# Patient Record
Sex: MALE | Race: WHITE | NOT HISPANIC OR LATINO | Employment: OTHER | URBAN - METROPOLITAN AREA
[De-identification: names, ages, dates, MRNs, and addresses within clinical notes are randomized per-mention and may not be internally consistent; named-entity substitution may affect disease eponyms.]

---

## 2021-03-20 ENCOUNTER — HOSPITAL ENCOUNTER (INPATIENT)
Facility: HOSPITAL | Age: 41
LOS: 20 days | Discharge: NURSING, REHAB OR LONG-TERM CARE | DRG: 065 | End: 2021-04-09
Attending: EMERGENCY MEDICINE | Admitting: HOSPITALIST
Payer: MEDICAID

## 2021-03-20 DIAGNOSIS — I63.9 STROKE: ICD-10-CM

## 2021-03-20 DIAGNOSIS — G93.40 ACUTE ENCEPHALOPATHY: ICD-10-CM

## 2021-03-20 DIAGNOSIS — R41.82 ALTERED MENTAL STATUS, UNSPECIFIED ALTERED MENTAL STATUS TYPE: ICD-10-CM

## 2021-03-20 DIAGNOSIS — L02.423: ICD-10-CM

## 2021-03-20 DIAGNOSIS — R41.82 AMS (ALTERED MENTAL STATUS): ICD-10-CM

## 2021-03-20 DIAGNOSIS — I16.1 HYPERTENSIVE EMERGENCY: ICD-10-CM

## 2021-03-20 DIAGNOSIS — L03.114 CELLULITIS OF LEFT FOREARM: ICD-10-CM

## 2021-03-20 DIAGNOSIS — I61.0 BASAL GANGLIA HEMORRHAGE: Primary | ICD-10-CM

## 2021-03-20 LAB
ABO + RH BLD: NORMAL
ALBUMIN SERPL BCP-MCNC: 4.2 G/DL (ref 3.5–5.2)
ALP SERPL-CCNC: 76 U/L (ref 55–135)
ALT SERPL W/O P-5'-P-CCNC: 9 U/L (ref 10–44)
AMMONIA PLAS-SCNC: 20 UMOL/L (ref 10–50)
AMPHET+METHAMPHET UR QL: NEGATIVE
ANION GAP SERPL CALC-SCNC: 8 MMOL/L (ref 8–16)
APTT PPP: 30.1 SEC (ref 23.6–33.3)
APTT PPP: 39.5 SEC (ref 23.6–33.3)
AST SERPL-CCNC: 20 U/L (ref 10–40)
BACTERIA #/AREA URNS HPF: ABNORMAL /HPF
BACTERIA #/AREA URNS HPF: NEGATIVE /HPF
BARBITURATES UR QL SCN>200 NG/ML: NEGATIVE
BASOPHILS # BLD AUTO: 0.05 K/UL (ref 0–0.2)
BASOPHILS NFR BLD: 0.3 % (ref 0–1.9)
BENZODIAZ UR QL SCN>200 NG/ML: NEGATIVE
BILIRUB SERPL-MCNC: 1.7 MG/DL (ref 0.1–1)
BILIRUB UR QL STRIP: NEGATIVE
BILIRUB UR QL STRIP: NEGATIVE
BLD GP AB SCN CELLS X3 SERPL QL: NORMAL
BNP SERPL-MCNC: 113 PG/ML (ref 0–99)
BUN SERPL-MCNC: 22 MG/DL (ref 6–20)
BZE UR QL SCN: NEGATIVE
CALCIUM SERPL-MCNC: 9.4 MG/DL (ref 8.7–10.5)
CANNABINOIDS UR QL SCN: NEGATIVE
CHLORIDE SERPL-SCNC: 105 MMOL/L (ref 95–110)
CHOLEST SERPL-MCNC: 210 MG/DL (ref 120–199)
CHOLEST/HDLC SERPL: 5.1 {RATIO} (ref 2–5)
CLARITY UR: CLEAR
CLARITY UR: CLEAR
CO2 SERPL-SCNC: 24 MMOL/L (ref 23–29)
COLOR UR: YELLOW
COLOR UR: YELLOW
CREAT SERPL-MCNC: 2.3 MG/DL (ref 0.5–1.4)
CREAT SERPL-MCNC: 2.5 MG/DL (ref 0.5–1.4)
CREAT UR-MCNC: 229 MG/DL (ref 23–375)
DIFFERENTIAL METHOD: ABNORMAL
EOSINOPHIL # BLD AUTO: 0.3 K/UL (ref 0–0.5)
EOSINOPHIL NFR BLD: 2.3 % (ref 0–8)
ERYTHROCYTE [DISTWIDTH] IN BLOOD BY AUTOMATED COUNT: 13.9 % (ref 11.5–14.5)
EST. GFR  (AFRICAN AMERICAN): 39.9 ML/MIN/1.73 M^2
EST. GFR  (NON AFRICAN AMERICAN): 34.5 ML/MIN/1.73 M^2
ESTIMATED AVG GLUCOSE: 117 MG/DL (ref 68–131)
FOLATE SERPL-MCNC: 6.3 NG/ML (ref 4–24)
GLUCOSE SERPL-MCNC: 111 MG/DL (ref 70–110)
GLUCOSE SERPL-MCNC: 93 MG/DL (ref 70–110)
GLUCOSE SERPL-MCNC: 94 MG/DL (ref 70–110)
GLUCOSE UR QL STRIP: NEGATIVE
GLUCOSE UR QL STRIP: NEGATIVE
HBA1C MFR BLD: 5.7 % (ref 4.5–6.2)
HCT VFR BLD AUTO: 41.1 % (ref 40–54)
HDLC SERPL-MCNC: 41 MG/DL (ref 40–75)
HDLC SERPL: 19.5 % (ref 20–50)
HGB BLD-MCNC: 13.2 G/DL (ref 14–18)
HGB UR QL STRIP: ABNORMAL
HGB UR QL STRIP: ABNORMAL
HYALINE CASTS #/AREA URNS LPF: 15 /LPF
HYALINE CASTS #/AREA URNS LPF: 27 /LPF
IMM GRANULOCYTES # BLD AUTO: 0.05 K/UL (ref 0–0.04)
IMM GRANULOCYTES NFR BLD AUTO: 0.3 % (ref 0–0.5)
INR PPP: 1.1
INR PPP: 1.1
KETONES UR QL STRIP: ABNORMAL
KETONES UR QL STRIP: NEGATIVE
LDLC SERPL CALC-MCNC: 146 MG/DL (ref 63–159)
LEUKOCYTE ESTERASE UR QL STRIP: NEGATIVE
LEUKOCYTE ESTERASE UR QL STRIP: NEGATIVE
LYMPHOCYTES # BLD AUTO: 2.1 K/UL (ref 1–4.8)
LYMPHOCYTES NFR BLD: 14.1 % (ref 18–48)
MAGNESIUM SERPL-MCNC: 1.8 MG/DL (ref 1.6–2.6)
MCH RBC QN AUTO: 27.6 PG (ref 27–31)
MCHC RBC AUTO-ENTMCNC: 32.1 G/DL (ref 32–36)
MCV RBC AUTO: 86 FL (ref 82–98)
MICROSCOPIC COMMENT: ABNORMAL
MICROSCOPIC COMMENT: ABNORMAL
MONOCYTES # BLD AUTO: 0.9 K/UL (ref 0.3–1)
MONOCYTES NFR BLD: 6.5 % (ref 4–15)
NEUTROPHILS # BLD AUTO: 11.1 K/UL (ref 1.8–7.7)
NEUTROPHILS NFR BLD: 76.5 % (ref 38–73)
NITRITE UR QL STRIP: NEGATIVE
NITRITE UR QL STRIP: NEGATIVE
NONHDLC SERPL-MCNC: 169 MG/DL
NRBC BLD-RTO: 0 /100 WBC
OPIATES UR QL SCN: NEGATIVE
PCP UR QL SCN>25 NG/ML: NEGATIVE
PH UR STRIP: 6 [PH] (ref 5–8)
PH UR STRIP: 6 [PH] (ref 5–8)
PLATELET # BLD AUTO: 199 K/UL (ref 150–350)
PMV BLD AUTO: 11.2 FL (ref 9.2–12.9)
POTASSIUM SERPL-SCNC: 3.7 MMOL/L (ref 3.5–5.1)
PROT SERPL-MCNC: 7.5 G/DL (ref 6–8.4)
PROT UR QL STRIP: ABNORMAL
PROT UR QL STRIP: ABNORMAL
PROTHROMBIN TIME: 13.3 SEC (ref 10.6–14.8)
PROTHROMBIN TIME: 13.8 SEC (ref 10.6–14.8)
RBC # BLD AUTO: 4.79 M/UL (ref 4.6–6.2)
RBC #/AREA URNS HPF: 1 /HPF (ref 0–4)
RBC #/AREA URNS HPF: 20 /HPF (ref 0–4)
SAMPLE: ABNORMAL
SARS-COV-2 RDRP RESP QL NAA+PROBE: NEGATIVE
SODIUM SERPL-SCNC: 137 MMOL/L (ref 136–145)
SP GR UR STRIP: 1.01 (ref 1–1.03)
SP GR UR STRIP: >=1.03 (ref 1–1.03)
SQUAMOUS #/AREA URNS HPF: 3 /HPF
SQUAMOUS #/AREA URNS HPF: 3 /HPF
TOXICOLOGY INFORMATION: NORMAL
TRIGL SERPL-MCNC: 115 MG/DL (ref 30–150)
TROPONIN I SERPL DL<=0.01 NG/ML-MCNC: <0.03 NG/ML
TSH SERPL DL<=0.005 MIU/L-ACNC: 1.82 UIU/ML (ref 0.34–5.6)
URN SPEC COLLECT METH UR: ABNORMAL
URN SPEC COLLECT METH UR: ABNORMAL
UROBILINOGEN UR STRIP-ACNC: NEGATIVE EU/DL
UROBILINOGEN UR STRIP-ACNC: NEGATIVE EU/DL
VIT B12 SERPL-MCNC: 212 PG/ML (ref 210–950)
WBC # BLD AUTO: 14.52 K/UL (ref 3.9–12.7)
WBC #/AREA URNS HPF: 2 /HPF (ref 0–5)
WBC #/AREA URNS HPF: 5 /HPF (ref 0–5)

## 2021-03-20 PROCEDURE — 63600175 PHARM REV CODE 636 W HCPCS: Performed by: INTERNAL MEDICINE

## 2021-03-20 PROCEDURE — 63600175 PHARM REV CODE 636 W HCPCS: Performed by: HOSPITALIST

## 2021-03-20 PROCEDURE — 82140 ASSAY OF AMMONIA: CPT | Performed by: HOSPITALIST

## 2021-03-20 PROCEDURE — 80061 LIPID PANEL: CPT | Performed by: HOSPITALIST

## 2021-03-20 PROCEDURE — 85610 PROTHROMBIN TIME: CPT | Mod: 91 | Performed by: EMERGENCY MEDICINE

## 2021-03-20 PROCEDURE — 86900 BLOOD TYPING SEROLOGIC ABO: CPT | Performed by: HOSPITALIST

## 2021-03-20 PROCEDURE — 85610 PROTHROMBIN TIME: CPT | Performed by: HOSPITALIST

## 2021-03-20 PROCEDURE — 86592 SYPHILIS TEST NON-TREP QUAL: CPT | Performed by: HOSPITALIST

## 2021-03-20 PROCEDURE — 82962 GLUCOSE BLOOD TEST: CPT

## 2021-03-20 PROCEDURE — C9113 INJ PANTOPRAZOLE SODIUM, VIA: HCPCS | Performed by: HOSPITALIST

## 2021-03-20 PROCEDURE — 83880 ASSAY OF NATRIURETIC PEPTIDE: CPT | Performed by: EMERGENCY MEDICINE

## 2021-03-20 PROCEDURE — 80307 DRUG TEST PRSMV CHEM ANLYZR: CPT | Performed by: EMERGENCY MEDICINE

## 2021-03-20 PROCEDURE — 99900035 HC TECH TIME PER 15 MIN (STAT)

## 2021-03-20 PROCEDURE — 85025 COMPLETE CBC W/AUTO DIFF WBC: CPT | Performed by: EMERGENCY MEDICINE

## 2021-03-20 PROCEDURE — 80053 COMPREHEN METABOLIC PANEL: CPT | Performed by: EMERGENCY MEDICINE

## 2021-03-20 PROCEDURE — 93010 EKG 12-LEAD: ICD-10-PCS | Mod: ,,, | Performed by: SPECIALIST

## 2021-03-20 PROCEDURE — 99291 CRITICAL CARE FIRST HOUR: CPT

## 2021-03-20 PROCEDURE — 84484 ASSAY OF TROPONIN QUANT: CPT | Performed by: EMERGENCY MEDICINE

## 2021-03-20 PROCEDURE — 25000003 PHARM REV CODE 250: Performed by: HOSPITALIST

## 2021-03-20 PROCEDURE — 20000000 HC ICU ROOM

## 2021-03-20 PROCEDURE — 83735 ASSAY OF MAGNESIUM: CPT | Performed by: EMERGENCY MEDICINE

## 2021-03-20 PROCEDURE — 84443 ASSAY THYROID STIM HORMONE: CPT | Performed by: HOSPITALIST

## 2021-03-20 PROCEDURE — 81001 URINALYSIS AUTO W/SCOPE: CPT | Performed by: EMERGENCY MEDICINE

## 2021-03-20 PROCEDURE — 94761 N-INVAS EAR/PLS OXIMETRY MLT: CPT

## 2021-03-20 PROCEDURE — 25000003 PHARM REV CODE 250: Performed by: EMERGENCY MEDICINE

## 2021-03-20 PROCEDURE — 82607 VITAMIN B-12: CPT | Performed by: HOSPITALIST

## 2021-03-20 PROCEDURE — 99232 SBSQ HOSP IP/OBS MODERATE 35: CPT | Mod: ,,, | Performed by: NEUROLOGICAL SURGERY

## 2021-03-20 PROCEDURE — U0002 COVID-19 LAB TEST NON-CDC: HCPCS | Performed by: EMERGENCY MEDICINE

## 2021-03-20 PROCEDURE — 93010 ELECTROCARDIOGRAM REPORT: CPT | Mod: ,,, | Performed by: SPECIALIST

## 2021-03-20 PROCEDURE — 99232 PR SUBSEQUENT HOSPITAL CARE,LEVL II: ICD-10-PCS | Mod: ,,, | Performed by: NEUROLOGICAL SURGERY

## 2021-03-20 PROCEDURE — 82746 ASSAY OF FOLIC ACID SERUM: CPT | Performed by: HOSPITALIST

## 2021-03-20 PROCEDURE — 83036 HEMOGLOBIN GLYCOSYLATED A1C: CPT | Performed by: HOSPITALIST

## 2021-03-20 PROCEDURE — 85730 THROMBOPLASTIN TIME PARTIAL: CPT | Performed by: HOSPITALIST

## 2021-03-20 PROCEDURE — 85730 THROMBOPLASTIN TIME PARTIAL: CPT | Mod: 91 | Performed by: EMERGENCY MEDICINE

## 2021-03-20 PROCEDURE — 93005 ELECTROCARDIOGRAM TRACING: CPT | Performed by: SPECIALIST

## 2021-03-20 PROCEDURE — 36415 COLL VENOUS BLD VENIPUNCTURE: CPT | Performed by: HOSPITALIST

## 2021-03-20 PROCEDURE — 81001 URINALYSIS AUTO W/SCOPE: CPT | Mod: 91 | Performed by: HOSPITALIST

## 2021-03-20 PROCEDURE — 96365 THER/PROPH/DIAG IV INF INIT: CPT

## 2021-03-20 RX ORDER — DIPHENHYDRAMINE HYDROCHLORIDE 50 MG/ML
50 INJECTION INTRAMUSCULAR; INTRAVENOUS EVERY 4 HOURS PRN
Status: DISCONTINUED | OUTPATIENT
Start: 2021-03-21 | End: 2021-03-25

## 2021-03-20 RX ORDER — ONDANSETRON 2 MG/ML
4 INJECTION INTRAMUSCULAR; INTRAVENOUS EVERY 6 HOURS PRN
Status: DISCONTINUED | OUTPATIENT
Start: 2021-03-20 | End: 2021-04-09 | Stop reason: HOSPADM

## 2021-03-20 RX ORDER — SODIUM CHLORIDE 0.9 % (FLUSH) 0.9 %
10 SYRINGE (ML) INJECTION
Status: DISCONTINUED | OUTPATIENT
Start: 2021-03-20 | End: 2021-04-09 | Stop reason: HOSPADM

## 2021-03-20 RX ORDER — NICARDIPINE HYDROCHLORIDE 0.2 MG/ML
1 INJECTION INTRAVENOUS CONTINUOUS
Status: DISCONTINUED | OUTPATIENT
Start: 2021-03-20 | End: 2021-03-24

## 2021-03-20 RX ORDER — DIPHENHYDRAMINE HYDROCHLORIDE 50 MG/ML
50 INJECTION INTRAMUSCULAR; INTRAVENOUS EVERY 6 HOURS PRN
Status: DISCONTINUED | OUTPATIENT
Start: 2021-03-20 | End: 2021-03-20

## 2021-03-20 RX ORDER — ACETAMINOPHEN 325 MG/1
650 TABLET ORAL EVERY 6 HOURS PRN
Status: DISCONTINUED | OUTPATIENT
Start: 2021-03-20 | End: 2021-04-09 | Stop reason: HOSPADM

## 2021-03-20 RX ORDER — DIPHENHYDRAMINE HYDROCHLORIDE 50 MG/ML
50 INJECTION INTRAMUSCULAR; INTRAVENOUS EVERY 4 HOURS PRN
Status: DISCONTINUED | OUTPATIENT
Start: 2021-03-21 | End: 2021-03-20

## 2021-03-20 RX ORDER — PANTOPRAZOLE SODIUM 40 MG/10ML
40 INJECTION, POWDER, LYOPHILIZED, FOR SOLUTION INTRAVENOUS DAILY
Status: DISCONTINUED | OUTPATIENT
Start: 2021-03-20 | End: 2021-03-23

## 2021-03-20 RX ADMIN — PANTOPRAZOLE SODIUM 40 MG: 40 INJECTION, POWDER, FOR SOLUTION INTRAVENOUS at 04:03

## 2021-03-20 RX ADMIN — NICARDIPINE HYDROCHLORIDE 7 MG/HR: 0.2 INJECTION INTRAVENOUS at 06:03

## 2021-03-20 RX ADMIN — NICARDIPINE HYDROCHLORIDE 1 MG/HR: 0.2 INJECTION INTRAVENOUS at 01:03

## 2021-03-20 RX ADMIN — NICARDIPINE HYDROCHLORIDE 15 MG/HR: 0.2 INJECTION INTRAVENOUS at 10:03

## 2021-03-20 RX ADMIN — DIPHENHYDRAMINE HYDROCHLORIDE 50 MG: 50 INJECTION INTRAMUSCULAR; INTRAVENOUS at 07:03

## 2021-03-21 ENCOUNTER — CLINICAL SUPPORT (OUTPATIENT)
Dept: CARDIOLOGY | Facility: HOSPITAL | Age: 41
DRG: 065 | End: 2021-03-21
Attending: HOSPITALIST
Payer: MEDICAID

## 2021-03-21 VITALS — WEIGHT: 217.13 LBS | HEIGHT: 68 IN | BODY MASS INDEX: 32.91 KG/M2

## 2021-03-21 LAB
ANION GAP SERPL CALC-SCNC: 12 MMOL/L (ref 8–16)
AORTIC ROOT ANNULUS: 3.66 CM
AORTIC VALVE CUSP SEPERATION: 2.42 CM
AV INDEX (PROSTH): 1
AV MEAN GRADIENT: 5 MMHG
AV PEAK GRADIENT: 8 MMHG
AV VALVE AREA: 4.56 CM2
AV VELOCITY RATIO: 86.5
BASOPHILS # BLD AUTO: 0.05 K/UL (ref 0–0.2)
BASOPHILS NFR BLD: 0.4 % (ref 0–1.9)
BSA FOR ECHO PROCEDURE: 2.17 M2
BUN SERPL-MCNC: 20 MG/DL (ref 6–20)
CALCIUM SERPL-MCNC: 8.9 MG/DL (ref 8.7–10.5)
CHLORIDE SERPL-SCNC: 107 MMOL/L (ref 95–110)
CO2 SERPL-SCNC: 22 MMOL/L (ref 23–29)
CREAT SERPL-MCNC: 2.4 MG/DL (ref 0.5–1.4)
CV ECHO LV RWT: 0.67 CM
DIFFERENTIAL METHOD: ABNORMAL
DOP CALC AO PEAK VEL: 1.43 M/S
DOP CALC AO VTI: 22.06 CM
DOP CALC LVOT AREA: 4.6 CM2
DOP CALC LVOT DIAMETER: 2.41 CM
DOP CALC LVOT PEAK VEL: 123.69 M/S
DOP CALC LVOT STROKE VOLUME: 100.67 CM3
DOP CALCLVOT PEAK VEL VTI: 22.08 CM
E WAVE DECELERATION TIME: 190.62 MSEC
E/A RATIO: 0.96
E/E' RATIO: 11.08 M/S
ECHO LV POSTERIOR WALL: 1.56 CM (ref 0.6–1.1)
EOSINOPHIL # BLD AUTO: 0.1 K/UL (ref 0–0.5)
EOSINOPHIL NFR BLD: 0.5 % (ref 0–8)
ERYTHROCYTE [DISTWIDTH] IN BLOOD BY AUTOMATED COUNT: 13.6 % (ref 11.5–14.5)
ERYTHROCYTE [DISTWIDTH] IN BLOOD BY AUTOMATED COUNT: 13.6 % (ref 11.5–14.5)
EST. GFR  (AFRICAN AMERICAN): 37.9 ML/MIN/1.73 M^2
EST. GFR  (NON AFRICAN AMERICAN): 32.8 ML/MIN/1.73 M^2
FRACTIONAL SHORTENING: 31 % (ref 28–44)
GLUCOSE SERPL-MCNC: 116 MG/DL (ref 70–110)
HCT VFR BLD AUTO: 38.4 % (ref 40–54)
HCT VFR BLD AUTO: 38.4 % (ref 40–54)
HGB BLD-MCNC: 12.5 G/DL (ref 14–18)
HGB BLD-MCNC: 12.5 G/DL (ref 14–18)
IMM GRANULOCYTES # BLD AUTO: 0.05 K/UL (ref 0–0.04)
IMM GRANULOCYTES NFR BLD AUTO: 0.4 % (ref 0–0.5)
INTERVENTRICULAR SEPTUM: 1.57 CM (ref 0.6–1.1)
IVRT: 102.22 MSEC
LEFT ATRIUM SIZE: 3.63 CM
LEFT INTERNAL DIMENSION IN SYSTOLE: 3.22 CM (ref 2.1–4)
LEFT VENTRICLE MASS INDEX: 145 G/M2
LEFT VENTRICULAR INTERNAL DIMENSION IN DIASTOLE: 4.64 CM (ref 3.5–6)
LEFT VENTRICULAR MASS: 307.77 G
LV LATERAL E/E' RATIO: 9 M/S
LV SEPTAL E/E' RATIO: 14.4 M/S
LYMPHOCYTES # BLD AUTO: 1.4 K/UL (ref 1–4.8)
LYMPHOCYTES NFR BLD: 10.6 % (ref 18–48)
MAGNESIUM SERPL-MCNC: 1.8 MG/DL (ref 1.6–2.6)
MCH RBC QN AUTO: 27.9 PG (ref 27–31)
MCH RBC QN AUTO: 27.9 PG (ref 27–31)
MCHC RBC AUTO-ENTMCNC: 32.6 G/DL (ref 32–36)
MCHC RBC AUTO-ENTMCNC: 32.6 G/DL (ref 32–36)
MCV RBC AUTO: 86 FL (ref 82–98)
MCV RBC AUTO: 86 FL (ref 82–98)
MONOCYTES # BLD AUTO: 0.9 K/UL (ref 0.3–1)
MONOCYTES NFR BLD: 6.3 % (ref 4–15)
MV PEAK A VEL: 0.75 M/S
MV PEAK E VEL: 0.72 M/S
NEUTROPHILS # BLD AUTO: 11 K/UL (ref 1.8–7.7)
NEUTROPHILS NFR BLD: 81.8 % (ref 38–73)
NRBC BLD-RTO: 0 /100 WBC
PISA TR MAX VEL: 2.47 M/S
PLATELET # BLD AUTO: 215 K/UL (ref 150–350)
PLATELET # BLD AUTO: 215 K/UL (ref 150–350)
PMV BLD AUTO: 11 FL (ref 9.2–12.9)
PMV BLD AUTO: 11 FL (ref 9.2–12.9)
POTASSIUM SERPL-SCNC: 3.8 MMOL/L (ref 3.5–5.1)
PV PEAK VELOCITY: 113.42 CM/S
RA PRESSURE: 3 MMHG
RBC # BLD AUTO: 4.48 M/UL (ref 4.6–6.2)
RBC # BLD AUTO: 4.48 M/UL (ref 4.6–6.2)
RIGHT VENTRICULAR END-DIASTOLIC DIMENSION: 256 CM
SODIUM SERPL-SCNC: 141 MMOL/L (ref 136–145)
TDI LATERAL: 0.08 M/S
TDI SEPTAL: 0.05 M/S
TDI: 0.07 M/S
TR MAX PG: 24 MMHG
TV REST PULMONARY ARTERY PRESSURE: 27 MMHG
WBC # BLD AUTO: 13.42 K/UL (ref 3.9–12.7)
WBC # BLD AUTO: 13.42 K/UL (ref 3.9–12.7)

## 2021-03-21 PROCEDURE — C9113 INJ PANTOPRAZOLE SODIUM, VIA: HCPCS | Performed by: HOSPITALIST

## 2021-03-21 PROCEDURE — 25000003 PHARM REV CODE 250: Performed by: HOSPITALIST

## 2021-03-21 PROCEDURE — 99900035 HC TECH TIME PER 15 MIN (STAT)

## 2021-03-21 PROCEDURE — 36415 COLL VENOUS BLD VENIPUNCTURE: CPT | Performed by: INTERNAL MEDICINE

## 2021-03-21 PROCEDURE — 87040 BLOOD CULTURE FOR BACTERIA: CPT | Performed by: INTERNAL MEDICINE

## 2021-03-21 PROCEDURE — 93306 ECHO (CUPID ONLY): ICD-10-PCS | Mod: 26,,, | Performed by: INTERNAL MEDICINE

## 2021-03-21 PROCEDURE — 99233 PR SUBSEQUENT HOSPITAL CARE,LEVL III: ICD-10-PCS | Mod: ,,, | Performed by: NEUROLOGICAL SURGERY

## 2021-03-21 PROCEDURE — 20000000 HC ICU ROOM

## 2021-03-21 PROCEDURE — 94761 N-INVAS EAR/PLS OXIMETRY MLT: CPT

## 2021-03-21 PROCEDURE — 93306 TTE W/DOPPLER COMPLETE: CPT

## 2021-03-21 PROCEDURE — 97110 THERAPEUTIC EXERCISES: CPT

## 2021-03-21 PROCEDURE — 93306 TTE W/DOPPLER COMPLETE: CPT | Mod: 26,,, | Performed by: INTERNAL MEDICINE

## 2021-03-21 PROCEDURE — 63600175 PHARM REV CODE 636 W HCPCS: Performed by: HOSPITALIST

## 2021-03-21 PROCEDURE — 63600175 PHARM REV CODE 636 W HCPCS: Performed by: INTERNAL MEDICINE

## 2021-03-21 PROCEDURE — 97535 SELF CARE MNGMENT TRAINING: CPT

## 2021-03-21 PROCEDURE — 25000003 PHARM REV CODE 250: Performed by: INTERNAL MEDICINE

## 2021-03-21 PROCEDURE — 99233 SBSQ HOSP IP/OBS HIGH 50: CPT | Mod: ,,, | Performed by: NEUROLOGICAL SURGERY

## 2021-03-21 PROCEDURE — 85025 COMPLETE CBC W/AUTO DIFF WBC: CPT | Performed by: HOSPITALIST

## 2021-03-21 PROCEDURE — 80048 BASIC METABOLIC PNL TOTAL CA: CPT | Performed by: HOSPITALIST

## 2021-03-21 PROCEDURE — 87040 BLOOD CULTURE FOR BACTERIA: CPT | Mod: 59 | Performed by: INTERNAL MEDICINE

## 2021-03-21 PROCEDURE — 97167 OT EVAL HIGH COMPLEX 60 MIN: CPT

## 2021-03-21 PROCEDURE — 83735 ASSAY OF MAGNESIUM: CPT | Performed by: HOSPITALIST

## 2021-03-21 RX ORDER — HALOPERIDOL LACTATE 5 MG/ML
5 INJECTION, SOLUTION INTRAMUSCULAR ONCE
Status: COMPLETED | OUTPATIENT
Start: 2021-03-21 | End: 2021-03-21

## 2021-03-21 RX ORDER — SODIUM CHLORIDE 9 MG/ML
INJECTION, SOLUTION INTRAVENOUS CONTINUOUS
Status: DISCONTINUED | OUTPATIENT
Start: 2021-03-21 | End: 2021-03-23

## 2021-03-21 RX ORDER — CLONAZEPAM 0.5 MG/1
0.5 TABLET ORAL 2 TIMES DAILY
Status: DISCONTINUED | OUTPATIENT
Start: 2021-03-21 | End: 2021-03-29

## 2021-03-21 RX ORDER — DEXMEDETOMIDINE HYDROCHLORIDE 4 UG/ML
0-1.4 INJECTION, SOLUTION INTRAVENOUS CONTINUOUS
Status: DISCONTINUED | OUTPATIENT
Start: 2021-03-21 | End: 2021-04-07

## 2021-03-21 RX ADMIN — HALOPERIDOL LACTATE 5 MG: 5 INJECTION, SOLUTION INTRAMUSCULAR at 08:03

## 2021-03-21 RX ADMIN — SODIUM CHLORIDE: 0.9 INJECTION, SOLUTION INTRAVENOUS at 11:03

## 2021-03-21 RX ADMIN — DIPHENHYDRAMINE HYDROCHLORIDE 50 MG: 50 INJECTION INTRAMUSCULAR; INTRAVENOUS at 04:03

## 2021-03-21 RX ADMIN — DEXMEDETOMIDINE HYDROCHLORIDE 0.2 MCG/KG/HR: 400 INJECTION, SOLUTION INTRAVENOUS at 07:03

## 2021-03-21 RX ADMIN — SODIUM CHLORIDE: 0.9 INJECTION, SOLUTION INTRAVENOUS at 06:03

## 2021-03-21 RX ADMIN — PANTOPRAZOLE SODIUM 40 MG: 40 INJECTION, POWDER, FOR SOLUTION INTRAVENOUS at 08:03

## 2021-03-21 RX ADMIN — DEXMEDETOMIDINE HYDROCHLORIDE 0.6 MCG/KG/HR: 400 INJECTION, SOLUTION INTRAVENOUS at 05:03

## 2021-03-21 RX ADMIN — DEXMEDETOMIDINE HYDROCHLORIDE 0.4 MCG/KG/HR: 400 INJECTION, SOLUTION INTRAVENOUS at 02:03

## 2021-03-21 RX ADMIN — DIPHENHYDRAMINE HYDROCHLORIDE 50 MG: 50 INJECTION INTRAMUSCULAR; INTRAVENOUS at 12:03

## 2021-03-21 RX ADMIN — NICARDIPINE HYDROCHLORIDE 13 MG/HR: 0.2 INJECTION INTRAVENOUS at 01:03

## 2021-03-22 LAB
AMMONIA PLAS-SCNC: 10 UMOL/L (ref 10–50)
ANION GAP SERPL CALC-SCNC: 9 MMOL/L (ref 8–16)
BASOPHILS # BLD AUTO: 0.06 K/UL (ref 0–0.2)
BASOPHILS NFR BLD: 0.6 % (ref 0–1.9)
BUN SERPL-MCNC: 21 MG/DL (ref 6–20)
CALCIUM SERPL-MCNC: 9 MG/DL (ref 8.7–10.5)
CHLORIDE SERPL-SCNC: 112 MMOL/L (ref 95–110)
CK SERPL-CCNC: 407 U/L (ref 20–200)
CO2 SERPL-SCNC: 21 MMOL/L (ref 23–29)
CREAT SERPL-MCNC: 2.3 MG/DL (ref 0.5–1.4)
DIFFERENTIAL METHOD: ABNORMAL
EOSINOPHIL # BLD AUTO: 0.2 K/UL (ref 0–0.5)
EOSINOPHIL NFR BLD: 1.9 % (ref 0–8)
ERYTHROCYTE [DISTWIDTH] IN BLOOD BY AUTOMATED COUNT: 14 % (ref 11.5–14.5)
EST. GFR  (AFRICAN AMERICAN): 39.9 ML/MIN/1.73 M^2
EST. GFR  (NON AFRICAN AMERICAN): 34.5 ML/MIN/1.73 M^2
GLUCOSE SERPL-MCNC: 83 MG/DL (ref 70–110)
GLUCOSE SERPL-MCNC: 91 MG/DL (ref 70–110)
GLUCOSE SERPL-MCNC: 93 MG/DL (ref 70–110)
HCT VFR BLD AUTO: 38.5 % (ref 40–54)
HGB BLD-MCNC: 12.2 G/DL (ref 14–18)
IMM GRANULOCYTES # BLD AUTO: 0.03 K/UL (ref 0–0.04)
IMM GRANULOCYTES NFR BLD AUTO: 0.3 % (ref 0–0.5)
LYMPHOCYTES # BLD AUTO: 1.6 K/UL (ref 1–4.8)
LYMPHOCYTES NFR BLD: 15.1 % (ref 18–48)
MCH RBC QN AUTO: 28 PG (ref 27–31)
MCHC RBC AUTO-ENTMCNC: 31.7 G/DL (ref 32–36)
MCV RBC AUTO: 88 FL (ref 82–98)
MONOCYTES # BLD AUTO: 0.7 K/UL (ref 0.3–1)
MONOCYTES NFR BLD: 6.2 % (ref 4–15)
NEUTROPHILS # BLD AUTO: 8.2 K/UL (ref 1.8–7.7)
NEUTROPHILS NFR BLD: 75.9 % (ref 38–73)
NRBC BLD-RTO: 0 /100 WBC
PLATELET # BLD AUTO: 213 K/UL (ref 150–350)
PMV BLD AUTO: 10.7 FL (ref 9.2–12.9)
POTASSIUM SERPL-SCNC: 4 MMOL/L (ref 3.5–5.1)
RBC # BLD AUTO: 4.36 M/UL (ref 4.6–6.2)
RPR SER QL: NORMAL
SODIUM SERPL-SCNC: 142 MMOL/L (ref 136–145)
WBC # BLD AUTO: 10.75 K/UL (ref 3.9–12.7)

## 2021-03-22 PROCEDURE — 80048 BASIC METABOLIC PNL TOTAL CA: CPT | Performed by: HOSPITALIST

## 2021-03-22 PROCEDURE — 84207 ASSAY OF VITAMIN B-6: CPT | Performed by: STUDENT IN AN ORGANIZED HEALTH CARE EDUCATION/TRAINING PROGRAM

## 2021-03-22 PROCEDURE — 85025 COMPLETE CBC W/AUTO DIFF WBC: CPT | Performed by: HOSPITALIST

## 2021-03-22 PROCEDURE — 92610 EVALUATE SWALLOWING FUNCTION: CPT

## 2021-03-22 PROCEDURE — 63600175 PHARM REV CODE 636 W HCPCS: Performed by: HOSPITALIST

## 2021-03-22 PROCEDURE — 99900035 HC TECH TIME PER 15 MIN (STAT)

## 2021-03-22 PROCEDURE — 82550 ASSAY OF CK (CPK): CPT | Performed by: STUDENT IN AN ORGANIZED HEALTH CARE EDUCATION/TRAINING PROGRAM

## 2021-03-22 PROCEDURE — C9113 INJ PANTOPRAZOLE SODIUM, VIA: HCPCS | Performed by: HOSPITALIST

## 2021-03-22 PROCEDURE — 82962 GLUCOSE BLOOD TEST: CPT

## 2021-03-22 PROCEDURE — 82140 ASSAY OF AMMONIA: CPT | Performed by: STUDENT IN AN ORGANIZED HEALTH CARE EDUCATION/TRAINING PROGRAM

## 2021-03-22 PROCEDURE — 97535 SELF CARE MNGMENT TRAINING: CPT

## 2021-03-22 PROCEDURE — 63600175 PHARM REV CODE 636 W HCPCS: Performed by: INTERNAL MEDICINE

## 2021-03-22 PROCEDURE — 20000000 HC ICU ROOM

## 2021-03-22 PROCEDURE — 25000003 PHARM REV CODE 250: Performed by: INTERNAL MEDICINE

## 2021-03-22 PROCEDURE — 94761 N-INVAS EAR/PLS OXIMETRY MLT: CPT

## 2021-03-22 PROCEDURE — 97162 PT EVAL MOD COMPLEX 30 MIN: CPT

## 2021-03-22 PROCEDURE — 36415 COLL VENOUS BLD VENIPUNCTURE: CPT | Performed by: STUDENT IN AN ORGANIZED HEALTH CARE EDUCATION/TRAINING PROGRAM

## 2021-03-22 PROCEDURE — 97530 THERAPEUTIC ACTIVITIES: CPT

## 2021-03-22 PROCEDURE — 25000003 PHARM REV CODE 250: Performed by: STUDENT IN AN ORGANIZED HEALTH CARE EDUCATION/TRAINING PROGRAM

## 2021-03-22 PROCEDURE — 84425 ASSAY OF VITAMIN B-1: CPT | Performed by: STUDENT IN AN ORGANIZED HEALTH CARE EDUCATION/TRAINING PROGRAM

## 2021-03-22 PROCEDURE — 36415 COLL VENOUS BLD VENIPUNCTURE: CPT | Performed by: HOSPITALIST

## 2021-03-22 PROCEDURE — 25000003 PHARM REV CODE 250: Performed by: HOSPITALIST

## 2021-03-22 PROCEDURE — 86341 ISLET CELL ANTIBODY: CPT | Performed by: STUDENT IN AN ORGANIZED HEALTH CARE EDUCATION/TRAINING PROGRAM

## 2021-03-22 PROCEDURE — 97112 NEUROMUSCULAR REEDUCATION: CPT

## 2021-03-22 PROCEDURE — 30000890 LABCORP MISCELLANEOUS TEST: Performed by: STUDENT IN AN ORGANIZED HEALTH CARE EDUCATION/TRAINING PROGRAM

## 2021-03-22 PROCEDURE — 25000003 PHARM REV CODE 250

## 2021-03-22 RX ORDER — MUPIROCIN 20 MG/G
OINTMENT TOPICAL 2 TIMES DAILY
Status: COMPLETED | OUTPATIENT
Start: 2021-03-22 | End: 2021-03-26

## 2021-03-22 RX ORDER — CHLORHEXIDINE GLUCONATE ORAL RINSE 1.2 MG/ML
15 SOLUTION DENTAL 2 TIMES DAILY
Status: DISPENSED | OUTPATIENT
Start: 2021-03-22 | End: 2021-03-27

## 2021-03-22 RX ADMIN — NICARDIPINE HYDROCHLORIDE 12 MG/HR: 0.2 INJECTION INTRAVENOUS at 10:03

## 2021-03-22 RX ADMIN — SODIUM CHLORIDE: 0.9 INJECTION, SOLUTION INTRAVENOUS at 04:03

## 2021-03-22 RX ADMIN — CHLORHEXIDINE GLUCONATE 15 ML: 1.2 RINSE ORAL at 08:03

## 2021-03-22 RX ADMIN — MUPIROCIN: 20 OINTMENT TOPICAL at 08:03

## 2021-03-22 RX ADMIN — NICARDIPINE HYDROCHLORIDE 2 MG/HR: 0.2 INJECTION INTRAVENOUS at 05:03

## 2021-03-22 RX ADMIN — MUPIROCIN: 20 OINTMENT TOPICAL at 10:03

## 2021-03-22 RX ADMIN — CLONAZEPAM 0.5 MG: 0.5 TABLET ORAL at 08:03

## 2021-03-22 RX ADMIN — PANTOPRAZOLE SODIUM 40 MG: 40 INJECTION, POWDER, FOR SOLUTION INTRAVENOUS at 09:03

## 2021-03-22 RX ADMIN — DEXMEDETOMIDINE HYDROCHLORIDE 0.2 MCG/KG/HR: 400 INJECTION, SOLUTION INTRAVENOUS at 02:03

## 2021-03-22 RX ADMIN — SODIUM CHLORIDE: 0.9 INJECTION, SOLUTION INTRAVENOUS at 09:03

## 2021-03-22 RX ADMIN — CHLORHEXIDINE GLUCONATE 15 ML: 1.2 RINSE ORAL at 10:03

## 2021-03-22 RX ADMIN — NICARDIPINE HYDROCHLORIDE 15 MG/HR: 0.2 INJECTION INTRAVENOUS at 09:03

## 2021-03-22 RX ADMIN — DIPHENHYDRAMINE HYDROCHLORIDE 50 MG: 50 INJECTION INTRAMUSCULAR; INTRAVENOUS at 02:03

## 2021-03-22 RX ADMIN — DIPHENHYDRAMINE HYDROCHLORIDE 50 MG: 50 INJECTION INTRAMUSCULAR; INTRAVENOUS at 04:03

## 2021-03-22 RX ADMIN — NICARDIPINE HYDROCHLORIDE 10 MG/HR: 0.2 INJECTION INTRAVENOUS at 06:03

## 2021-03-22 RX ADMIN — CLONAZEPAM 0.5 MG: 0.5 TABLET ORAL at 09:03

## 2021-03-22 RX ADMIN — NICARDIPINE HYDROCHLORIDE 10 MG/HR: 0.2 INJECTION INTRAVENOUS at 02:03

## 2021-03-23 ENCOUNTER — CLINICAL SUPPORT (OUTPATIENT)
Dept: CARDIOLOGY | Facility: HOSPITAL | Age: 41
DRG: 065 | End: 2021-03-23
Attending: PSYCHIATRY & NEUROLOGY
Payer: MEDICAID

## 2021-03-23 VITALS — HEIGHT: 68 IN | WEIGHT: 218.06 LBS | BODY MASS INDEX: 33.05 KG/M2

## 2021-03-23 PROBLEM — I63.9 ACUTE CVA (CEREBROVASCULAR ACCIDENT): Status: ACTIVE | Noted: 2021-03-23

## 2021-03-23 PROBLEM — R53.81 PHYSICAL DEBILITY: Status: ACTIVE | Noted: 2021-03-23

## 2021-03-23 PROBLEM — N18.9 CKD (CHRONIC KIDNEY DISEASE): Status: ACTIVE | Noted: 2021-03-23

## 2021-03-23 PROBLEM — G93.40 ACUTE ENCEPHALOPATHY: Status: ACTIVE | Noted: 2021-03-23

## 2021-03-23 PROBLEM — R62.50 DEVELOPMENT DELAY: Status: ACTIVE | Noted: 2021-03-23

## 2021-03-23 PROBLEM — L98.9 SKIN LESIONS: Status: ACTIVE | Noted: 2021-03-23

## 2021-03-23 LAB
ANION GAP SERPL CALC-SCNC: 10 MMOL/L (ref 8–16)
BASOPHILS # BLD AUTO: 0.06 K/UL (ref 0–0.2)
BASOPHILS NFR BLD: 0.5 % (ref 0–1.9)
BSA FOR ECHO PROCEDURE: 2.18 M2
BUN SERPL-MCNC: 18 MG/DL (ref 6–20)
CALCIUM SERPL-MCNC: 8.8 MG/DL (ref 8.7–10.5)
CHLORIDE SERPL-SCNC: 113 MMOL/L (ref 95–110)
CK SERPL-CCNC: 229 U/L (ref 20–200)
CO2 SERPL-SCNC: 19 MMOL/L (ref 23–29)
CREAT SERPL-MCNC: 2.2 MG/DL (ref 0.5–1.4)
DIFFERENTIAL METHOD: ABNORMAL
EOSINOPHIL # BLD AUTO: 0.4 K/UL (ref 0–0.5)
EOSINOPHIL NFR BLD: 3.4 % (ref 0–8)
ERYTHROCYTE [DISTWIDTH] IN BLOOD BY AUTOMATED COUNT: 14.2 % (ref 11.5–14.5)
EST. GFR  (AFRICAN AMERICAN): 42.1 ML/MIN/1.73 M^2
EST. GFR  (NON AFRICAN AMERICAN): 36.4 ML/MIN/1.73 M^2
GLUCOSE SERPL-MCNC: 95 MG/DL (ref 70–110)
HCT VFR BLD AUTO: 38.8 % (ref 40–54)
HGB BLD-MCNC: 12.4 G/DL (ref 14–18)
IMM GRANULOCYTES # BLD AUTO: 0.04 K/UL (ref 0–0.04)
IMM GRANULOCYTES NFR BLD AUTO: 0.3 % (ref 0–0.5)
LYMPHOCYTES # BLD AUTO: 2.3 K/UL (ref 1–4.8)
LYMPHOCYTES NFR BLD: 18.4 % (ref 18–48)
MCH RBC QN AUTO: 27.9 PG (ref 27–31)
MCHC RBC AUTO-ENTMCNC: 32 G/DL (ref 32–36)
MCV RBC AUTO: 87 FL (ref 82–98)
MONOCYTES # BLD AUTO: 1 K/UL (ref 0.3–1)
MONOCYTES NFR BLD: 7.7 % (ref 4–15)
NEUTROPHILS # BLD AUTO: 8.8 K/UL (ref 1.8–7.7)
NEUTROPHILS NFR BLD: 69.7 % (ref 38–73)
NRBC BLD-RTO: 0 /100 WBC
PLATELET # BLD AUTO: 234 K/UL (ref 150–350)
PMV BLD AUTO: 10.8 FL (ref 9.2–12.9)
POTASSIUM SERPL-SCNC: 3.7 MMOL/L (ref 3.5–5.1)
RBC # BLD AUTO: 4.44 M/UL (ref 4.6–6.2)
SODIUM SERPL-SCNC: 142 MMOL/L (ref 136–145)
WBC # BLD AUTO: 12.66 K/UL (ref 3.9–12.7)

## 2021-03-23 PROCEDURE — 99900035 HC TECH TIME PER 15 MIN (STAT)

## 2021-03-23 PROCEDURE — 94761 N-INVAS EAR/PLS OXIMETRY MLT: CPT

## 2021-03-23 PROCEDURE — 80048 BASIC METABOLIC PNL TOTAL CA: CPT | Performed by: HOSPITALIST

## 2021-03-23 PROCEDURE — 25000003 PHARM REV CODE 250: Performed by: HOSPITALIST

## 2021-03-23 PROCEDURE — 36415 COLL VENOUS BLD VENIPUNCTURE: CPT | Performed by: STUDENT IN AN ORGANIZED HEALTH CARE EDUCATION/TRAINING PROGRAM

## 2021-03-23 PROCEDURE — 63600175 PHARM REV CODE 636 W HCPCS: Performed by: HOSPITALIST

## 2021-03-23 PROCEDURE — 85025 COMPLETE CBC W/AUTO DIFF WBC: CPT | Performed by: HOSPITALIST

## 2021-03-23 PROCEDURE — 93308 ECHO (CUPID ONLY): ICD-10-PCS | Mod: 26,,, | Performed by: INTERNAL MEDICINE

## 2021-03-23 PROCEDURE — 25000003 PHARM REV CODE 250: Performed by: STUDENT IN AN ORGANIZED HEALTH CARE EDUCATION/TRAINING PROGRAM

## 2021-03-23 PROCEDURE — 97112 NEUROMUSCULAR REEDUCATION: CPT

## 2021-03-23 PROCEDURE — 97535 SELF CARE MNGMENT TRAINING: CPT

## 2021-03-23 PROCEDURE — C9113 INJ PANTOPRAZOLE SODIUM, VIA: HCPCS | Performed by: HOSPITALIST

## 2021-03-23 PROCEDURE — 20000000 HC ICU ROOM

## 2021-03-23 PROCEDURE — 97530 THERAPEUTIC ACTIVITIES: CPT

## 2021-03-23 PROCEDURE — 63600175 PHARM REV CODE 636 W HCPCS: Performed by: INTERNAL MEDICINE

## 2021-03-23 PROCEDURE — 25000003 PHARM REV CODE 250

## 2021-03-23 PROCEDURE — 25000003 PHARM REV CODE 250: Performed by: INTERNAL MEDICINE

## 2021-03-23 PROCEDURE — 27000221 HC OXYGEN, UP TO 24 HOURS

## 2021-03-23 PROCEDURE — 36415 COLL VENOUS BLD VENIPUNCTURE: CPT | Performed by: HOSPITALIST

## 2021-03-23 PROCEDURE — 93308 TTE F-UP OR LMTD: CPT | Mod: 26,,, | Performed by: INTERNAL MEDICINE

## 2021-03-23 PROCEDURE — 92526 ORAL FUNCTION THERAPY: CPT

## 2021-03-23 PROCEDURE — 93308 TTE F-UP OR LMTD: CPT

## 2021-03-23 PROCEDURE — 92523 SPEECH SOUND LANG COMPREHEN: CPT

## 2021-03-23 PROCEDURE — 82550 ASSAY OF CK (CPK): CPT | Performed by: STUDENT IN AN ORGANIZED HEALTH CARE EDUCATION/TRAINING PROGRAM

## 2021-03-23 PROCEDURE — 95819 EEG AWAKE AND ASLEEP: CPT

## 2021-03-23 RX ORDER — AMLODIPINE BESYLATE 5 MG/1
10 TABLET ORAL DAILY
Status: DISCONTINUED | OUTPATIENT
Start: 2021-03-23 | End: 2021-04-02

## 2021-03-23 RX ORDER — LISINOPRIL 20 MG/1
20 TABLET ORAL DAILY
Status: DISCONTINUED | OUTPATIENT
Start: 2021-03-23 | End: 2021-03-31

## 2021-03-23 RX ORDER — FAMOTIDINE 20 MG/1
20 TABLET, FILM COATED ORAL 2 TIMES DAILY
Status: DISCONTINUED | OUTPATIENT
Start: 2021-03-23 | End: 2021-04-09 | Stop reason: HOSPADM

## 2021-03-23 RX ORDER — AMLODIPINE BESYLATE 5 MG/1
5 TABLET ORAL DAILY
Status: DISCONTINUED | OUTPATIENT
Start: 2021-03-23 | End: 2021-03-23

## 2021-03-23 RX ADMIN — CLONAZEPAM 0.5 MG: 0.5 TABLET ORAL at 09:03

## 2021-03-23 RX ADMIN — MUPIROCIN: 20 OINTMENT TOPICAL at 09:03

## 2021-03-23 RX ADMIN — FAMOTIDINE 20 MG: 20 TABLET ORAL at 09:03

## 2021-03-23 RX ADMIN — MUPIROCIN: 20 OINTMENT TOPICAL at 08:03

## 2021-03-23 RX ADMIN — DEXMEDETOMIDINE HYDROCHLORIDE 1 MCG/KG/HR: 400 INJECTION, SOLUTION INTRAVENOUS at 07:03

## 2021-03-23 RX ADMIN — NICARDIPINE HYDROCHLORIDE 13 MG/HR: 0.2 INJECTION INTRAVENOUS at 01:03

## 2021-03-23 RX ADMIN — AMLODIPINE BESYLATE 10 MG: 5 TABLET ORAL at 09:03

## 2021-03-23 RX ADMIN — NICARDIPINE HYDROCHLORIDE 10 MG/HR: 0.2 INJECTION INTRAVENOUS at 09:03

## 2021-03-23 RX ADMIN — SODIUM CHLORIDE: 0.9 INJECTION, SOLUTION INTRAVENOUS at 08:03

## 2021-03-23 RX ADMIN — CHLORHEXIDINE GLUCONATE 15 ML: 1.2 RINSE ORAL at 09:03

## 2021-03-23 RX ADMIN — LISINOPRIL 20 MG: 20 TABLET ORAL at 09:03

## 2021-03-23 RX ADMIN — NICARDIPINE HYDROCHLORIDE 5 MG/HR: 0.2 INJECTION INTRAVENOUS at 04:03

## 2021-03-23 RX ADMIN — DIPHENHYDRAMINE HYDROCHLORIDE 50 MG: 50 INJECTION INTRAMUSCULAR; INTRAVENOUS at 07:03

## 2021-03-23 RX ADMIN — SODIUM CHLORIDE: 0.9 INJECTION, SOLUTION INTRAVENOUS at 01:03

## 2021-03-23 RX ADMIN — CHLORHEXIDINE GLUCONATE 15 ML: 1.2 RINSE ORAL at 08:03

## 2021-03-23 RX ADMIN — NICARDIPINE HYDROCHLORIDE 6 MG/HR: 0.2 INJECTION INTRAVENOUS at 04:03

## 2021-03-23 RX ADMIN — PANTOPRAZOLE SODIUM 40 MG: 40 INJECTION, POWDER, FOR SOLUTION INTRAVENOUS at 08:03

## 2021-03-23 RX ADMIN — NICARDIPINE HYDROCHLORIDE 15 MG/HR: 0.2 INJECTION INTRAVENOUS at 11:03

## 2021-03-23 RX ADMIN — NICARDIPINE HYDROCHLORIDE 12 MG/HR: 0.2 INJECTION INTRAVENOUS at 08:03

## 2021-03-24 LAB
ALBUMIN SERPL BCP-MCNC: 3.2 G/DL (ref 3.5–5.2)
ALP SERPL-CCNC: 53 U/L (ref 55–135)
ALT SERPL W/O P-5'-P-CCNC: 10 U/L (ref 10–44)
ANION GAP SERPL CALC-SCNC: 9 MMOL/L (ref 8–16)
AST SERPL-CCNC: 16 U/L (ref 10–40)
BASOPHILS # BLD AUTO: 0.05 K/UL (ref 0–0.2)
BASOPHILS NFR BLD: 0.4 % (ref 0–1.9)
BILIRUB SERPL-MCNC: 1.5 MG/DL (ref 0.1–1)
BUN SERPL-MCNC: 17 MG/DL (ref 6–20)
CALCIUM SERPL-MCNC: 8.7 MG/DL (ref 8.7–10.5)
CHLORIDE SERPL-SCNC: 118 MMOL/L (ref 95–110)
CK SERPL-CCNC: 151 U/L (ref 20–200)
CO2 SERPL-SCNC: 19 MMOL/L (ref 23–29)
CREAT SERPL-MCNC: 2.1 MG/DL (ref 0.5–1.4)
DIFFERENTIAL METHOD: ABNORMAL
EOSINOPHIL # BLD AUTO: 0.4 K/UL (ref 0–0.5)
EOSINOPHIL NFR BLD: 3.9 % (ref 0–8)
ERYTHROCYTE [DISTWIDTH] IN BLOOD BY AUTOMATED COUNT: 14.6 % (ref 11.5–14.5)
EST. GFR  (AFRICAN AMERICAN): 44.5 ML/MIN/1.73 M^2
EST. GFR  (NON AFRICAN AMERICAN): 38.5 ML/MIN/1.73 M^2
GLUCOSE SERPL-MCNC: 116 MG/DL (ref 70–110)
HCT VFR BLD AUTO: 36.7 % (ref 40–54)
HGB BLD-MCNC: 11.6 G/DL (ref 14–18)
IMM GRANULOCYTES # BLD AUTO: 0.03 K/UL (ref 0–0.04)
IMM GRANULOCYTES NFR BLD AUTO: 0.3 % (ref 0–0.5)
LABCORP MISC TEST CODE: NORMAL
LABCORP MISC TEST CODE: NORMAL
LABCORP MISC TEST NAME: NORMAL
LABCORP MISC TEST NAME: NORMAL
LABCORP MISCELLANEOUS TEST: NORMAL
LABCORP MISCELLANEOUS TEST: NORMAL
LYMPHOCYTES # BLD AUTO: 1.6 K/UL (ref 1–4.8)
LYMPHOCYTES NFR BLD: 14.4 % (ref 18–48)
MCH RBC QN AUTO: 28.1 PG (ref 27–31)
MCHC RBC AUTO-ENTMCNC: 31.6 G/DL (ref 32–36)
MCV RBC AUTO: 89 FL (ref 82–98)
MONOCYTES # BLD AUTO: 0.8 K/UL (ref 0.3–1)
MONOCYTES NFR BLD: 7.3 % (ref 4–15)
NEUTROPHILS # BLD AUTO: 8.2 K/UL (ref 1.8–7.7)
NEUTROPHILS NFR BLD: 73.7 % (ref 38–73)
NRBC BLD-RTO: 0 /100 WBC
PLATELET # BLD AUTO: 238 K/UL (ref 150–350)
PMV BLD AUTO: 11 FL (ref 9.2–12.9)
POTASSIUM SERPL-SCNC: 3.7 MMOL/L (ref 3.5–5.1)
PROT SERPL-MCNC: 6.5 G/DL (ref 6–8.4)
RBC # BLD AUTO: 4.13 M/UL (ref 4.6–6.2)
SODIUM SERPL-SCNC: 146 MMOL/L (ref 136–145)
WBC # BLD AUTO: 11.18 K/UL (ref 3.9–12.7)

## 2021-03-24 PROCEDURE — 97535 SELF CARE MNGMENT TRAINING: CPT

## 2021-03-24 PROCEDURE — 82550 ASSAY OF CK (CPK): CPT | Performed by: STUDENT IN AN ORGANIZED HEALTH CARE EDUCATION/TRAINING PROGRAM

## 2021-03-24 PROCEDURE — 99900035 HC TECH TIME PER 15 MIN (STAT)

## 2021-03-24 PROCEDURE — 86255 FLUORESCENT ANTIBODY SCREEN: CPT

## 2021-03-24 PROCEDURE — 30000890 HC MISC. SEND OUT TEST

## 2021-03-24 PROCEDURE — 25000003 PHARM REV CODE 250: Performed by: STUDENT IN AN ORGANIZED HEALTH CARE EDUCATION/TRAINING PROGRAM

## 2021-03-24 PROCEDURE — 86038 ANTINUCLEAR ANTIBODIES: CPT | Performed by: INTERNAL MEDICINE

## 2021-03-24 PROCEDURE — 94761 N-INVAS EAR/PLS OXIMETRY MLT: CPT

## 2021-03-24 PROCEDURE — 85025 COMPLETE CBC W/AUTO DIFF WBC: CPT | Performed by: HOSPITALIST

## 2021-03-24 PROCEDURE — 80053 COMPREHEN METABOLIC PANEL: CPT | Performed by: INTERNAL MEDICINE

## 2021-03-24 PROCEDURE — 25000003 PHARM REV CODE 250: Performed by: NURSE PRACTITIONER

## 2021-03-24 PROCEDURE — 25000003 PHARM REV CODE 250: Performed by: HOSPITALIST

## 2021-03-24 PROCEDURE — 63600175 PHARM REV CODE 636 W HCPCS: Performed by: INTERNAL MEDICINE

## 2021-03-24 PROCEDURE — 92526 ORAL FUNCTION THERAPY: CPT

## 2021-03-24 PROCEDURE — 97530 THERAPEUTIC ACTIVITIES: CPT | Mod: CQ

## 2021-03-24 PROCEDURE — 20000000 HC ICU ROOM

## 2021-03-24 PROCEDURE — 25000003 PHARM REV CODE 250: Performed by: INTERNAL MEDICINE

## 2021-03-24 PROCEDURE — 25000003 PHARM REV CODE 250

## 2021-03-24 RX ORDER — NICARDIPINE HYDROCHLORIDE 0.2 MG/ML
1 INJECTION INTRAVENOUS CONTINUOUS
Status: DISCONTINUED | OUTPATIENT
Start: 2021-03-24 | End: 2021-03-27

## 2021-03-24 RX ORDER — HYDROCORTISONE 1 %
CREAM (GRAM) TOPICAL DAILY
Status: DISCONTINUED | OUTPATIENT
Start: 2021-03-25 | End: 2021-04-09 | Stop reason: HOSPADM

## 2021-03-24 RX ORDER — HYDRALAZINE HYDROCHLORIDE 25 MG/1
50 TABLET, FILM COATED ORAL EVERY 8 HOURS
Status: DISCONTINUED | OUTPATIENT
Start: 2021-03-24 | End: 2021-04-02

## 2021-03-24 RX ADMIN — HYDRALAZINE HYDROCHLORIDE 50 MG: 25 TABLET, FILM COATED ORAL at 02:03

## 2021-03-24 RX ADMIN — FAMOTIDINE 20 MG: 20 TABLET ORAL at 09:03

## 2021-03-24 RX ADMIN — CHLORHEXIDINE GLUCONATE 15 ML: 1.2 RINSE ORAL at 09:03

## 2021-03-24 RX ADMIN — NICARDIPINE HYDROCHLORIDE 15 MG/HR: 0.2 INJECTION INTRAVENOUS at 07:03

## 2021-03-24 RX ADMIN — AMLODIPINE BESYLATE 10 MG: 5 TABLET ORAL at 09:03

## 2021-03-24 RX ADMIN — MUPIROCIN: 20 OINTMENT TOPICAL at 09:03

## 2021-03-24 RX ADMIN — CLONAZEPAM 0.5 MG: 0.5 TABLET ORAL at 09:03

## 2021-03-24 RX ADMIN — NICARDIPINE HYDROCHLORIDE 14 MG/HR: 0.2 INJECTION INTRAVENOUS at 05:03

## 2021-03-24 RX ADMIN — HYDRALAZINE HYDROCHLORIDE 50 MG: 25 TABLET, FILM COATED ORAL at 09:03

## 2021-03-24 RX ADMIN — NICARDIPINE HYDROCHLORIDE 10 MG/HR: 0.2 INJECTION INTRAVENOUS at 11:03

## 2021-03-24 RX ADMIN — Medication: at 09:03

## 2021-03-24 RX ADMIN — NICARDIPINE HYDROCHLORIDE 14 MG/HR: 0.2 INJECTION INTRAVENOUS at 02:03

## 2021-03-24 RX ADMIN — DIPHENHYDRAMINE HYDROCHLORIDE 50 MG: 50 INJECTION INTRAMUSCULAR; INTRAVENOUS at 02:03

## 2021-03-24 RX ADMIN — LISINOPRIL 20 MG: 20 TABLET ORAL at 09:03

## 2021-03-25 PROBLEM — L02.511 ABSCESS OF RIGHT HAND: Status: ACTIVE | Noted: 2021-03-25

## 2021-03-25 LAB
ALBUMIN SERPL BCP-MCNC: 3.5 G/DL (ref 3.5–5.2)
ALP SERPL-CCNC: 56 U/L (ref 55–135)
ALT SERPL W/O P-5'-P-CCNC: 8 U/L (ref 10–44)
ANA TITR SER IF: NEGATIVE {TITER}
ANION GAP SERPL CALC-SCNC: 11 MMOL/L (ref 8–16)
AST SERPL-CCNC: 16 U/L (ref 10–40)
BASOPHILS # BLD AUTO: 0.04 K/UL (ref 0–0.2)
BASOPHILS NFR BLD: 0.3 % (ref 0–1.9)
BILIRUB SERPL-MCNC: 1.7 MG/DL (ref 0.1–1)
BUN SERPL-MCNC: 16 MG/DL (ref 6–20)
CALCIUM SERPL-MCNC: 8.9 MG/DL (ref 8.7–10.5)
CHLORIDE SERPL-SCNC: 114 MMOL/L (ref 95–110)
CK SERPL-CCNC: 162 U/L (ref 20–200)
CO2 SERPL-SCNC: 19 MMOL/L (ref 23–29)
CREAT SERPL-MCNC: 2.1 MG/DL (ref 0.5–1.4)
DIFFERENTIAL METHOD: ABNORMAL
EOSINOPHIL # BLD AUTO: 0.1 K/UL (ref 0–0.5)
EOSINOPHIL NFR BLD: 0.9 % (ref 0–8)
ERYTHROCYTE [DISTWIDTH] IN BLOOD BY AUTOMATED COUNT: 14.9 % (ref 11.5–14.5)
EST. GFR  (AFRICAN AMERICAN): 44.5 ML/MIN/1.73 M^2
EST. GFR  (NON AFRICAN AMERICAN): 38.5 ML/MIN/1.73 M^2
GLUCOSE SERPL-MCNC: 112 MG/DL (ref 70–110)
HCT VFR BLD AUTO: 39.7 % (ref 40–54)
HGB BLD-MCNC: 12.6 G/DL (ref 14–18)
IMM GRANULOCYTES # BLD AUTO: 0.05 K/UL (ref 0–0.04)
IMM GRANULOCYTES NFR BLD AUTO: 0.4 % (ref 0–0.5)
LYMPHOCYTES # BLD AUTO: 1.4 K/UL (ref 1–4.8)
LYMPHOCYTES NFR BLD: 9.9 % (ref 18–48)
MCH RBC QN AUTO: 27.3 PG (ref 27–31)
MCHC RBC AUTO-ENTMCNC: 31.7 G/DL (ref 32–36)
MCV RBC AUTO: 86 FL (ref 82–98)
MONOCYTES # BLD AUTO: 1.2 K/UL (ref 0.3–1)
MONOCYTES NFR BLD: 8.6 % (ref 4–15)
NEUTROPHILS # BLD AUTO: 11.4 K/UL (ref 1.8–7.7)
NEUTROPHILS NFR BLD: 79.9 % (ref 38–73)
NRBC BLD-RTO: 0 /100 WBC
PLATELET # BLD AUTO: 298 K/UL (ref 150–350)
PMV BLD AUTO: 11 FL (ref 9.2–12.9)
POTASSIUM SERPL-SCNC: 3.4 MMOL/L (ref 3.5–5.1)
PROT SERPL-MCNC: 7.5 G/DL (ref 6–8.4)
RBC # BLD AUTO: 4.61 M/UL (ref 4.6–6.2)
SODIUM SERPL-SCNC: 144 MMOL/L (ref 136–145)
WBC # BLD AUTO: 14.21 K/UL (ref 3.9–12.7)

## 2021-03-25 PROCEDURE — 25000003 PHARM REV CODE 250: Performed by: INTERNAL MEDICINE

## 2021-03-25 PROCEDURE — 25000003 PHARM REV CODE 250

## 2021-03-25 PROCEDURE — 36415 COLL VENOUS BLD VENIPUNCTURE: CPT | Performed by: STUDENT IN AN ORGANIZED HEALTH CARE EDUCATION/TRAINING PROGRAM

## 2021-03-25 PROCEDURE — 82550 ASSAY OF CK (CPK): CPT | Performed by: STUDENT IN AN ORGANIZED HEALTH CARE EDUCATION/TRAINING PROGRAM

## 2021-03-25 PROCEDURE — 97110 THERAPEUTIC EXERCISES: CPT | Mod: CQ

## 2021-03-25 PROCEDURE — 80053 COMPREHEN METABOLIC PANEL: CPT | Performed by: INTERNAL MEDICINE

## 2021-03-25 PROCEDURE — 20000000 HC ICU ROOM

## 2021-03-25 PROCEDURE — 25000003 PHARM REV CODE 250: Performed by: NURSE PRACTITIONER

## 2021-03-25 PROCEDURE — 94761 N-INVAS EAR/PLS OXIMETRY MLT: CPT

## 2021-03-25 PROCEDURE — 85025 COMPLETE CBC W/AUTO DIFF WBC: CPT | Performed by: HOSPITALIST

## 2021-03-25 PROCEDURE — 63600175 PHARM REV CODE 636 W HCPCS: Performed by: INTERNAL MEDICINE

## 2021-03-25 PROCEDURE — 25000003 PHARM REV CODE 250: Performed by: HOSPITALIST

## 2021-03-25 PROCEDURE — 99900035 HC TECH TIME PER 15 MIN (STAT)

## 2021-03-25 PROCEDURE — 97110 THERAPEUTIC EXERCISES: CPT

## 2021-03-25 PROCEDURE — 92526 ORAL FUNCTION THERAPY: CPT

## 2021-03-25 PROCEDURE — B4185 PARENTERAL SOL 10 GM LIPIDS: HCPCS | Performed by: INTERNAL MEDICINE

## 2021-03-25 PROCEDURE — 25000003 PHARM REV CODE 250: Performed by: STUDENT IN AN ORGANIZED HEALTH CARE EDUCATION/TRAINING PROGRAM

## 2021-03-25 RX ORDER — LABETALOL HYDROCHLORIDE 5 MG/ML
10 INJECTION, SOLUTION INTRAVENOUS
Status: DISCONTINUED | OUTPATIENT
Start: 2021-03-25 | End: 2021-04-09 | Stop reason: HOSPADM

## 2021-03-25 RX ORDER — HYDRALAZINE HYDROCHLORIDE 20 MG/ML
10 INJECTION INTRAMUSCULAR; INTRAVENOUS
Status: DISCONTINUED | OUTPATIENT
Start: 2021-03-25 | End: 2021-04-09 | Stop reason: HOSPADM

## 2021-03-25 RX ORDER — HYDROCHLOROTHIAZIDE 25 MG/1
25 TABLET ORAL DAILY
Status: DISCONTINUED | OUTPATIENT
Start: 2021-03-25 | End: 2021-04-01

## 2021-03-25 RX ORDER — LABETALOL 100 MG/1
300 TABLET, FILM COATED ORAL EVERY 12 HOURS
Status: DISCONTINUED | OUTPATIENT
Start: 2021-03-25 | End: 2021-04-09 | Stop reason: HOSPADM

## 2021-03-25 RX ADMIN — FAMOTIDINE 20 MG: 20 TABLET ORAL at 09:03

## 2021-03-25 RX ADMIN — HYDROCORTISONE: 1 CREAM TOPICAL at 09:03

## 2021-03-25 RX ADMIN — SOYBEAN OIL 250 ML: 20 INJECTION, SOLUTION INTRAVENOUS at 09:03

## 2021-03-25 RX ADMIN — ACETAMINOPHEN 650 MG: 325 TABLET, FILM COATED ORAL at 09:03

## 2021-03-25 RX ADMIN — MUPIROCIN: 20 OINTMENT TOPICAL at 09:03

## 2021-03-25 RX ADMIN — AMLODIPINE BESYLATE 10 MG: 5 TABLET ORAL at 09:03

## 2021-03-25 RX ADMIN — LABETALOL HYDROCHLORIDE 300 MG: 100 TABLET, FILM COATED ORAL at 09:03

## 2021-03-25 RX ADMIN — NICARDIPINE HYDROCHLORIDE 15 MG/HR: 0.2 INJECTION INTRAVENOUS at 09:03

## 2021-03-25 RX ADMIN — LABETALOL HYDROCHLORIDE 10 MG: 5 INJECTION, SOLUTION INTRAVENOUS at 09:03

## 2021-03-25 RX ADMIN — LABETALOL HYDROCHLORIDE 300 MG: 100 TABLET, FILM COATED ORAL at 10:03

## 2021-03-25 RX ADMIN — CLONAZEPAM 0.5 MG: 0.5 TABLET ORAL at 09:03

## 2021-03-25 RX ADMIN — HYDRALAZINE HYDROCHLORIDE 50 MG: 25 TABLET, FILM COATED ORAL at 06:03

## 2021-03-25 RX ADMIN — LEUCINE, PHENYLALANINE, LYSINE, METHIONINE, ISOLEUCINE, VALINE, HISTIDINE, THREONINE, TRYPTOPHAN, ALANINE, GLYCINE, ARGININE, PROLINE, SERINE, TYROSINE, SODIUM ACETATE, DIBASIC POTASSIUM PHOSPHATE, MAGNESIUM CHLORIDE, SODIUM CHLORIDE, CALCIUM CHLORIDE, DEXTROSE
311; 238; 247; 170; 255; 247; 204; 179; 77; 880; 438; 489; 289; 213; 17; 297; 261; 51; 77; 33; 5 INJECTION INTRAVENOUS at 04:03

## 2021-03-25 RX ADMIN — NICARDIPINE HYDROCHLORIDE 15 MG/HR: 0.2 INJECTION INTRAVENOUS at 06:03

## 2021-03-25 RX ADMIN — LISINOPRIL 20 MG: 20 TABLET ORAL at 09:03

## 2021-03-25 RX ADMIN — DIPHENHYDRAMINE HYDROCHLORIDE 50 MG: 50 INJECTION INTRAMUSCULAR; INTRAVENOUS at 12:03

## 2021-03-25 RX ADMIN — HYDRALAZINE HYDROCHLORIDE 50 MG: 25 TABLET, FILM COATED ORAL at 09:03

## 2021-03-25 RX ADMIN — CHLORHEXIDINE GLUCONATE 15 ML: 1.2 RINSE ORAL at 09:03

## 2021-03-25 RX ADMIN — NICARDIPINE HYDROCHLORIDE 5 MG/HR: 0.2 INJECTION INTRAVENOUS at 05:03

## 2021-03-25 RX ADMIN — DIPHENHYDRAMINE HYDROCHLORIDE 50 MG: 50 INJECTION INTRAMUSCULAR; INTRAVENOUS at 09:03

## 2021-03-25 RX ADMIN — NICARDIPINE HYDROCHLORIDE 15 MG/HR: 0.2 INJECTION INTRAVENOUS at 11:03

## 2021-03-25 RX ADMIN — Medication: at 09:03

## 2021-03-25 RX ADMIN — VANCOMYCIN HYDROCHLORIDE 1500 MG: 1.5 INJECTION, POWDER, LYOPHILIZED, FOR SOLUTION INTRAVENOUS at 10:03

## 2021-03-25 RX ADMIN — NICARDIPINE HYDROCHLORIDE 10 MG/HR: 0.2 INJECTION INTRAVENOUS at 12:03

## 2021-03-26 LAB
ALBUMIN SERPL BCP-MCNC: 3.2 G/DL (ref 3.5–5.2)
ALP SERPL-CCNC: 48 U/L (ref 55–135)
ALT SERPL W/O P-5'-P-CCNC: 13 U/L (ref 10–44)
ANION GAP SERPL CALC-SCNC: 12 MMOL/L (ref 8–16)
AST SERPL-CCNC: 19 U/L (ref 10–40)
BACTERIA BLD CULT: NORMAL
BACTERIA BLD CULT: NORMAL
BASOPHILS # BLD AUTO: 0.05 K/UL (ref 0–0.2)
BASOPHILS NFR BLD: 0.4 % (ref 0–1.9)
BILIRUB SERPL-MCNC: 1.9 MG/DL (ref 0.1–1)
BUN SERPL-MCNC: 19 MG/DL (ref 6–20)
CALCIUM SERPL-MCNC: 8.9 MG/DL (ref 8.7–10.5)
CHLORIDE SERPL-SCNC: 111 MMOL/L (ref 95–110)
CK SERPL-CCNC: 148 U/L (ref 20–200)
CO2 SERPL-SCNC: 19 MMOL/L (ref 23–29)
CREAT SERPL-MCNC: 2.1 MG/DL (ref 0.5–1.4)
DIFFERENTIAL METHOD: ABNORMAL
EOSINOPHIL # BLD AUTO: 0.1 K/UL (ref 0–0.5)
EOSINOPHIL NFR BLD: 0.9 % (ref 0–8)
ERYTHROCYTE [DISTWIDTH] IN BLOOD BY AUTOMATED COUNT: 15.3 % (ref 11.5–14.5)
EST. GFR  (AFRICAN AMERICAN): 44.5 ML/MIN/1.73 M^2
EST. GFR  (NON AFRICAN AMERICAN): 38.5 ML/MIN/1.73 M^2
GLUCOSE SERPL-MCNC: 126 MG/DL (ref 70–110)
HCT VFR BLD AUTO: 36.2 % (ref 40–54)
HGB BLD-MCNC: 11.7 G/DL (ref 14–18)
IMM GRANULOCYTES # BLD AUTO: 0.07 K/UL (ref 0–0.04)
IMM GRANULOCYTES NFR BLD AUTO: 0.5 % (ref 0–0.5)
LYMPHOCYTES # BLD AUTO: 1.4 K/UL (ref 1–4.8)
LYMPHOCYTES NFR BLD: 10.1 % (ref 18–48)
MCH RBC QN AUTO: 28.1 PG (ref 27–31)
MCHC RBC AUTO-ENTMCNC: 32.3 G/DL (ref 32–36)
MCV RBC AUTO: 87 FL (ref 82–98)
MONOCYTES # BLD AUTO: 1.2 K/UL (ref 0.3–1)
MONOCYTES NFR BLD: 8.5 % (ref 4–15)
NEUTROPHILS # BLD AUTO: 11.1 K/UL (ref 1.8–7.7)
NEUTROPHILS NFR BLD: 79.6 % (ref 38–73)
NRBC BLD-RTO: 0 /100 WBC
PLATELET # BLD AUTO: 255 K/UL (ref 150–350)
PMV BLD AUTO: 12.2 FL (ref 9.2–12.9)
POTASSIUM SERPL-SCNC: 4.2 MMOL/L (ref 3.5–5.1)
PROT SERPL-MCNC: 7.2 G/DL (ref 6–8.4)
RBC # BLD AUTO: 4.16 M/UL (ref 4.6–6.2)
SODIUM SERPL-SCNC: 142 MMOL/L (ref 136–145)
VANCOMYCIN TROUGH SERPL-MCNC: 11.9 UG/ML (ref 10–22)
VIT B1 BLD-SCNC: 103.3 NMOL/L (ref 66.5–200)
WBC # BLD AUTO: 13.89 K/UL (ref 3.9–12.7)

## 2021-03-26 PROCEDURE — 25000003 PHARM REV CODE 250: Performed by: INTERNAL MEDICINE

## 2021-03-26 PROCEDURE — 27000221 HC OXYGEN, UP TO 24 HOURS

## 2021-03-26 PROCEDURE — 80053 COMPREHEN METABOLIC PANEL: CPT | Performed by: INTERNAL MEDICINE

## 2021-03-26 PROCEDURE — 36415 COLL VENOUS BLD VENIPUNCTURE: CPT | Performed by: STUDENT IN AN ORGANIZED HEALTH CARE EDUCATION/TRAINING PROGRAM

## 2021-03-26 PROCEDURE — 25000003 PHARM REV CODE 250

## 2021-03-26 PROCEDURE — 97530 THERAPEUTIC ACTIVITIES: CPT

## 2021-03-26 PROCEDURE — 20000000 HC ICU ROOM

## 2021-03-26 PROCEDURE — 82550 ASSAY OF CK (CPK): CPT | Performed by: STUDENT IN AN ORGANIZED HEALTH CARE EDUCATION/TRAINING PROGRAM

## 2021-03-26 PROCEDURE — 99900035 HC TECH TIME PER 15 MIN (STAT)

## 2021-03-26 PROCEDURE — 92526 ORAL FUNCTION THERAPY: CPT

## 2021-03-26 PROCEDURE — 85025 COMPLETE CBC W/AUTO DIFF WBC: CPT | Performed by: HOSPITALIST

## 2021-03-26 PROCEDURE — 25000003 PHARM REV CODE 250: Performed by: STUDENT IN AN ORGANIZED HEALTH CARE EDUCATION/TRAINING PROGRAM

## 2021-03-26 PROCEDURE — 94761 N-INVAS EAR/PLS OXIMETRY MLT: CPT

## 2021-03-26 PROCEDURE — 36415 COLL VENOUS BLD VENIPUNCTURE: CPT | Performed by: INTERNAL MEDICINE

## 2021-03-26 PROCEDURE — 80202 ASSAY OF VANCOMYCIN: CPT | Performed by: INTERNAL MEDICINE

## 2021-03-26 RX ADMIN — MUPIROCIN: 20 OINTMENT TOPICAL at 09:03

## 2021-03-26 RX ADMIN — CLONAZEPAM 0.5 MG: 0.5 TABLET ORAL at 09:03

## 2021-03-26 RX ADMIN — DEXMEDETOMIDINE HYDROCHLORIDE 0.2 MCG/KG/HR: 400 INJECTION, SOLUTION INTRAVENOUS at 08:03

## 2021-03-26 RX ADMIN — LISINOPRIL 20 MG: 20 TABLET ORAL at 03:03

## 2021-03-26 RX ADMIN — LEUCINE, PHENYLALANINE, LYSINE, METHIONINE, ISOLEUCINE, VALINE, HISTIDINE, THREONINE, TRYPTOPHAN, ALANINE, GLYCINE, ARGININE, PROLINE, SERINE, TYROSINE, SODIUM ACETATE, DIBASIC POTASSIUM PHOSPHATE, MAGNESIUM CHLORIDE, SODIUM CHLORIDE, CALCIUM CHLORIDE, DEXTROSE
311; 238; 247; 170; 255; 247; 204; 179; 77; 880; 438; 489; 289; 213; 17; 297; 261; 51; 77; 33; 5 INJECTION INTRAVENOUS at 06:03

## 2021-03-26 RX ADMIN — CHLORHEXIDINE GLUCONATE 15 ML: 1.2 RINSE ORAL at 09:03

## 2021-03-26 RX ADMIN — FAMOTIDINE 20 MG: 20 TABLET ORAL at 09:03

## 2021-03-26 RX ADMIN — FAMOTIDINE 20 MG: 20 TABLET ORAL at 03:03

## 2021-03-26 RX ADMIN — MUPIROCIN: 20 OINTMENT TOPICAL at 10:03

## 2021-03-26 RX ADMIN — DEXMEDETOMIDINE HYDROCHLORIDE 0.2 MCG/KG/HR: 400 INJECTION, SOLUTION INTRAVENOUS at 03:03

## 2021-03-26 RX ADMIN — HYDROCHLOROTHIAZIDE 25 MG: 25 TABLET ORAL at 10:03

## 2021-03-26 RX ADMIN — HYDROCORTISONE: 1 CREAM TOPICAL at 10:03

## 2021-03-26 RX ADMIN — HYDRALAZINE HYDROCHLORIDE 50 MG: 25 TABLET, FILM COATED ORAL at 03:03

## 2021-03-26 RX ADMIN — AMLODIPINE BESYLATE 10 MG: 5 TABLET ORAL at 03:03

## 2021-03-26 RX ADMIN — LABETALOL HYDROCHLORIDE 10 MG: 5 INJECTION, SOLUTION INTRAVENOUS at 06:03

## 2021-03-26 RX ADMIN — CLONAZEPAM 0.5 MG: 0.5 TABLET ORAL at 03:03

## 2021-03-26 RX ADMIN — Medication: at 10:03

## 2021-03-27 LAB
ALBUMIN SERPL BCP-MCNC: 3.2 G/DL (ref 3.5–5.2)
ALP SERPL-CCNC: 56 U/L (ref 55–135)
ALT SERPL W/O P-5'-P-CCNC: 10 U/L (ref 10–44)
ANION GAP SERPL CALC-SCNC: 9 MMOL/L (ref 8–16)
ANION GAP SERPL CALC-SCNC: 9 MMOL/L (ref 8–16)
AST SERPL-CCNC: 15 U/L (ref 10–40)
BASOPHILS # BLD AUTO: 0.03 K/UL (ref 0–0.2)
BASOPHILS # BLD AUTO: 0.03 K/UL (ref 0–0.2)
BASOPHILS NFR BLD: 0.3 % (ref 0–1.9)
BASOPHILS NFR BLD: 0.3 % (ref 0–1.9)
BILIRUB SERPL-MCNC: 1.4 MG/DL (ref 0.1–1)
BUN SERPL-MCNC: 24 MG/DL (ref 6–20)
BUN SERPL-MCNC: 25 MG/DL (ref 6–20)
CALCIUM SERPL-MCNC: 8.8 MG/DL (ref 8.7–10.5)
CALCIUM SERPL-MCNC: 8.9 MG/DL (ref 8.7–10.5)
CHLORIDE SERPL-SCNC: 109 MMOL/L (ref 95–110)
CHLORIDE SERPL-SCNC: 110 MMOL/L (ref 95–110)
CK SERPL-CCNC: 80 U/L (ref 20–200)
CO2 SERPL-SCNC: 21 MMOL/L (ref 23–29)
CO2 SERPL-SCNC: 22 MMOL/L (ref 23–29)
CREAT SERPL-MCNC: 2.1 MG/DL (ref 0.5–1.4)
CREAT SERPL-MCNC: 2.2 MG/DL (ref 0.5–1.4)
DIFFERENTIAL METHOD: ABNORMAL
DIFFERENTIAL METHOD: ABNORMAL
EOSINOPHIL # BLD AUTO: 0.3 K/UL (ref 0–0.5)
EOSINOPHIL # BLD AUTO: 0.3 K/UL (ref 0–0.5)
EOSINOPHIL NFR BLD: 2.8 % (ref 0–8)
EOSINOPHIL NFR BLD: 2.9 % (ref 0–8)
ERYTHROCYTE [DISTWIDTH] IN BLOOD BY AUTOMATED COUNT: 14.8 % (ref 11.5–14.5)
ERYTHROCYTE [DISTWIDTH] IN BLOOD BY AUTOMATED COUNT: 15.1 % (ref 11.5–14.5)
EST. GFR  (AFRICAN AMERICAN): 42.1 ML/MIN/1.73 M^2
EST. GFR  (AFRICAN AMERICAN): 44.5 ML/MIN/1.73 M^2
EST. GFR  (NON AFRICAN AMERICAN): 36.4 ML/MIN/1.73 M^2
EST. GFR  (NON AFRICAN AMERICAN): 38.5 ML/MIN/1.73 M^2
GLUCOSE SERPL-MCNC: 109 MG/DL (ref 70–110)
GLUCOSE SERPL-MCNC: 110 MG/DL (ref 70–110)
HCT VFR BLD AUTO: 38.5 % (ref 40–54)
HCT VFR BLD AUTO: 39.6 % (ref 40–54)
HGB BLD-MCNC: 12.2 G/DL (ref 14–18)
HGB BLD-MCNC: 12.6 G/DL (ref 14–18)
IMM GRANULOCYTES # BLD AUTO: 0.05 K/UL (ref 0–0.04)
IMM GRANULOCYTES # BLD AUTO: 0.05 K/UL (ref 0–0.04)
IMM GRANULOCYTES NFR BLD AUTO: 0.4 % (ref 0–0.5)
IMM GRANULOCYTES NFR BLD AUTO: 0.5 % (ref 0–0.5)
LYMPHOCYTES # BLD AUTO: 1.4 K/UL (ref 1–4.8)
LYMPHOCYTES # BLD AUTO: 1.6 K/UL (ref 1–4.8)
LYMPHOCYTES NFR BLD: 12.8 % (ref 18–48)
LYMPHOCYTES NFR BLD: 13.9 % (ref 18–48)
MCH RBC QN AUTO: 27.4 PG (ref 27–31)
MCH RBC QN AUTO: 27.7 PG (ref 27–31)
MCHC RBC AUTO-ENTMCNC: 31.7 G/DL (ref 32–36)
MCHC RBC AUTO-ENTMCNC: 31.8 G/DL (ref 32–36)
MCV RBC AUTO: 86 FL (ref 82–98)
MCV RBC AUTO: 87 FL (ref 82–98)
MONOCYTES # BLD AUTO: 0.9 K/UL (ref 0.3–1)
MONOCYTES # BLD AUTO: 1 K/UL (ref 0.3–1)
MONOCYTES NFR BLD: 8.3 % (ref 4–15)
MONOCYTES NFR BLD: 8.7 % (ref 4–15)
NEUTROPHILS # BLD AUTO: 8.3 K/UL (ref 1.8–7.7)
NEUTROPHILS # BLD AUTO: 8.3 K/UL (ref 1.8–7.7)
NEUTROPHILS NFR BLD: 74.2 % (ref 38–73)
NEUTROPHILS NFR BLD: 74.9 % (ref 38–73)
NRBC BLD-RTO: 0 /100 WBC
NRBC BLD-RTO: 0 /100 WBC
PLATELET # BLD AUTO: 250 K/UL (ref 150–350)
PLATELET # BLD AUTO: 275 K/UL (ref 150–350)
PMV BLD AUTO: 10.7 FL (ref 9.2–12.9)
PMV BLD AUTO: 11 FL (ref 9.2–12.9)
POTASSIUM SERPL-SCNC: 3.7 MMOL/L (ref 3.5–5.1)
POTASSIUM SERPL-SCNC: 3.9 MMOL/L (ref 3.5–5.1)
PROT SERPL-MCNC: 7.2 G/DL (ref 6–8.4)
RBC # BLD AUTO: 4.46 M/UL (ref 4.6–6.2)
RBC # BLD AUTO: 4.55 M/UL (ref 4.6–6.2)
SODIUM SERPL-SCNC: 139 MMOL/L (ref 136–145)
SODIUM SERPL-SCNC: 141 MMOL/L (ref 136–145)
WBC # BLD AUTO: 11.09 K/UL (ref 3.9–12.7)
WBC # BLD AUTO: 11.12 K/UL (ref 3.9–12.7)

## 2021-03-27 PROCEDURE — 25000003 PHARM REV CODE 250: Performed by: INTERNAL MEDICINE

## 2021-03-27 PROCEDURE — 85025 COMPLETE CBC W/AUTO DIFF WBC: CPT | Performed by: HOSPITALIST

## 2021-03-27 PROCEDURE — 85025 COMPLETE CBC W/AUTO DIFF WBC: CPT | Mod: 91 | Performed by: INTERNAL MEDICINE

## 2021-03-27 PROCEDURE — 82550 ASSAY OF CK (CPK): CPT | Performed by: STUDENT IN AN ORGANIZED HEALTH CARE EDUCATION/TRAINING PROGRAM

## 2021-03-27 PROCEDURE — 87077 CULTURE AEROBIC IDENTIFY: CPT | Performed by: INTERNAL MEDICINE

## 2021-03-27 PROCEDURE — 94761 N-INVAS EAR/PLS OXIMETRY MLT: CPT

## 2021-03-27 PROCEDURE — 25000003 PHARM REV CODE 250: Performed by: STUDENT IN AN ORGANIZED HEALTH CARE EDUCATION/TRAINING PROGRAM

## 2021-03-27 PROCEDURE — 80053 COMPREHEN METABOLIC PANEL: CPT | Performed by: INTERNAL MEDICINE

## 2021-03-27 PROCEDURE — 87186 SC STD MICRODIL/AGAR DIL: CPT | Performed by: INTERNAL MEDICINE

## 2021-03-27 PROCEDURE — 87040 BLOOD CULTURE FOR BACTERIA: CPT | Performed by: INTERNAL MEDICINE

## 2021-03-27 PROCEDURE — 80048 BASIC METABOLIC PNL TOTAL CA: CPT | Performed by: INTERNAL MEDICINE

## 2021-03-27 PROCEDURE — 20000000 HC ICU ROOM

## 2021-03-27 PROCEDURE — 87070 CULTURE OTHR SPECIMN AEROBIC: CPT | Performed by: INTERNAL MEDICINE

## 2021-03-27 PROCEDURE — 36415 COLL VENOUS BLD VENIPUNCTURE: CPT | Performed by: INTERNAL MEDICINE

## 2021-03-27 PROCEDURE — 36415 COLL VENOUS BLD VENIPUNCTURE: CPT | Performed by: STUDENT IN AN ORGANIZED HEALTH CARE EDUCATION/TRAINING PROGRAM

## 2021-03-27 PROCEDURE — 99900035 HC TECH TIME PER 15 MIN (STAT)

## 2021-03-27 PROCEDURE — 63600175 PHARM REV CODE 636 W HCPCS: Performed by: INTERNAL MEDICINE

## 2021-03-27 PROCEDURE — 87147 CULTURE TYPE IMMUNOLOGIC: CPT | Mod: 59 | Performed by: INTERNAL MEDICINE

## 2021-03-27 RX ORDER — CETIRIZINE HYDROCHLORIDE 10 MG/1
10 TABLET ORAL DAILY
Status: DISCONTINUED | OUTPATIENT
Start: 2021-03-27 | End: 2021-04-09 | Stop reason: HOSPADM

## 2021-03-27 RX ORDER — PERMETHRIN 50 MG/G
CREAM TOPICAL ONCE
Status: COMPLETED | OUTPATIENT
Start: 2021-03-27 | End: 2021-03-27

## 2021-03-27 RX ORDER — DIPHENHYDRAMINE HYDROCHLORIDE 50 MG/ML
25 INJECTION INTRAMUSCULAR; INTRAVENOUS EVERY 6 HOURS PRN
Status: DISCONTINUED | OUTPATIENT
Start: 2021-03-27 | End: 2021-04-09 | Stop reason: HOSPADM

## 2021-03-27 RX ORDER — MUPIROCIN 20 MG/G
OINTMENT TOPICAL 2 TIMES DAILY
Status: DISCONTINUED | OUTPATIENT
Start: 2021-03-27 | End: 2021-04-09 | Stop reason: HOSPADM

## 2021-03-27 RX ADMIN — HYDRALAZINE HYDROCHLORIDE 50 MG: 25 TABLET, FILM COATED ORAL at 05:03

## 2021-03-27 RX ADMIN — FAMOTIDINE 20 MG: 20 TABLET ORAL at 09:03

## 2021-03-27 RX ADMIN — CLONAZEPAM 0.5 MG: 0.5 TABLET ORAL at 10:03

## 2021-03-27 RX ADMIN — DEXMEDETOMIDINE HYDROCHLORIDE 0.7 MCG/KG/HR: 400 INJECTION, SOLUTION INTRAVENOUS at 11:03

## 2021-03-27 RX ADMIN — HYDROCORTISONE: 1 CREAM TOPICAL at 10:03

## 2021-03-27 RX ADMIN — FAMOTIDINE 20 MG: 20 TABLET ORAL at 10:03

## 2021-03-27 RX ADMIN — DIPHENHYDRAMINE HYDROCHLORIDE 25 MG: 50 INJECTION INTRAMUSCULAR; INTRAVENOUS at 04:03

## 2021-03-27 RX ADMIN — DEXMEDETOMIDINE HYDROCHLORIDE 0.2 MCG/KG/HR: 400 INJECTION, SOLUTION INTRAVENOUS at 06:03

## 2021-03-27 RX ADMIN — VANCOMYCIN HYDROCHLORIDE 1500 MG: 1.5 INJECTION, POWDER, LYOPHILIZED, FOR SOLUTION INTRAVENOUS at 09:03

## 2021-03-27 RX ADMIN — DIPHENHYDRAMINE HYDROCHLORIDE 25 MG: 50 INJECTION INTRAMUSCULAR; INTRAVENOUS at 10:03

## 2021-03-27 RX ADMIN — CETIRIZINE HYDROCHLORIDE 10 MG: 10 TABLET, FILM COATED ORAL at 10:03

## 2021-03-27 RX ADMIN — CLONAZEPAM 0.5 MG: 0.5 TABLET ORAL at 09:03

## 2021-03-27 RX ADMIN — MUPIROCIN: 20 OINTMENT TOPICAL at 10:03

## 2021-03-27 RX ADMIN — Medication: at 10:03

## 2021-03-27 RX ADMIN — PERMETHRIN CREAM 5% W/W: 50 CREAM TOPICAL at 09:03

## 2021-03-27 RX ADMIN — MUPIROCIN: 20 OINTMENT TOPICAL at 09:03

## 2021-03-27 RX ADMIN — LABETALOL HYDROCHLORIDE 10 MG: 5 INJECTION, SOLUTION INTRAVENOUS at 05:03

## 2021-03-27 RX ADMIN — LISINOPRIL 20 MG: 20 TABLET ORAL at 10:03

## 2021-03-27 RX ADMIN — DEXMEDETOMIDINE HYDROCHLORIDE 0.2 MCG/KG/HR: 400 INJECTION, SOLUTION INTRAVENOUS at 12:03

## 2021-03-27 RX ADMIN — HYDROCHLOROTHIAZIDE 25 MG: 25 TABLET ORAL at 10:03

## 2021-03-27 RX ADMIN — HYDRALAZINE HYDROCHLORIDE 50 MG: 25 TABLET, FILM COATED ORAL at 09:03

## 2021-03-27 RX ADMIN — HYDRALAZINE HYDROCHLORIDE 50 MG: 25 TABLET, FILM COATED ORAL at 03:03

## 2021-03-27 RX ADMIN — AMLODIPINE BESYLATE 10 MG: 5 TABLET ORAL at 10:03

## 2021-03-28 LAB
ALBUMIN SERPL BCP-MCNC: 3.2 G/DL (ref 3.5–5.2)
ALP SERPL-CCNC: 64 U/L (ref 55–135)
ALT SERPL W/O P-5'-P-CCNC: 18 U/L (ref 10–44)
ANION GAP SERPL CALC-SCNC: 11 MMOL/L (ref 8–16)
AST SERPL-CCNC: 23 U/L (ref 10–40)
BASOPHILS # BLD AUTO: 0.03 K/UL (ref 0–0.2)
BASOPHILS NFR BLD: 0.3 % (ref 0–1.9)
BILIRUB SERPL-MCNC: 1.7 MG/DL (ref 0.1–1)
BUN SERPL-MCNC: 27 MG/DL (ref 6–20)
CALCIUM SERPL-MCNC: 8.9 MG/DL (ref 8.7–10.5)
CHLORIDE SERPL-SCNC: 107 MMOL/L (ref 95–110)
CK SERPL-CCNC: 78 U/L (ref 20–200)
CO2 SERPL-SCNC: 21 MMOL/L (ref 23–29)
CREAT SERPL-MCNC: 2.4 MG/DL (ref 0.5–1.4)
DIFFERENTIAL METHOD: ABNORMAL
EOSINOPHIL # BLD AUTO: 0.3 K/UL (ref 0–0.5)
EOSINOPHIL NFR BLD: 3.4 % (ref 0–8)
ERYTHROCYTE [DISTWIDTH] IN BLOOD BY AUTOMATED COUNT: 14.6 % (ref 11.5–14.5)
EST. GFR  (AFRICAN AMERICAN): 37.9 ML/MIN/1.73 M^2
EST. GFR  (NON AFRICAN AMERICAN): 32.8 ML/MIN/1.73 M^2
GLUCOSE SERPL-MCNC: 116 MG/DL (ref 70–110)
HCT VFR BLD AUTO: 36 % (ref 40–54)
HGB BLD-MCNC: 11.5 G/DL (ref 14–18)
IMM GRANULOCYTES # BLD AUTO: 0.04 K/UL (ref 0–0.04)
IMM GRANULOCYTES NFR BLD AUTO: 0.4 % (ref 0–0.5)
LYMPHOCYTES # BLD AUTO: 1.8 K/UL (ref 1–4.8)
LYMPHOCYTES NFR BLD: 18 % (ref 18–48)
MCH RBC QN AUTO: 27.8 PG (ref 27–31)
MCHC RBC AUTO-ENTMCNC: 31.9 G/DL (ref 32–36)
MCV RBC AUTO: 87 FL (ref 82–98)
MONOCYTES # BLD AUTO: 0.9 K/UL (ref 0.3–1)
MONOCYTES NFR BLD: 9.3 % (ref 4–15)
NEUTROPHILS # BLD AUTO: 6.7 K/UL (ref 1.8–7.7)
NEUTROPHILS NFR BLD: 68.6 % (ref 38–73)
NRBC BLD-RTO: 0 /100 WBC
PLATELET # BLD AUTO: 240 K/UL (ref 150–350)
PMV BLD AUTO: 11.1 FL (ref 9.2–12.9)
POTASSIUM SERPL-SCNC: 3.8 MMOL/L (ref 3.5–5.1)
PROT SERPL-MCNC: 7.1 G/DL (ref 6–8.4)
RBC # BLD AUTO: 4.14 M/UL (ref 4.6–6.2)
SODIUM SERPL-SCNC: 139 MMOL/L (ref 136–145)
VIT B6 SERPL-MCNC: 4.6 UG/L (ref 5.3–46.7)
WBC # BLD AUTO: 9.76 K/UL (ref 3.9–12.7)

## 2021-03-28 PROCEDURE — 25000003 PHARM REV CODE 250: Performed by: INTERNAL MEDICINE

## 2021-03-28 PROCEDURE — 99223 1ST HOSP IP/OBS HIGH 75: CPT | Mod: ,,, | Performed by: SURGERY

## 2021-03-28 PROCEDURE — 85025 COMPLETE CBC W/AUTO DIFF WBC: CPT | Performed by: HOSPITALIST

## 2021-03-28 PROCEDURE — 63600175 PHARM REV CODE 636 W HCPCS: Performed by: INTERNAL MEDICINE

## 2021-03-28 PROCEDURE — 99223 PR INITIAL HOSPITAL CARE,LEVL III: ICD-10-PCS | Mod: ,,, | Performed by: SURGERY

## 2021-03-28 PROCEDURE — 25000003 PHARM REV CODE 250: Performed by: STUDENT IN AN ORGANIZED HEALTH CARE EDUCATION/TRAINING PROGRAM

## 2021-03-28 PROCEDURE — 36415 COLL VENOUS BLD VENIPUNCTURE: CPT | Performed by: STUDENT IN AN ORGANIZED HEALTH CARE EDUCATION/TRAINING PROGRAM

## 2021-03-28 PROCEDURE — 94761 N-INVAS EAR/PLS OXIMETRY MLT: CPT

## 2021-03-28 PROCEDURE — 20000000 HC ICU ROOM

## 2021-03-28 PROCEDURE — 82550 ASSAY OF CK (CPK): CPT | Performed by: STUDENT IN AN ORGANIZED HEALTH CARE EDUCATION/TRAINING PROGRAM

## 2021-03-28 PROCEDURE — 80053 COMPREHEN METABOLIC PANEL: CPT | Performed by: INTERNAL MEDICINE

## 2021-03-28 PROCEDURE — 99900035 HC TECH TIME PER 15 MIN (STAT)

## 2021-03-28 RX ADMIN — HYDRALAZINE HYDROCHLORIDE 10 MG: 20 INJECTION, SOLUTION INTRAMUSCULAR; INTRAVENOUS at 12:03

## 2021-03-28 RX ADMIN — HYDROCHLOROTHIAZIDE 25 MG: 25 TABLET ORAL at 09:03

## 2021-03-28 RX ADMIN — MUPIROCIN: 20 OINTMENT TOPICAL at 09:03

## 2021-03-28 RX ADMIN — FAMOTIDINE 20 MG: 20 TABLET ORAL at 09:03

## 2021-03-28 RX ADMIN — HYDRALAZINE HYDROCHLORIDE 50 MG: 25 TABLET, FILM COATED ORAL at 05:03

## 2021-03-28 RX ADMIN — DEXMEDETOMIDINE HYDROCHLORIDE 0.7 MCG/KG/HR: 400 INJECTION, SOLUTION INTRAVENOUS at 10:03

## 2021-03-28 RX ADMIN — LABETALOL HYDROCHLORIDE 300 MG: 100 TABLET, FILM COATED ORAL at 09:03

## 2021-03-28 RX ADMIN — CETIRIZINE HYDROCHLORIDE 10 MG: 10 TABLET, FILM COATED ORAL at 09:03

## 2021-03-28 RX ADMIN — CLONAZEPAM 0.5 MG: 0.5 TABLET ORAL at 09:03

## 2021-03-28 RX ADMIN — LISINOPRIL 20 MG: 20 TABLET ORAL at 09:03

## 2021-03-28 RX ADMIN — DEXMEDETOMIDINE HYDROCHLORIDE 0.6 MCG/KG/HR: 400 INJECTION, SOLUTION INTRAVENOUS at 09:03

## 2021-03-28 RX ADMIN — HYDRALAZINE HYDROCHLORIDE 50 MG: 25 TABLET, FILM COATED ORAL at 09:03

## 2021-03-28 RX ADMIN — Medication: at 09:03

## 2021-03-28 RX ADMIN — DEXMEDETOMIDINE HYDROCHLORIDE 1.4 MCG/KG/HR: 400 INJECTION, SOLUTION INTRAVENOUS at 08:03

## 2021-03-28 RX ADMIN — DEXMEDETOMIDINE HYDROCHLORIDE 0.7 MCG/KG/HR: 400 INJECTION, SOLUTION INTRAVENOUS at 02:03

## 2021-03-28 RX ADMIN — HYDROCORTISONE: 1 CREAM TOPICAL at 09:03

## 2021-03-28 RX ADMIN — HYDRALAZINE HYDROCHLORIDE 50 MG: 25 TABLET, FILM COATED ORAL at 03:03

## 2021-03-28 RX ADMIN — AMLODIPINE BESYLATE 10 MG: 5 TABLET ORAL at 09:03

## 2021-03-28 RX ADMIN — DEXMEDETOMIDINE HYDROCHLORIDE 0.6 MCG/KG/HR: 400 INJECTION, SOLUTION INTRAVENOUS at 03:03

## 2021-03-28 RX ADMIN — VANCOMYCIN HYDROCHLORIDE 1500 MG: 1.5 INJECTION, POWDER, LYOPHILIZED, FOR SOLUTION INTRAVENOUS at 04:03

## 2021-03-29 LAB
ALBUMIN SERPL BCP-MCNC: 3.2 G/DL (ref 3.5–5.2)
ALP SERPL-CCNC: 71 U/L (ref 55–135)
ALT SERPL W/O P-5'-P-CCNC: 22 U/L (ref 10–44)
ANION GAP SERPL CALC-SCNC: 10 MMOL/L (ref 8–16)
AST SERPL-CCNC: 25 U/L (ref 10–40)
BACTERIA SPEC AEROBE CULT: ABNORMAL
BASOPHILS # BLD AUTO: 0.04 K/UL (ref 0–0.2)
BASOPHILS NFR BLD: 0.4 % (ref 0–1.9)
BILIRUB SERPL-MCNC: 1.7 MG/DL (ref 0.1–1)
BUN SERPL-MCNC: 32 MG/DL (ref 6–20)
CALCIUM SERPL-MCNC: 9 MG/DL (ref 8.7–10.5)
CHLORIDE SERPL-SCNC: 107 MMOL/L (ref 95–110)
CK SERPL-CCNC: 411 U/L (ref 20–200)
CO2 SERPL-SCNC: 21 MMOL/L (ref 23–29)
CREAT SERPL-MCNC: 2.5 MG/DL (ref 0.5–1.4)
DIFFERENTIAL METHOD: ABNORMAL
EOSINOPHIL # BLD AUTO: 0.3 K/UL (ref 0–0.5)
EOSINOPHIL NFR BLD: 3 % (ref 0–8)
ERYTHROCYTE [DISTWIDTH] IN BLOOD BY AUTOMATED COUNT: 14.5 % (ref 11.5–14.5)
EST. GFR  (AFRICAN AMERICAN): 36 ML/MIN/1.73 M^2
EST. GFR  (NON AFRICAN AMERICAN): 31.2 ML/MIN/1.73 M^2
GLUCOSE SERPL-MCNC: 110 MG/DL (ref 70–110)
GLUCOSE SERPL-MCNC: 130 MG/DL (ref 70–110)
HCT VFR BLD AUTO: 36.7 % (ref 40–54)
HGB BLD-MCNC: 11.8 G/DL (ref 14–18)
IMM GRANULOCYTES # BLD AUTO: 0.05 K/UL (ref 0–0.04)
IMM GRANULOCYTES NFR BLD AUTO: 0.4 % (ref 0–0.5)
LYMPHOCYTES # BLD AUTO: 1.5 K/UL (ref 1–4.8)
LYMPHOCYTES NFR BLD: 13.1 % (ref 18–48)
MCH RBC QN AUTO: 28 PG (ref 27–31)
MCHC RBC AUTO-ENTMCNC: 32.2 G/DL (ref 32–36)
MCV RBC AUTO: 87 FL (ref 82–98)
MONOCYTES # BLD AUTO: 0.9 K/UL (ref 0.3–1)
MONOCYTES NFR BLD: 8.3 % (ref 4–15)
NEUTROPHILS # BLD AUTO: 8.3 K/UL (ref 1.8–7.7)
NEUTROPHILS NFR BLD: 74.8 % (ref 38–73)
NRBC BLD-RTO: 0 /100 WBC
PLATELET # BLD AUTO: 258 K/UL (ref 150–450)
PMV BLD AUTO: 11.2 FL (ref 9.2–12.9)
POTASSIUM SERPL-SCNC: 3.8 MMOL/L (ref 3.5–5.1)
PROT SERPL-MCNC: 7.3 G/DL (ref 6–8.4)
RBC # BLD AUTO: 4.22 M/UL (ref 4.6–6.2)
SODIUM SERPL-SCNC: 138 MMOL/L (ref 136–145)
WBC # BLD AUTO: 11.16 K/UL (ref 3.9–12.7)

## 2021-03-29 PROCEDURE — 25000003 PHARM REV CODE 250: Performed by: INTERNAL MEDICINE

## 2021-03-29 PROCEDURE — 20000000 HC ICU ROOM

## 2021-03-29 PROCEDURE — 36415 COLL VENOUS BLD VENIPUNCTURE: CPT | Performed by: STUDENT IN AN ORGANIZED HEALTH CARE EDUCATION/TRAINING PROGRAM

## 2021-03-29 PROCEDURE — 97535 SELF CARE MNGMENT TRAINING: CPT

## 2021-03-29 PROCEDURE — 99900031 HC PATIENT EDUCATION (STAT)

## 2021-03-29 PROCEDURE — 82550 ASSAY OF CK (CPK): CPT | Performed by: STUDENT IN AN ORGANIZED HEALTH CARE EDUCATION/TRAINING PROGRAM

## 2021-03-29 PROCEDURE — 80053 COMPREHEN METABOLIC PANEL: CPT | Performed by: INTERNAL MEDICINE

## 2021-03-29 PROCEDURE — 85025 COMPLETE CBC W/AUTO DIFF WBC: CPT | Performed by: HOSPITALIST

## 2021-03-29 PROCEDURE — 92507 TX SP LANG VOICE COMM INDIV: CPT

## 2021-03-29 PROCEDURE — 99900035 HC TECH TIME PER 15 MIN (STAT)

## 2021-03-29 PROCEDURE — 25000003 PHARM REV CODE 250: Performed by: STUDENT IN AN ORGANIZED HEALTH CARE EDUCATION/TRAINING PROGRAM

## 2021-03-29 PROCEDURE — 63600175 PHARM REV CODE 636 W HCPCS: Performed by: INTERNAL MEDICINE

## 2021-03-29 PROCEDURE — 94761 N-INVAS EAR/PLS OXIMETRY MLT: CPT

## 2021-03-29 RX ADMIN — DEXMEDETOMIDINE HYDROCHLORIDE 0.7 MCG/KG/HR: 400 INJECTION, SOLUTION INTRAVENOUS at 02:03

## 2021-03-29 RX ADMIN — HYDRALAZINE HYDROCHLORIDE 50 MG: 25 TABLET, FILM COATED ORAL at 02:03

## 2021-03-29 RX ADMIN — FAMOTIDINE 20 MG: 20 TABLET ORAL at 08:03

## 2021-03-29 RX ADMIN — DEXMEDETOMIDINE HYDROCHLORIDE 0.3 MCG/KG/HR: 400 INJECTION, SOLUTION INTRAVENOUS at 02:03

## 2021-03-29 RX ADMIN — DEXMEDETOMIDINE HYDROCHLORIDE 0.7 MCG/KG/HR: 400 INJECTION, SOLUTION INTRAVENOUS at 08:03

## 2021-03-29 RX ADMIN — MUPIROCIN: 20 OINTMENT TOPICAL at 09:03

## 2021-03-29 RX ADMIN — AMLODIPINE BESYLATE 10 MG: 5 TABLET ORAL at 09:03

## 2021-03-29 RX ADMIN — LABETALOL HYDROCHLORIDE 300 MG: 100 TABLET, FILM COATED ORAL at 08:03

## 2021-03-29 RX ADMIN — HYDROCORTISONE: 1 CREAM TOPICAL at 09:03

## 2021-03-29 RX ADMIN — CETIRIZINE HYDROCHLORIDE 10 MG: 10 TABLET, FILM COATED ORAL at 09:03

## 2021-03-29 RX ADMIN — LABETALOL HYDROCHLORIDE 300 MG: 100 TABLET, FILM COATED ORAL at 09:03

## 2021-03-29 RX ADMIN — Medication: at 09:03

## 2021-03-29 RX ADMIN — VANCOMYCIN HYDROCHLORIDE 1500 MG: 1.5 INJECTION, POWDER, LYOPHILIZED, FOR SOLUTION INTRAVENOUS at 05:03

## 2021-03-29 RX ADMIN — LABETALOL HYDROCHLORIDE 10 MG: 5 INJECTION, SOLUTION INTRAVENOUS at 06:03

## 2021-03-29 RX ADMIN — FAMOTIDINE 20 MG: 20 TABLET ORAL at 09:03

## 2021-03-29 RX ADMIN — MUPIROCIN: 20 OINTMENT TOPICAL at 08:03

## 2021-03-29 RX ADMIN — LISINOPRIL 20 MG: 20 TABLET ORAL at 09:03

## 2021-03-29 RX ADMIN — HYDROCHLOROTHIAZIDE 25 MG: 25 TABLET ORAL at 09:03

## 2021-03-29 RX ADMIN — HYDRALAZINE HYDROCHLORIDE 50 MG: 25 TABLET, FILM COATED ORAL at 05:03

## 2021-03-29 RX ADMIN — DEXMEDETOMIDINE HYDROCHLORIDE 0.07 MCG/KG/HR: 400 INJECTION, SOLUTION INTRAVENOUS at 09:03

## 2021-03-29 RX ADMIN — CLONAZEPAM 0.5 MG: 0.5 TABLET ORAL at 09:03

## 2021-03-30 LAB
ALBUMIN SERPL BCP-MCNC: 3.2 G/DL (ref 3.5–5.2)
ALP SERPL-CCNC: 80 U/L (ref 55–135)
ALT SERPL W/O P-5'-P-CCNC: 29 U/L (ref 10–44)
ANION GAP SERPL CALC-SCNC: 12 MMOL/L (ref 8–16)
AST SERPL-CCNC: 28 U/L (ref 10–40)
BASOPHILS # BLD AUTO: 0.05 K/UL (ref 0–0.2)
BASOPHILS NFR BLD: 0.5 % (ref 0–1.9)
BILIRUB SERPL-MCNC: 1.2 MG/DL (ref 0.1–1)
BUN SERPL-MCNC: 35 MG/DL (ref 6–20)
CALCIUM SERPL-MCNC: 9.2 MG/DL (ref 8.7–10.5)
CHLORIDE SERPL-SCNC: 107 MMOL/L (ref 95–110)
CK SERPL-CCNC: 161 U/L (ref 20–200)
CK SERPL-CCNC: 161 U/L (ref 20–200)
CO2 SERPL-SCNC: 22 MMOL/L (ref 23–29)
CREAT SERPL-MCNC: 2.9 MG/DL (ref 0.5–1.4)
DIFFERENTIAL METHOD: ABNORMAL
EOSINOPHIL # BLD AUTO: 0.4 K/UL (ref 0–0.5)
EOSINOPHIL NFR BLD: 3.4 % (ref 0–8)
ERYTHROCYTE [DISTWIDTH] IN BLOOD BY AUTOMATED COUNT: 14.5 % (ref 11.5–14.5)
EST. GFR  (AFRICAN AMERICAN): 30.1 ML/MIN/1.73 M^2
EST. GFR  (NON AFRICAN AMERICAN): 26.1 ML/MIN/1.73 M^2
GLUCOSE SERPL-MCNC: 123 MG/DL (ref 70–110)
HCT VFR BLD AUTO: 36.4 % (ref 40–54)
HGB BLD-MCNC: 11.6 G/DL (ref 14–18)
IMM GRANULOCYTES # BLD AUTO: 0.05 K/UL (ref 0–0.04)
IMM GRANULOCYTES NFR BLD AUTO: 0.5 % (ref 0–0.5)
LYMPHOCYTES # BLD AUTO: 1.8 K/UL (ref 1–4.8)
LYMPHOCYTES NFR BLD: 17.1 % (ref 18–48)
MCH RBC QN AUTO: 27.4 PG (ref 27–31)
MCHC RBC AUTO-ENTMCNC: 31.9 G/DL (ref 32–36)
MCV RBC AUTO: 86 FL (ref 82–98)
MONOCYTES # BLD AUTO: 1.2 K/UL (ref 0.3–1)
MONOCYTES NFR BLD: 11.4 % (ref 4–15)
NEUTROPHILS # BLD AUTO: 6.9 K/UL (ref 1.8–7.7)
NEUTROPHILS NFR BLD: 67.1 % (ref 38–73)
NRBC BLD-RTO: 0 /100 WBC
PLATELET # BLD AUTO: 293 K/UL (ref 150–450)
PMV BLD AUTO: 11 FL (ref 9.2–12.9)
POTASSIUM SERPL-SCNC: 4 MMOL/L (ref 3.5–5.1)
PROT SERPL-MCNC: 6.9 G/DL (ref 6–8.4)
RBC # BLD AUTO: 4.23 M/UL (ref 4.6–6.2)
SODIUM SERPL-SCNC: 141 MMOL/L (ref 136–145)
VANCOMYCIN SERPL-MCNC: 45.5 UG/ML
WBC # BLD AUTO: 10.22 K/UL (ref 3.9–12.7)

## 2021-03-30 PROCEDURE — 36415 COLL VENOUS BLD VENIPUNCTURE: CPT | Performed by: STUDENT IN AN ORGANIZED HEALTH CARE EDUCATION/TRAINING PROGRAM

## 2021-03-30 PROCEDURE — 25000003 PHARM REV CODE 250: Performed by: INTERNAL MEDICINE

## 2021-03-30 PROCEDURE — 82550 ASSAY OF CK (CPK): CPT | Mod: 91 | Performed by: HOSPITALIST

## 2021-03-30 PROCEDURE — 85025 COMPLETE CBC W/AUTO DIFF WBC: CPT | Performed by: HOSPITALIST

## 2021-03-30 PROCEDURE — 80053 COMPREHEN METABOLIC PANEL: CPT | Performed by: INTERNAL MEDICINE

## 2021-03-30 PROCEDURE — 20000000 HC ICU ROOM

## 2021-03-30 PROCEDURE — 36415 COLL VENOUS BLD VENIPUNCTURE: CPT | Performed by: HOSPITALIST

## 2021-03-30 PROCEDURE — 82550 ASSAY OF CK (CPK): CPT | Performed by: STUDENT IN AN ORGANIZED HEALTH CARE EDUCATION/TRAINING PROGRAM

## 2021-03-30 PROCEDURE — 92526 ORAL FUNCTION THERAPY: CPT

## 2021-03-30 PROCEDURE — 94761 N-INVAS EAR/PLS OXIMETRY MLT: CPT

## 2021-03-30 PROCEDURE — 99900035 HC TECH TIME PER 15 MIN (STAT)

## 2021-03-30 PROCEDURE — 92507 TX SP LANG VOICE COMM INDIV: CPT

## 2021-03-30 PROCEDURE — 80202 ASSAY OF VANCOMYCIN: CPT | Performed by: HOSPITALIST

## 2021-03-30 PROCEDURE — 63600175 PHARM REV CODE 636 W HCPCS: Performed by: INTERNAL MEDICINE

## 2021-03-30 RX ORDER — SODIUM CHLORIDE 9 MG/ML
INJECTION, SOLUTION INTRAVENOUS CONTINUOUS
Status: DISCONTINUED | OUTPATIENT
Start: 2021-03-30 | End: 2021-03-30

## 2021-03-30 RX ADMIN — AMLODIPINE BESYLATE 10 MG: 5 TABLET ORAL at 09:03

## 2021-03-30 RX ADMIN — CEFTRIAXONE SODIUM 1 G: 1 INJECTION, POWDER, FOR SOLUTION INTRAMUSCULAR; INTRAVENOUS at 07:03

## 2021-03-30 RX ADMIN — DEXMEDETOMIDINE HYDROCHLORIDE 0.7 MCG/KG/HR: 400 INJECTION, SOLUTION INTRAVENOUS at 02:03

## 2021-03-30 RX ADMIN — HYDROCHLOROTHIAZIDE 25 MG: 25 TABLET ORAL at 09:03

## 2021-03-30 RX ADMIN — FAMOTIDINE 20 MG: 20 TABLET ORAL at 08:03

## 2021-03-30 RX ADMIN — DEXMEDETOMIDINE HYDROCHLORIDE 0.7 MCG/KG/HR: 400 INJECTION, SOLUTION INTRAVENOUS at 08:03

## 2021-03-30 RX ADMIN — DEXMEDETOMIDINE HYDROCHLORIDE 0.5 MCG/KG/HR: 400 INJECTION, SOLUTION INTRAVENOUS at 03:03

## 2021-03-30 RX ADMIN — HYDRALAZINE HYDROCHLORIDE 50 MG: 25 TABLET, FILM COATED ORAL at 03:03

## 2021-03-30 RX ADMIN — CETIRIZINE HYDROCHLORIDE 10 MG: 10 TABLET, FILM COATED ORAL at 09:03

## 2021-03-30 RX ADMIN — SODIUM CHLORIDE: 0.9 INJECTION, SOLUTION INTRAVENOUS at 03:03

## 2021-03-30 RX ADMIN — Medication: at 09:03

## 2021-03-30 RX ADMIN — MUPIROCIN: 20 OINTMENT TOPICAL at 08:03

## 2021-03-30 RX ADMIN — FAMOTIDINE 20 MG: 20 TABLET ORAL at 09:03

## 2021-03-30 RX ADMIN — HYDRALAZINE HYDROCHLORIDE 50 MG: 25 TABLET, FILM COATED ORAL at 08:03

## 2021-03-30 RX ADMIN — HYDROCORTISONE: 1 CREAM TOPICAL at 09:03

## 2021-03-30 RX ADMIN — HYDRALAZINE HYDROCHLORIDE 50 MG: 25 TABLET, FILM COATED ORAL at 09:03

## 2021-03-30 RX ADMIN — LISINOPRIL 20 MG: 20 TABLET ORAL at 09:03

## 2021-03-30 RX ADMIN — MUPIROCIN: 20 OINTMENT TOPICAL at 09:03

## 2021-03-30 RX ADMIN — LABETALOL HYDROCHLORIDE 300 MG: 100 TABLET, FILM COATED ORAL at 09:03

## 2021-03-30 RX ADMIN — DEXMEDETOMIDINE HYDROCHLORIDE 0.5 MCG/KG/HR: 400 INJECTION, SOLUTION INTRAVENOUS at 09:03

## 2021-03-31 LAB
ALBUMIN SERPL BCP-MCNC: 3.2 G/DL (ref 3.5–5.2)
ALP SERPL-CCNC: 84 U/L (ref 55–135)
ALT SERPL W/O P-5'-P-CCNC: 38 U/L (ref 10–44)
ANION GAP SERPL CALC-SCNC: 11 MMOL/L (ref 8–16)
AST SERPL-CCNC: 36 U/L (ref 10–40)
BASOPHILS # BLD AUTO: 0.04 K/UL (ref 0–0.2)
BASOPHILS NFR BLD: 0.3 % (ref 0–1.9)
BILIRUB SERPL-MCNC: 1.1 MG/DL (ref 0.1–1)
BILIRUB UR QL STRIP: NEGATIVE
BUN SERPL-MCNC: 41 MG/DL (ref 6–20)
CALCIUM SERPL-MCNC: 9.1 MG/DL (ref 8.7–10.5)
CHLORIDE SERPL-SCNC: 104 MMOL/L (ref 95–110)
CK SERPL-CCNC: 114 U/L (ref 20–200)
CLARITY UR: CLEAR
CO2 SERPL-SCNC: 22 MMOL/L (ref 23–29)
COLOR UR: YELLOW
CREAT SERPL-MCNC: 3 MG/DL (ref 0.5–1.4)
DIFFERENTIAL METHOD: ABNORMAL
EOSINOPHIL # BLD AUTO: 0.4 K/UL (ref 0–0.5)
EOSINOPHIL NFR BLD: 3 % (ref 0–8)
ERYTHROCYTE [DISTWIDTH] IN BLOOD BY AUTOMATED COUNT: 14.1 % (ref 11.5–14.5)
EST. GFR  (AFRICAN AMERICAN): 28.9 ML/MIN/1.73 M^2
EST. GFR  (NON AFRICAN AMERICAN): 25 ML/MIN/1.73 M^2
GLUCOSE SERPL-MCNC: 106 MG/DL (ref 70–110)
GLUCOSE SERPL-MCNC: 109 MG/DL (ref 70–110)
GLUCOSE UR QL STRIP: NEGATIVE
HCT VFR BLD AUTO: 36.5 % (ref 40–54)
HGB BLD-MCNC: 11.5 G/DL (ref 14–18)
HGB UR QL STRIP: ABNORMAL
IMM GRANULOCYTES # BLD AUTO: 0.07 K/UL (ref 0–0.04)
IMM GRANULOCYTES NFR BLD AUTO: 0.6 % (ref 0–0.5)
KETONES UR QL STRIP: NEGATIVE
LEUKOCYTE ESTERASE UR QL STRIP: NEGATIVE
LYMPHOCYTES # BLD AUTO: 1.9 K/UL (ref 1–4.8)
LYMPHOCYTES NFR BLD: 16 % (ref 18–48)
MCH RBC QN AUTO: 27.4 PG (ref 27–31)
MCHC RBC AUTO-ENTMCNC: 31.5 G/DL (ref 32–36)
MCV RBC AUTO: 87 FL (ref 82–98)
MONOCYTES # BLD AUTO: 1.2 K/UL (ref 0.3–1)
MONOCYTES NFR BLD: 10 % (ref 4–15)
NEUTROPHILS # BLD AUTO: 8.4 K/UL (ref 1.8–7.7)
NEUTROPHILS NFR BLD: 70.1 % (ref 38–73)
NITRITE UR QL STRIP: NEGATIVE
NRBC BLD-RTO: 0 /100 WBC
PH UR STRIP: 6 [PH] (ref 5–8)
PLATELET # BLD AUTO: 314 K/UL (ref 150–450)
PMV BLD AUTO: 11.6 FL (ref 9.2–12.9)
POTASSIUM SERPL-SCNC: 4.3 MMOL/L (ref 3.5–5.1)
PROT SERPL-MCNC: 7.3 G/DL (ref 6–8.4)
PROT UR QL STRIP: ABNORMAL
RBC # BLD AUTO: 4.19 M/UL (ref 4.6–6.2)
SODIUM SERPL-SCNC: 137 MMOL/L (ref 136–145)
SP GR UR STRIP: 1.01 (ref 1–1.03)
URN SPEC COLLECT METH UR: ABNORMAL
UROBILINOGEN UR STRIP-ACNC: NEGATIVE EU/DL
WBC # BLD AUTO: 12.02 K/UL (ref 3.9–12.7)

## 2021-03-31 PROCEDURE — 97110 THERAPEUTIC EXERCISES: CPT

## 2021-03-31 PROCEDURE — 99900035 HC TECH TIME PER 15 MIN (STAT)

## 2021-03-31 PROCEDURE — 92507 TX SP LANG VOICE COMM INDIV: CPT

## 2021-03-31 PROCEDURE — 80053 COMPREHEN METABOLIC PANEL: CPT | Performed by: INTERNAL MEDICINE

## 2021-03-31 PROCEDURE — 63600175 PHARM REV CODE 636 W HCPCS: Performed by: INTERNAL MEDICINE

## 2021-03-31 PROCEDURE — 20000000 HC ICU ROOM

## 2021-03-31 PROCEDURE — 82550 ASSAY OF CK (CPK): CPT | Performed by: STUDENT IN AN ORGANIZED HEALTH CARE EDUCATION/TRAINING PROGRAM

## 2021-03-31 PROCEDURE — 36415 COLL VENOUS BLD VENIPUNCTURE: CPT | Performed by: STUDENT IN AN ORGANIZED HEALTH CARE EDUCATION/TRAINING PROGRAM

## 2021-03-31 PROCEDURE — 25000003 PHARM REV CODE 250: Performed by: INTERNAL MEDICINE

## 2021-03-31 PROCEDURE — 94761 N-INVAS EAR/PLS OXIMETRY MLT: CPT

## 2021-03-31 PROCEDURE — 81003 URINALYSIS AUTO W/O SCOPE: CPT | Performed by: INTERNAL MEDICINE

## 2021-03-31 PROCEDURE — 85025 COMPLETE CBC W/AUTO DIFF WBC: CPT | Performed by: HOSPITALIST

## 2021-03-31 PROCEDURE — 82962 GLUCOSE BLOOD TEST: CPT

## 2021-03-31 RX ADMIN — HYDRALAZINE HYDROCHLORIDE 50 MG: 25 TABLET, FILM COATED ORAL at 05:03

## 2021-03-31 RX ADMIN — LABETALOL HYDROCHLORIDE 300 MG: 100 TABLET, FILM COATED ORAL at 08:03

## 2021-03-31 RX ADMIN — DEXMEDETOMIDINE HYDROCHLORIDE 0.5 MCG/KG/HR: 400 INJECTION, SOLUTION INTRAVENOUS at 07:03

## 2021-03-31 RX ADMIN — FAMOTIDINE 20 MG: 20 TABLET ORAL at 08:03

## 2021-03-31 RX ADMIN — HYDRALAZINE HYDROCHLORIDE 50 MG: 25 TABLET, FILM COATED ORAL at 03:03

## 2021-03-31 RX ADMIN — CETIRIZINE HYDROCHLORIDE 10 MG: 10 TABLET, FILM COATED ORAL at 08:03

## 2021-03-31 RX ADMIN — DEXMEDETOMIDINE HYDROCHLORIDE 0.4 MCG/KG/HR: 400 INJECTION, SOLUTION INTRAVENOUS at 10:03

## 2021-03-31 RX ADMIN — Medication: at 09:03

## 2021-03-31 RX ADMIN — HYDRALAZINE HYDROCHLORIDE 50 MG: 25 TABLET, FILM COATED ORAL at 09:03

## 2021-03-31 RX ADMIN — AMLODIPINE BESYLATE 10 MG: 5 TABLET ORAL at 08:03

## 2021-03-31 RX ADMIN — CEFTRIAXONE SODIUM 1 G: 1 INJECTION, POWDER, FOR SOLUTION INTRAMUSCULAR; INTRAVENOUS at 08:03

## 2021-03-31 RX ADMIN — MUPIROCIN: 20 OINTMENT TOPICAL at 08:03

## 2021-03-31 RX ADMIN — HYDROCORTISONE: 1 CREAM TOPICAL at 09:03

## 2021-03-31 RX ADMIN — DEXMEDETOMIDINE HYDROCHLORIDE 0.7 MCG/KG/HR: 400 INJECTION, SOLUTION INTRAVENOUS at 01:03

## 2021-03-31 RX ADMIN — DIPHENHYDRAMINE HYDROCHLORIDE 25 MG: 50 INJECTION INTRAMUSCULAR; INTRAVENOUS at 03:03

## 2021-03-31 RX ADMIN — LISINOPRIL 20 MG: 20 TABLET ORAL at 08:03

## 2021-03-31 RX ADMIN — HYDROCHLOROTHIAZIDE 25 MG: 25 TABLET ORAL at 08:03

## 2021-03-31 RX ADMIN — MUPIROCIN: 20 OINTMENT TOPICAL at 09:03

## 2021-04-01 ENCOUNTER — ANESTHESIA (OUTPATIENT)
Dept: SURGERY | Facility: HOSPITAL | Age: 41
DRG: 065 | End: 2021-04-01
Payer: MEDICAID

## 2021-04-01 ENCOUNTER — ANESTHESIA EVENT (OUTPATIENT)
Dept: SURGERY | Facility: HOSPITAL | Age: 41
DRG: 065 | End: 2021-04-01
Payer: MEDICAID

## 2021-04-01 LAB
ALBUMIN SERPL BCP-MCNC: 3.5 G/DL (ref 3.5–5.2)
ALP SERPL-CCNC: 95 U/L (ref 55–135)
ALT SERPL W/O P-5'-P-CCNC: 41 U/L (ref 10–44)
ANION GAP SERPL CALC-SCNC: 14 MMOL/L (ref 8–16)
APTT PPP: 28.8 SEC (ref 23.6–33.3)
AST SERPL-CCNC: 34 U/L (ref 10–40)
BACTERIA BLD CULT: NORMAL
BASOPHILS # BLD AUTO: 0.06 K/UL (ref 0–0.2)
BASOPHILS NFR BLD: 0.5 % (ref 0–1.9)
BILIRUB SERPL-MCNC: 1.1 MG/DL (ref 0.1–1)
BUN SERPL-MCNC: 40 MG/DL (ref 6–20)
CALCIUM SERPL-MCNC: 9.8 MG/DL (ref 8.7–10.5)
CHLORIDE SERPL-SCNC: 103 MMOL/L (ref 95–110)
CK SERPL-CCNC: 100 U/L (ref 20–200)
CO2 SERPL-SCNC: 22 MMOL/L (ref 23–29)
CREAT SERPL-MCNC: 2.9 MG/DL (ref 0.5–1.4)
DIFFERENTIAL METHOD: ABNORMAL
EOSINOPHIL # BLD AUTO: 0.5 K/UL (ref 0–0.5)
EOSINOPHIL NFR BLD: 3.9 % (ref 0–8)
ERYTHROCYTE [DISTWIDTH] IN BLOOD BY AUTOMATED COUNT: 14.1 % (ref 11.5–14.5)
EST. GFR  (AFRICAN AMERICAN): 30.1 ML/MIN/1.73 M^2
EST. GFR  (NON AFRICAN AMERICAN): 26.1 ML/MIN/1.73 M^2
GLUCOSE SERPL-MCNC: 88 MG/DL (ref 70–110)
GLUCOSE SERPL-MCNC: 91 MG/DL (ref 70–110)
GLUCOSE SERPL-MCNC: 94 MG/DL (ref 70–110)
HCT VFR BLD AUTO: 38.1 % (ref 40–54)
HGB BLD-MCNC: 12.3 G/DL (ref 14–18)
IMM GRANULOCYTES # BLD AUTO: 0.06 K/UL (ref 0–0.04)
IMM GRANULOCYTES NFR BLD AUTO: 0.5 % (ref 0–0.5)
INR PPP: 1
LYMPHOCYTES # BLD AUTO: 1.9 K/UL (ref 1–4.8)
LYMPHOCYTES NFR BLD: 15 % (ref 18–48)
MAGNESIUM SERPL-MCNC: 2.4 MG/DL (ref 1.6–2.6)
MCH RBC QN AUTO: 28.1 PG (ref 27–31)
MCHC RBC AUTO-ENTMCNC: 32.3 G/DL (ref 32–36)
MCV RBC AUTO: 87 FL (ref 82–98)
MONOCYTES # BLD AUTO: 1.3 K/UL (ref 0.3–1)
MONOCYTES NFR BLD: 10.4 % (ref 4–15)
NEUTROPHILS # BLD AUTO: 9 K/UL (ref 1.8–7.7)
NEUTROPHILS NFR BLD: 69.7 % (ref 38–73)
NRBC BLD-RTO: 0 /100 WBC
PLATELET # BLD AUTO: 337 K/UL (ref 150–450)
PMV BLD AUTO: 11.5 FL (ref 9.2–12.9)
POTASSIUM SERPL-SCNC: 4.2 MMOL/L (ref 3.5–5.1)
PROT SERPL-MCNC: 8.2 G/DL (ref 6–8.4)
PROTHROMBIN TIME: 13.1 SEC (ref 10.6–14.8)
RBC # BLD AUTO: 4.38 M/UL (ref 4.6–6.2)
SODIUM SERPL-SCNC: 139 MMOL/L (ref 136–145)
WBC # BLD AUTO: 12.92 K/UL (ref 3.9–12.7)

## 2021-04-01 PROCEDURE — 85730 THROMBOPLASTIN TIME PARTIAL: CPT | Performed by: INTERNAL MEDICINE

## 2021-04-01 PROCEDURE — 37000009 HC ANESTHESIA EA ADD 15 MINS: Performed by: INTERNAL MEDICINE

## 2021-04-01 PROCEDURE — 25000003 PHARM REV CODE 250: Performed by: INTERNAL MEDICINE

## 2021-04-01 PROCEDURE — 63600175 PHARM REV CODE 636 W HCPCS: Performed by: INTERNAL MEDICINE

## 2021-04-01 PROCEDURE — 63600175 PHARM REV CODE 636 W HCPCS: Performed by: NURSE ANESTHETIST, CERTIFIED REGISTERED

## 2021-04-01 PROCEDURE — 43246 EGD PLACE GASTROSTOMY TUBE: CPT | Performed by: INTERNAL MEDICINE

## 2021-04-01 PROCEDURE — 85025 COMPLETE CBC W/AUTO DIFF WBC: CPT | Performed by: HOSPITALIST

## 2021-04-01 PROCEDURE — 20000000 HC ICU ROOM

## 2021-04-01 PROCEDURE — 27201018 HC KIT, PEG (ANY): Performed by: INTERNAL MEDICINE

## 2021-04-01 PROCEDURE — 36415 COLL VENOUS BLD VENIPUNCTURE: CPT | Performed by: INTERNAL MEDICINE

## 2021-04-01 PROCEDURE — 92507 TX SP LANG VOICE COMM INDIV: CPT

## 2021-04-01 PROCEDURE — 99900035 HC TECH TIME PER 15 MIN (STAT)

## 2021-04-01 PROCEDURE — 94761 N-INVAS EAR/PLS OXIMETRY MLT: CPT

## 2021-04-01 PROCEDURE — 85610 PROTHROMBIN TIME: CPT | Performed by: INTERNAL MEDICINE

## 2021-04-01 PROCEDURE — 37000008 HC ANESTHESIA 1ST 15 MINUTES: Performed by: INTERNAL MEDICINE

## 2021-04-01 PROCEDURE — 80053 COMPREHEN METABOLIC PANEL: CPT | Performed by: INTERNAL MEDICINE

## 2021-04-01 PROCEDURE — 82550 ASSAY OF CK (CPK): CPT | Performed by: STUDENT IN AN ORGANIZED HEALTH CARE EDUCATION/TRAINING PROGRAM

## 2021-04-01 PROCEDURE — 83735 ASSAY OF MAGNESIUM: CPT | Performed by: INTERNAL MEDICINE

## 2021-04-01 PROCEDURE — 25000003 PHARM REV CODE 250: Performed by: NURSE ANESTHETIST, CERTIFIED REGISTERED

## 2021-04-01 RX ORDER — PROPOFOL 10 MG/ML
VIAL (ML) INTRAVENOUS
Status: DISCONTINUED | OUTPATIENT
Start: 2021-04-01 | End: 2021-04-01

## 2021-04-01 RX ORDER — LIDOCAINE HYDROCHLORIDE 20 MG/ML
INJECTION, SOLUTION EPIDURAL; INFILTRATION; INTRACAUDAL; PERINEURAL
Status: DISCONTINUED | OUTPATIENT
Start: 2021-04-01 | End: 2021-04-01

## 2021-04-01 RX ADMIN — DEXMEDETOMIDINE HYDROCHLORIDE 0.4 MCG/KG/HR: 400 INJECTION, SOLUTION INTRAVENOUS at 03:04

## 2021-04-01 RX ADMIN — AMLODIPINE BESYLATE 10 MG: 5 TABLET ORAL at 09:04

## 2021-04-01 RX ADMIN — HYDRALAZINE HYDROCHLORIDE 50 MG: 25 TABLET, FILM COATED ORAL at 09:04

## 2021-04-01 RX ADMIN — DEXMEDETOMIDINE HYDROCHLORIDE 0.4 MCG/KG/HR: 400 INJECTION, SOLUTION INTRAVENOUS at 11:04

## 2021-04-01 RX ADMIN — LABETALOL HYDROCHLORIDE 10 MG: 5 INJECTION, SOLUTION INTRAVENOUS at 05:04

## 2021-04-01 RX ADMIN — FAMOTIDINE 20 MG: 20 TABLET ORAL at 09:04

## 2021-04-01 RX ADMIN — MUPIROCIN: 20 OINTMENT TOPICAL at 09:04

## 2021-04-01 RX ADMIN — CEFTRIAXONE SODIUM 1 G: 1 INJECTION, POWDER, FOR SOLUTION INTRAMUSCULAR; INTRAVENOUS at 09:04

## 2021-04-01 RX ADMIN — Medication: at 09:04

## 2021-04-01 RX ADMIN — DIPHENHYDRAMINE HYDROCHLORIDE 25 MG: 50 INJECTION INTRAMUSCULAR; INTRAVENOUS at 06:04

## 2021-04-01 RX ADMIN — HYDRALAZINE HYDROCHLORIDE 50 MG: 25 TABLET, FILM COATED ORAL at 05:04

## 2021-04-01 RX ADMIN — PROPOFOL 50 MG: 10 INJECTION, EMULSION INTRAVENOUS at 01:04

## 2021-04-01 RX ADMIN — LIDOCAINE HYDROCHLORIDE 80 MG: 20 INJECTION, SOLUTION INTRAVENOUS at 01:04

## 2021-04-01 RX ADMIN — LABETALOL HYDROCHLORIDE 300 MG: 100 TABLET, FILM COATED ORAL at 09:04

## 2021-04-01 RX ADMIN — HYDRALAZINE HYDROCHLORIDE 10 MG: 20 INJECTION, SOLUTION INTRAMUSCULAR; INTRAVENOUS at 03:04

## 2021-04-01 RX ADMIN — HYDROCHLOROTHIAZIDE 25 MG: 25 TABLET ORAL at 09:04

## 2021-04-01 RX ADMIN — DEXMEDETOMIDINE HYDROCHLORIDE 0.4 MCG/KG/HR: 400 INJECTION, SOLUTION INTRAVENOUS at 06:04

## 2021-04-01 RX ADMIN — DIPHENHYDRAMINE HYDROCHLORIDE 25 MG: 50 INJECTION INTRAMUSCULAR; INTRAVENOUS at 01:04

## 2021-04-01 RX ADMIN — HYDROCORTISONE: 1 CREAM TOPICAL at 09:04

## 2021-04-01 RX ADMIN — LABETALOL HYDROCHLORIDE 10 MG: 5 INJECTION, SOLUTION INTRAVENOUS at 04:04

## 2021-04-01 RX ADMIN — CETIRIZINE HYDROCHLORIDE 10 MG: 10 TABLET, FILM COATED ORAL at 09:04

## 2021-04-02 LAB
ALBUMIN SERPL BCP-MCNC: 3.5 G/DL (ref 3.5–5.2)
ALP SERPL-CCNC: 97 U/L (ref 55–135)
ALT SERPL W/O P-5'-P-CCNC: 36 U/L (ref 10–44)
ANION GAP SERPL CALC-SCNC: 14 MMOL/L (ref 8–16)
AST SERPL-CCNC: 31 U/L (ref 10–40)
BASOPHILS # BLD AUTO: 0.04 K/UL (ref 0–0.2)
BASOPHILS NFR BLD: 0.3 % (ref 0–1.9)
BILIRUB SERPL-MCNC: 1.7 MG/DL (ref 0.1–1)
BUN SERPL-MCNC: 42 MG/DL (ref 6–20)
CALCIUM SERPL-MCNC: 10 MG/DL (ref 8.7–10.5)
CHLORIDE SERPL-SCNC: 106 MMOL/L (ref 95–110)
CK SERPL-CCNC: 86 U/L (ref 20–200)
CO2 SERPL-SCNC: 22 MMOL/L (ref 23–29)
CREAT SERPL-MCNC: 3.1 MG/DL (ref 0.5–1.4)
DIFFERENTIAL METHOD: ABNORMAL
EOSINOPHIL # BLD AUTO: 0.4 K/UL (ref 0–0.5)
EOSINOPHIL NFR BLD: 3.1 % (ref 0–8)
ERYTHROCYTE [DISTWIDTH] IN BLOOD BY AUTOMATED COUNT: 14.2 % (ref 11.5–14.5)
EST. GFR  (AFRICAN AMERICAN): 27.8 ML/MIN/1.73 M^2
EST. GFR  (NON AFRICAN AMERICAN): 24 ML/MIN/1.73 M^2
GLUCOSE SERPL-MCNC: 102 MG/DL (ref 70–110)
HCT VFR BLD AUTO: 38 % (ref 40–54)
HGB BLD-MCNC: 11.8 G/DL (ref 14–18)
IMM GRANULOCYTES # BLD AUTO: 0.05 K/UL (ref 0–0.04)
IMM GRANULOCYTES NFR BLD AUTO: 0.4 % (ref 0–0.5)
LYMPHOCYTES # BLD AUTO: 1.6 K/UL (ref 1–4.8)
LYMPHOCYTES NFR BLD: 13.6 % (ref 18–48)
MCH RBC QN AUTO: 26.8 PG (ref 27–31)
MCHC RBC AUTO-ENTMCNC: 31.1 G/DL (ref 32–36)
MCV RBC AUTO: 86 FL (ref 82–98)
MONOCYTES # BLD AUTO: 1.2 K/UL (ref 0.3–1)
MONOCYTES NFR BLD: 10 % (ref 4–15)
NEUTROPHILS # BLD AUTO: 8.4 K/UL (ref 1.8–7.7)
NEUTROPHILS NFR BLD: 72.6 % (ref 38–73)
NRBC BLD-RTO: 0 /100 WBC
PLATELET # BLD AUTO: 340 K/UL (ref 150–450)
PMV BLD AUTO: 11.4 FL (ref 9.2–12.9)
POTASSIUM SERPL-SCNC: 4.6 MMOL/L (ref 3.5–5.1)
PROT SERPL-MCNC: 7.9 G/DL (ref 6–8.4)
RBC # BLD AUTO: 4.41 M/UL (ref 4.6–6.2)
SODIUM SERPL-SCNC: 142 MMOL/L (ref 136–145)
WBC # BLD AUTO: 11.61 K/UL (ref 3.9–12.7)

## 2021-04-02 PROCEDURE — 85025 COMPLETE CBC W/AUTO DIFF WBC: CPT | Performed by: HOSPITALIST

## 2021-04-02 PROCEDURE — 97530 THERAPEUTIC ACTIVITIES: CPT | Mod: CQ

## 2021-04-02 PROCEDURE — 94761 N-INVAS EAR/PLS OXIMETRY MLT: CPT

## 2021-04-02 PROCEDURE — 63600175 PHARM REV CODE 636 W HCPCS: Performed by: INTERNAL MEDICINE

## 2021-04-02 PROCEDURE — 25000003 PHARM REV CODE 250: Performed by: INTERNAL MEDICINE

## 2021-04-02 PROCEDURE — 36415 COLL VENOUS BLD VENIPUNCTURE: CPT | Performed by: STUDENT IN AN ORGANIZED HEALTH CARE EDUCATION/TRAINING PROGRAM

## 2021-04-02 PROCEDURE — 20000000 HC ICU ROOM

## 2021-04-02 PROCEDURE — 92526 ORAL FUNCTION THERAPY: CPT

## 2021-04-02 PROCEDURE — 82550 ASSAY OF CK (CPK): CPT | Performed by: STUDENT IN AN ORGANIZED HEALTH CARE EDUCATION/TRAINING PROGRAM

## 2021-04-02 PROCEDURE — 99900035 HC TECH TIME PER 15 MIN (STAT)

## 2021-04-02 PROCEDURE — 80053 COMPREHEN METABOLIC PANEL: CPT | Performed by: INTERNAL MEDICINE

## 2021-04-02 RX ORDER — AMLODIPINE BESYLATE 5 MG/1
10 TABLET ORAL DAILY
Status: DISCONTINUED | OUTPATIENT
Start: 2021-04-02 | End: 2021-04-09 | Stop reason: HOSPADM

## 2021-04-02 RX ORDER — AMLODIPINE BESYLATE 5 MG/1
5 TABLET ORAL DAILY
Status: DISCONTINUED | OUTPATIENT
Start: 2021-04-02 | End: 2021-04-02

## 2021-04-02 RX ORDER — HYDRALAZINE HYDROCHLORIDE 25 MG/1
25 TABLET, FILM COATED ORAL EVERY 8 HOURS
Status: DISCONTINUED | OUTPATIENT
Start: 2021-04-02 | End: 2021-04-09

## 2021-04-02 RX ORDER — LORAZEPAM 1 MG/1
1 TABLET ORAL EVERY 6 HOURS PRN
Status: DISCONTINUED | OUTPATIENT
Start: 2021-04-02 | End: 2021-04-03

## 2021-04-02 RX ORDER — HYDRALAZINE HYDROCHLORIDE 20 MG/ML
10 INJECTION INTRAMUSCULAR; INTRAVENOUS ONCE
Status: DISCONTINUED | OUTPATIENT
Start: 2021-04-02 | End: 2021-04-06

## 2021-04-02 RX ADMIN — MUPIROCIN: 20 OINTMENT TOPICAL at 08:04

## 2021-04-02 RX ADMIN — LABETALOL HYDROCHLORIDE 300 MG: 100 TABLET, FILM COATED ORAL at 08:04

## 2021-04-02 RX ADMIN — CETIRIZINE HYDROCHLORIDE 10 MG: 10 TABLET, FILM COATED ORAL at 08:04

## 2021-04-02 RX ADMIN — DEXMEDETOMIDINE HYDROCHLORIDE 0.6 MCG/KG/HR: 400 INJECTION, SOLUTION INTRAVENOUS at 03:04

## 2021-04-02 RX ADMIN — LORAZEPAM 1 MG: 1 TABLET ORAL at 04:04

## 2021-04-02 RX ADMIN — DIPHENHYDRAMINE HYDROCHLORIDE 25 MG: 50 INJECTION INTRAMUSCULAR; INTRAVENOUS at 12:04

## 2021-04-02 RX ADMIN — HYDRALAZINE HYDROCHLORIDE 25 MG: 25 TABLET, FILM COATED ORAL at 01:04

## 2021-04-02 RX ADMIN — FAMOTIDINE 20 MG: 20 TABLET ORAL at 08:04

## 2021-04-02 RX ADMIN — HYDROCORTISONE: 1 CREAM TOPICAL at 08:04

## 2021-04-02 RX ADMIN — DEXMEDETOMIDINE HYDROCHLORIDE 1.4 MCG/KG/HR: 400 INJECTION, SOLUTION INTRAVENOUS at 09:04

## 2021-04-02 RX ADMIN — DIPHENHYDRAMINE HYDROCHLORIDE 25 MG: 50 INJECTION INTRAMUSCULAR; INTRAVENOUS at 02:04

## 2021-04-02 RX ADMIN — Medication: at 08:04

## 2021-04-02 RX ADMIN — AMLODIPINE BESYLATE 10 MG: 5 TABLET ORAL at 08:04

## 2021-04-02 RX ADMIN — DEXMEDETOMIDINE HYDROCHLORIDE 0.4 MCG/KG/HR: 400 INJECTION, SOLUTION INTRAVENOUS at 05:04

## 2021-04-02 RX ADMIN — DEXMEDETOMIDINE HYDROCHLORIDE 1.4 MCG/KG/HR: 400 INJECTION, SOLUTION INTRAVENOUS at 07:04

## 2021-04-02 RX ADMIN — CEFTRIAXONE SODIUM 1 G: 1 INJECTION, POWDER, FOR SOLUTION INTRAMUSCULAR; INTRAVENOUS at 08:04

## 2021-04-02 RX ADMIN — HYDRALAZINE HYDROCHLORIDE 25 MG: 25 TABLET, FILM COATED ORAL at 09:04

## 2021-04-02 RX ADMIN — DIPHENHYDRAMINE HYDROCHLORIDE 25 MG: 50 INJECTION INTRAMUSCULAR; INTRAVENOUS at 06:04

## 2021-04-02 RX ADMIN — HYDRALAZINE HYDROCHLORIDE 50 MG: 25 TABLET, FILM COATED ORAL at 05:04

## 2021-04-03 LAB
ALBUMIN SERPL BCP-MCNC: 3.1 G/DL (ref 3.5–5.2)
ALP SERPL-CCNC: 109 U/L (ref 55–135)
ALT SERPL W/O P-5'-P-CCNC: 32 U/L (ref 10–44)
ANION GAP SERPL CALC-SCNC: 10 MMOL/L (ref 8–16)
AST SERPL-CCNC: 36 U/L (ref 10–40)
BASOPHILS # BLD AUTO: 0.05 K/UL (ref 0–0.2)
BASOPHILS NFR BLD: 0.2 % (ref 0–1.9)
BILIRUB SERPL-MCNC: 1.7 MG/DL (ref 0.1–1)
BUN SERPL-MCNC: 42 MG/DL (ref 6–20)
CALCIUM SERPL-MCNC: 9.1 MG/DL (ref 8.7–10.5)
CHLORIDE SERPL-SCNC: 107 MMOL/L (ref 95–110)
CK SERPL-CCNC: 147 U/L (ref 20–200)
CO2 SERPL-SCNC: 21 MMOL/L (ref 23–29)
CREAT SERPL-MCNC: 3.1 MG/DL (ref 0.5–1.4)
DIFFERENTIAL METHOD: ABNORMAL
EOSINOPHIL # BLD AUTO: 0.2 K/UL (ref 0–0.5)
EOSINOPHIL NFR BLD: 0.7 % (ref 0–8)
ERYTHROCYTE [DISTWIDTH] IN BLOOD BY AUTOMATED COUNT: 14.2 % (ref 11.5–14.5)
EST. GFR  (AFRICAN AMERICAN): 27.8 ML/MIN/1.73 M^2
EST. GFR  (NON AFRICAN AMERICAN): 24 ML/MIN/1.73 M^2
GLUCOSE SERPL-MCNC: 113 MG/DL (ref 70–110)
GLUCOSE SERPL-MCNC: 138 MG/DL (ref 70–110)
HCT VFR BLD AUTO: 36.1 % (ref 40–54)
HGB BLD-MCNC: 11.4 G/DL (ref 14–18)
IMM GRANULOCYTES # BLD AUTO: 0.13 K/UL (ref 0–0.04)
IMM GRANULOCYTES NFR BLD AUTO: 0.6 % (ref 0–0.5)
LYMPHOCYTES # BLD AUTO: 1.6 K/UL (ref 1–4.8)
LYMPHOCYTES NFR BLD: 8 % (ref 18–48)
MCH RBC QN AUTO: 27.3 PG (ref 27–31)
MCHC RBC AUTO-ENTMCNC: 31.6 G/DL (ref 32–36)
MCV RBC AUTO: 86 FL (ref 82–98)
MONOCYTES # BLD AUTO: 2 K/UL (ref 0.3–1)
MONOCYTES NFR BLD: 9.9 % (ref 4–15)
NEUTROPHILS # BLD AUTO: 16.1 K/UL (ref 1.8–7.7)
NEUTROPHILS NFR BLD: 80.6 % (ref 38–73)
NRBC BLD-RTO: 0 /100 WBC
PLATELET # BLD AUTO: 306 K/UL (ref 150–450)
PMV BLD AUTO: 10.7 FL (ref 9.2–12.9)
POTASSIUM SERPL-SCNC: 5.1 MMOL/L (ref 3.5–5.1)
PROT SERPL-MCNC: 7.4 G/DL (ref 6–8.4)
RBC # BLD AUTO: 4.18 M/UL (ref 4.6–6.2)
SODIUM SERPL-SCNC: 138 MMOL/L (ref 136–145)
WBC # BLD AUTO: 20.03 K/UL (ref 3.9–12.7)

## 2021-04-03 PROCEDURE — 63600175 PHARM REV CODE 636 W HCPCS: Performed by: INTERNAL MEDICINE

## 2021-04-03 PROCEDURE — 85025 COMPLETE CBC W/AUTO DIFF WBC: CPT | Performed by: HOSPITALIST

## 2021-04-03 PROCEDURE — 82550 ASSAY OF CK (CPK): CPT | Performed by: STUDENT IN AN ORGANIZED HEALTH CARE EDUCATION/TRAINING PROGRAM

## 2021-04-03 PROCEDURE — 80053 COMPREHEN METABOLIC PANEL: CPT | Performed by: INTERNAL MEDICINE

## 2021-04-03 PROCEDURE — 99900031 HC PATIENT EDUCATION (STAT)

## 2021-04-03 PROCEDURE — 25000003 PHARM REV CODE 250: Performed by: INTERNAL MEDICINE

## 2021-04-03 PROCEDURE — 94761 N-INVAS EAR/PLS OXIMETRY MLT: CPT

## 2021-04-03 PROCEDURE — 36415 COLL VENOUS BLD VENIPUNCTURE: CPT | Performed by: STUDENT IN AN ORGANIZED HEALTH CARE EDUCATION/TRAINING PROGRAM

## 2021-04-03 PROCEDURE — 20000000 HC ICU ROOM

## 2021-04-03 PROCEDURE — 99900035 HC TECH TIME PER 15 MIN (STAT)

## 2021-04-03 RX ORDER — LORAZEPAM 1 MG/1
2 TABLET ORAL EVERY 6 HOURS PRN
Status: DISCONTINUED | OUTPATIENT
Start: 2021-04-03 | End: 2021-04-09 | Stop reason: HOSPADM

## 2021-04-03 RX ADMIN — DEXMEDETOMIDINE HYDROCHLORIDE 1.4 MCG/KG/HR: 400 INJECTION, SOLUTION INTRAVENOUS at 02:04

## 2021-04-03 RX ADMIN — DEXMEDETOMIDINE HYDROCHLORIDE 1.4 MCG/KG/HR: 400 INJECTION, SOLUTION INTRAVENOUS at 07:04

## 2021-04-03 RX ADMIN — HYDRALAZINE HYDROCHLORIDE 25 MG: 25 TABLET, FILM COATED ORAL at 09:04

## 2021-04-03 RX ADMIN — LORAZEPAM 1 MG: 1 TABLET ORAL at 05:04

## 2021-04-03 RX ADMIN — HYDROCORTISONE: 1 CREAM TOPICAL at 10:04

## 2021-04-03 RX ADMIN — DEXMEDETOMIDINE HYDROCHLORIDE 1.4 MCG/KG/HR: 400 INJECTION, SOLUTION INTRAVENOUS at 10:04

## 2021-04-03 RX ADMIN — MUPIROCIN: 20 OINTMENT TOPICAL at 08:04

## 2021-04-03 RX ADMIN — FAMOTIDINE 20 MG: 20 TABLET ORAL at 08:04

## 2021-04-03 RX ADMIN — Medication: at 10:04

## 2021-04-03 RX ADMIN — DIPHENHYDRAMINE HYDROCHLORIDE 25 MG: 50 INJECTION INTRAMUSCULAR; INTRAVENOUS at 05:04

## 2021-04-03 RX ADMIN — CEFTRIAXONE SODIUM 1 G: 1 INJECTION, POWDER, FOR SOLUTION INTRAMUSCULAR; INTRAVENOUS at 08:04

## 2021-04-03 RX ADMIN — HYDRALAZINE HYDROCHLORIDE 25 MG: 25 TABLET, FILM COATED ORAL at 02:04

## 2021-04-03 RX ADMIN — MUPIROCIN: 20 OINTMENT TOPICAL at 10:04

## 2021-04-03 RX ADMIN — LABETALOL HYDROCHLORIDE 300 MG: 100 TABLET, FILM COATED ORAL at 08:04

## 2021-04-03 RX ADMIN — DIPHENHYDRAMINE HYDROCHLORIDE 25 MG: 50 INJECTION INTRAMUSCULAR; INTRAVENOUS at 08:04

## 2021-04-03 RX ADMIN — DEXMEDETOMIDINE HYDROCHLORIDE 1.4 MCG/KG/HR: 400 INJECTION, SOLUTION INTRAVENOUS at 06:04

## 2021-04-03 RX ADMIN — CETIRIZINE HYDROCHLORIDE 10 MG: 10 TABLET, FILM COATED ORAL at 10:04

## 2021-04-03 RX ADMIN — LORAZEPAM 2 MG: 1 TABLET ORAL at 08:04

## 2021-04-03 RX ADMIN — FAMOTIDINE 20 MG: 20 TABLET ORAL at 10:04

## 2021-04-03 RX ADMIN — DEXMEDETOMIDINE HYDROCHLORIDE 1.4 MCG/KG/HR: 400 INJECTION, SOLUTION INTRAVENOUS at 12:04

## 2021-04-03 RX ADMIN — DEXMEDETOMIDINE HYDROCHLORIDE 1.4 MCG/KG/HR: 400 INJECTION, SOLUTION INTRAVENOUS at 03:04

## 2021-04-03 RX ADMIN — AMLODIPINE BESYLATE 10 MG: 5 TABLET ORAL at 10:04

## 2021-04-03 RX ADMIN — LABETALOL HYDROCHLORIDE 300 MG: 100 TABLET, FILM COATED ORAL at 10:04

## 2021-04-03 RX ADMIN — HYDRALAZINE HYDROCHLORIDE 25 MG: 25 TABLET, FILM COATED ORAL at 05:04

## 2021-04-04 LAB
ALBUMIN SERPL BCP-MCNC: 3.3 G/DL (ref 3.5–5.2)
ALP SERPL-CCNC: 111 U/L (ref 55–135)
ALT SERPL W/O P-5'-P-CCNC: 28 U/L (ref 10–44)
ANION GAP SERPL CALC-SCNC: 14 MMOL/L (ref 8–16)
AST SERPL-CCNC: 20 U/L (ref 10–40)
BASOPHILS # BLD AUTO: 0.04 K/UL (ref 0–0.2)
BASOPHILS NFR BLD: 0.3 % (ref 0–1.9)
BILIRUB SERPL-MCNC: 1 MG/DL (ref 0.1–1)
BUN SERPL-MCNC: 45 MG/DL (ref 6–20)
CALCIUM SERPL-MCNC: 9.8 MG/DL (ref 8.7–10.5)
CHLORIDE SERPL-SCNC: 108 MMOL/L (ref 95–110)
CK SERPL-CCNC: 83 U/L (ref 20–200)
CO2 SERPL-SCNC: 24 MMOL/L (ref 23–29)
CREAT SERPL-MCNC: 3 MG/DL (ref 0.5–1.4)
DIFFERENTIAL METHOD: ABNORMAL
EOSINOPHIL # BLD AUTO: 0.3 K/UL (ref 0–0.5)
EOSINOPHIL NFR BLD: 2 % (ref 0–8)
ERYTHROCYTE [DISTWIDTH] IN BLOOD BY AUTOMATED COUNT: 14.3 % (ref 11.5–14.5)
EST. GFR  (AFRICAN AMERICAN): 28.9 ML/MIN/1.73 M^2
EST. GFR  (NON AFRICAN AMERICAN): 25 ML/MIN/1.73 M^2
GLUCOSE SERPL-MCNC: 135 MG/DL (ref 70–110)
HCT VFR BLD AUTO: 37.5 % (ref 40–54)
HGB BLD-MCNC: 11.7 G/DL (ref 14–18)
IMM GRANULOCYTES # BLD AUTO: 0.07 K/UL (ref 0–0.04)
IMM GRANULOCYTES NFR BLD AUTO: 0.5 % (ref 0–0.5)
LYMPHOCYTES # BLD AUTO: 1.9 K/UL (ref 1–4.8)
LYMPHOCYTES NFR BLD: 12.9 % (ref 18–48)
MCH RBC QN AUTO: 27.5 PG (ref 27–31)
MCHC RBC AUTO-ENTMCNC: 31.2 G/DL (ref 32–36)
MCV RBC AUTO: 88 FL (ref 82–98)
MONOCYTES # BLD AUTO: 1.3 K/UL (ref 0.3–1)
MONOCYTES NFR BLD: 9.2 % (ref 4–15)
NEUTROPHILS # BLD AUTO: 10.9 K/UL (ref 1.8–7.7)
NEUTROPHILS NFR BLD: 75.1 % (ref 38–73)
NRBC BLD-RTO: 0 /100 WBC
PLATELET # BLD AUTO: 328 K/UL (ref 150–450)
PMV BLD AUTO: 11.3 FL (ref 9.2–12.9)
POTASSIUM SERPL-SCNC: 4.7 MMOL/L (ref 3.5–5.1)
PROT SERPL-MCNC: 8.1 G/DL (ref 6–8.4)
RBC # BLD AUTO: 4.26 M/UL (ref 4.6–6.2)
SODIUM SERPL-SCNC: 146 MMOL/L (ref 136–145)
WBC # BLD AUTO: 14.52 K/UL (ref 3.9–12.7)

## 2021-04-04 PROCEDURE — 25000003 PHARM REV CODE 250: Performed by: INTERNAL MEDICINE

## 2021-04-04 PROCEDURE — 63600175 PHARM REV CODE 636 W HCPCS: Performed by: INTERNAL MEDICINE

## 2021-04-04 PROCEDURE — 85025 COMPLETE CBC W/AUTO DIFF WBC: CPT | Performed by: HOSPITALIST

## 2021-04-04 PROCEDURE — 80053 COMPREHEN METABOLIC PANEL: CPT | Performed by: INTERNAL MEDICINE

## 2021-04-04 PROCEDURE — 36415 COLL VENOUS BLD VENIPUNCTURE: CPT | Performed by: STUDENT IN AN ORGANIZED HEALTH CARE EDUCATION/TRAINING PROGRAM

## 2021-04-04 PROCEDURE — 99900035 HC TECH TIME PER 15 MIN (STAT)

## 2021-04-04 PROCEDURE — 20000000 HC ICU ROOM

## 2021-04-04 PROCEDURE — 82550 ASSAY OF CK (CPK): CPT | Performed by: STUDENT IN AN ORGANIZED HEALTH CARE EDUCATION/TRAINING PROGRAM

## 2021-04-04 PROCEDURE — 94761 N-INVAS EAR/PLS OXIMETRY MLT: CPT

## 2021-04-04 RX ORDER — DIVALPROEX SODIUM 125 MG/1
500 CAPSULE, COATED PELLETS ORAL EVERY 12 HOURS
Status: DISCONTINUED | OUTPATIENT
Start: 2021-04-04 | End: 2021-04-05

## 2021-04-04 RX ORDER — DIVALPROEX SODIUM 125 MG/1
250 CAPSULE, COATED PELLETS ORAL EVERY 12 HOURS
Status: DISCONTINUED | OUTPATIENT
Start: 2021-04-04 | End: 2021-04-04

## 2021-04-04 RX ADMIN — DIVALPROEX SODIUM 250 MG: 125 CAPSULE, COATED PELLETS ORAL at 09:04

## 2021-04-04 RX ADMIN — DEXMEDETOMIDINE HYDROCHLORIDE 1.4 MCG/KG/HR: 400 INJECTION, SOLUTION INTRAVENOUS at 01:04

## 2021-04-04 RX ADMIN — CETIRIZINE HYDROCHLORIDE 10 MG: 10 TABLET, FILM COATED ORAL at 09:04

## 2021-04-04 RX ADMIN — CEFTRIAXONE SODIUM 1 G: 1 INJECTION, POWDER, FOR SOLUTION INTRAMUSCULAR; INTRAVENOUS at 09:04

## 2021-04-04 RX ADMIN — HYDRALAZINE HYDROCHLORIDE 25 MG: 25 TABLET, FILM COATED ORAL at 05:04

## 2021-04-04 RX ADMIN — Medication: at 09:04

## 2021-04-04 RX ADMIN — MUPIROCIN: 20 OINTMENT TOPICAL at 08:04

## 2021-04-04 RX ADMIN — DEXMEDETOMIDINE HYDROCHLORIDE 1.4 MCG/KG/HR: 400 INJECTION, SOLUTION INTRAVENOUS at 07:04

## 2021-04-04 RX ADMIN — DIPHENHYDRAMINE HYDROCHLORIDE 25 MG: 50 INJECTION INTRAMUSCULAR; INTRAVENOUS at 10:04

## 2021-04-04 RX ADMIN — DEXMEDETOMIDINE HYDROCHLORIDE 1.4 MCG/KG/HR: 400 INJECTION, SOLUTION INTRAVENOUS at 04:04

## 2021-04-04 RX ADMIN — DEXMEDETOMIDINE HYDROCHLORIDE 1.2 MCG/KG/HR: 400 INJECTION, SOLUTION INTRAVENOUS at 09:04

## 2021-04-04 RX ADMIN — HYDRALAZINE HYDROCHLORIDE 25 MG: 25 TABLET, FILM COATED ORAL at 09:04

## 2021-04-04 RX ADMIN — DEXMEDETOMIDINE HYDROCHLORIDE 1.4 MCG/KG/HR: 400 INJECTION, SOLUTION INTRAVENOUS at 12:04

## 2021-04-04 RX ADMIN — DEXMEDETOMIDINE HYDROCHLORIDE 1.4 MCG/KG/HR: 400 INJECTION, SOLUTION INTRAVENOUS at 09:04

## 2021-04-04 RX ADMIN — HYDROCORTISONE: 1 CREAM TOPICAL at 09:04

## 2021-04-04 RX ADMIN — HYDRALAZINE HYDROCHLORIDE 25 MG: 25 TABLET, FILM COATED ORAL at 02:04

## 2021-04-04 RX ADMIN — DIVALPROEX SODIUM 500 MG: 125 CAPSULE, COATED PELLETS ORAL at 08:04

## 2021-04-04 RX ADMIN — DEXMEDETOMIDINE HYDROCHLORIDE 1.4 MCG/KG/HR: 400 INJECTION, SOLUTION INTRAVENOUS at 03:04

## 2021-04-04 RX ADMIN — DEXMEDETOMIDINE HYDROCHLORIDE 1.4 MCG/KG/HR: 400 INJECTION, SOLUTION INTRAVENOUS at 06:04

## 2021-04-04 RX ADMIN — MUPIROCIN: 20 OINTMENT TOPICAL at 09:04

## 2021-04-04 RX ADMIN — FAMOTIDINE 20 MG: 20 TABLET ORAL at 09:04

## 2021-04-04 RX ADMIN — FAMOTIDINE 20 MG: 20 TABLET ORAL at 08:04

## 2021-04-05 LAB
ALBUMIN SERPL BCP-MCNC: 3.3 G/DL (ref 3.5–5.2)
ALP SERPL-CCNC: 110 U/L (ref 55–135)
ALT SERPL W/O P-5'-P-CCNC: 27 U/L (ref 10–44)
ANION GAP SERPL CALC-SCNC: 11 MMOL/L (ref 8–16)
AST SERPL-CCNC: 22 U/L (ref 10–40)
BASOPHILS # BLD AUTO: 0.07 K/UL (ref 0–0.2)
BASOPHILS NFR BLD: 0.5 % (ref 0–1.9)
BILIRUB SERPL-MCNC: 0.8 MG/DL (ref 0.1–1)
BUN SERPL-MCNC: 41 MG/DL (ref 6–20)
CALCIUM SERPL-MCNC: 9.6 MG/DL (ref 8.7–10.5)
CHLORIDE SERPL-SCNC: 108 MMOL/L (ref 95–110)
CK SERPL-CCNC: 183 U/L (ref 20–200)
CO2 SERPL-SCNC: 24 MMOL/L (ref 23–29)
CREAT SERPL-MCNC: 2.8 MG/DL (ref 0.5–1.4)
DIFFERENTIAL METHOD: ABNORMAL
EOSINOPHIL # BLD AUTO: 0.6 K/UL (ref 0–0.5)
EOSINOPHIL NFR BLD: 3.7 % (ref 0–8)
ERYTHROCYTE [DISTWIDTH] IN BLOOD BY AUTOMATED COUNT: 14.2 % (ref 11.5–14.5)
EST. GFR  (AFRICAN AMERICAN): 31.4 ML/MIN/1.73 M^2
EST. GFR  (NON AFRICAN AMERICAN): 27.2 ML/MIN/1.73 M^2
GLUCOSE SERPL-MCNC: 101 MG/DL (ref 70–110)
GLUCOSE SERPL-MCNC: 115 MG/DL (ref 70–110)
GLUCOSE SERPL-MCNC: 123 MG/DL (ref 70–110)
HCT VFR BLD AUTO: 37.8 % (ref 40–54)
HGB BLD-MCNC: 11.8 G/DL (ref 14–18)
IMM GRANULOCYTES # BLD AUTO: 0.08 K/UL (ref 0–0.04)
IMM GRANULOCYTES NFR BLD AUTO: 0.5 % (ref 0–0.5)
LYMPHOCYTES # BLD AUTO: 2.5 K/UL (ref 1–4.8)
LYMPHOCYTES NFR BLD: 17 % (ref 18–48)
MCH RBC QN AUTO: 27.7 PG (ref 27–31)
MCHC RBC AUTO-ENTMCNC: 31.2 G/DL (ref 32–36)
MCV RBC AUTO: 89 FL (ref 82–98)
MONOCYTES # BLD AUTO: 1.2 K/UL (ref 0.3–1)
MONOCYTES NFR BLD: 8 % (ref 4–15)
NEUTROPHILS # BLD AUTO: 10.4 K/UL (ref 1.8–7.7)
NEUTROPHILS NFR BLD: 70.3 % (ref 38–73)
NRBC BLD-RTO: 0 /100 WBC
PLATELET # BLD AUTO: 343 K/UL (ref 150–450)
PMV BLD AUTO: 11.1 FL (ref 9.2–12.9)
POTASSIUM SERPL-SCNC: 4.6 MMOL/L (ref 3.5–5.1)
PROT SERPL-MCNC: 8.1 G/DL (ref 6–8.4)
RBC # BLD AUTO: 4.26 M/UL (ref 4.6–6.2)
SODIUM SERPL-SCNC: 143 MMOL/L (ref 136–145)
VALPROATE SERPL-MCNC: 18.8 UG/ML (ref 50–100)
WBC # BLD AUTO: 14.76 K/UL (ref 3.9–12.7)

## 2021-04-05 PROCEDURE — 97530 THERAPEUTIC ACTIVITIES: CPT | Mod: CQ

## 2021-04-05 PROCEDURE — 80164 ASSAY DIPROPYLACETIC ACD TOT: CPT | Performed by: INTERNAL MEDICINE

## 2021-04-05 PROCEDURE — 20000000 HC ICU ROOM

## 2021-04-05 PROCEDURE — 25000003 PHARM REV CODE 250: Performed by: INTERNAL MEDICINE

## 2021-04-05 PROCEDURE — 97110 THERAPEUTIC EXERCISES: CPT

## 2021-04-05 PROCEDURE — 94761 N-INVAS EAR/PLS OXIMETRY MLT: CPT

## 2021-04-05 PROCEDURE — 99900035 HC TECH TIME PER 15 MIN (STAT)

## 2021-04-05 PROCEDURE — 80053 COMPREHEN METABOLIC PANEL: CPT | Performed by: INTERNAL MEDICINE

## 2021-04-05 PROCEDURE — 82962 GLUCOSE BLOOD TEST: CPT

## 2021-04-05 PROCEDURE — 63600175 PHARM REV CODE 636 W HCPCS: Performed by: INTERNAL MEDICINE

## 2021-04-05 PROCEDURE — 82550 ASSAY OF CK (CPK): CPT | Performed by: STUDENT IN AN ORGANIZED HEALTH CARE EDUCATION/TRAINING PROGRAM

## 2021-04-05 PROCEDURE — 36415 COLL VENOUS BLD VENIPUNCTURE: CPT | Performed by: INTERNAL MEDICINE

## 2021-04-05 PROCEDURE — 85025 COMPLETE CBC W/AUTO DIFF WBC: CPT | Performed by: HOSPITALIST

## 2021-04-05 RX ORDER — RISPERIDONE 1 MG/1
2 TABLET ORAL NIGHTLY
Status: DISCONTINUED | OUTPATIENT
Start: 2021-04-05 | End: 2021-04-09 | Stop reason: HOSPADM

## 2021-04-05 RX ORDER — DIVALPROEX SODIUM 125 MG/1
1000 CAPSULE, COATED PELLETS ORAL EVERY 12 HOURS
Status: DISCONTINUED | OUTPATIENT
Start: 2021-04-05 | End: 2021-04-09 | Stop reason: HOSPADM

## 2021-04-05 RX ADMIN — DIPHENHYDRAMINE HYDROCHLORIDE 25 MG: 50 INJECTION INTRAMUSCULAR; INTRAVENOUS at 04:04

## 2021-04-05 RX ADMIN — HYDRALAZINE HYDROCHLORIDE 25 MG: 25 TABLET, FILM COATED ORAL at 06:04

## 2021-04-05 RX ADMIN — HYDRALAZINE HYDROCHLORIDE 25 MG: 25 TABLET, FILM COATED ORAL at 09:04

## 2021-04-05 RX ADMIN — Medication: at 08:04

## 2021-04-05 RX ADMIN — DEXMEDETOMIDINE HYDROCHLORIDE 1 MCG/KG/HR: 400 INJECTION, SOLUTION INTRAVENOUS at 03:04

## 2021-04-05 RX ADMIN — FAMOTIDINE 20 MG: 20 TABLET ORAL at 08:04

## 2021-04-05 RX ADMIN — DEXMEDETOMIDINE HYDROCHLORIDE 1.4 MCG/KG/HR: 400 INJECTION, SOLUTION INTRAVENOUS at 06:04

## 2021-04-05 RX ADMIN — MUPIROCIN: 20 OINTMENT TOPICAL at 09:04

## 2021-04-05 RX ADMIN — DIVALPROEX SODIUM 500 MG: 125 CAPSULE, COATED PELLETS ORAL at 08:04

## 2021-04-05 RX ADMIN — HYDROCORTISONE: 1 CREAM TOPICAL at 08:04

## 2021-04-05 RX ADMIN — DEXMEDETOMIDINE HYDROCHLORIDE 1.4 MCG/KG/HR: 400 INJECTION, SOLUTION INTRAVENOUS at 02:04

## 2021-04-05 RX ADMIN — DEXMEDETOMIDINE HYDROCHLORIDE 1.4 MCG/KG/HR: 400 INJECTION, SOLUTION INTRAVENOUS at 12:04

## 2021-04-05 RX ADMIN — CEFTRIAXONE SODIUM 1 G: 1 INJECTION, POWDER, FOR SOLUTION INTRAMUSCULAR; INTRAVENOUS at 09:04

## 2021-04-05 RX ADMIN — LABETALOL HYDROCHLORIDE 300 MG: 100 TABLET, FILM COATED ORAL at 08:04

## 2021-04-05 RX ADMIN — CETIRIZINE HYDROCHLORIDE 10 MG: 10 TABLET, FILM COATED ORAL at 08:04

## 2021-04-05 RX ADMIN — DIPHENHYDRAMINE HYDROCHLORIDE 25 MG: 50 INJECTION INTRAMUSCULAR; INTRAVENOUS at 07:04

## 2021-04-05 RX ADMIN — AMLODIPINE BESYLATE 10 MG: 5 TABLET ORAL at 08:04

## 2021-04-05 RX ADMIN — DIVALPROEX SODIUM 1000 MG: 125 CAPSULE, COATED PELLETS ORAL at 09:04

## 2021-04-05 RX ADMIN — DEXMEDETOMIDINE HYDROCHLORIDE 1.4 MCG/KG/HR: 400 INJECTION, SOLUTION INTRAVENOUS at 09:04

## 2021-04-05 RX ADMIN — HYDRALAZINE HYDROCHLORIDE 25 MG: 25 TABLET, FILM COATED ORAL at 02:04

## 2021-04-05 RX ADMIN — FAMOTIDINE 20 MG: 20 TABLET ORAL at 09:04

## 2021-04-05 RX ADMIN — DEXMEDETOMIDINE HYDROCHLORIDE 1.4 MCG/KG/HR: 400 INJECTION, SOLUTION INTRAVENOUS at 11:04

## 2021-04-05 RX ADMIN — MUPIROCIN: 20 OINTMENT TOPICAL at 08:04

## 2021-04-05 RX ADMIN — HYDRALAZINE HYDROCHLORIDE 10 MG: 20 INJECTION, SOLUTION INTRAMUSCULAR; INTRAVENOUS at 04:04

## 2021-04-05 RX ADMIN — DEXMEDETOMIDINE HYDROCHLORIDE 1 MCG/KG/HR: 400 INJECTION, SOLUTION INTRAVENOUS at 08:04

## 2021-04-05 RX ADMIN — RISPERIDONE 2 MG: 1 TABLET ORAL at 09:04

## 2021-04-05 RX ADMIN — DEXMEDETOMIDINE HYDROCHLORIDE 1.4 MCG/KG/HR: 400 INJECTION, SOLUTION INTRAVENOUS at 05:04

## 2021-04-05 RX ADMIN — LORAZEPAM 2 MG: 1 TABLET ORAL at 11:04

## 2021-04-06 PROBLEM — N17.9 AKI (ACUTE KIDNEY INJURY): Status: ACTIVE | Noted: 2021-04-06

## 2021-04-06 LAB
ALBUMIN SERPL BCP-MCNC: 3.2 G/DL (ref 3.5–5.2)
ALP SERPL-CCNC: 109 U/L (ref 55–135)
ALT SERPL W/O P-5'-P-CCNC: 26 U/L (ref 10–44)
ANION GAP SERPL CALC-SCNC: 12 MMOL/L (ref 8–16)
AST SERPL-CCNC: 22 U/L (ref 10–40)
BASOPHILS # BLD AUTO: 0.06 K/UL (ref 0–0.2)
BASOPHILS NFR BLD: 0.5 % (ref 0–1.9)
BILIRUB SERPL-MCNC: 0.8 MG/DL (ref 0.1–1)
BUN SERPL-MCNC: 41 MG/DL (ref 6–20)
CALCIUM SERPL-MCNC: 9.4 MG/DL (ref 8.7–10.5)
CHLORIDE SERPL-SCNC: 106 MMOL/L (ref 95–110)
CK SERPL-CCNC: 84 U/L (ref 20–200)
CO2 SERPL-SCNC: 23 MMOL/L (ref 23–29)
CREAT SERPL-MCNC: 2.5 MG/DL (ref 0.5–1.4)
DIFFERENTIAL METHOD: ABNORMAL
EOSINOPHIL # BLD AUTO: 0.4 K/UL (ref 0–0.5)
EOSINOPHIL NFR BLD: 4 % (ref 0–8)
ERYTHROCYTE [DISTWIDTH] IN BLOOD BY AUTOMATED COUNT: 14.4 % (ref 11.5–14.5)
EST. GFR  (AFRICAN AMERICAN): 36 ML/MIN/1.73 M^2
EST. GFR  (NON AFRICAN AMERICAN): 31.2 ML/MIN/1.73 M^2
GLUCOSE SERPL-MCNC: 110 MG/DL (ref 70–110)
GLUCOSE SERPL-MCNC: 118 MG/DL (ref 70–110)
HCT VFR BLD AUTO: 41 % (ref 40–54)
HGB BLD-MCNC: 12.6 G/DL (ref 14–18)
IMM GRANULOCYTES # BLD AUTO: 0.06 K/UL (ref 0–0.04)
IMM GRANULOCYTES NFR BLD AUTO: 0.5 % (ref 0–0.5)
LYMPHOCYTES # BLD AUTO: 2 K/UL (ref 1–4.8)
LYMPHOCYTES NFR BLD: 17.9 % (ref 18–48)
MCH RBC QN AUTO: 27 PG (ref 27–31)
MCHC RBC AUTO-ENTMCNC: 30.7 G/DL (ref 32–36)
MCV RBC AUTO: 88 FL (ref 82–98)
MONOCYTES # BLD AUTO: 0.7 K/UL (ref 0.3–1)
MONOCYTES NFR BLD: 6.6 % (ref 4–15)
NEUTROPHILS # BLD AUTO: 7.8 K/UL (ref 1.8–7.7)
NEUTROPHILS NFR BLD: 70.5 % (ref 38–73)
NRBC BLD-RTO: 0 /100 WBC
PLATELET # BLD AUTO: 326 K/UL (ref 150–450)
PMV BLD AUTO: 11.3 FL (ref 9.2–12.9)
POTASSIUM SERPL-SCNC: 4.5 MMOL/L (ref 3.5–5.1)
PROT SERPL-MCNC: 7.8 G/DL (ref 6–8.4)
RBC # BLD AUTO: 4.67 M/UL (ref 4.6–6.2)
SODIUM SERPL-SCNC: 141 MMOL/L (ref 136–145)
VALPROATE SERPL-MCNC: 36.2 UG/ML (ref 50–100)
WBC # BLD AUTO: 11.06 K/UL (ref 3.9–12.7)

## 2021-04-06 PROCEDURE — 25000003 PHARM REV CODE 250: Performed by: INTERNAL MEDICINE

## 2021-04-06 PROCEDURE — 36415 COLL VENOUS BLD VENIPUNCTURE: CPT | Performed by: STUDENT IN AN ORGANIZED HEALTH CARE EDUCATION/TRAINING PROGRAM

## 2021-04-06 PROCEDURE — 80164 ASSAY DIPROPYLACETIC ACD TOT: CPT | Performed by: INTERNAL MEDICINE

## 2021-04-06 PROCEDURE — 94761 N-INVAS EAR/PLS OXIMETRY MLT: CPT

## 2021-04-06 PROCEDURE — 20000000 HC ICU ROOM

## 2021-04-06 PROCEDURE — 80053 COMPREHEN METABOLIC PANEL: CPT | Performed by: INTERNAL MEDICINE

## 2021-04-06 PROCEDURE — 82550 ASSAY OF CK (CPK): CPT | Performed by: STUDENT IN AN ORGANIZED HEALTH CARE EDUCATION/TRAINING PROGRAM

## 2021-04-06 PROCEDURE — 85025 COMPLETE CBC W/AUTO DIFF WBC: CPT | Performed by: HOSPITALIST

## 2021-04-06 PROCEDURE — 63600175 PHARM REV CODE 636 W HCPCS: Performed by: INTERNAL MEDICINE

## 2021-04-06 PROCEDURE — 82962 GLUCOSE BLOOD TEST: CPT

## 2021-04-06 PROCEDURE — 99900035 HC TECH TIME PER 15 MIN (STAT)

## 2021-04-06 RX ORDER — CEFUROXIME AXETIL 250 MG/1
250 TABLET ORAL EVERY 12 HOURS
Status: DISCONTINUED | OUTPATIENT
Start: 2021-04-06 | End: 2021-04-08

## 2021-04-06 RX ADMIN — LORAZEPAM 2 MG: 1 TABLET ORAL at 04:04

## 2021-04-06 RX ADMIN — MUPIROCIN: 20 OINTMENT TOPICAL at 08:04

## 2021-04-06 RX ADMIN — HYDRALAZINE HYDROCHLORIDE 25 MG: 25 TABLET, FILM COATED ORAL at 01:04

## 2021-04-06 RX ADMIN — FAMOTIDINE 20 MG: 20 TABLET ORAL at 08:04

## 2021-04-06 RX ADMIN — HYDROCORTISONE: 1 CREAM TOPICAL at 08:04

## 2021-04-06 RX ADMIN — LABETALOL HYDROCHLORIDE 300 MG: 100 TABLET, FILM COATED ORAL at 08:04

## 2021-04-06 RX ADMIN — DEXMEDETOMIDINE HYDROCHLORIDE 0.4 MCG/KG/HR: 400 INJECTION, SOLUTION INTRAVENOUS at 03:04

## 2021-04-06 RX ADMIN — DIVALPROEX SODIUM 1000 MG: 125 CAPSULE, COATED PELLETS ORAL at 08:04

## 2021-04-06 RX ADMIN — DEXMEDETOMIDINE HYDROCHLORIDE 1.4 MCG/KG/HR: 400 INJECTION, SOLUTION INTRAVENOUS at 12:04

## 2021-04-06 RX ADMIN — DEXMEDETOMIDINE HYDROCHLORIDE 1.4 MCG/KG/HR: 400 INJECTION, SOLUTION INTRAVENOUS at 07:04

## 2021-04-06 RX ADMIN — RISPERIDONE 2 MG: 1 TABLET ORAL at 08:04

## 2021-04-06 RX ADMIN — HYDRALAZINE HYDROCHLORIDE 25 MG: 25 TABLET, FILM COATED ORAL at 05:04

## 2021-04-06 RX ADMIN — CEFTRIAXONE SODIUM 1 G: 1 INJECTION, POWDER, FOR SOLUTION INTRAMUSCULAR; INTRAVENOUS at 09:04

## 2021-04-06 RX ADMIN — LORAZEPAM 2 MG: 1 TABLET ORAL at 05:04

## 2021-04-06 RX ADMIN — DEXMEDETOMIDINE HYDROCHLORIDE 1 MCG/KG/HR: 400 INJECTION, SOLUTION INTRAVENOUS at 10:04

## 2021-04-06 RX ADMIN — HYDRALAZINE HYDROCHLORIDE 25 MG: 25 TABLET, FILM COATED ORAL at 09:04

## 2021-04-06 RX ADMIN — Medication: at 08:04

## 2021-04-06 RX ADMIN — DEXMEDETOMIDINE HYDROCHLORIDE 1 MCG/KG/HR: 400 INJECTION, SOLUTION INTRAVENOUS at 03:04

## 2021-04-06 RX ADMIN — CEFUROXIME AXETIL 250 MG: 250 TABLET, FILM COATED ORAL at 11:04

## 2021-04-06 RX ADMIN — AMLODIPINE BESYLATE 10 MG: 5 TABLET ORAL at 08:04

## 2021-04-06 RX ADMIN — CETIRIZINE HYDROCHLORIDE 10 MG: 10 TABLET, FILM COATED ORAL at 08:04

## 2021-04-07 LAB
ALBUMIN SERPL BCP-MCNC: 3.3 G/DL (ref 3.5–5.2)
ALP SERPL-CCNC: 101 U/L (ref 55–135)
ALT SERPL W/O P-5'-P-CCNC: 29 U/L (ref 10–44)
ANION GAP SERPL CALC-SCNC: 14 MMOL/L (ref 8–16)
AST SERPL-CCNC: 22 U/L (ref 10–40)
BASOPHILS # BLD AUTO: 0.07 K/UL (ref 0–0.2)
BASOPHILS NFR BLD: 0.5 % (ref 0–1.9)
BILIRUB SERPL-MCNC: 1 MG/DL (ref 0.1–1)
BUN SERPL-MCNC: 38 MG/DL (ref 6–20)
CALCIUM SERPL-MCNC: 9.7 MG/DL (ref 8.7–10.5)
CHLORIDE SERPL-SCNC: 106 MMOL/L (ref 95–110)
CK SERPL-CCNC: 59 U/L (ref 20–200)
CO2 SERPL-SCNC: 23 MMOL/L (ref 23–29)
CREAT SERPL-MCNC: 2.2 MG/DL (ref 0.5–1.4)
DIFFERENTIAL METHOD: ABNORMAL
EOSINOPHIL # BLD AUTO: 0.5 K/UL (ref 0–0.5)
EOSINOPHIL NFR BLD: 3.7 % (ref 0–8)
ERYTHROCYTE [DISTWIDTH] IN BLOOD BY AUTOMATED COUNT: 14.3 % (ref 11.5–14.5)
EST. GFR  (AFRICAN AMERICAN): 42.1 ML/MIN/1.73 M^2
EST. GFR  (NON AFRICAN AMERICAN): 36.4 ML/MIN/1.73 M^2
GLUCOSE SERPL-MCNC: 106 MG/DL (ref 70–110)
GLUCOSE SERPL-MCNC: 80 MG/DL (ref 70–110)
HCT VFR BLD AUTO: 38.9 % (ref 40–54)
HGB BLD-MCNC: 12.2 G/DL (ref 14–18)
IMM GRANULOCYTES # BLD AUTO: 0.1 K/UL (ref 0–0.04)
IMM GRANULOCYTES NFR BLD AUTO: 0.7 % (ref 0–0.5)
LYMPHOCYTES # BLD AUTO: 2.5 K/UL (ref 1–4.8)
LYMPHOCYTES NFR BLD: 17.1 % (ref 18–48)
MCH RBC QN AUTO: 27.5 PG (ref 27–31)
MCHC RBC AUTO-ENTMCNC: 31.4 G/DL (ref 32–36)
MCV RBC AUTO: 88 FL (ref 82–98)
MONOCYTES # BLD AUTO: 0.9 K/UL (ref 0.3–1)
MONOCYTES NFR BLD: 6.5 % (ref 4–15)
NEUTROPHILS # BLD AUTO: 10.4 K/UL (ref 1.8–7.7)
NEUTROPHILS NFR BLD: 71.5 % (ref 38–73)
NRBC BLD-RTO: 0 /100 WBC
PLATELET # BLD AUTO: 326 K/UL (ref 150–450)
PMV BLD AUTO: 11.6 FL (ref 9.2–12.9)
POTASSIUM SERPL-SCNC: 5 MMOL/L (ref 3.5–5.1)
PROT SERPL-MCNC: 7.9 G/DL (ref 6–8.4)
RBC # BLD AUTO: 4.44 M/UL (ref 4.6–6.2)
SODIUM SERPL-SCNC: 143 MMOL/L (ref 136–145)
VALPROATE SERPL-MCNC: 37.1 UG/ML (ref 50–100)
WBC # BLD AUTO: 14.52 K/UL (ref 3.9–12.7)

## 2021-04-07 PROCEDURE — 27000221 HC OXYGEN, UP TO 24 HOURS

## 2021-04-07 PROCEDURE — 25000003 PHARM REV CODE 250: Performed by: INTERNAL MEDICINE

## 2021-04-07 PROCEDURE — 82550 ASSAY OF CK (CPK): CPT | Performed by: STUDENT IN AN ORGANIZED HEALTH CARE EDUCATION/TRAINING PROGRAM

## 2021-04-07 PROCEDURE — 97530 THERAPEUTIC ACTIVITIES: CPT

## 2021-04-07 PROCEDURE — 85025 COMPLETE CBC W/AUTO DIFF WBC: CPT | Performed by: HOSPITALIST

## 2021-04-07 PROCEDURE — 94761 N-INVAS EAR/PLS OXIMETRY MLT: CPT

## 2021-04-07 PROCEDURE — 80053 COMPREHEN METABOLIC PANEL: CPT | Performed by: INTERNAL MEDICINE

## 2021-04-07 PROCEDURE — 36415 COLL VENOUS BLD VENIPUNCTURE: CPT | Performed by: STUDENT IN AN ORGANIZED HEALTH CARE EDUCATION/TRAINING PROGRAM

## 2021-04-07 PROCEDURE — 80164 ASSAY DIPROPYLACETIC ACD TOT: CPT | Performed by: INTERNAL MEDICINE

## 2021-04-07 PROCEDURE — 63600175 PHARM REV CODE 636 W HCPCS: Performed by: HOSPITALIST

## 2021-04-07 PROCEDURE — 92507 TX SP LANG VOICE COMM INDIV: CPT

## 2021-04-07 PROCEDURE — 92526 ORAL FUNCTION THERAPY: CPT

## 2021-04-07 PROCEDURE — 12000002 HC ACUTE/MED SURGE SEMI-PRIVATE ROOM

## 2021-04-07 PROCEDURE — 63600175 PHARM REV CODE 636 W HCPCS: Performed by: INTERNAL MEDICINE

## 2021-04-07 RX ADMIN — RISPERIDONE 2 MG: 1 TABLET ORAL at 09:04

## 2021-04-07 RX ADMIN — DIPHENHYDRAMINE HYDROCHLORIDE 25 MG: 50 INJECTION INTRAMUSCULAR; INTRAVENOUS at 03:04

## 2021-04-07 RX ADMIN — LABETALOL HYDROCHLORIDE 300 MG: 100 TABLET, FILM COATED ORAL at 08:04

## 2021-04-07 RX ADMIN — HYDRALAZINE HYDROCHLORIDE 25 MG: 25 TABLET, FILM COATED ORAL at 02:04

## 2021-04-07 RX ADMIN — CEFUROXIME AXETIL 250 MG: 250 TABLET, FILM COATED ORAL at 09:04

## 2021-04-07 RX ADMIN — HYDRALAZINE HYDROCHLORIDE 10 MG: 20 INJECTION, SOLUTION INTRAMUSCULAR; INTRAVENOUS at 03:04

## 2021-04-07 RX ADMIN — CEFUROXIME AXETIL 250 MG: 250 TABLET, FILM COATED ORAL at 08:04

## 2021-04-07 RX ADMIN — LORAZEPAM 2 MG: 1 TABLET ORAL at 05:04

## 2021-04-07 RX ADMIN — Medication: at 08:04

## 2021-04-07 RX ADMIN — HYDRALAZINE HYDROCHLORIDE 25 MG: 25 TABLET, FILM COATED ORAL at 05:04

## 2021-04-07 RX ADMIN — FAMOTIDINE 20 MG: 20 TABLET ORAL at 09:04

## 2021-04-07 RX ADMIN — AMLODIPINE BESYLATE 10 MG: 5 TABLET ORAL at 08:04

## 2021-04-07 RX ADMIN — DIVALPROEX SODIUM 1000 MG: 125 CAPSULE, COATED PELLETS ORAL at 08:04

## 2021-04-07 RX ADMIN — HYDROCORTISONE: 1 CREAM TOPICAL at 08:04

## 2021-04-07 RX ADMIN — MUPIROCIN: 20 OINTMENT TOPICAL at 09:04

## 2021-04-07 RX ADMIN — MUPIROCIN: 20 OINTMENT TOPICAL at 08:04

## 2021-04-07 RX ADMIN — CETIRIZINE HYDROCHLORIDE 10 MG: 10 TABLET, FILM COATED ORAL at 08:04

## 2021-04-07 RX ADMIN — FAMOTIDINE 20 MG: 20 TABLET ORAL at 08:04

## 2021-04-07 RX ADMIN — DIVALPROEX SODIUM 1000 MG: 125 CAPSULE, COATED PELLETS ORAL at 09:04

## 2021-04-07 RX ADMIN — LORAZEPAM 2 MG: 1 TABLET ORAL at 02:04

## 2021-04-07 RX ADMIN — ONDANSETRON 4 MG: 2 INJECTION INTRAMUSCULAR; INTRAVENOUS at 12:04

## 2021-04-08 LAB
ALBUMIN SERPL BCP-MCNC: 3.3 G/DL (ref 3.5–5.2)
ALP SERPL-CCNC: 96 U/L (ref 55–135)
ALT SERPL W/O P-5'-P-CCNC: 28 U/L (ref 10–44)
ANION GAP SERPL CALC-SCNC: 13 MMOL/L (ref 8–16)
AST SERPL-CCNC: 25 U/L (ref 10–40)
BASOPHILS # BLD AUTO: 0.03 K/UL (ref 0–0.2)
BASOPHILS NFR BLD: 0.3 % (ref 0–1.9)
BILIRUB SERPL-MCNC: 1.1 MG/DL (ref 0.1–1)
BUN SERPL-MCNC: 36 MG/DL (ref 6–20)
CALCIUM SERPL-MCNC: 9.7 MG/DL (ref 8.7–10.5)
CHLORIDE SERPL-SCNC: 102 MMOL/L (ref 95–110)
CO2 SERPL-SCNC: 24 MMOL/L (ref 23–29)
CREAT SERPL-MCNC: 2.5 MG/DL (ref 0.5–1.4)
DIFFERENTIAL METHOD: ABNORMAL
EOSINOPHIL # BLD AUTO: 0.4 K/UL (ref 0–0.5)
EOSINOPHIL NFR BLD: 3.5 % (ref 0–8)
ERYTHROCYTE [DISTWIDTH] IN BLOOD BY AUTOMATED COUNT: 14.4 % (ref 11.5–14.5)
EST. GFR  (AFRICAN AMERICAN): 36 ML/MIN/1.73 M^2
EST. GFR  (NON AFRICAN AMERICAN): 31.2 ML/MIN/1.73 M^2
GLUCOSE SERPL-MCNC: 82 MG/DL (ref 70–110)
GLUCOSE SERPL-MCNC: 90 MG/DL (ref 70–110)
GLUCOSE SERPL-MCNC: 91 MG/DL (ref 70–110)
GLUCOSE SERPL-MCNC: 94 MG/DL (ref 70–110)
GLUCOSE SERPL-MCNC: 98 MG/DL (ref 70–110)
HCT VFR BLD AUTO: 40.3 % (ref 40–54)
HGB BLD-MCNC: 12.4 G/DL (ref 14–18)
IMM GRANULOCYTES # BLD AUTO: 0.15 K/UL (ref 0–0.04)
IMM GRANULOCYTES NFR BLD AUTO: 1.3 % (ref 0–0.5)
LYMPHOCYTES # BLD AUTO: 2.2 K/UL (ref 1–4.8)
LYMPHOCYTES NFR BLD: 18.6 % (ref 18–48)
MCH RBC QN AUTO: 27 PG (ref 27–31)
MCHC RBC AUTO-ENTMCNC: 30.8 G/DL (ref 32–36)
MCV RBC AUTO: 88 FL (ref 82–98)
MONOCYTES # BLD AUTO: 1.1 K/UL (ref 0.3–1)
MONOCYTES NFR BLD: 9.3 % (ref 4–15)
NEUTROPHILS # BLD AUTO: 7.9 K/UL (ref 1.8–7.7)
NEUTROPHILS NFR BLD: 67 % (ref 38–73)
NRBC BLD-RTO: 0 /100 WBC
PLATELET # BLD AUTO: 359 K/UL (ref 150–450)
PMV BLD AUTO: 11 FL (ref 9.2–12.9)
POTASSIUM SERPL-SCNC: 5.3 MMOL/L (ref 3.5–5.1)
PROT SERPL-MCNC: 8 G/DL (ref 6–8.4)
RBC # BLD AUTO: 4.6 M/UL (ref 4.6–6.2)
SODIUM SERPL-SCNC: 139 MMOL/L (ref 136–145)
VALPROATE SERPL-MCNC: 18.6 UG/ML (ref 50–100)
WBC # BLD AUTO: 11.79 K/UL (ref 3.9–12.7)

## 2021-04-08 PROCEDURE — 63600175 PHARM REV CODE 636 W HCPCS: Performed by: INTERNAL MEDICINE

## 2021-04-08 PROCEDURE — 97110 THERAPEUTIC EXERCISES: CPT

## 2021-04-08 PROCEDURE — 80164 ASSAY DIPROPYLACETIC ACD TOT: CPT | Performed by: INTERNAL MEDICINE

## 2021-04-08 PROCEDURE — 97530 THERAPEUTIC ACTIVITIES: CPT

## 2021-04-08 PROCEDURE — 25000003 PHARM REV CODE 250: Performed by: INTERNAL MEDICINE

## 2021-04-08 PROCEDURE — 85025 COMPLETE CBC W/AUTO DIFF WBC: CPT | Performed by: HOSPITALIST

## 2021-04-08 PROCEDURE — 97535 SELF CARE MNGMENT TRAINING: CPT

## 2021-04-08 PROCEDURE — 12000002 HC ACUTE/MED SURGE SEMI-PRIVATE ROOM

## 2021-04-08 PROCEDURE — 36415 COLL VENOUS BLD VENIPUNCTURE: CPT | Performed by: INTERNAL MEDICINE

## 2021-04-08 PROCEDURE — 80053 COMPREHEN METABOLIC PANEL: CPT | Performed by: INTERNAL MEDICINE

## 2021-04-08 RX ADMIN — DIVALPROEX SODIUM 1000 MG: 125 CAPSULE, COATED PELLETS ORAL at 09:04

## 2021-04-08 RX ADMIN — MUPIROCIN: 20 OINTMENT TOPICAL at 09:04

## 2021-04-08 RX ADMIN — FAMOTIDINE 20 MG: 20 TABLET ORAL at 08:04

## 2021-04-08 RX ADMIN — HYDRALAZINE HYDROCHLORIDE 25 MG: 25 TABLET, FILM COATED ORAL at 09:04

## 2021-04-08 RX ADMIN — CETIRIZINE HYDROCHLORIDE 10 MG: 10 TABLET, FILM COATED ORAL at 08:04

## 2021-04-08 RX ADMIN — AMLODIPINE BESYLATE 10 MG: 5 TABLET ORAL at 08:04

## 2021-04-08 RX ADMIN — RISPERIDONE 2 MG: 1 TABLET ORAL at 09:04

## 2021-04-08 RX ADMIN — HYDRALAZINE HYDROCHLORIDE 25 MG: 25 TABLET, FILM COATED ORAL at 01:04

## 2021-04-08 RX ADMIN — LABETALOL HYDROCHLORIDE 300 MG: 100 TABLET, FILM COATED ORAL at 09:04

## 2021-04-08 RX ADMIN — HYDRALAZINE HYDROCHLORIDE 25 MG: 25 TABLET, FILM COATED ORAL at 05:04

## 2021-04-08 RX ADMIN — DIPHENHYDRAMINE HYDROCHLORIDE 25 MG: 50 INJECTION INTRAMUSCULAR; INTRAVENOUS at 09:04

## 2021-04-08 RX ADMIN — DIVALPROEX SODIUM 1000 MG: 125 CAPSULE, COATED PELLETS ORAL at 08:04

## 2021-04-08 RX ADMIN — LORAZEPAM 2 MG: 1 TABLET ORAL at 08:04

## 2021-04-08 RX ADMIN — LABETALOL HYDROCHLORIDE 300 MG: 100 TABLET, FILM COATED ORAL at 08:04

## 2021-04-08 RX ADMIN — FAMOTIDINE 20 MG: 20 TABLET ORAL at 09:04

## 2021-04-09 VITALS
HEART RATE: 88 BPM | TEMPERATURE: 98 F | WEIGHT: 223.13 LBS | OXYGEN SATURATION: 98 % | DIASTOLIC BLOOD PRESSURE: 96 MMHG | SYSTOLIC BLOOD PRESSURE: 153 MMHG | HEIGHT: 68 IN | BODY MASS INDEX: 33.82 KG/M2 | RESPIRATION RATE: 18 BRPM

## 2021-04-09 PROBLEM — I61.0 BASAL GANGLIA HEMORRHAGE: Status: RESOLVED | Noted: 2021-03-20 | Resolved: 2021-04-09

## 2021-04-09 PROBLEM — L02.511 ABSCESS OF RIGHT HAND: Status: RESOLVED | Noted: 2021-03-25 | Resolved: 2021-04-09

## 2021-04-09 PROBLEM — G93.40 ACUTE ENCEPHALOPATHY: Status: RESOLVED | Noted: 2021-03-23 | Resolved: 2021-04-09

## 2021-04-09 PROBLEM — I63.9 CVA (CEREBRAL VASCULAR ACCIDENT): Status: ACTIVE | Noted: 2021-04-09

## 2021-04-09 PROBLEM — I16.1 HYPERTENSIVE EMERGENCY: Status: RESOLVED | Noted: 2021-03-20 | Resolved: 2021-04-09

## 2021-04-09 PROBLEM — L98.9 SKIN LESIONS: Status: RESOLVED | Noted: 2021-03-23 | Resolved: 2021-04-09

## 2021-04-09 PROBLEM — N17.9 AKI (ACUTE KIDNEY INJURY): Status: RESOLVED | Noted: 2021-04-06 | Resolved: 2021-04-09

## 2021-04-09 LAB
ALBUMIN SERPL BCP-MCNC: 3.4 G/DL (ref 3.5–5.2)
ALP SERPL-CCNC: 96 U/L (ref 55–135)
ALT SERPL W/O P-5'-P-CCNC: 29 U/L (ref 10–44)
ANION GAP SERPL CALC-SCNC: 11 MMOL/L (ref 8–16)
AST SERPL-CCNC: 26 U/L (ref 10–40)
BASOPHILS # BLD AUTO: 0.05 K/UL (ref 0–0.2)
BASOPHILS NFR BLD: 0.4 % (ref 0–1.9)
BILIRUB SERPL-MCNC: 1.3 MG/DL (ref 0.1–1)
BUN SERPL-MCNC: 42 MG/DL (ref 6–20)
CALCIUM SERPL-MCNC: 9.3 MG/DL (ref 8.7–10.5)
CHLORIDE SERPL-SCNC: 102 MMOL/L (ref 95–110)
CO2 SERPL-SCNC: 24 MMOL/L (ref 23–29)
CREAT SERPL-MCNC: 2.5 MG/DL (ref 0.5–1.4)
DIFFERENTIAL METHOD: ABNORMAL
EOSINOPHIL # BLD AUTO: 0.4 K/UL (ref 0–0.5)
EOSINOPHIL NFR BLD: 3.6 % (ref 0–8)
ERYTHROCYTE [DISTWIDTH] IN BLOOD BY AUTOMATED COUNT: 13.9 % (ref 11.5–14.5)
EST. GFR  (AFRICAN AMERICAN): 36 ML/MIN/1.73 M^2
EST. GFR  (NON AFRICAN AMERICAN): 31.2 ML/MIN/1.73 M^2
GLUCOSE SERPL-MCNC: 88 MG/DL (ref 70–110)
GLUCOSE SERPL-MCNC: 90 MG/DL (ref 70–110)
HCT VFR BLD AUTO: 38.5 % (ref 40–54)
HGB BLD-MCNC: 12 G/DL (ref 14–18)
IMM GRANULOCYTES # BLD AUTO: 0.13 K/UL (ref 0–0.04)
IMM GRANULOCYTES NFR BLD AUTO: 1.1 % (ref 0–0.5)
LYMPHOCYTES # BLD AUTO: 2.4 K/UL (ref 1–4.8)
LYMPHOCYTES NFR BLD: 21 % (ref 18–48)
MCH RBC QN AUTO: 27.1 PG (ref 27–31)
MCHC RBC AUTO-ENTMCNC: 31.2 G/DL (ref 32–36)
MCV RBC AUTO: 87 FL (ref 82–98)
MONOCYTES # BLD AUTO: 1 K/UL (ref 0.3–1)
MONOCYTES NFR BLD: 8.7 % (ref 4–15)
NEUTROPHILS # BLD AUTO: 7.5 K/UL (ref 1.8–7.7)
NEUTROPHILS NFR BLD: 65.2 % (ref 38–73)
NRBC BLD-RTO: 0 /100 WBC
PLATELET # BLD AUTO: 303 K/UL (ref 150–450)
PMV BLD AUTO: 10.9 FL (ref 9.2–12.9)
POTASSIUM SERPL-SCNC: 5.2 MMOL/L (ref 3.5–5.1)
PROT SERPL-MCNC: 7.6 G/DL (ref 6–8.4)
RBC # BLD AUTO: 4.42 M/UL (ref 4.6–6.2)
SODIUM SERPL-SCNC: 137 MMOL/L (ref 136–145)
VALPROATE SERPL-MCNC: 53.2 UG/ML (ref 50–100)
WBC # BLD AUTO: 11.55 K/UL (ref 3.9–12.7)

## 2021-04-09 PROCEDURE — 80053 COMPREHEN METABOLIC PANEL: CPT | Performed by: INTERNAL MEDICINE

## 2021-04-09 PROCEDURE — 80164 ASSAY DIPROPYLACETIC ACD TOT: CPT | Performed by: INTERNAL MEDICINE

## 2021-04-09 PROCEDURE — 25000003 PHARM REV CODE 250: Performed by: INTERNAL MEDICINE

## 2021-04-09 PROCEDURE — 97530 THERAPEUTIC ACTIVITIES: CPT

## 2021-04-09 PROCEDURE — 36415 COLL VENOUS BLD VENIPUNCTURE: CPT | Performed by: HOSPITALIST

## 2021-04-09 PROCEDURE — 85025 COMPLETE CBC W/AUTO DIFF WBC: CPT | Performed by: HOSPITALIST

## 2021-04-09 RX ORDER — HYDROCORTISONE 1 %
CREAM (GRAM) TOPICAL DAILY
Refills: 0 | Status: ON HOLD
Start: 2021-04-10 | End: 2021-06-02 | Stop reason: HOSPADM

## 2021-04-09 RX ORDER — LABETALOL 300 MG/1
300 TABLET, FILM COATED ORAL EVERY 12 HOURS
Qty: 60 TABLET | Refills: 0 | Status: ON HOLD
Start: 2021-04-09 | End: 2021-06-02 | Stop reason: HOSPADM

## 2021-04-09 RX ORDER — FAMOTIDINE 20 MG/1
20 TABLET, FILM COATED ORAL DAILY
Qty: 30 TABLET | Refills: 0 | Status: ON HOLD
Start: 2021-04-09 | End: 2021-07-14 | Stop reason: HOSPADM

## 2021-04-09 RX ORDER — HYDRALAZINE HYDROCHLORIDE 50 MG/1
50 TABLET, FILM COATED ORAL EVERY 8 HOURS
Qty: 90 TABLET | Refills: 0 | Status: ON HOLD
Start: 2021-04-09 | End: 2021-07-14 | Stop reason: HOSPADM

## 2021-04-09 RX ORDER — DIVALPROEX SODIUM 125 MG/1
1000 CAPSULE, COATED PELLETS ORAL EVERY 12 HOURS
Qty: 480 CAPSULE | Refills: 0 | Status: ON HOLD
Start: 2021-04-09 | End: 2021-06-02 | Stop reason: HOSPADM

## 2021-04-09 RX ORDER — AMLODIPINE BESYLATE 10 MG/1
10 TABLET ORAL DAILY
Qty: 30 TABLET | Refills: 0 | Status: ON HOLD
Start: 2021-04-10 | End: 2021-07-14 | Stop reason: HOSPADM

## 2021-04-09 RX ORDER — HYDRALAZINE HYDROCHLORIDE 25 MG/1
50 TABLET, FILM COATED ORAL EVERY 8 HOURS
Status: DISCONTINUED | OUTPATIENT
Start: 2021-04-09 | End: 2021-04-09 | Stop reason: HOSPADM

## 2021-04-09 RX ORDER — RISPERIDONE 2 MG/1
2 TABLET ORAL NIGHTLY
Qty: 30 TABLET | Refills: 0 | Status: ON HOLD
Start: 2021-04-09 | End: 2021-07-14 | Stop reason: HOSPADM

## 2021-04-09 RX ADMIN — HYDRALAZINE HYDROCHLORIDE 50 MG: 25 TABLET, FILM COATED ORAL at 01:04

## 2021-04-09 RX ADMIN — CETIRIZINE HYDROCHLORIDE 10 MG: 10 TABLET, FILM COATED ORAL at 08:04

## 2021-04-09 RX ADMIN — DIVALPROEX SODIUM 1000 MG: 125 CAPSULE, COATED PELLETS ORAL at 08:04

## 2021-04-09 RX ADMIN — HYDRALAZINE HYDROCHLORIDE 25 MG: 25 TABLET, FILM COATED ORAL at 05:04

## 2021-04-09 RX ADMIN — SODIUM ZIRCONIUM CYCLOSILICATE 10 G: 10 POWDER, FOR SUSPENSION ORAL at 11:04

## 2021-04-09 RX ADMIN — LORAZEPAM 2 MG: 1 TABLET ORAL at 08:04

## 2021-04-09 RX ADMIN — LABETALOL HYDROCHLORIDE 300 MG: 100 TABLET, FILM COATED ORAL at 08:04

## 2021-04-09 RX ADMIN — FAMOTIDINE 20 MG: 20 TABLET ORAL at 08:04

## 2021-04-09 RX ADMIN — AMLODIPINE BESYLATE 10 MG: 5 TABLET ORAL at 08:04

## 2021-04-10 PROBLEM — Z93.1 PEG (PERCUTANEOUS ENDOSCOPIC GASTROSTOMY) STATUS: Status: ACTIVE | Noted: 2021-04-10

## 2021-04-10 PROBLEM — I10 ESSENTIAL HYPERTENSION, MALIGNANT: Status: ACTIVE | Noted: 2021-04-10

## 2021-04-10 PROBLEM — E87.5 HYPERKALEMIA: Status: ACTIVE | Noted: 2021-04-10

## 2021-04-29 PROBLEM — I10 ESSENTIAL HYPERTENSION, MALIGNANT: Status: RESOLVED | Noted: 2021-04-10 | Resolved: 2021-04-29

## 2021-04-29 PROBLEM — N17.9 AKI (ACUTE KIDNEY INJURY): Status: RESOLVED | Noted: 2021-04-06 | Resolved: 2021-04-29

## 2021-05-08 ENCOUNTER — HOSPITAL ENCOUNTER (OUTPATIENT)
Dept: RADIOLOGY | Facility: HOSPITAL | Age: 41
Discharge: HOME | End: 2021-05-08
Attending: STUDENT IN AN ORGANIZED HEALTH CARE EDUCATION/TRAINING PROGRAM
Payer: MEDICAID

## 2021-05-08 PROCEDURE — 71045 XR CHEST 1 VIEW: ICD-10-PCS | Mod: 26,,, | Performed by: RADIOLOGY

## 2021-05-08 PROCEDURE — 71045 X-RAY EXAM CHEST 1 VIEW: CPT | Mod: 26,,, | Performed by: RADIOLOGY

## 2021-05-13 ENCOUNTER — HOSPITAL ENCOUNTER (OUTPATIENT)
Dept: RADIOLOGY | Facility: HOSPITAL | Age: 41
Discharge: HOME | End: 2021-05-13
Attending: STUDENT IN AN ORGANIZED HEALTH CARE EDUCATION/TRAINING PROGRAM
Payer: MEDICAID

## 2021-05-13 PROCEDURE — 71045 XR CHEST 1 VIEW: ICD-10-PCS | Mod: 26,,, | Performed by: RADIOLOGY

## 2021-05-13 PROCEDURE — 71045 X-RAY EXAM CHEST 1 VIEW: CPT | Mod: 26,,, | Performed by: RADIOLOGY

## 2021-05-28 ENCOUNTER — HOSPITAL ENCOUNTER (INPATIENT)
Facility: HOSPITAL | Age: 41
LOS: 7 days | Discharge: HOME | DRG: 871 | End: 2021-06-04
Attending: EMERGENCY MEDICINE | Admitting: HOSPITALIST
Payer: MEDICAID

## 2021-05-28 DIAGNOSIS — Z93.1 PEG (PERCUTANEOUS ENDOSCOPIC GASTROSTOMY) STATUS: ICD-10-CM

## 2021-05-28 DIAGNOSIS — N28.9 RENAL INSUFFICIENCY: ICD-10-CM

## 2021-05-28 DIAGNOSIS — I69.359 CVA, OLD, HEMIPARESIS: ICD-10-CM

## 2021-05-28 DIAGNOSIS — R78.81 BACTEREMIA: ICD-10-CM

## 2021-05-28 DIAGNOSIS — A41.01 STAPHYLOCOCCUS AUREUS BACTEREMIA WITH SEPSIS: ICD-10-CM

## 2021-05-28 DIAGNOSIS — N17.9 AKI (ACUTE KIDNEY INJURY): ICD-10-CM

## 2021-05-28 DIAGNOSIS — K92.1 HEMATOCHEZIA: ICD-10-CM

## 2021-05-28 DIAGNOSIS — I10 ESSENTIAL HYPERTENSION, BENIGN: ICD-10-CM

## 2021-05-28 DIAGNOSIS — R50.9 FEVER, UNSPECIFIED FEVER CAUSE: ICD-10-CM

## 2021-05-28 DIAGNOSIS — R65.20 SEVERE SEPSIS: Primary | ICD-10-CM

## 2021-05-28 DIAGNOSIS — R53.2 FUNCTIONAL QUADRIPLEGIA: ICD-10-CM

## 2021-05-28 DIAGNOSIS — A41.9 SEVERE SEPSIS: Primary | ICD-10-CM

## 2021-05-28 DIAGNOSIS — R40.3 PERSISTENT VEGETATIVE STATE: ICD-10-CM

## 2021-05-28 DIAGNOSIS — E87.0 HYPERNATREMIA: ICD-10-CM

## 2021-05-28 LAB
ALBUMIN SERPL BCP-MCNC: 3 G/DL (ref 3.5–5.2)
ALP SERPL-CCNC: 83 U/L (ref 55–135)
ALT SERPL W/O P-5'-P-CCNC: 14 U/L (ref 10–44)
ANION GAP SERPL CALC-SCNC: 14 MMOL/L (ref 8–16)
AST SERPL-CCNC: 20 U/L (ref 10–40)
BACTERIA #/AREA URNS HPF: ABNORMAL /HPF
BASOPHILS # BLD AUTO: 0.09 K/UL (ref 0–0.2)
BASOPHILS NFR BLD: 0.5 % (ref 0–1.9)
BILIRUB SERPL-MCNC: 0.5 MG/DL (ref 0.1–1)
BILIRUB UR QL STRIP: NEGATIVE
BUN SERPL-MCNC: 84 MG/DL (ref 6–20)
CALCIUM SERPL-MCNC: 9.4 MG/DL (ref 8.7–10.5)
CHLORIDE SERPL-SCNC: 113 MMOL/L (ref 95–110)
CLARITY UR: CLEAR
CO2 SERPL-SCNC: 22 MMOL/L (ref 23–29)
COLOR UR: YELLOW
CREAT SERPL-MCNC: 2.9 MG/DL (ref 0.5–1.4)
DIFFERENTIAL METHOD: ABNORMAL
EOSINOPHIL # BLD AUTO: 0.1 K/UL (ref 0–0.5)
EOSINOPHIL NFR BLD: 0.5 % (ref 0–8)
ERYTHROCYTE [DISTWIDTH] IN BLOOD BY AUTOMATED COUNT: 18.5 % (ref 11.5–14.5)
EST. GFR  (AFRICAN AMERICAN): 30 ML/MIN/1.73 M^2
EST. GFR  (NON AFRICAN AMERICAN): 26 ML/MIN/1.73 M^2
GLUCOSE SERPL-MCNC: 101 MG/DL (ref 70–110)
GLUCOSE UR QL STRIP: NEGATIVE
HCT VFR BLD AUTO: 31.6 % (ref 40–54)
HGB BLD-MCNC: 9.9 G/DL (ref 14–18)
HGB UR QL STRIP: ABNORMAL
IMM GRANULOCYTES # BLD AUTO: 0.11 K/UL (ref 0–0.04)
IMM GRANULOCYTES NFR BLD AUTO: 0.7 % (ref 0–0.5)
KETONES UR QL STRIP: NEGATIVE
LACTATE SERPL-SCNC: 0.8 MMOL/L (ref 0.5–2.2)
LACTATE SERPL-SCNC: 0.9 MMOL/L (ref 0.5–2.2)
LEUKOCYTE ESTERASE UR QL STRIP: ABNORMAL
LYMPHOCYTES # BLD AUTO: 1.5 K/UL (ref 1–4.8)
LYMPHOCYTES NFR BLD: 8.9 % (ref 18–48)
MCH RBC QN AUTO: 28.7 PG (ref 27–31)
MCHC RBC AUTO-ENTMCNC: 31.3 G/DL (ref 32–36)
MCV RBC AUTO: 92 FL (ref 82–98)
MICROSCOPIC COMMENT: ABNORMAL
MONOCYTES # BLD AUTO: 2.1 K/UL (ref 0.3–1)
MONOCYTES NFR BLD: 12.3 % (ref 4–15)
NEUTROPHILS # BLD AUTO: 12.8 K/UL (ref 1.8–7.7)
NEUTROPHILS NFR BLD: 77.1 % (ref 38–73)
NITRITE UR QL STRIP: NEGATIVE
NRBC BLD-RTO: 0 /100 WBC
PH UR STRIP: 6 [PH] (ref 5–8)
PLATELET # BLD AUTO: 195 K/UL (ref 150–450)
PMV BLD AUTO: 10.6 FL (ref 9.2–12.9)
POCT GLUCOSE: 90 MG/DL (ref 70–110)
POTASSIUM SERPL-SCNC: 4.6 MMOL/L (ref 3.5–5.1)
PROT SERPL-MCNC: 7.1 G/DL (ref 6–8.4)
PROT UR QL STRIP: ABNORMAL
RBC # BLD AUTO: 3.45 M/UL (ref 4.6–6.2)
RBC #/AREA URNS HPF: 2 /HPF (ref 0–4)
SARS-COV-2 RDRP RESP QL NAA+PROBE: NEGATIVE
SODIUM SERPL-SCNC: 149 MMOL/L (ref 136–145)
SP GR UR STRIP: 1.02 (ref 1–1.03)
URN SPEC COLLECT METH UR: ABNORMAL
UROBILINOGEN UR STRIP-ACNC: NEGATIVE EU/DL
WBC # BLD AUTO: 16.66 K/UL (ref 3.9–12.7)
WBC #/AREA URNS HPF: 1 /HPF (ref 0–5)

## 2021-05-28 PROCEDURE — 96361 HYDRATE IV INFUSION ADD-ON: CPT

## 2021-05-28 PROCEDURE — 96365 THER/PROPH/DIAG IV INF INIT: CPT

## 2021-05-28 PROCEDURE — 80053 COMPREHEN METABOLIC PANEL: CPT | Performed by: EMERGENCY MEDICINE

## 2021-05-28 PROCEDURE — U0002 COVID-19 LAB TEST NON-CDC: HCPCS | Performed by: EMERGENCY MEDICINE

## 2021-05-28 PROCEDURE — 81000 URINALYSIS NONAUTO W/SCOPE: CPT | Performed by: EMERGENCY MEDICINE

## 2021-05-28 PROCEDURE — 99285 EMERGENCY DEPT VISIT HI MDM: CPT | Mod: 25

## 2021-05-28 PROCEDURE — 87186 SC STD MICRODIL/AGAR DIL: CPT | Performed by: EMERGENCY MEDICINE

## 2021-05-28 PROCEDURE — 87077 CULTURE AEROBIC IDENTIFY: CPT | Mod: 59 | Performed by: EMERGENCY MEDICINE

## 2021-05-28 PROCEDURE — 36415 COLL VENOUS BLD VENIPUNCTURE: CPT | Performed by: HOSPITALIST

## 2021-05-28 PROCEDURE — 87040 BLOOD CULTURE FOR BACTERIA: CPT | Mod: 59 | Performed by: EMERGENCY MEDICINE

## 2021-05-28 PROCEDURE — 25000003 PHARM REV CODE 250: Performed by: INTERNAL MEDICINE

## 2021-05-28 PROCEDURE — 83605 ASSAY OF LACTIC ACID: CPT | Mod: 91 | Performed by: HOSPITALIST

## 2021-05-28 PROCEDURE — 25000003 PHARM REV CODE 250: Performed by: HOSPITALIST

## 2021-05-28 PROCEDURE — 25000003 PHARM REV CODE 250: Performed by: EMERGENCY MEDICINE

## 2021-05-28 PROCEDURE — 63600175 PHARM REV CODE 636 W HCPCS: Performed by: HOSPITALIST

## 2021-05-28 PROCEDURE — A4216 STERILE WATER/SALINE, 10 ML: HCPCS | Performed by: HOSPITALIST

## 2021-05-28 PROCEDURE — 36415 COLL VENOUS BLD VENIPUNCTURE: CPT | Performed by: EMERGENCY MEDICINE

## 2021-05-28 PROCEDURE — 12000002 HC ACUTE/MED SURGE SEMI-PRIVATE ROOM

## 2021-05-28 PROCEDURE — 85025 COMPLETE CBC W/AUTO DIFF WBC: CPT | Performed by: EMERGENCY MEDICINE

## 2021-05-28 PROCEDURE — 25000003 PHARM REV CODE 250: Performed by: NURSE PRACTITIONER

## 2021-05-28 PROCEDURE — 63600175 PHARM REV CODE 636 W HCPCS: Performed by: EMERGENCY MEDICINE

## 2021-05-28 PROCEDURE — 83605 ASSAY OF LACTIC ACID: CPT | Performed by: EMERGENCY MEDICINE

## 2021-05-28 RX ORDER — MUPIROCIN 20 MG/G
OINTMENT TOPICAL 2 TIMES DAILY
Status: DISCONTINUED | OUTPATIENT
Start: 2021-05-28 | End: 2021-06-04 | Stop reason: HOSPADM

## 2021-05-28 RX ORDER — CETIRIZINE HYDROCHLORIDE 10 MG/1
10 TABLET ORAL DAILY
Status: DISCONTINUED | OUTPATIENT
Start: 2021-05-29 | End: 2021-06-04 | Stop reason: HOSPADM

## 2021-05-28 RX ORDER — LANOLIN ALCOHOL/MO/W.PET/CERES
800 CREAM (GRAM) TOPICAL
Status: DISCONTINUED | OUTPATIENT
Start: 2021-05-28 | End: 2021-06-04 | Stop reason: HOSPADM

## 2021-05-28 RX ORDER — ONDANSETRON 2 MG/ML
4 INJECTION INTRAMUSCULAR; INTRAVENOUS EVERY 6 HOURS PRN
Status: DISCONTINUED | OUTPATIENT
Start: 2021-05-28 | End: 2021-06-04 | Stop reason: HOSPADM

## 2021-05-28 RX ORDER — AMOXICILLIN 250 MG
1 CAPSULE ORAL 2 TIMES DAILY
Status: DISCONTINUED | OUTPATIENT
Start: 2021-05-28 | End: 2021-06-02

## 2021-05-28 RX ORDER — SODIUM CHLORIDE 0.9 % (FLUSH) 0.9 %
3 SYRINGE (ML) INJECTION EVERY 8 HOURS
Status: DISCONTINUED | OUTPATIENT
Start: 2021-05-28 | End: 2021-06-04 | Stop reason: HOSPADM

## 2021-05-28 RX ORDER — SODIUM,POTASSIUM PHOSPHATES 280-250MG
2 POWDER IN PACKET (EA) ORAL
Status: DISCONTINUED | OUTPATIENT
Start: 2021-05-28 | End: 2021-06-04 | Stop reason: HOSPADM

## 2021-05-28 RX ORDER — FAMOTIDINE 20 MG/1
20 TABLET, FILM COATED ORAL DAILY
Status: DISCONTINUED | OUTPATIENT
Start: 2021-05-29 | End: 2021-06-02

## 2021-05-28 RX ORDER — FLUOXETINE HYDROCHLORIDE 20 MG/1
20 CAPSULE ORAL DAILY
Status: DISCONTINUED | OUTPATIENT
Start: 2021-05-29 | End: 2021-06-04 | Stop reason: HOSPADM

## 2021-05-28 RX ORDER — DIVALPROEX SODIUM 125 MG/1
1000 CAPSULE, COATED PELLETS ORAL EVERY 12 HOURS
Status: DISCONTINUED | OUTPATIENT
Start: 2021-05-28 | End: 2021-05-29

## 2021-05-28 RX ORDER — IBUPROFEN 200 MG
16 TABLET ORAL
Status: DISCONTINUED | OUTPATIENT
Start: 2021-05-28 | End: 2021-06-04 | Stop reason: HOSPADM

## 2021-05-28 RX ORDER — ACETAMINOPHEN 325 MG/1
650 TABLET ORAL EVERY 6 HOURS PRN
Status: DISCONTINUED | OUTPATIENT
Start: 2021-05-28 | End: 2021-06-04 | Stop reason: HOSPADM

## 2021-05-28 RX ORDER — HYDROCORTISONE 1 %
CREAM (GRAM) TOPICAL DAILY
Status: DISCONTINUED | OUTPATIENT
Start: 2021-05-29 | End: 2021-06-04 | Stop reason: HOSPADM

## 2021-05-28 RX ORDER — IBUPROFEN 600 MG/1
600 TABLET ORAL
Status: COMPLETED | OUTPATIENT
Start: 2021-05-28 | End: 2021-05-28

## 2021-05-28 RX ORDER — RISPERIDONE 1 MG/1
3 TABLET ORAL 2 TIMES DAILY
Status: DISCONTINUED | OUTPATIENT
Start: 2021-05-28 | End: 2021-06-04 | Stop reason: HOSPADM

## 2021-05-28 RX ORDER — ENOXAPARIN SODIUM 100 MG/ML
40 INJECTION SUBCUTANEOUS EVERY 24 HOURS
Status: DISCONTINUED | OUTPATIENT
Start: 2021-05-28 | End: 2021-05-28 | Stop reason: SDUPTHER

## 2021-05-28 RX ORDER — AMLODIPINE BESYLATE 5 MG/1
10 TABLET ORAL DAILY
Status: DISCONTINUED | OUTPATIENT
Start: 2021-05-29 | End: 2021-06-02

## 2021-05-28 RX ORDER — ACETAMINOPHEN 500 MG
1000 TABLET ORAL EVERY 4 HOURS PRN
Status: DISCONTINUED | OUTPATIENT
Start: 2021-05-28 | End: 2021-06-04 | Stop reason: HOSPADM

## 2021-05-28 RX ORDER — SODIUM CHLORIDE 0.9 % (FLUSH) 0.9 %
10 SYRINGE (ML) INJECTION
Status: DISCONTINUED | OUTPATIENT
Start: 2021-05-28 | End: 2021-06-04 | Stop reason: HOSPADM

## 2021-05-28 RX ORDER — INSULIN ASPART 100 [IU]/ML
0-5 INJECTION, SOLUTION INTRAVENOUS; SUBCUTANEOUS EVERY 12 HOURS PRN
Status: DISCONTINUED | OUTPATIENT
Start: 2021-05-28 | End: 2021-05-31

## 2021-05-28 RX ORDER — HYDRALAZINE HYDROCHLORIDE 20 MG/ML
10 INJECTION INTRAMUSCULAR; INTRAVENOUS EVERY 6 HOURS PRN
Status: DISCONTINUED | OUTPATIENT
Start: 2021-05-28 | End: 2021-06-02

## 2021-05-28 RX ORDER — VALPROIC ACID 250 MG/5ML
1000 SOLUTION ORAL EVERY 12 HOURS
Status: DISCONTINUED | OUTPATIENT
Start: 2021-05-28 | End: 2021-06-04 | Stop reason: HOSPADM

## 2021-05-28 RX ORDER — ENOXAPARIN SODIUM 100 MG/ML
40 INJECTION SUBCUTANEOUS EVERY 24 HOURS
Status: DISCONTINUED | OUTPATIENT
Start: 2021-05-28 | End: 2021-06-02

## 2021-05-28 RX ORDER — HYDRALAZINE HYDROCHLORIDE 25 MG/1
75 TABLET, FILM COATED ORAL EVERY 8 HOURS
Status: DISCONTINUED | OUTPATIENT
Start: 2021-05-28 | End: 2021-06-02

## 2021-05-28 RX ORDER — SODIUM CHLORIDE 9 MG/ML
INJECTION, SOLUTION INTRAVENOUS CONTINUOUS
Status: DISCONTINUED | OUTPATIENT
Start: 2021-05-28 | End: 2021-05-29

## 2021-05-28 RX ORDER — GLUCAGON 1 MG
1 KIT INJECTION
Status: DISCONTINUED | OUTPATIENT
Start: 2021-05-28 | End: 2021-06-04 | Stop reason: HOSPADM

## 2021-05-28 RX ORDER — LORAZEPAM 0.5 MG/1
0.5 TABLET ORAL EVERY 6 HOURS PRN
Status: DISCONTINUED | OUTPATIENT
Start: 2021-05-28 | End: 2021-06-04 | Stop reason: HOSPADM

## 2021-05-28 RX ORDER — METOPROLOL TARTRATE 1 MG/ML
5 INJECTION, SOLUTION INTRAVENOUS EVERY 6 HOURS PRN
Status: DISCONTINUED | OUTPATIENT
Start: 2021-05-28 | End: 2021-06-04 | Stop reason: HOSPADM

## 2021-05-28 RX ORDER — CLONIDINE HYDROCHLORIDE 0.1 MG/1
0.1 TABLET ORAL EVERY 4 HOURS PRN
Status: DISCONTINUED | OUTPATIENT
Start: 2021-05-28 | End: 2021-06-02

## 2021-05-28 RX ORDER — NAPROXEN SODIUM 220 MG/1
81 TABLET, FILM COATED ORAL DAILY
Status: DISCONTINUED | OUTPATIENT
Start: 2021-05-29 | End: 2021-06-02

## 2021-05-28 RX ORDER — GLUCAGON 1 MG
1 KIT INJECTION
Status: DISCONTINUED | OUTPATIENT
Start: 2021-05-28 | End: 2021-05-31

## 2021-05-28 RX ORDER — FAMOTIDINE 20 MG/1
20 TABLET, FILM COATED ORAL 2 TIMES DAILY
Status: DISCONTINUED | OUTPATIENT
Start: 2021-05-28 | End: 2021-05-28 | Stop reason: DRUGHIGH

## 2021-05-28 RX ORDER — DIPHENHYDRAMINE HCL 25 MG
25 CAPSULE ORAL EVERY 6 HOURS PRN
Status: DISCONTINUED | OUTPATIENT
Start: 2021-05-28 | End: 2021-06-04 | Stop reason: HOSPADM

## 2021-05-28 RX ORDER — RISPERIDONE 1 MG/1
2 TABLET ORAL NIGHTLY
Status: DISCONTINUED | OUTPATIENT
Start: 2021-05-28 | End: 2021-05-29

## 2021-05-28 RX ORDER — MODAFINIL 200 MG/1
200 TABLET ORAL DAILY
Status: DISCONTINUED | OUTPATIENT
Start: 2021-05-29 | End: 2021-06-04 | Stop reason: HOSPADM

## 2021-05-28 RX ORDER — IBUPROFEN 200 MG
24 TABLET ORAL
Status: DISCONTINUED | OUTPATIENT
Start: 2021-05-28 | End: 2021-06-04 | Stop reason: HOSPADM

## 2021-05-28 RX ADMIN — SODIUM CHLORIDE: 0.9 INJECTION, SOLUTION INTRAVENOUS at 09:05

## 2021-05-28 RX ADMIN — IBUPROFEN 600 MG: 600 TABLET, FILM COATED ORAL at 03:05

## 2021-05-28 RX ADMIN — AMANTADINE HYDROCHLORIDE 100 MG: 100 CAPSULE, LIQUID FILLED ORAL at 10:05

## 2021-05-28 RX ADMIN — ENOXAPARIN SODIUM 40 MG: 40 INJECTION SUBCUTANEOUS at 09:05

## 2021-05-28 RX ADMIN — MUPIROCIN: 20 OINTMENT TOPICAL at 09:05

## 2021-05-28 RX ADMIN — Medication 3 ML: at 09:05

## 2021-05-28 RX ADMIN — HYDRALAZINE HYDROCHLORIDE 75 MG: 25 TABLET, FILM COATED ORAL at 10:05

## 2021-05-28 RX ADMIN — PIPERACILLIN AND TAZOBACTAM 4.5 G: 4; .5 INJECTION, POWDER, LYOPHILIZED, FOR SOLUTION INTRAVENOUS; PARENTERAL at 05:05

## 2021-05-28 RX ADMIN — VANCOMYCIN HYDROCHLORIDE 1500 MG: 1.5 INJECTION, POWDER, LYOPHILIZED, FOR SOLUTION INTRAVENOUS at 10:05

## 2021-05-28 RX ADMIN — DOCUSATE SODIUM 50 MG AND SENNOSIDES 8.6 MG 1 TABLET: 8.6; 5 TABLET, FILM COATED ORAL at 10:05

## 2021-05-28 RX ADMIN — SODIUM CHLORIDE, SODIUM LACTATE, POTASSIUM CHLORIDE, AND CALCIUM CHLORIDE 2940 ML: .6; .31; .03; .02 INJECTION, SOLUTION INTRAVENOUS at 02:05

## 2021-05-29 LAB
ALBUMIN SERPL BCP-MCNC: 2.7 G/DL (ref 3.5–5.2)
ALP SERPL-CCNC: 67 U/L (ref 55–135)
ALT SERPL W/O P-5'-P-CCNC: 12 U/L (ref 10–44)
ANION GAP SERPL CALC-SCNC: 10 MMOL/L (ref 8–16)
AST SERPL-CCNC: 18 U/L (ref 10–40)
BASOPHILS # BLD AUTO: 0.11 K/UL (ref 0–0.2)
BASOPHILS NFR BLD: 0.8 % (ref 0–1.9)
BILIRUB SERPL-MCNC: 0.7 MG/DL (ref 0.1–1)
BUN SERPL-MCNC: 71 MG/DL (ref 6–20)
CALCIUM SERPL-MCNC: 9 MG/DL (ref 8.7–10.5)
CHLORIDE SERPL-SCNC: 116 MMOL/L (ref 95–110)
CO2 SERPL-SCNC: 24 MMOL/L (ref 23–29)
CREAT SERPL-MCNC: 2.3 MG/DL (ref 0.5–1.4)
CRP SERPL-MCNC: 27.4 MG/L (ref 0–8.2)
DIFFERENTIAL METHOD: ABNORMAL
EOSINOPHIL # BLD AUTO: 0.4 K/UL (ref 0–0.5)
EOSINOPHIL NFR BLD: 2.8 % (ref 0–8)
ERYTHROCYTE [DISTWIDTH] IN BLOOD BY AUTOMATED COUNT: 18.1 % (ref 11.5–14.5)
EST. GFR  (AFRICAN AMERICAN): 40 ML/MIN/1.73 M^2
EST. GFR  (NON AFRICAN AMERICAN): 34 ML/MIN/1.73 M^2
GLUCOSE SERPL-MCNC: 84 MG/DL (ref 70–110)
HCT VFR BLD AUTO: 30.3 % (ref 40–54)
HGB BLD-MCNC: 9.2 G/DL (ref 14–18)
IMM GRANULOCYTES # BLD AUTO: 0.07 K/UL (ref 0–0.04)
IMM GRANULOCYTES NFR BLD AUTO: 0.5 % (ref 0–0.5)
LYMPHOCYTES # BLD AUTO: 2.2 K/UL (ref 1–4.8)
LYMPHOCYTES NFR BLD: 15.4 % (ref 18–48)
MAGNESIUM SERPL-MCNC: 2.3 MG/DL (ref 1.6–2.6)
MCH RBC QN AUTO: 28.5 PG (ref 27–31)
MCHC RBC AUTO-ENTMCNC: 30.4 G/DL (ref 32–36)
MCV RBC AUTO: 94 FL (ref 82–98)
MONOCYTES # BLD AUTO: 1.6 K/UL (ref 0.3–1)
MONOCYTES NFR BLD: 11.3 % (ref 4–15)
NEUTROPHILS # BLD AUTO: 9.9 K/UL (ref 1.8–7.7)
NEUTROPHILS NFR BLD: 69.2 % (ref 38–73)
NRBC BLD-RTO: 0 /100 WBC
PHOSPHATE SERPL-MCNC: 4.1 MG/DL (ref 2.7–4.5)
PLATELET # BLD AUTO: 152 K/UL (ref 150–450)
PMV BLD AUTO: 10.5 FL (ref 9.2–12.9)
POCT GLUCOSE: 111 MG/DL (ref 70–110)
POCT GLUCOSE: 114 MG/DL (ref 70–110)
POCT GLUCOSE: 128 MG/DL (ref 70–110)
POTASSIUM SERPL-SCNC: 4.3 MMOL/L (ref 3.5–5.1)
PROCALCITONIN SERPL IA-MCNC: 0.1 NG/ML
PROT SERPL-MCNC: 6.2 G/DL (ref 6–8.4)
RBC # BLD AUTO: 3.23 M/UL (ref 4.6–6.2)
SODIUM SERPL-SCNC: 150 MMOL/L (ref 136–145)
SODIUM UR-SCNC: 58 MMOL/L (ref 20–250)
VALPROATE SERPL-MCNC: 49.5 UG/ML (ref 50–100)
VANCOMYCIN SERPL-MCNC: 15.5 UG/ML
WBC # BLD AUTO: 14.32 K/UL (ref 3.9–12.7)

## 2021-05-29 PROCEDURE — 80053 COMPREHEN METABOLIC PANEL: CPT | Performed by: HOSPITALIST

## 2021-05-29 PROCEDURE — 99233 SBSQ HOSP IP/OBS HIGH 50: CPT | Mod: 95,,, | Performed by: STUDENT IN AN ORGANIZED HEALTH CARE EDUCATION/TRAINING PROGRAM

## 2021-05-29 PROCEDURE — 97165 OT EVAL LOW COMPLEX 30 MIN: CPT

## 2021-05-29 PROCEDURE — 25000003 PHARM REV CODE 250: Performed by: HOSPITALIST

## 2021-05-29 PROCEDURE — 63600175 PHARM REV CODE 636 W HCPCS: Performed by: HOSPITALIST

## 2021-05-29 PROCEDURE — 36415 COLL VENOUS BLD VENIPUNCTURE: CPT | Performed by: HOSPITALIST

## 2021-05-29 PROCEDURE — 84145 PROCALCITONIN (PCT): CPT | Performed by: INTERNAL MEDICINE

## 2021-05-29 PROCEDURE — 97161 PT EVAL LOW COMPLEX 20 MIN: CPT

## 2021-05-29 PROCEDURE — 25000003 PHARM REV CODE 250: Performed by: INTERNAL MEDICINE

## 2021-05-29 PROCEDURE — 86140 C-REACTIVE PROTEIN: CPT | Performed by: INTERNAL MEDICINE

## 2021-05-29 PROCEDURE — 99233 PR SUBSEQUENT HOSPITAL CARE,LEVL III: ICD-10-PCS | Mod: 95,,, | Performed by: STUDENT IN AN ORGANIZED HEALTH CARE EDUCATION/TRAINING PROGRAM

## 2021-05-29 PROCEDURE — 95816 PR EEG,W/AWAKE & DROWSY RECORD: ICD-10-PCS | Mod: 26,,, | Performed by: PSYCHIATRY & NEUROLOGY

## 2021-05-29 PROCEDURE — 92523 SPEECH SOUND LANG COMPREHEN: CPT

## 2021-05-29 PROCEDURE — 83735 ASSAY OF MAGNESIUM: CPT | Performed by: HOSPITALIST

## 2021-05-29 PROCEDURE — 25000003 PHARM REV CODE 250: Performed by: NURSE PRACTITIONER

## 2021-05-29 PROCEDURE — 27000221 HC OXYGEN, UP TO 24 HOURS

## 2021-05-29 PROCEDURE — 95816 EEG AWAKE AND DROWSY: CPT | Mod: 26,,, | Performed by: PSYCHIATRY & NEUROLOGY

## 2021-05-29 PROCEDURE — 36415 COLL VENOUS BLD VENIPUNCTURE: CPT | Performed by: PHYSICIAN ASSISTANT

## 2021-05-29 PROCEDURE — 63600175 PHARM REV CODE 636 W HCPCS: Performed by: STUDENT IN AN ORGANIZED HEALTH CARE EDUCATION/TRAINING PROGRAM

## 2021-05-29 PROCEDURE — A4216 STERILE WATER/SALINE, 10 ML: HCPCS | Performed by: HOSPITALIST

## 2021-05-29 PROCEDURE — 84300 ASSAY OF URINE SODIUM: CPT | Performed by: INTERNAL MEDICINE

## 2021-05-29 PROCEDURE — 80164 ASSAY DIPROPYLACETIC ACD TOT: CPT | Performed by: PHYSICIAN ASSISTANT

## 2021-05-29 PROCEDURE — 12000002 HC ACUTE/MED SURGE SEMI-PRIVATE ROOM

## 2021-05-29 PROCEDURE — 85025 COMPLETE CBC W/AUTO DIFF WBC: CPT | Performed by: HOSPITALIST

## 2021-05-29 PROCEDURE — 95816 EEG AWAKE AND DROWSY: CPT

## 2021-05-29 PROCEDURE — 25000003 PHARM REV CODE 250: Performed by: STUDENT IN AN ORGANIZED HEALTH CARE EDUCATION/TRAINING PROGRAM

## 2021-05-29 PROCEDURE — 80202 ASSAY OF VANCOMYCIN: CPT | Performed by: HOSPITALIST

## 2021-05-29 PROCEDURE — 94761 N-INVAS EAR/PLS OXIMETRY MLT: CPT

## 2021-05-29 PROCEDURE — 84100 ASSAY OF PHOSPHORUS: CPT | Performed by: HOSPITALIST

## 2021-05-29 RX ORDER — DEXTROSE MONOHYDRATE 50 MG/ML
INJECTION, SOLUTION INTRAVENOUS CONTINUOUS
Status: DISCONTINUED | OUTPATIENT
Start: 2021-05-29 | End: 2021-05-31

## 2021-05-29 RX ORDER — ATORVASTATIN CALCIUM 40 MG/1
40 TABLET, FILM COATED ORAL DAILY
Status: DISCONTINUED | OUTPATIENT
Start: 2021-05-30 | End: 2021-06-04 | Stop reason: HOSPADM

## 2021-05-29 RX ADMIN — HYDRALAZINE HYDROCHLORIDE 75 MG: 25 TABLET, FILM COATED ORAL at 09:05

## 2021-05-29 RX ADMIN — VALPROIC ACID 1000 MG: 250 SOLUTION ORAL at 09:05

## 2021-05-29 RX ADMIN — VANCOMYCIN HYDROCHLORIDE 750 MG: 750 INJECTION, POWDER, LYOPHILIZED, FOR SOLUTION INTRAVENOUS at 09:05

## 2021-05-29 RX ADMIN — PIPERACILLIN AND TAZOBACTAM 4.5 G: 4; .5 INJECTION, POWDER, LYOPHILIZED, FOR SOLUTION INTRAVENOUS; PARENTERAL at 09:05

## 2021-05-29 RX ADMIN — HYDROCORTISONE: 1 CREAM TOPICAL at 09:05

## 2021-05-29 RX ADMIN — HYDRALAZINE HYDROCHLORIDE 75 MG: 25 TABLET, FILM COATED ORAL at 05:05

## 2021-05-29 RX ADMIN — MUPIROCIN: 20 OINTMENT TOPICAL at 09:05

## 2021-05-29 RX ADMIN — MODAFINIL 200 MG: 200 TABLET ORAL at 09:05

## 2021-05-29 RX ADMIN — DEXTROSE: 5 SOLUTION INTRAVENOUS at 05:05

## 2021-05-29 RX ADMIN — HYDRALAZINE HYDROCHLORIDE 75 MG: 25 TABLET, FILM COATED ORAL at 04:05

## 2021-05-29 RX ADMIN — AMANTADINE HYDROCHLORIDE 100 MG: 100 CAPSULE, LIQUID FILLED ORAL at 09:05

## 2021-05-29 RX ADMIN — RISPERIDONE 3 MG: 1 TABLET ORAL at 12:05

## 2021-05-29 RX ADMIN — RISPERIDONE 3 MG: 1 TABLET ORAL at 09:05

## 2021-05-29 RX ADMIN — DOCUSATE SODIUM 50 MG AND SENNOSIDES 8.6 MG 1 TABLET: 8.6; 5 TABLET, FILM COATED ORAL at 09:05

## 2021-05-29 RX ADMIN — PIPERACILLIN AND TAZOBACTAM 4.5 G: 4; .5 INJECTION, POWDER, LYOPHILIZED, FOR SOLUTION INTRAVENOUS; PARENTERAL at 01:05

## 2021-05-29 RX ADMIN — CETIRIZINE HYDROCHLORIDE 10 MG: 10 TABLET, FILM COATED ORAL at 09:05

## 2021-05-29 RX ADMIN — FLUOXETINE HYDROCHLORIDE 20 MG: 20 CAPSULE ORAL at 09:05

## 2021-05-29 RX ADMIN — PIPERACILLIN AND TAZOBACTAM 4.5 G: 4; .5 INJECTION, POWDER, LYOPHILIZED, FOR SOLUTION INTRAVENOUS; PARENTERAL at 05:05

## 2021-05-29 RX ADMIN — Medication 3 ML: at 04:05

## 2021-05-29 RX ADMIN — Medication 3 ML: at 05:05

## 2021-05-29 RX ADMIN — ASPIRIN 81 MG CHEWABLE TABLET 81 MG: 81 TABLET CHEWABLE at 09:05

## 2021-05-29 RX ADMIN — DEXTROSE: 5 SOLUTION INTRAVENOUS at 09:05

## 2021-05-29 RX ADMIN — FAMOTIDINE 20 MG: 20 TABLET, FILM COATED ORAL at 09:05

## 2021-05-29 RX ADMIN — Medication 3 ML: at 09:05

## 2021-05-29 RX ADMIN — ENOXAPARIN SODIUM 40 MG: 40 INJECTION SUBCUTANEOUS at 04:05

## 2021-05-29 RX ADMIN — Medication: at 09:05

## 2021-05-29 RX ADMIN — VALPROIC ACID 1000 MG: 250 SOLUTION ORAL at 12:05

## 2021-05-30 LAB
ALBUMIN SERPL BCP-MCNC: 2.8 G/DL (ref 3.5–5.2)
ALP SERPL-CCNC: 67 U/L (ref 55–135)
ALT SERPL W/O P-5'-P-CCNC: 13 U/L (ref 10–44)
ANION GAP SERPL CALC-SCNC: 8 MMOL/L (ref 8–16)
AST SERPL-CCNC: 19 U/L (ref 10–40)
BASOPHILS # BLD AUTO: 0.09 K/UL (ref 0–0.2)
BASOPHILS NFR BLD: 0.6 % (ref 0–1.9)
BILIRUB SERPL-MCNC: 0.6 MG/DL (ref 0.1–1)
BUN SERPL-MCNC: 52 MG/DL (ref 6–20)
CALCIUM SERPL-MCNC: 9 MG/DL (ref 8.7–10.5)
CHLORIDE SERPL-SCNC: 113 MMOL/L (ref 95–110)
CO2 SERPL-SCNC: 26 MMOL/L (ref 23–29)
CREAT SERPL-MCNC: 2.1 MG/DL (ref 0.5–1.4)
DIFFERENTIAL METHOD: ABNORMAL
EOSINOPHIL # BLD AUTO: 0.6 K/UL (ref 0–0.5)
EOSINOPHIL NFR BLD: 4.1 % (ref 0–8)
ERYTHROCYTE [DISTWIDTH] IN BLOOD BY AUTOMATED COUNT: 18 % (ref 11.5–14.5)
EST. GFR  (AFRICAN AMERICAN): 44 ML/MIN/1.73 M^2
EST. GFR  (NON AFRICAN AMERICAN): 38 ML/MIN/1.73 M^2
FERRITIN SERPL-MCNC: 230 NG/ML (ref 20–300)
GLUCOSE SERPL-MCNC: 105 MG/DL (ref 70–110)
HCT VFR BLD AUTO: 30.4 % (ref 40–54)
HGB BLD-MCNC: 9.3 G/DL (ref 14–18)
IMM GRANULOCYTES # BLD AUTO: 0.05 K/UL (ref 0–0.04)
IMM GRANULOCYTES NFR BLD AUTO: 0.3 % (ref 0–0.5)
IRON SERPL-MCNC: 28 UG/DL (ref 45–160)
LYMPHOCYTES # BLD AUTO: 1.5 K/UL (ref 1–4.8)
LYMPHOCYTES NFR BLD: 10.1 % (ref 18–48)
MAGNESIUM SERPL-MCNC: 2.2 MG/DL (ref 1.6–2.6)
MCH RBC QN AUTO: 28.6 PG (ref 27–31)
MCHC RBC AUTO-ENTMCNC: 30.6 G/DL (ref 32–36)
MCV RBC AUTO: 94 FL (ref 82–98)
MONOCYTES # BLD AUTO: 1.4 K/UL (ref 0.3–1)
MONOCYTES NFR BLD: 9.6 % (ref 4–15)
NEUTROPHILS # BLD AUTO: 10.9 K/UL (ref 1.8–7.7)
NEUTROPHILS NFR BLD: 75.3 % (ref 38–73)
NRBC BLD-RTO: 0 /100 WBC
PHOSPHATE SERPL-MCNC: 3.5 MG/DL (ref 2.7–4.5)
PLATELET # BLD AUTO: 168 K/UL (ref 150–450)
PMV BLD AUTO: 10.8 FL (ref 9.2–12.9)
POCT GLUCOSE: 116 MG/DL (ref 70–110)
POTASSIUM SERPL-SCNC: 4.3 MMOL/L (ref 3.5–5.1)
PROT SERPL-MCNC: 6.4 G/DL (ref 6–8.4)
RBC # BLD AUTO: 3.25 M/UL (ref 4.6–6.2)
SATURATED IRON: 12 % (ref 20–50)
SODIUM SERPL-SCNC: 147 MMOL/L (ref 136–145)
TOTAL IRON BINDING CAPACITY: 231 UG/DL (ref 250–450)
TRANSFERRIN SERPL-MCNC: 156 MG/DL (ref 200–375)
VANCOMYCIN SERPL-MCNC: 14.7 UG/ML
WBC # BLD AUTO: 14.5 K/UL (ref 3.9–12.7)

## 2021-05-30 PROCEDURE — 94761 N-INVAS EAR/PLS OXIMETRY MLT: CPT

## 2021-05-30 PROCEDURE — 25000003 PHARM REV CODE 250: Performed by: HOSPITALIST

## 2021-05-30 PROCEDURE — 83540 ASSAY OF IRON: CPT | Performed by: INTERNAL MEDICINE

## 2021-05-30 PROCEDURE — 80053 COMPREHEN METABOLIC PANEL: CPT | Performed by: HOSPITALIST

## 2021-05-30 PROCEDURE — 25000003 PHARM REV CODE 250: Performed by: STUDENT IN AN ORGANIZED HEALTH CARE EDUCATION/TRAINING PROGRAM

## 2021-05-30 PROCEDURE — 12000002 HC ACUTE/MED SURGE SEMI-PRIVATE ROOM

## 2021-05-30 PROCEDURE — 84100 ASSAY OF PHOSPHORUS: CPT | Performed by: HOSPITALIST

## 2021-05-30 PROCEDURE — 25000003 PHARM REV CODE 250: Performed by: NURSE PRACTITIONER

## 2021-05-30 PROCEDURE — 85025 COMPLETE CBC W/AUTO DIFF WBC: CPT | Performed by: HOSPITALIST

## 2021-05-30 PROCEDURE — 87040 BLOOD CULTURE FOR BACTERIA: CPT | Performed by: STUDENT IN AN ORGANIZED HEALTH CARE EDUCATION/TRAINING PROGRAM

## 2021-05-30 PROCEDURE — 99233 SBSQ HOSP IP/OBS HIGH 50: CPT | Mod: 95,,, | Performed by: STUDENT IN AN ORGANIZED HEALTH CARE EDUCATION/TRAINING PROGRAM

## 2021-05-30 PROCEDURE — 80202 ASSAY OF VANCOMYCIN: CPT | Performed by: STUDENT IN AN ORGANIZED HEALTH CARE EDUCATION/TRAINING PROGRAM

## 2021-05-30 PROCEDURE — 99233 PR SUBSEQUENT HOSPITAL CARE,LEVL III: ICD-10-PCS | Mod: 95,,, | Performed by: STUDENT IN AN ORGANIZED HEALTH CARE EDUCATION/TRAINING PROGRAM

## 2021-05-30 PROCEDURE — 25000003 PHARM REV CODE 250: Performed by: INTERNAL MEDICINE

## 2021-05-30 PROCEDURE — 63600175 PHARM REV CODE 636 W HCPCS: Performed by: HOSPITALIST

## 2021-05-30 PROCEDURE — 83735 ASSAY OF MAGNESIUM: CPT | Performed by: HOSPITALIST

## 2021-05-30 PROCEDURE — 82728 ASSAY OF FERRITIN: CPT | Performed by: INTERNAL MEDICINE

## 2021-05-30 PROCEDURE — A4216 STERILE WATER/SALINE, 10 ML: HCPCS | Performed by: HOSPITALIST

## 2021-05-30 PROCEDURE — 36415 COLL VENOUS BLD VENIPUNCTURE: CPT | Performed by: HOSPITALIST

## 2021-05-30 PROCEDURE — 36415 COLL VENOUS BLD VENIPUNCTURE: CPT | Performed by: STUDENT IN AN ORGANIZED HEALTH CARE EDUCATION/TRAINING PROGRAM

## 2021-05-30 PROCEDURE — 27000221 HC OXYGEN, UP TO 24 HOURS

## 2021-05-30 PROCEDURE — 63600175 PHARM REV CODE 636 W HCPCS: Performed by: STUDENT IN AN ORGANIZED HEALTH CARE EDUCATION/TRAINING PROGRAM

## 2021-05-30 RX ORDER — VANCOMYCIN HCL IN 5 % DEXTROSE 1G/250ML
1000 PLASTIC BAG, INJECTION (ML) INTRAVENOUS ONCE
Status: COMPLETED | OUTPATIENT
Start: 2021-05-30 | End: 2021-05-30

## 2021-05-30 RX ADMIN — ATORVASTATIN CALCIUM 40 MG: 40 TABLET, FILM COATED ORAL at 09:05

## 2021-05-30 RX ADMIN — MUPIROCIN: 20 OINTMENT TOPICAL at 09:05

## 2021-05-30 RX ADMIN — Medication 3 ML: at 05:05

## 2021-05-30 RX ADMIN — FAMOTIDINE 20 MG: 20 TABLET, FILM COATED ORAL at 09:05

## 2021-05-30 RX ADMIN — VANCOMYCIN HYDROCHLORIDE 1000 MG: 1 INJECTION, POWDER, LYOPHILIZED, FOR SOLUTION INTRAVENOUS at 09:05

## 2021-05-30 RX ADMIN — RISPERIDONE 3 MG: 1 TABLET ORAL at 09:05

## 2021-05-30 RX ADMIN — CETIRIZINE HYDROCHLORIDE 10 MG: 10 TABLET, FILM COATED ORAL at 09:05

## 2021-05-30 RX ADMIN — ASPIRIN 81 MG CHEWABLE TABLET 81 MG: 81 TABLET CHEWABLE at 09:05

## 2021-05-30 RX ADMIN — DOCUSATE SODIUM 50 MG AND SENNOSIDES 8.6 MG 1 TABLET: 8.6; 5 TABLET, FILM COATED ORAL at 09:05

## 2021-05-30 RX ADMIN — HYDRALAZINE HYDROCHLORIDE 75 MG: 25 TABLET, FILM COATED ORAL at 05:05

## 2021-05-30 RX ADMIN — Medication 3 ML: at 01:05

## 2021-05-30 RX ADMIN — PIPERACILLIN AND TAZOBACTAM 4.5 G: 4; .5 INJECTION, POWDER, LYOPHILIZED, FOR SOLUTION INTRAVENOUS; PARENTERAL at 09:05

## 2021-05-30 RX ADMIN — AMLODIPINE BESYLATE 10 MG: 5 TABLET ORAL at 09:05

## 2021-05-30 RX ADMIN — DEXTROSE: 5 SOLUTION INTRAVENOUS at 01:05

## 2021-05-30 RX ADMIN — VALPROIC ACID 1000 MG: 250 SOLUTION ORAL at 09:05

## 2021-05-30 RX ADMIN — PIPERACILLIN AND TAZOBACTAM 4.5 G: 4; .5 INJECTION, POWDER, LYOPHILIZED, FOR SOLUTION INTRAVENOUS; PARENTERAL at 12:05

## 2021-05-30 RX ADMIN — Medication 3 ML: at 10:05

## 2021-05-30 RX ADMIN — HYDROCORTISONE: 1 CREAM TOPICAL at 09:05

## 2021-05-30 RX ADMIN — HYDRALAZINE HYDROCHLORIDE 75 MG: 25 TABLET, FILM COATED ORAL at 09:05

## 2021-05-30 RX ADMIN — Medication: at 09:05

## 2021-05-30 RX ADMIN — ENOXAPARIN SODIUM 40 MG: 40 INJECTION SUBCUTANEOUS at 05:05

## 2021-05-30 RX ADMIN — MODAFINIL 200 MG: 200 TABLET ORAL at 09:05

## 2021-05-30 RX ADMIN — AMANTADINE HYDROCHLORIDE 100 MG: 100 CAPSULE, LIQUID FILLED ORAL at 09:05

## 2021-05-30 RX ADMIN — HYDRALAZINE HYDROCHLORIDE 75 MG: 25 TABLET, FILM COATED ORAL at 01:05

## 2021-05-30 RX ADMIN — FLUOXETINE HYDROCHLORIDE 20 MG: 20 CAPSULE ORAL at 09:05

## 2021-05-31 ENCOUNTER — OUTSIDE PLACE OF SERVICE (OUTPATIENT)
Dept: INFECTIOUS DISEASES | Facility: CLINIC | Age: 41
End: 2021-05-31
Payer: MEDICAID

## 2021-05-31 DIAGNOSIS — K92.2 GIB (GASTROINTESTINAL BLEEDING): ICD-10-CM

## 2021-05-31 DIAGNOSIS — I69.359 CVA, OLD, HEMIPARESIS: ICD-10-CM

## 2021-05-31 PROBLEM — R65.20 SEVERE SEPSIS: Status: RESOLVED | Noted: 2021-05-28 | Resolved: 2021-05-31

## 2021-05-31 PROBLEM — A41.9 SEVERE SEPSIS: Status: RESOLVED | Noted: 2021-05-28 | Resolved: 2021-05-31

## 2021-05-31 LAB
ALBUMIN SERPL BCP-MCNC: 2.7 G/DL (ref 3.5–5.2)
ALP SERPL-CCNC: 73 U/L (ref 55–135)
ALT SERPL W/O P-5'-P-CCNC: 13 U/L (ref 10–44)
ANION GAP SERPL CALC-SCNC: 9 MMOL/L (ref 8–16)
AST SERPL-CCNC: 17 U/L (ref 10–40)
BASOPHILS # BLD AUTO: 0.06 K/UL (ref 0–0.2)
BASOPHILS NFR BLD: 0.4 % (ref 0–1.9)
BILIRUB SERPL-MCNC: 0.5 MG/DL (ref 0.1–1)
BUN SERPL-MCNC: 36 MG/DL (ref 6–20)
CALCIUM SERPL-MCNC: 8.9 MG/DL (ref 8.7–10.5)
CHLORIDE SERPL-SCNC: 109 MMOL/L (ref 95–110)
CO2 SERPL-SCNC: 25 MMOL/L (ref 23–29)
CREAT SERPL-MCNC: 1.7 MG/DL (ref 0.5–1.4)
DIFFERENTIAL METHOD: ABNORMAL
EOSINOPHIL # BLD AUTO: 0.5 K/UL (ref 0–0.5)
EOSINOPHIL NFR BLD: 3.7 % (ref 0–8)
ERYTHROCYTE [DISTWIDTH] IN BLOOD BY AUTOMATED COUNT: 17.4 % (ref 11.5–14.5)
EST. GFR  (AFRICAN AMERICAN): 57 ML/MIN/1.73 M^2
EST. GFR  (NON AFRICAN AMERICAN): 50 ML/MIN/1.73 M^2
GLUCOSE SERPL-MCNC: 109 MG/DL (ref 70–110)
HCT VFR BLD AUTO: 31.1 % (ref 40–54)
HGB BLD-MCNC: 9.6 G/DL (ref 14–18)
IMM GRANULOCYTES # BLD AUTO: 0.06 K/UL (ref 0–0.04)
IMM GRANULOCYTES NFR BLD AUTO: 0.4 % (ref 0–0.5)
LYMPHOCYTES # BLD AUTO: 1.4 K/UL (ref 1–4.8)
LYMPHOCYTES NFR BLD: 9.5 % (ref 18–48)
MAGNESIUM SERPL-MCNC: 2 MG/DL (ref 1.6–2.6)
MCH RBC QN AUTO: 28.7 PG (ref 27–31)
MCHC RBC AUTO-ENTMCNC: 30.9 G/DL (ref 32–36)
MCV RBC AUTO: 93 FL (ref 82–98)
MONOCYTES # BLD AUTO: 1.7 K/UL (ref 0.3–1)
MONOCYTES NFR BLD: 12.1 % (ref 4–15)
NEUTROPHILS # BLD AUTO: 10.5 K/UL (ref 1.8–7.7)
NEUTROPHILS NFR BLD: 73.9 % (ref 38–73)
NRBC BLD-RTO: 0 /100 WBC
PHOSPHATE SERPL-MCNC: 2.8 MG/DL (ref 2.7–4.5)
PLATELET # BLD AUTO: 167 K/UL (ref 150–450)
PMV BLD AUTO: 10.9 FL (ref 9.2–12.9)
POTASSIUM SERPL-SCNC: 4.3 MMOL/L (ref 3.5–5.1)
PREALB SERPL-MCNC: 27 MG/DL (ref 20–43)
PROT SERPL-MCNC: 6.4 G/DL (ref 6–8.4)
RBC # BLD AUTO: 3.34 M/UL (ref 4.6–6.2)
SODIUM SERPL-SCNC: 143 MMOL/L (ref 136–145)
VANCOMYCIN SERPL-MCNC: 14.9 UG/ML
WBC # BLD AUTO: 14.19 K/UL (ref 3.9–12.7)

## 2021-05-31 PROCEDURE — 25000003 PHARM REV CODE 250: Performed by: STUDENT IN AN ORGANIZED HEALTH CARE EDUCATION/TRAINING PROGRAM

## 2021-05-31 PROCEDURE — 99232 PR SUBSEQUENT HOSPITAL CARE,LEVL II: ICD-10-PCS | Mod: S$GLB,,, | Performed by: INTERNAL MEDICINE

## 2021-05-31 PROCEDURE — 25000003 PHARM REV CODE 250: Performed by: INTERNAL MEDICINE

## 2021-05-31 PROCEDURE — 99232 SBSQ HOSP IP/OBS MODERATE 35: CPT | Mod: S$GLB,,, | Performed by: INTERNAL MEDICINE

## 2021-05-31 PROCEDURE — 25000003 PHARM REV CODE 250: Performed by: NURSE PRACTITIONER

## 2021-05-31 PROCEDURE — 94761 N-INVAS EAR/PLS OXIMETRY MLT: CPT

## 2021-05-31 PROCEDURE — 84134 ASSAY OF PREALBUMIN: CPT | Performed by: INTERNAL MEDICINE

## 2021-05-31 PROCEDURE — 84100 ASSAY OF PHOSPHORUS: CPT | Performed by: HOSPITALIST

## 2021-05-31 PROCEDURE — 83735 ASSAY OF MAGNESIUM: CPT | Performed by: HOSPITALIST

## 2021-05-31 PROCEDURE — 80202 ASSAY OF VANCOMYCIN: CPT | Performed by: STUDENT IN AN ORGANIZED HEALTH CARE EDUCATION/TRAINING PROGRAM

## 2021-05-31 PROCEDURE — 80053 COMPREHEN METABOLIC PANEL: CPT | Performed by: INTERNAL MEDICINE

## 2021-05-31 PROCEDURE — 36415 COLL VENOUS BLD VENIPUNCTURE: CPT | Performed by: STUDENT IN AN ORGANIZED HEALTH CARE EDUCATION/TRAINING PROGRAM

## 2021-05-31 PROCEDURE — 85025 COMPLETE CBC W/AUTO DIFF WBC: CPT | Performed by: INTERNAL MEDICINE

## 2021-05-31 PROCEDURE — 63600175 PHARM REV CODE 636 W HCPCS: Performed by: HOSPITALIST

## 2021-05-31 PROCEDURE — 63600175 PHARM REV CODE 636 W HCPCS: Performed by: STUDENT IN AN ORGANIZED HEALTH CARE EDUCATION/TRAINING PROGRAM

## 2021-05-31 PROCEDURE — 27000221 HC OXYGEN, UP TO 24 HOURS

## 2021-05-31 PROCEDURE — 12000002 HC ACUTE/MED SURGE SEMI-PRIVATE ROOM

## 2021-05-31 PROCEDURE — A4216 STERILE WATER/SALINE, 10 ML: HCPCS | Performed by: HOSPITALIST

## 2021-05-31 PROCEDURE — 25000003 PHARM REV CODE 250: Performed by: HOSPITALIST

## 2021-05-31 RX ORDER — KETOCONAZOLE 20 MG/ML
SHAMPOO, SUSPENSION TOPICAL DAILY
Status: DISCONTINUED | OUTPATIENT
Start: 2021-05-31 | End: 2021-06-04 | Stop reason: HOSPADM

## 2021-05-31 RX ORDER — VANCOMYCIN HCL IN 5 % DEXTROSE 1G/250ML
1000 PLASTIC BAG, INJECTION (ML) INTRAVENOUS ONCE
Status: COMPLETED | OUTPATIENT
Start: 2021-05-31 | End: 2021-05-31

## 2021-05-31 RX ADMIN — VALPROIC ACID 1000 MG: 250 SOLUTION ORAL at 09:05

## 2021-05-31 RX ADMIN — FAMOTIDINE 20 MG: 20 TABLET, FILM COATED ORAL at 09:05

## 2021-05-31 RX ADMIN — HYDRALAZINE HYDROCHLORIDE 75 MG: 25 TABLET, FILM COATED ORAL at 09:05

## 2021-05-31 RX ADMIN — ASPIRIN 81 MG CHEWABLE TABLET 81 MG: 81 TABLET CHEWABLE at 09:05

## 2021-05-31 RX ADMIN — MUPIROCIN: 20 OINTMENT TOPICAL at 09:05

## 2021-05-31 RX ADMIN — AMLODIPINE BESYLATE 10 MG: 5 TABLET ORAL at 09:05

## 2021-05-31 RX ADMIN — Medication: at 09:05

## 2021-05-31 RX ADMIN — RISPERIDONE 3 MG: 1 TABLET ORAL at 09:05

## 2021-05-31 RX ADMIN — FLUOXETINE HYDROCHLORIDE 20 MG: 20 CAPSULE ORAL at 09:05

## 2021-05-31 RX ADMIN — AMANTADINE HYDROCHLORIDE 100 MG: 100 CAPSULE, LIQUID FILLED ORAL at 09:05

## 2021-05-31 RX ADMIN — CETIRIZINE HYDROCHLORIDE 10 MG: 10 TABLET, FILM COATED ORAL at 09:05

## 2021-05-31 RX ADMIN — HYDROCORTISONE: 1 CREAM TOPICAL at 09:05

## 2021-05-31 RX ADMIN — ENOXAPARIN SODIUM 40 MG: 40 INJECTION SUBCUTANEOUS at 03:05

## 2021-05-31 RX ADMIN — Medication 3 ML: at 05:05

## 2021-05-31 RX ADMIN — VANCOMYCIN HYDROCHLORIDE 1000 MG: 1 INJECTION, POWDER, LYOPHILIZED, FOR SOLUTION INTRAVENOUS at 09:05

## 2021-05-31 RX ADMIN — DOCUSATE SODIUM 50 MG AND SENNOSIDES 8.6 MG 1 TABLET: 8.6; 5 TABLET, FILM COATED ORAL at 09:05

## 2021-05-31 RX ADMIN — MODAFINIL 200 MG: 200 TABLET ORAL at 09:05

## 2021-05-31 RX ADMIN — ATORVASTATIN CALCIUM 40 MG: 40 TABLET, FILM COATED ORAL at 09:05

## 2021-05-31 RX ADMIN — HYDRALAZINE HYDROCHLORIDE 75 MG: 25 TABLET, FILM COATED ORAL at 03:05

## 2021-05-31 RX ADMIN — Medication 3 ML: at 09:05

## 2021-05-31 RX ADMIN — HYDRALAZINE HYDROCHLORIDE 75 MG: 25 TABLET, FILM COATED ORAL at 05:05

## 2021-05-31 RX ADMIN — Medication 3 ML: at 02:05

## 2021-06-01 LAB
ALBUMIN SERPL BCP-MCNC: 2.6 G/DL (ref 3.5–5.2)
ALP SERPL-CCNC: 71 U/L (ref 55–135)
ALT SERPL W/O P-5'-P-CCNC: 12 U/L (ref 10–44)
ANION GAP SERPL CALC-SCNC: 4 MMOL/L (ref 8–16)
AST SERPL-CCNC: 21 U/L (ref 10–40)
BASOPHILS # BLD AUTO: 0.04 K/UL (ref 0–0.2)
BASOPHILS NFR BLD: 0.4 % (ref 0–1.9)
BILIRUB SERPL-MCNC: 0.5 MG/DL (ref 0.1–1)
BUN SERPL-MCNC: 35 MG/DL (ref 6–20)
CALCIUM SERPL-MCNC: 8.7 MG/DL (ref 8.7–10.5)
CHLORIDE SERPL-SCNC: 106 MMOL/L (ref 95–110)
CO2 SERPL-SCNC: 29 MMOL/L (ref 23–29)
CREAT SERPL-MCNC: 1.6 MG/DL (ref 0.5–1.4)
DIFFERENTIAL METHOD: ABNORMAL
EOSINOPHIL # BLD AUTO: 0.6 K/UL (ref 0–0.5)
EOSINOPHIL NFR BLD: 5.2 % (ref 0–8)
ERYTHROCYTE [DISTWIDTH] IN BLOOD BY AUTOMATED COUNT: 17.2 % (ref 11.5–14.5)
EST. GFR  (AFRICAN AMERICAN): >60 ML/MIN/1.73 M^2
EST. GFR  (NON AFRICAN AMERICAN): 53 ML/MIN/1.73 M^2
GLUCOSE SERPL-MCNC: 104 MG/DL (ref 70–110)
HCT VFR BLD AUTO: 29.7 % (ref 40–54)
HGB BLD-MCNC: 9 G/DL (ref 14–18)
IMM GRANULOCYTES # BLD AUTO: 0.06 K/UL (ref 0–0.04)
IMM GRANULOCYTES NFR BLD AUTO: 0.6 % (ref 0–0.5)
LYMPHOCYTES # BLD AUTO: 1.4 K/UL (ref 1–4.8)
LYMPHOCYTES NFR BLD: 12.9 % (ref 18–48)
MAGNESIUM SERPL-MCNC: 2.1 MG/DL (ref 1.6–2.6)
MCH RBC QN AUTO: 28.1 PG (ref 27–31)
MCHC RBC AUTO-ENTMCNC: 30.3 G/DL (ref 32–36)
MCV RBC AUTO: 93 FL (ref 82–98)
MONOCYTES # BLD AUTO: 1.4 K/UL (ref 0.3–1)
MONOCYTES NFR BLD: 12.9 % (ref 4–15)
NEUTROPHILS # BLD AUTO: 7.3 K/UL (ref 1.8–7.7)
NEUTROPHILS NFR BLD: 68 % (ref 38–73)
NRBC BLD-RTO: 0 /100 WBC
PHOSPHATE SERPL-MCNC: 3.9 MG/DL (ref 2.7–4.5)
PLATELET # BLD AUTO: 159 K/UL (ref 150–450)
PMV BLD AUTO: 10.8 FL (ref 9.2–12.9)
POCT GLUCOSE: 117 MG/DL (ref 70–110)
POCT GLUCOSE: 128 MG/DL (ref 70–110)
POTASSIUM SERPL-SCNC: 4.9 MMOL/L (ref 3.5–5.1)
PROT SERPL-MCNC: 6.1 G/DL (ref 6–8.4)
RBC # BLD AUTO: 3.2 M/UL (ref 4.6–6.2)
SODIUM SERPL-SCNC: 139 MMOL/L (ref 136–145)
VANCOMYCIN SERPL-MCNC: 15 UG/ML
WBC # BLD AUTO: 10.73 K/UL (ref 3.9–12.7)

## 2021-06-01 PROCEDURE — 36415 COLL VENOUS BLD VENIPUNCTURE: CPT | Performed by: HOSPITALIST

## 2021-06-01 PROCEDURE — 25000003 PHARM REV CODE 250: Performed by: NURSE PRACTITIONER

## 2021-06-01 PROCEDURE — 99231 SBSQ HOSP IP/OBS SF/LOW 25: CPT | Mod: S$GLB,,, | Performed by: INTERNAL MEDICINE

## 2021-06-01 PROCEDURE — 84100 ASSAY OF PHOSPHORUS: CPT | Performed by: HOSPITALIST

## 2021-06-01 PROCEDURE — 25000003 PHARM REV CODE 250: Performed by: STUDENT IN AN ORGANIZED HEALTH CARE EDUCATION/TRAINING PROGRAM

## 2021-06-01 PROCEDURE — 80053 COMPREHEN METABOLIC PANEL: CPT | Performed by: HOSPITALIST

## 2021-06-01 PROCEDURE — 63600175 PHARM REV CODE 636 W HCPCS: Performed by: STUDENT IN AN ORGANIZED HEALTH CARE EDUCATION/TRAINING PROGRAM

## 2021-06-01 PROCEDURE — 94761 N-INVAS EAR/PLS OXIMETRY MLT: CPT

## 2021-06-01 PROCEDURE — 63600175 PHARM REV CODE 636 W HCPCS: Performed by: HOSPITALIST

## 2021-06-01 PROCEDURE — 25000003 PHARM REV CODE 250: Performed by: INTERNAL MEDICINE

## 2021-06-01 PROCEDURE — 12000002 HC ACUTE/MED SURGE SEMI-PRIVATE ROOM

## 2021-06-01 PROCEDURE — A4216 STERILE WATER/SALINE, 10 ML: HCPCS | Performed by: HOSPITALIST

## 2021-06-01 PROCEDURE — 25000003 PHARM REV CODE 250: Performed by: HOSPITALIST

## 2021-06-01 PROCEDURE — 83735 ASSAY OF MAGNESIUM: CPT | Performed by: HOSPITALIST

## 2021-06-01 PROCEDURE — 92507 TX SP LANG VOICE COMM INDIV: CPT

## 2021-06-01 PROCEDURE — 80202 ASSAY OF VANCOMYCIN: CPT | Performed by: STUDENT IN AN ORGANIZED HEALTH CARE EDUCATION/TRAINING PROGRAM

## 2021-06-01 PROCEDURE — 99231 PR SUBSEQUENT HOSPITAL CARE,LEVL I: ICD-10-PCS | Mod: S$GLB,,, | Performed by: INTERNAL MEDICINE

## 2021-06-01 PROCEDURE — 36415 COLL VENOUS BLD VENIPUNCTURE: CPT | Performed by: STUDENT IN AN ORGANIZED HEALTH CARE EDUCATION/TRAINING PROGRAM

## 2021-06-01 PROCEDURE — 85025 COMPLETE CBC W/AUTO DIFF WBC: CPT | Performed by: HOSPITALIST

## 2021-06-01 PROCEDURE — 27000221 HC OXYGEN, UP TO 24 HOURS

## 2021-06-01 RX ORDER — VANCOMYCIN HCL IN 5 % DEXTROSE 1G/250ML
1000 PLASTIC BAG, INJECTION (ML) INTRAVENOUS
Status: DISCONTINUED | OUTPATIENT
Start: 2021-06-01 | End: 2021-06-03

## 2021-06-01 RX ADMIN — RISPERIDONE 3 MG: 1 TABLET ORAL at 09:06

## 2021-06-01 RX ADMIN — HYDRALAZINE HYDROCHLORIDE 75 MG: 25 TABLET, FILM COATED ORAL at 05:06

## 2021-06-01 RX ADMIN — RISPERIDONE 3 MG: 1 TABLET ORAL at 08:06

## 2021-06-01 RX ADMIN — Medication: at 08:06

## 2021-06-01 RX ADMIN — ASPIRIN 81 MG CHEWABLE TABLET 81 MG: 81 TABLET CHEWABLE at 08:06

## 2021-06-01 RX ADMIN — ENOXAPARIN SODIUM 40 MG: 40 INJECTION SUBCUTANEOUS at 04:06

## 2021-06-01 RX ADMIN — MUPIROCIN: 20 OINTMENT TOPICAL at 09:06

## 2021-06-01 RX ADMIN — FLUOXETINE HYDROCHLORIDE 20 MG: 20 CAPSULE ORAL at 08:06

## 2021-06-01 RX ADMIN — Medication 3 ML: at 09:06

## 2021-06-01 RX ADMIN — MODAFINIL 200 MG: 200 TABLET ORAL at 09:06

## 2021-06-01 RX ADMIN — CETIRIZINE HYDROCHLORIDE 10 MG: 10 TABLET, FILM COATED ORAL at 08:06

## 2021-06-01 RX ADMIN — HYDROCORTISONE: 1 CREAM TOPICAL at 08:06

## 2021-06-01 RX ADMIN — VALPROIC ACID 1000 MG: 250 SOLUTION ORAL at 08:06

## 2021-06-01 RX ADMIN — HYDRALAZINE HYDROCHLORIDE 75 MG: 25 TABLET, FILM COATED ORAL at 02:06

## 2021-06-01 RX ADMIN — ATORVASTATIN CALCIUM 40 MG: 40 TABLET, FILM COATED ORAL at 08:06

## 2021-06-01 RX ADMIN — Medication 3 ML: at 05:06

## 2021-06-01 RX ADMIN — VALPROIC ACID 1000 MG: 250 SOLUTION ORAL at 09:06

## 2021-06-01 RX ADMIN — AMLODIPINE BESYLATE 10 MG: 5 TABLET ORAL at 08:06

## 2021-06-01 RX ADMIN — MUPIROCIN: 20 OINTMENT TOPICAL at 08:06

## 2021-06-01 RX ADMIN — AMANTADINE HYDROCHLORIDE 100 MG: 100 CAPSULE, LIQUID FILLED ORAL at 09:06

## 2021-06-01 RX ADMIN — FAMOTIDINE 20 MG: 20 TABLET, FILM COATED ORAL at 08:06

## 2021-06-01 RX ADMIN — VANCOMYCIN HYDROCHLORIDE 1000 MG: 1 INJECTION, POWDER, LYOPHILIZED, FOR SOLUTION INTRAVENOUS at 09:06

## 2021-06-01 RX ADMIN — HYDRALAZINE HYDROCHLORIDE 75 MG: 25 TABLET, FILM COATED ORAL at 09:06

## 2021-06-02 PROBLEM — K92.2 GIB (GASTROINTESTINAL BLEEDING): Status: ACTIVE | Noted: 2021-06-02

## 2021-06-02 PROBLEM — D69.6 THROMBOCYTOPENIA: Status: ACTIVE | Noted: 2021-06-02

## 2021-06-02 PROBLEM — D62 ACUTE BLOOD LOSS ANEMIA: Status: ACTIVE | Noted: 2021-06-02

## 2021-06-02 PROBLEM — I63.9 CVA (CEREBRAL VASCULAR ACCIDENT): Status: RESOLVED | Noted: 2021-04-09 | Resolved: 2021-06-02

## 2021-06-02 PROBLEM — E87.5 HYPERKALEMIA: Status: RESOLVED | Noted: 2021-04-10 | Resolved: 2021-06-02

## 2021-06-02 PROBLEM — K92.2 GI BLEED: Status: ACTIVE | Noted: 2021-06-02

## 2021-06-02 LAB
ABO + RH BLD: NORMAL
ALBUMIN SERPL BCP-MCNC: 2.5 G/DL (ref 3.5–5.2)
ALP SERPL-CCNC: 70 U/L (ref 55–135)
ALT SERPL W/O P-5'-P-CCNC: 16 U/L (ref 10–44)
ANION GAP SERPL CALC-SCNC: 7 MMOL/L (ref 8–16)
AST SERPL-CCNC: 24 U/L (ref 10–40)
BASOPHILS # BLD AUTO: 0.02 K/UL (ref 0–0.2)
BASOPHILS # BLD AUTO: 0.04 K/UL (ref 0–0.2)
BASOPHILS NFR BLD: 0.2 % (ref 0–1.9)
BASOPHILS NFR BLD: 0.4 % (ref 0–1.9)
BILIRUB SERPL-MCNC: 0.3 MG/DL (ref 0.1–1)
BLD GP AB SCN CELLS X3 SERPL QL: NORMAL
BLD PROD TYP BPU: NORMAL
BLD PROD TYP BPU: NORMAL
BLOOD UNIT EXPIRATION DATE: NORMAL
BLOOD UNIT EXPIRATION DATE: NORMAL
BLOOD UNIT TYPE CODE: 5100
BLOOD UNIT TYPE CODE: 5100
BLOOD UNIT TYPE: NORMAL
BLOOD UNIT TYPE: NORMAL
BUN SERPL-MCNC: 40 MG/DL (ref 6–20)
CALCIUM SERPL-MCNC: 8.7 MG/DL (ref 8.7–10.5)
CHLORIDE SERPL-SCNC: 107 MMOL/L (ref 95–110)
CO2 SERPL-SCNC: 26 MMOL/L (ref 23–29)
CODING SYSTEM: NORMAL
CODING SYSTEM: NORMAL
CREAT SERPL-MCNC: 1.6 MG/DL (ref 0.5–1.4)
DIFFERENTIAL METHOD: ABNORMAL
DIFFERENTIAL METHOD: ABNORMAL
DISPENSE STATUS: NORMAL
DISPENSE STATUS: NORMAL
EOSINOPHIL # BLD AUTO: 0.3 K/UL (ref 0–0.5)
EOSINOPHIL # BLD AUTO: 0.5 K/UL (ref 0–0.5)
EOSINOPHIL NFR BLD: 2.8 % (ref 0–8)
EOSINOPHIL NFR BLD: 4.7 % (ref 0–8)
ERYTHROCYTE [DISTWIDTH] IN BLOOD BY AUTOMATED COUNT: 17.2 % (ref 11.5–14.5)
ERYTHROCYTE [DISTWIDTH] IN BLOOD BY AUTOMATED COUNT: 17.2 % (ref 11.5–14.5)
ERYTHROCYTE [DISTWIDTH] IN BLOOD BY AUTOMATED COUNT: 17.3 % (ref 11.5–14.5)
EST. GFR  (AFRICAN AMERICAN): >60 ML/MIN/1.73 M^2
EST. GFR  (NON AFRICAN AMERICAN): 53 ML/MIN/1.73 M^2
GLUCOSE SERPL-MCNC: 101 MG/DL (ref 70–110)
HCT VFR BLD AUTO: 21.5 % (ref 40–54)
HCT VFR BLD AUTO: 25.2 % (ref 40–54)
HCT VFR BLD AUTO: 28.1 % (ref 40–54)
HGB BLD-MCNC: 6.6 G/DL (ref 14–18)
HGB BLD-MCNC: 7.8 G/DL (ref 14–18)
HGB BLD-MCNC: 8.8 G/DL (ref 14–18)
IMM GRANULOCYTES # BLD AUTO: 0.05 K/UL (ref 0–0.04)
IMM GRANULOCYTES # BLD AUTO: 0.05 K/UL (ref 0–0.04)
IMM GRANULOCYTES NFR BLD AUTO: 0.4 % (ref 0–0.5)
IMM GRANULOCYTES NFR BLD AUTO: 0.5 % (ref 0–0.5)
INR PPP: 1 (ref 0.8–1.2)
LYMPHOCYTES # BLD AUTO: 1.1 K/UL (ref 1–4.8)
LYMPHOCYTES # BLD AUTO: 1.2 K/UL (ref 1–4.8)
LYMPHOCYTES NFR BLD: 12 % (ref 18–48)
LYMPHOCYTES NFR BLD: 9.2 % (ref 18–48)
MAGNESIUM SERPL-MCNC: 2.2 MG/DL (ref 1.6–2.6)
MCH RBC QN AUTO: 28.9 PG (ref 27–31)
MCH RBC QN AUTO: 29 PG (ref 27–31)
MCH RBC QN AUTO: 29 PG (ref 27–31)
MCHC RBC AUTO-ENTMCNC: 30.7 G/DL (ref 32–36)
MCHC RBC AUTO-ENTMCNC: 31 G/DL (ref 32–36)
MCHC RBC AUTO-ENTMCNC: 31.3 G/DL (ref 32–36)
MCV RBC AUTO: 93 FL (ref 82–98)
MCV RBC AUTO: 94 FL (ref 82–98)
MCV RBC AUTO: 94 FL (ref 82–98)
MONOCYTES # BLD AUTO: 1.3 K/UL (ref 0.3–1)
MONOCYTES # BLD AUTO: 1.4 K/UL (ref 0.3–1)
MONOCYTES NFR BLD: 10.8 % (ref 4–15)
MONOCYTES NFR BLD: 13.8 % (ref 4–15)
NEUTROPHILS # BLD AUTO: 6.9 K/UL (ref 1.8–7.7)
NEUTROPHILS # BLD AUTO: 9 K/UL (ref 1.8–7.7)
NEUTROPHILS NFR BLD: 68.6 % (ref 38–73)
NEUTROPHILS NFR BLD: 76.6 % (ref 38–73)
NRBC BLD-RTO: 0 /100 WBC
NRBC BLD-RTO: 0 /100 WBC
NUM UNITS TRANS PACKED RBC: NORMAL
NUM UNITS TRANS PACKED RBC: NORMAL
PHOSPHATE SERPL-MCNC: 3.8 MG/DL (ref 2.7–4.5)
PLATELET # BLD AUTO: 137 K/UL (ref 150–450)
PLATELET # BLD AUTO: 143 K/UL (ref 150–450)
PLATELET # BLD AUTO: 143 K/UL (ref 150–450)
PMV BLD AUTO: 11.1 FL (ref 9.2–12.9)
PMV BLD AUTO: 11.2 FL (ref 9.2–12.9)
PMV BLD AUTO: 11.2 FL (ref 9.2–12.9)
POTASSIUM SERPL-SCNC: 5 MMOL/L (ref 3.5–5.1)
PROT SERPL-MCNC: 6 G/DL (ref 6–8.4)
PROTHROMBIN TIME: 10.6 SEC (ref 9–12.5)
RBC # BLD AUTO: 2.28 M/UL (ref 4.6–6.2)
RBC # BLD AUTO: 2.69 M/UL (ref 4.6–6.2)
RBC # BLD AUTO: 3.03 M/UL (ref 4.6–6.2)
SODIUM SERPL-SCNC: 140 MMOL/L (ref 136–145)
WBC # BLD AUTO: 10.02 K/UL (ref 3.9–12.7)
WBC # BLD AUTO: 10.98 K/UL (ref 3.9–12.7)
WBC # BLD AUTO: 11.8 K/UL (ref 3.9–12.7)

## 2021-06-02 PROCEDURE — A4216 STERILE WATER/SALINE, 10 ML: HCPCS | Performed by: HOSPITALIST

## 2021-06-02 PROCEDURE — 99223 PR INITIAL HOSPITAL CARE,LEVL III: ICD-10-PCS | Mod: ,,, | Performed by: INTERNAL MEDICINE

## 2021-06-02 PROCEDURE — 27000221 HC OXYGEN, UP TO 24 HOURS

## 2021-06-02 PROCEDURE — 80053 COMPREHEN METABOLIC PANEL: CPT | Performed by: HOSPITALIST

## 2021-06-02 PROCEDURE — 85025 COMPLETE CBC W/AUTO DIFF WBC: CPT | Performed by: HOSPITALIST

## 2021-06-02 PROCEDURE — 36410 VNPNXR 3YR/> PHY/QHP DX/THER: CPT

## 2021-06-02 PROCEDURE — 25000003 PHARM REV CODE 250: Performed by: INTERNAL MEDICINE

## 2021-06-02 PROCEDURE — 86920 COMPATIBILITY TEST SPIN: CPT | Performed by: INTERNAL MEDICINE

## 2021-06-02 PROCEDURE — 36415 COLL VENOUS BLD VENIPUNCTURE: CPT | Performed by: HOSPITALIST

## 2021-06-02 PROCEDURE — 63600175 PHARM REV CODE 636 W HCPCS: Performed by: STUDENT IN AN ORGANIZED HEALTH CARE EDUCATION/TRAINING PROGRAM

## 2021-06-02 PROCEDURE — 94761 N-INVAS EAR/PLS OXIMETRY MLT: CPT

## 2021-06-02 PROCEDURE — 99231 PR SUBSEQUENT HOSPITAL CARE,LEVL I: ICD-10-PCS | Mod: S$GLB,,, | Performed by: INTERNAL MEDICINE

## 2021-06-02 PROCEDURE — 25000003 PHARM REV CODE 250: Performed by: STUDENT IN AN ORGANIZED HEALTH CARE EDUCATION/TRAINING PROGRAM

## 2021-06-02 PROCEDURE — 86920 COMPATIBILITY TEST SPIN: CPT | Performed by: NURSE PRACTITIONER

## 2021-06-02 PROCEDURE — 83735 ASSAY OF MAGNESIUM: CPT | Performed by: HOSPITALIST

## 2021-06-02 PROCEDURE — C1751 CATH, INF, PER/CENT/MIDLINE: HCPCS

## 2021-06-02 PROCEDURE — 85025 COMPLETE CBC W/AUTO DIFF WBC: CPT | Mod: 91 | Performed by: STUDENT IN AN ORGANIZED HEALTH CARE EDUCATION/TRAINING PROGRAM

## 2021-06-02 PROCEDURE — 36415 COLL VENOUS BLD VENIPUNCTURE: CPT | Performed by: INTERNAL MEDICINE

## 2021-06-02 PROCEDURE — C9113 INJ PANTOPRAZOLE SODIUM, VIA: HCPCS | Performed by: STUDENT IN AN ORGANIZED HEALTH CARE EDUCATION/TRAINING PROGRAM

## 2021-06-02 PROCEDURE — 86920 COMPATIBILITY TEST SPIN: CPT | Performed by: STUDENT IN AN ORGANIZED HEALTH CARE EDUCATION/TRAINING PROGRAM

## 2021-06-02 PROCEDURE — 99231 SBSQ HOSP IP/OBS SF/LOW 25: CPT | Mod: S$GLB,,, | Performed by: INTERNAL MEDICINE

## 2021-06-02 PROCEDURE — 84100 ASSAY OF PHOSPHORUS: CPT | Performed by: HOSPITALIST

## 2021-06-02 PROCEDURE — 25000003 PHARM REV CODE 250: Performed by: NURSE PRACTITIONER

## 2021-06-02 PROCEDURE — 86900 BLOOD TYPING SEROLOGIC ABO: CPT | Performed by: STUDENT IN AN ORGANIZED HEALTH CARE EDUCATION/TRAINING PROGRAM

## 2021-06-02 PROCEDURE — 20000000 HC ICU ROOM

## 2021-06-02 PROCEDURE — 85610 PROTHROMBIN TIME: CPT | Performed by: INTERNAL MEDICINE

## 2021-06-02 PROCEDURE — 99223 1ST HOSP IP/OBS HIGH 75: CPT | Mod: ,,, | Performed by: INTERNAL MEDICINE

## 2021-06-02 PROCEDURE — P9016 RBC LEUKOCYTES REDUCED: HCPCS | Performed by: STUDENT IN AN ORGANIZED HEALTH CARE EDUCATION/TRAINING PROGRAM

## 2021-06-02 PROCEDURE — 25000003 PHARM REV CODE 250

## 2021-06-02 PROCEDURE — A4216 STERILE WATER/SALINE, 10 ML: HCPCS | Performed by: STUDENT IN AN ORGANIZED HEALTH CARE EDUCATION/TRAINING PROGRAM

## 2021-06-02 PROCEDURE — 76937 US GUIDE VASCULAR ACCESS: CPT

## 2021-06-02 PROCEDURE — 25000003 PHARM REV CODE 250: Performed by: HOSPITALIST

## 2021-06-02 PROCEDURE — 85027 COMPLETE CBC AUTOMATED: CPT | Performed by: INTERNAL MEDICINE

## 2021-06-02 RX ORDER — KETOCONAZOLE 20 MG/ML
SHAMPOO, SUSPENSION TOPICAL DAILY
Status: ON HOLD
Start: 2021-06-02 | End: 2023-01-06 | Stop reason: HOSPADM

## 2021-06-02 RX ORDER — AMANTADINE HYDROCHLORIDE 100 MG/1
100 CAPSULE, GELATIN COATED ORAL DAILY
Qty: 30 CAPSULE | Refills: 11
Start: 2021-06-02 | End: 2024-02-29

## 2021-06-02 RX ORDER — HYDROCODONE BITARTRATE AND ACETAMINOPHEN 500; 5 MG/1; MG/1
TABLET ORAL
Status: DISCONTINUED | OUTPATIENT
Start: 2021-06-02 | End: 2021-06-04 | Stop reason: HOSPADM

## 2021-06-02 RX ORDER — FLUOXETINE HYDROCHLORIDE 20 MG/1
20 CAPSULE ORAL DAILY
Qty: 30 CAPSULE | Refills: 11
Start: 2021-06-02 | End: 2023-11-26

## 2021-06-02 RX ORDER — SODIUM CHLORIDE 0.9 % (FLUSH) 0.9 %
10 SYRINGE (ML) INJECTION EVERY 6 HOURS
Status: DISCONTINUED | OUTPATIENT
Start: 2021-06-03 | End: 2021-06-04 | Stop reason: HOSPADM

## 2021-06-02 RX ORDER — LIDOCAINE HYDROCHLORIDE 10 MG/ML
INJECTION, SOLUTION EPIDURAL; INFILTRATION; INTRACAUDAL; PERINEURAL
Status: COMPLETED
Start: 2021-06-02 | End: 2021-06-02

## 2021-06-02 RX ORDER — SODIUM CHLORIDE 0.9 % (FLUSH) 0.9 %
10 SYRINGE (ML) INJECTION
Status: DISCONTINUED | OUTPATIENT
Start: 2021-06-02 | End: 2021-06-04 | Stop reason: HOSPADM

## 2021-06-02 RX ORDER — NAPROXEN SODIUM 220 MG/1
81 TABLET, FILM COATED ORAL DAILY
Refills: 0 | Status: ON HOLD
Start: 2021-06-02 | End: 2021-07-14 | Stop reason: HOSPADM

## 2021-06-02 RX ORDER — CLONIDINE HYDROCHLORIDE 0.1 MG/1
0.1 TABLET ORAL EVERY 4 HOURS PRN
Status: ON HOLD
Start: 2021-06-02 | End: 2021-07-14 | Stop reason: HOSPADM

## 2021-06-02 RX ORDER — SODIUM CHLORIDE, SODIUM LACTATE, POTASSIUM CHLORIDE, CALCIUM CHLORIDE 600; 310; 30; 20 MG/100ML; MG/100ML; MG/100ML; MG/100ML
INJECTION, SOLUTION INTRAVENOUS CONTINUOUS
Status: DISCONTINUED | OUTPATIENT
Start: 2021-06-02 | End: 2021-06-03

## 2021-06-02 RX ORDER — MODAFINIL 200 MG/1
200 TABLET ORAL DAILY
Qty: 30 TABLET | Refills: 0
Start: 2021-06-02 | End: 2021-07-02

## 2021-06-02 RX ORDER — ATORVASTATIN CALCIUM 40 MG/1
40 TABLET, FILM COATED ORAL DAILY
Qty: 90 TABLET | Refills: 3 | Status: SHIPPED | OUTPATIENT
Start: 2021-06-02 | End: 2024-02-29

## 2021-06-02 RX ORDER — PANTOPRAZOLE SODIUM 40 MG/10ML
40 INJECTION, POWDER, LYOPHILIZED, FOR SOLUTION INTRAVENOUS EVERY 12 HOURS
Status: DISCONTINUED | OUTPATIENT
Start: 2021-06-02 | End: 2021-06-04 | Stop reason: HOSPADM

## 2021-06-02 RX ORDER — POLYETHYLENE GLYCOL 3350, SODIUM SULFATE ANHYDROUS, SODIUM BICARBONATE, SODIUM CHLORIDE, POTASSIUM CHLORIDE 236; 22.74; 6.74; 5.86; 2.97 G/4L; G/4L; G/4L; G/4L; G/4L
4000 POWDER, FOR SOLUTION ORAL ONCE
Status: COMPLETED | OUTPATIENT
Start: 2021-06-02 | End: 2021-06-02

## 2021-06-02 RX ORDER — VALPROIC ACID 250 MG/5ML
1000 SOLUTION ORAL EVERY 12 HOURS
Qty: 1200 ML | Refills: 11
Start: 2021-06-02 | End: 2022-06-02

## 2021-06-02 RX ORDER — LORAZEPAM 0.5 MG/1
0.5 TABLET ORAL EVERY 6 HOURS PRN
Start: 2021-06-02 | End: 2021-07-02

## 2021-06-02 RX ADMIN — VALPROIC ACID 1000 MG: 250 SOLUTION ORAL at 10:06

## 2021-06-02 RX ADMIN — HYDROCORTISONE: 1 CREAM TOPICAL at 10:06

## 2021-06-02 RX ADMIN — KETOCONAZOLE: 20 SHAMPOO, SUSPENSION TOPICAL at 10:06

## 2021-06-02 RX ADMIN — VALPROIC ACID 1000 MG: 250 SOLUTION ORAL at 08:06

## 2021-06-02 RX ADMIN — SODIUM CHLORIDE, SODIUM LACTATE, POTASSIUM CHLORIDE, AND CALCIUM CHLORIDE: .6; .31; .03; .02 INJECTION, SOLUTION INTRAVENOUS at 05:06

## 2021-06-02 RX ADMIN — AMANTADINE HYDROCHLORIDE 100 MG: 100 CAPSULE, LIQUID FILLED ORAL at 10:06

## 2021-06-02 RX ADMIN — HYDRALAZINE HYDROCHLORIDE 75 MG: 25 TABLET, FILM COATED ORAL at 05:06

## 2021-06-02 RX ADMIN — SODIUM CHLORIDE, SODIUM LACTATE, POTASSIUM CHLORIDE, AND CALCIUM CHLORIDE: .6; .31; .03; .02 INJECTION, SOLUTION INTRAVENOUS at 02:06

## 2021-06-02 RX ADMIN — PANTOPRAZOLE SODIUM 40 MG: 40 INJECTION, POWDER, FOR SOLUTION INTRAVENOUS at 11:06

## 2021-06-02 RX ADMIN — MUPIROCIN: 20 OINTMENT TOPICAL at 08:06

## 2021-06-02 RX ADMIN — RISPERIDONE 3 MG: 1 TABLET ORAL at 10:06

## 2021-06-02 RX ADMIN — FLUOXETINE HYDROCHLORIDE 20 MG: 20 CAPSULE ORAL at 10:06

## 2021-06-02 RX ADMIN — PANTOPRAZOLE SODIUM 40 MG: 40 INJECTION, POWDER, FOR SOLUTION INTRAVENOUS at 08:06

## 2021-06-02 RX ADMIN — RISPERIDONE 3 MG: 1 TABLET ORAL at 08:06

## 2021-06-02 RX ADMIN — MODAFINIL 200 MG: 200 TABLET ORAL at 10:06

## 2021-06-02 RX ADMIN — CETIRIZINE HYDROCHLORIDE 10 MG: 10 TABLET, FILM COATED ORAL at 10:06

## 2021-06-02 RX ADMIN — POLYETHYLENE GLYCOL 3350, SODIUM SULFATE ANHYDROUS, SODIUM BICARBONATE, SODIUM CHLORIDE, POTASSIUM CHLORIDE 4000 ML: 236; 22.74; 6.74; 5.86; 2.97 POWDER, FOR SOLUTION ORAL at 07:06

## 2021-06-02 RX ADMIN — Medication 3 ML: at 08:06

## 2021-06-02 RX ADMIN — LIDOCAINE HYDROCHLORIDE 2 MG: 10 INJECTION, SOLUTION EPIDURAL; INFILTRATION; INTRACAUDAL; PERINEURAL at 06:06

## 2021-06-02 RX ADMIN — MUPIROCIN: 20 OINTMENT TOPICAL at 10:06

## 2021-06-02 RX ADMIN — ATORVASTATIN CALCIUM 40 MG: 40 TABLET, FILM COATED ORAL at 10:06

## 2021-06-02 RX ADMIN — Medication: at 10:06

## 2021-06-02 RX ADMIN — SODIUM CHLORIDE, SODIUM LACTATE, POTASSIUM CHLORIDE, AND CALCIUM CHLORIDE 1000 ML: .6; .31; .03; .02 INJECTION, SOLUTION INTRAVENOUS at 10:06

## 2021-06-02 RX ADMIN — LORAZEPAM 0.5 MG: 0.5 TABLET ORAL at 04:06

## 2021-06-02 RX ADMIN — Medication 3 ML: at 04:06

## 2021-06-02 RX ADMIN — AMLODIPINE BESYLATE 10 MG: 5 TABLET ORAL at 10:06

## 2021-06-02 RX ADMIN — VANCOMYCIN HYDROCHLORIDE 1000 MG: 1 INJECTION, POWDER, LYOPHILIZED, FOR SOLUTION INTRAVENOUS at 08:06

## 2021-06-03 ENCOUNTER — ANESTHESIA EVENT (OUTPATIENT)
Dept: ENDOSCOPY | Facility: HOSPITAL | Age: 41
DRG: 871 | End: 2021-06-03
Payer: MEDICAID

## 2021-06-03 ENCOUNTER — ANESTHESIA (OUTPATIENT)
Dept: ENDOSCOPY | Facility: HOSPITAL | Age: 41
DRG: 871 | End: 2021-06-03
Payer: MEDICAID

## 2021-06-03 LAB
ALBUMIN SERPL BCP-MCNC: 2.3 G/DL (ref 3.5–5.2)
ALP SERPL-CCNC: 52 U/L (ref 55–135)
ALT SERPL W/O P-5'-P-CCNC: 21 U/L (ref 10–44)
ANION GAP SERPL CALC-SCNC: 12 MMOL/L (ref 8–16)
AORTIC ROOT ANNULUS: 3.89 CM
AORTIC VALVE CUSP SEPERATION: 2.69 CM
AST SERPL-CCNC: 33 U/L (ref 10–40)
AV INDEX (PROSTH): 0.67
AV MEAN GRADIENT: 9 MMHG
AV PEAK GRADIENT: 14 MMHG
AV VALVE AREA: 3.57 CM2
AV VELOCITY RATIO: 0.73
BACTERIA BLD CULT: ABNORMAL
BASOPHILS # BLD AUTO: 0.02 K/UL (ref 0–0.2)
BASOPHILS # BLD AUTO: 0.02 K/UL (ref 0–0.2)
BASOPHILS # BLD AUTO: 0.03 K/UL (ref 0–0.2)
BASOPHILS NFR BLD: 0.2 % (ref 0–1.9)
BILIRUB SERPL-MCNC: 0.6 MG/DL (ref 0.1–1)
BLD PROD TYP BPU: NORMAL
BLD PROD TYP BPU: NORMAL
BLOOD UNIT EXPIRATION DATE: NORMAL
BLOOD UNIT EXPIRATION DATE: NORMAL
BLOOD UNIT TYPE CODE: 5100
BLOOD UNIT TYPE CODE: 5100
BLOOD UNIT TYPE: NORMAL
BLOOD UNIT TYPE: NORMAL
BSA FOR ECHO PROCEDURE: 2.14 M2
BUN SERPL-MCNC: 37 MG/DL (ref 6–20)
CALCIUM SERPL-MCNC: 8.2 MG/DL (ref 8.7–10.5)
CHLORIDE SERPL-SCNC: 112 MMOL/L (ref 95–110)
CO2 SERPL-SCNC: 25 MMOL/L (ref 23–29)
CODING SYSTEM: NORMAL
CODING SYSTEM: NORMAL
CREAT SERPL-MCNC: 1.6 MG/DL (ref 0.5–1.4)
CV ECHO LV RWT: 0.78 CM
DIFFERENTIAL METHOD: ABNORMAL
DISPENSE STATUS: NORMAL
DISPENSE STATUS: NORMAL
DOP CALC AO PEAK VEL: 1.84 M/S
DOP CALC AO VTI: 34.58 CM
DOP CALC LVOT AREA: 5.3 CM2
DOP CALC LVOT DIAMETER: 2.61 CM
DOP CALC LVOT PEAK VEL: 1.34 M/S
DOP CALC LVOT STROKE VOLUME: 123.47 CM3
DOP CALCLVOT PEAK VEL VTI: 23.09 CM
E WAVE DECELERATION TIME: 213.94 MSEC
E/A RATIO: 0.93
E/E' RATIO: 11.18 M/S
ECHO LV POSTERIOR WALL: 1.58 CM (ref 0.6–1.1)
EJECTION FRACTION: 60 %
EOSINOPHIL # BLD AUTO: 0.2 K/UL (ref 0–0.5)
EOSINOPHIL # BLD AUTO: 0.2 K/UL (ref 0–0.5)
EOSINOPHIL # BLD AUTO: 0.3 K/UL (ref 0–0.5)
EOSINOPHIL NFR BLD: 1.4 % (ref 0–8)
EOSINOPHIL NFR BLD: 1.5 % (ref 0–8)
EOSINOPHIL NFR BLD: 2.5 % (ref 0–8)
ERYTHROCYTE [DISTWIDTH] IN BLOOD BY AUTOMATED COUNT: 15.8 % (ref 11.5–14.5)
ERYTHROCYTE [DISTWIDTH] IN BLOOD BY AUTOMATED COUNT: 16.1 % (ref 11.5–14.5)
ERYTHROCYTE [DISTWIDTH] IN BLOOD BY AUTOMATED COUNT: 16.3 % (ref 11.5–14.5)
EST. GFR  (AFRICAN AMERICAN): >60 ML/MIN/1.73 M^2
EST. GFR  (NON AFRICAN AMERICAN): 53 ML/MIN/1.73 M^2
FRACTIONAL SHORTENING: 27 % (ref 28–44)
GLUCOSE SERPL-MCNC: 106 MG/DL (ref 70–110)
HCT VFR BLD AUTO: 23.1 % (ref 40–54)
HCT VFR BLD AUTO: 24 % (ref 40–54)
HCT VFR BLD AUTO: 25.4 % (ref 40–54)
HGB BLD-MCNC: 7.4 G/DL (ref 14–18)
HGB BLD-MCNC: 7.5 G/DL (ref 14–18)
HGB BLD-MCNC: 8.3 G/DL (ref 14–18)
IMM GRANULOCYTES # BLD AUTO: 0.05 K/UL (ref 0–0.04)
IMM GRANULOCYTES # BLD AUTO: 0.07 K/UL (ref 0–0.04)
IMM GRANULOCYTES # BLD AUTO: 0.09 K/UL (ref 0–0.04)
IMM GRANULOCYTES NFR BLD AUTO: 0.5 % (ref 0–0.5)
IMM GRANULOCYTES NFR BLD AUTO: 0.6 % (ref 0–0.5)
IMM GRANULOCYTES NFR BLD AUTO: 0.7 % (ref 0–0.5)
INTERVENTRICULAR SEPTUM: 2.03 CM (ref 0.6–1.1)
IVRT: 99.9 MSEC
LA MAJOR: 4.37 CM
LA MINOR: 5 CM
LA WIDTH: 3.79 CM
LEFT ATRIUM SIZE: 3.37 CM
LEFT ATRIUM VOLUME INDEX MOD: 22.7 ML/M2
LEFT ATRIUM VOLUME INDEX: 24.1 ML/M2
LEFT ATRIUM VOLUME MOD: 47.68 CM3
LEFT ATRIUM VOLUME: 50.63 CM3
LEFT INTERNAL DIMENSION IN SYSTOLE: 2.94 CM (ref 2.1–4)
LEFT VENTRICLE DIASTOLIC VOLUME INDEX: 33.93 ML/M2
LEFT VENTRICLE DIASTOLIC VOLUME: 71.26 ML
LEFT VENTRICLE MASS INDEX: 151 G/M2
LEFT VENTRICLE SYSTOLIC VOLUME INDEX: 15.8 ML/M2
LEFT VENTRICLE SYSTOLIC VOLUME: 33.27 ML
LEFT VENTRICULAR INTERNAL DIMENSION IN DIASTOLE: 4.03 CM (ref 3.5–6)
LEFT VENTRICULAR MASS: 317.17 G
LV LATERAL E/E' RATIO: 9.5 M/S
LV SEPTAL E/E' RATIO: 13.57 M/S
LYMPHOCYTES # BLD AUTO: 1.1 K/UL (ref 1–4.8)
LYMPHOCYTES NFR BLD: 10.4 % (ref 18–48)
LYMPHOCYTES NFR BLD: 8.1 % (ref 18–48)
LYMPHOCYTES NFR BLD: 9.9 % (ref 18–48)
MAGNESIUM SERPL-MCNC: 1.9 MG/DL (ref 1.6–2.6)
MCH RBC QN AUTO: 28.5 PG (ref 27–31)
MCH RBC QN AUTO: 29.5 PG (ref 27–31)
MCH RBC QN AUTO: 29.9 PG (ref 27–31)
MCHC RBC AUTO-ENTMCNC: 30.8 G/DL (ref 32–36)
MCHC RBC AUTO-ENTMCNC: 32.5 G/DL (ref 32–36)
MCHC RBC AUTO-ENTMCNC: 32.7 G/DL (ref 32–36)
MCV RBC AUTO: 91 FL (ref 82–98)
MCV RBC AUTO: 91 FL (ref 82–98)
MCV RBC AUTO: 92 FL (ref 82–98)
MONOCYTES # BLD AUTO: 1.1 K/UL (ref 0.3–1)
MONOCYTES # BLD AUTO: 1.2 K/UL (ref 0.3–1)
MONOCYTES # BLD AUTO: 1.4 K/UL (ref 0.3–1)
MONOCYTES NFR BLD: 10.2 % (ref 4–15)
MONOCYTES NFR BLD: 10.7 % (ref 4–15)
MONOCYTES NFR BLD: 10.7 % (ref 4–15)
MV A" WAVE DURATION": 11.04 MSEC
MV MEAN GRADIENT: 0 MMHG
MV PEAK A VEL: 1.02 M/S
MV PEAK E VEL: 0.95 M/S
MV PEAK GRADIENT: 3 MMHG
MV STENOSIS PRESSURE HALF TIME: 62.04 MS
MV VALVE AREA P 1/2 METHOD: 3.55 CM2
NEUTROPHILS # BLD AUTO: 10.4 K/UL (ref 1.8–7.7)
NEUTROPHILS # BLD AUTO: 8.2 K/UL (ref 1.8–7.7)
NEUTROPHILS # BLD AUTO: 8.9 K/UL (ref 1.8–7.7)
NEUTROPHILS NFR BLD: 76.2 % (ref 38–73)
NEUTROPHILS NFR BLD: 77.1 % (ref 38–73)
NEUTROPHILS NFR BLD: 78.9 % (ref 38–73)
NRBC BLD-RTO: 0 /100 WBC
NUM UNITS TRANS PACKED RBC: NORMAL
NUM UNITS TRANS PACKED RBC: NORMAL
PHOSPHATE SERPL-MCNC: 3.4 MG/DL (ref 2.7–4.5)
PISA MRMAX VEL: 0.05 M/S
PISA TR MAX VEL: 2.77 M/S
PLATELET # BLD AUTO: 114 K/UL (ref 150–450)
PLATELET # BLD AUTO: 120 K/UL (ref 150–450)
PLATELET # BLD AUTO: 152 K/UL (ref 150–450)
PMV BLD AUTO: 10.7 FL (ref 9.2–12.9)
PMV BLD AUTO: 10.8 FL (ref 9.2–12.9)
PMV BLD AUTO: 11.4 FL (ref 9.2–12.9)
POTASSIUM SERPL-SCNC: 4.6 MMOL/L (ref 3.5–5.1)
PROT SERPL-MCNC: 5.2 G/DL (ref 6–8.4)
PV PEAK VELOCITY: 1.47 CM/S
RA MAJOR: 4.2 CM
RA PRESSURE: 3 MMHG
RA WIDTH: 2.95 CM
RBC # BLD AUTO: 2.54 M/UL (ref 4.6–6.2)
RBC # BLD AUTO: 2.6 M/UL (ref 4.6–6.2)
RBC # BLD AUTO: 2.78 M/UL (ref 4.6–6.2)
RIGHT VENTRICULAR END-DIASTOLIC DIMENSION: 2.9 CM
RV TISSUE DOPPLER FREE WALL SYSTOLIC VELOCITY 1 (APICAL 4 CHAMBER VIEW): 22.64 CM/S
SARS-COV-2 RDRP RESP QL NAA+PROBE: NEGATIVE
SODIUM SERPL-SCNC: 149 MMOL/L (ref 136–145)
TDI LATERAL: 0.1 M/S
TDI SEPTAL: 0.07 M/S
TDI: 0.09 M/S
TR MAX PG: 31 MMHG
TRICUSPID ANNULAR PLANE SYSTOLIC EXCURSION: 2.79 CM
TV REST PULMONARY ARTERY PRESSURE: 34 MMHG
VANCOMYCIN TROUGH SERPL-MCNC: 15.1 UG/ML (ref 10–22)
WBC # BLD AUTO: 10.69 K/UL (ref 3.9–12.7)
WBC # BLD AUTO: 11.56 K/UL (ref 3.9–12.7)
WBC # BLD AUTO: 13.16 K/UL (ref 3.9–12.7)

## 2021-06-03 PROCEDURE — 84100 ASSAY OF PHOSPHORUS: CPT | Performed by: STUDENT IN AN ORGANIZED HEALTH CARE EDUCATION/TRAINING PROGRAM

## 2021-06-03 PROCEDURE — 80053 COMPREHEN METABOLIC PANEL: CPT | Performed by: STUDENT IN AN ORGANIZED HEALTH CARE EDUCATION/TRAINING PROGRAM

## 2021-06-03 PROCEDURE — P9016 RBC LEUKOCYTES REDUCED: HCPCS | Performed by: INTERNAL MEDICINE

## 2021-06-03 PROCEDURE — 27000221 HC OXYGEN, UP TO 24 HOURS

## 2021-06-03 PROCEDURE — 85025 COMPLETE CBC W/AUTO DIFF WBC: CPT | Mod: 91 | Performed by: STUDENT IN AN ORGANIZED HEALTH CARE EDUCATION/TRAINING PROGRAM

## 2021-06-03 PROCEDURE — 45378 DIAGNOSTIC COLONOSCOPY: CPT | Mod: ,,, | Performed by: INTERNAL MEDICINE

## 2021-06-03 PROCEDURE — 37000008 HC ANESTHESIA 1ST 15 MINUTES: Performed by: INTERNAL MEDICINE

## 2021-06-03 PROCEDURE — 37000009 HC ANESTHESIA EA ADD 15 MINS: Performed by: INTERNAL MEDICINE

## 2021-06-03 PROCEDURE — U0002 COVID-19 LAB TEST NON-CDC: HCPCS | Performed by: INTERNAL MEDICINE

## 2021-06-03 PROCEDURE — 94761 N-INVAS EAR/PLS OXIMETRY MLT: CPT

## 2021-06-03 PROCEDURE — 85025 COMPLETE CBC W/AUTO DIFF WBC: CPT | Performed by: NURSE PRACTITIONER

## 2021-06-03 PROCEDURE — C1751 CATH, INF, PER/CENT/MIDLINE: HCPCS

## 2021-06-03 PROCEDURE — A4216 STERILE WATER/SALINE, 10 ML: HCPCS | Performed by: STUDENT IN AN ORGANIZED HEALTH CARE EDUCATION/TRAINING PROGRAM

## 2021-06-03 PROCEDURE — 80202 ASSAY OF VANCOMYCIN: CPT | Performed by: STUDENT IN AN ORGANIZED HEALTH CARE EDUCATION/TRAINING PROGRAM

## 2021-06-03 PROCEDURE — 25000003 PHARM REV CODE 250: Performed by: STUDENT IN AN ORGANIZED HEALTH CARE EDUCATION/TRAINING PROGRAM

## 2021-06-03 PROCEDURE — D9220A PRA ANESTHESIA: ICD-10-PCS | Mod: CRNA,,, | Performed by: NURSE ANESTHETIST, CERTIFIED REGISTERED

## 2021-06-03 PROCEDURE — 83735 ASSAY OF MAGNESIUM: CPT | Performed by: STUDENT IN AN ORGANIZED HEALTH CARE EDUCATION/TRAINING PROGRAM

## 2021-06-03 PROCEDURE — P9016 RBC LEUKOCYTES REDUCED: HCPCS | Performed by: NURSE PRACTITIONER

## 2021-06-03 PROCEDURE — 63600175 PHARM REV CODE 636 W HCPCS: Performed by: STUDENT IN AN ORGANIZED HEALTH CARE EDUCATION/TRAINING PROGRAM

## 2021-06-03 PROCEDURE — D9220A PRA ANESTHESIA: ICD-10-PCS | Mod: ANES,,, | Performed by: ANESTHESIOLOGY

## 2021-06-03 PROCEDURE — 20000000 HC ICU ROOM

## 2021-06-03 PROCEDURE — 63600175 PHARM REV CODE 636 W HCPCS: Performed by: NURSE ANESTHETIST, CERTIFIED REGISTERED

## 2021-06-03 PROCEDURE — D9220A PRA ANESTHESIA: Mod: CRNA,,, | Performed by: NURSE ANESTHETIST, CERTIFIED REGISTERED

## 2021-06-03 PROCEDURE — 36430 TRANSFUSION BLD/BLD COMPNT: CPT

## 2021-06-03 PROCEDURE — D9220A PRA ANESTHESIA: Mod: ANES,,, | Performed by: ANESTHESIOLOGY

## 2021-06-03 PROCEDURE — C9113 INJ PANTOPRAZOLE SODIUM, VIA: HCPCS | Performed by: STUDENT IN AN ORGANIZED HEALTH CARE EDUCATION/TRAINING PROGRAM

## 2021-06-03 PROCEDURE — 25000003 PHARM REV CODE 250: Performed by: INTERNAL MEDICINE

## 2021-06-03 PROCEDURE — 36415 COLL VENOUS BLD VENIPUNCTURE: CPT | Performed by: STUDENT IN AN ORGANIZED HEALTH CARE EDUCATION/TRAINING PROGRAM

## 2021-06-03 PROCEDURE — 36415 COLL VENOUS BLD VENIPUNCTURE: CPT | Performed by: NURSE PRACTITIONER

## 2021-06-03 PROCEDURE — 45378 DIAGNOSTIC COLONOSCOPY: CPT | Performed by: INTERNAL MEDICINE

## 2021-06-03 PROCEDURE — 45378 PR COLONOSCOPY,DIAGNOSTIC: ICD-10-PCS | Mod: ,,, | Performed by: INTERNAL MEDICINE

## 2021-06-03 PROCEDURE — 25000003 PHARM REV CODE 250: Performed by: NURSE ANESTHETIST, CERTIFIED REGISTERED

## 2021-06-03 RX ORDER — LIDOCAINE HYDROCHLORIDE 20 MG/ML
INJECTION INTRAVENOUS
Status: DISCONTINUED | OUTPATIENT
Start: 2021-06-03 | End: 2021-06-03

## 2021-06-03 RX ORDER — HYDROCODONE BITARTRATE AND ACETAMINOPHEN 500; 5 MG/1; MG/1
TABLET ORAL
Status: DISCONTINUED | OUTPATIENT
Start: 2021-06-03 | End: 2021-06-04 | Stop reason: HOSPADM

## 2021-06-03 RX ORDER — DEXTROMETHORPHAN/PSEUDOEPHED 2.5-7.5/.8
DROPS ORAL
Status: COMPLETED | OUTPATIENT
Start: 2021-06-03 | End: 2021-06-03

## 2021-06-03 RX ORDER — PROPOFOL 10 MG/ML
VIAL (ML) INTRAVENOUS
Status: DISCONTINUED | OUTPATIENT
Start: 2021-06-03 | End: 2021-06-03

## 2021-06-03 RX ORDER — EPINEPHRINE 0.1 MG/ML
INJECTION INTRAVENOUS
Status: DISCONTINUED
Start: 2021-06-03 | End: 2021-06-04 | Stop reason: HOSPADM

## 2021-06-03 RX ORDER — DEXTROSE MONOHYDRATE 50 MG/ML
INJECTION, SOLUTION INTRAVENOUS CONTINUOUS
Status: DISCONTINUED | OUTPATIENT
Start: 2021-06-03 | End: 2021-06-03

## 2021-06-03 RX ADMIN — MUPIROCIN: 20 OINTMENT TOPICAL at 08:06

## 2021-06-03 RX ADMIN — MUPIROCIN: 20 OINTMENT TOPICAL at 09:06

## 2021-06-03 RX ADMIN — VALPROIC ACID 1000 MG: 250 SOLUTION ORAL at 09:06

## 2021-06-03 RX ADMIN — Medication 3 ML: at 08:06

## 2021-06-03 RX ADMIN — Medication 10 ML: at 01:06

## 2021-06-03 RX ADMIN — LIDOCAINE HYDROCHLORIDE 100 MG: 20 INJECTION, SOLUTION INTRAVENOUS at 01:06

## 2021-06-03 RX ADMIN — KETOCONAZOLE: 20 SHAMPOO, SUSPENSION TOPICAL at 09:06

## 2021-06-03 RX ADMIN — DEXTROSE: 5 SOLUTION INTRAVENOUS at 07:06

## 2021-06-03 RX ADMIN — Medication: at 09:06

## 2021-06-03 RX ADMIN — PROPOFOL 20 MG: 10 INJECTION, EMULSION INTRAVENOUS at 01:06

## 2021-06-03 RX ADMIN — Medication 3 ML: at 04:06

## 2021-06-03 RX ADMIN — HYDROCORTISONE: 1 CREAM TOPICAL at 09:06

## 2021-06-03 RX ADMIN — VALPROIC ACID 1000 MG: 250 SOLUTION ORAL at 08:06

## 2021-06-03 RX ADMIN — SIMETHICONE 20 MG: 20 SUSPENSION/ DROPS ORAL at 01:06

## 2021-06-03 RX ADMIN — PANTOPRAZOLE SODIUM 40 MG: 40 INJECTION, POWDER, FOR SOLUTION INTRAVENOUS at 09:06

## 2021-06-03 RX ADMIN — SODIUM CHLORIDE, SODIUM LACTATE, POTASSIUM CHLORIDE, AND CALCIUM CHLORIDE: .6; .31; .03; .02 INJECTION, SOLUTION INTRAVENOUS at 04:06

## 2021-06-03 RX ADMIN — Medication 10 ML: at 04:06

## 2021-06-03 RX ADMIN — PROPOFOL 100 MG: 10 INJECTION, EMULSION INTRAVENOUS at 01:06

## 2021-06-03 RX ADMIN — RISPERIDONE 3 MG: 1 TABLET ORAL at 08:06

## 2021-06-03 RX ADMIN — Medication 10 ML: at 07:06

## 2021-06-03 RX ADMIN — PANTOPRAZOLE SODIUM 40 MG: 40 INJECTION, POWDER, FOR SOLUTION INTRAVENOUS at 08:06

## 2021-06-04 VITALS
RESPIRATION RATE: 24 BRPM | OXYGEN SATURATION: 97 % | TEMPERATURE: 99 F | BODY MASS INDEX: 31.34 KG/M2 | DIASTOLIC BLOOD PRESSURE: 89 MMHG | SYSTOLIC BLOOD PRESSURE: 188 MMHG | WEIGHT: 211.63 LBS | HEIGHT: 69 IN | HEART RATE: 89 BPM

## 2021-06-04 PROBLEM — K92.2 GI BLEED: Status: RESOLVED | Noted: 2021-06-02 | Resolved: 2021-06-04

## 2021-06-04 PROBLEM — N17.9 AKI (ACUTE KIDNEY INJURY): Status: RESOLVED | Noted: 2021-04-06 | Resolved: 2021-06-04

## 2021-06-04 PROBLEM — E87.0 HYPERNATREMIA: Status: ACTIVE | Noted: 2021-06-04

## 2021-06-04 PROBLEM — D62 ACUTE BLOOD LOSS ANEMIA: Status: RESOLVED | Noted: 2021-06-02 | Resolved: 2021-06-04

## 2021-06-04 LAB
ALBUMIN SERPL BCP-MCNC: 2.4 G/DL (ref 3.5–5.2)
ALP SERPL-CCNC: 60 U/L (ref 55–135)
ALT SERPL W/O P-5'-P-CCNC: 25 U/L (ref 10–44)
ANION GAP SERPL CALC-SCNC: 8 MMOL/L (ref 8–16)
AST SERPL-CCNC: 34 U/L (ref 10–40)
BACTERIA BLD CULT: NORMAL
BACTERIA BLD CULT: NORMAL
BASOPHILS # BLD AUTO: 0.04 K/UL (ref 0–0.2)
BASOPHILS # BLD AUTO: 0.04 K/UL (ref 0–0.2)
BASOPHILS NFR BLD: 0.4 % (ref 0–1.9)
BASOPHILS NFR BLD: 0.4 % (ref 0–1.9)
BILIRUB SERPL-MCNC: 0.5 MG/DL (ref 0.1–1)
BUN SERPL-MCNC: 30 MG/DL (ref 6–20)
CALCIUM SERPL-MCNC: 8.7 MG/DL (ref 8.7–10.5)
CHLORIDE SERPL-SCNC: 113 MMOL/L (ref 95–110)
CO2 SERPL-SCNC: 27 MMOL/L (ref 23–29)
CREAT SERPL-MCNC: 1.5 MG/DL (ref 0.5–1.4)
DIFFERENTIAL METHOD: ABNORMAL
DIFFERENTIAL METHOD: ABNORMAL
EOSINOPHIL # BLD AUTO: 0.2 K/UL (ref 0–0.5)
EOSINOPHIL # BLD AUTO: 0.3 K/UL (ref 0–0.5)
EOSINOPHIL NFR BLD: 2.2 % (ref 0–8)
EOSINOPHIL NFR BLD: 2.6 % (ref 0–8)
ERYTHROCYTE [DISTWIDTH] IN BLOOD BY AUTOMATED COUNT: 15.9 % (ref 11.5–14.5)
ERYTHROCYTE [DISTWIDTH] IN BLOOD BY AUTOMATED COUNT: 15.9 % (ref 11.5–14.5)
EST. GFR  (AFRICAN AMERICAN): >60 ML/MIN/1.73 M^2
EST. GFR  (NON AFRICAN AMERICAN): 58 ML/MIN/1.73 M^2
GLUCOSE SERPL-MCNC: 94 MG/DL (ref 70–110)
HCT VFR BLD AUTO: 27 % (ref 40–54)
HCT VFR BLD AUTO: 27.6 % (ref 40–54)
HGB BLD-MCNC: 8.8 G/DL (ref 14–18)
HGB BLD-MCNC: 9.1 G/DL (ref 14–18)
IMM GRANULOCYTES # BLD AUTO: 0.05 K/UL (ref 0–0.04)
IMM GRANULOCYTES # BLD AUTO: 0.06 K/UL (ref 0–0.04)
IMM GRANULOCYTES NFR BLD AUTO: 0.5 % (ref 0–0.5)
IMM GRANULOCYTES NFR BLD AUTO: 0.5 % (ref 0–0.5)
LYMPHOCYTES # BLD AUTO: 1.2 K/UL (ref 1–4.8)
LYMPHOCYTES # BLD AUTO: 1.4 K/UL (ref 1–4.8)
LYMPHOCYTES NFR BLD: 10.9 % (ref 18–48)
LYMPHOCYTES NFR BLD: 13.2 % (ref 18–48)
MAGNESIUM SERPL-MCNC: 1.8 MG/DL (ref 1.6–2.6)
MCH RBC QN AUTO: 29.8 PG (ref 27–31)
MCH RBC QN AUTO: 29.9 PG (ref 27–31)
MCHC RBC AUTO-ENTMCNC: 32.6 G/DL (ref 32–36)
MCHC RBC AUTO-ENTMCNC: 33 G/DL (ref 32–36)
MCV RBC AUTO: 91 FL (ref 82–98)
MCV RBC AUTO: 92 FL (ref 82–98)
MONOCYTES # BLD AUTO: 1.1 K/UL (ref 0.3–1)
MONOCYTES # BLD AUTO: 1.1 K/UL (ref 0.3–1)
MONOCYTES NFR BLD: 10.8 % (ref 4–15)
MONOCYTES NFR BLD: 9.6 % (ref 4–15)
NEUTROPHILS # BLD AUTO: 7.5 K/UL (ref 1.8–7.7)
NEUTROPHILS # BLD AUTO: 8.5 K/UL (ref 1.8–7.7)
NEUTROPHILS NFR BLD: 72.5 % (ref 38–73)
NEUTROPHILS NFR BLD: 76.4 % (ref 38–73)
NRBC BLD-RTO: 0 /100 WBC
NRBC BLD-RTO: 0 /100 WBC
PHOSPHATE SERPL-MCNC: 2.6 MG/DL (ref 2.7–4.5)
PLATELET # BLD AUTO: 139 K/UL (ref 150–450)
PLATELET # BLD AUTO: 148 K/UL (ref 150–450)
PMV BLD AUTO: 10.9 FL (ref 9.2–12.9)
PMV BLD AUTO: 11 FL (ref 9.2–12.9)
POTASSIUM SERPL-SCNC: 4.5 MMOL/L (ref 3.5–5.1)
PROT SERPL-MCNC: 5.4 G/DL (ref 6–8.4)
RBC # BLD AUTO: 2.95 M/UL (ref 4.6–6.2)
RBC # BLD AUTO: 3.04 M/UL (ref 4.6–6.2)
SODIUM SERPL-SCNC: 148 MMOL/L (ref 136–145)
WBC # BLD AUTO: 10.29 K/UL (ref 3.9–12.7)
WBC # BLD AUTO: 11.08 K/UL (ref 3.9–12.7)

## 2021-06-04 PROCEDURE — 63600175 PHARM REV CODE 636 W HCPCS: Performed by: STUDENT IN AN ORGANIZED HEALTH CARE EDUCATION/TRAINING PROGRAM

## 2021-06-04 PROCEDURE — 80053 COMPREHEN METABOLIC PANEL: CPT | Performed by: STUDENT IN AN ORGANIZED HEALTH CARE EDUCATION/TRAINING PROGRAM

## 2021-06-04 PROCEDURE — 25000003 PHARM REV CODE 250: Performed by: STUDENT IN AN ORGANIZED HEALTH CARE EDUCATION/TRAINING PROGRAM

## 2021-06-04 PROCEDURE — 85025 COMPLETE CBC W/AUTO DIFF WBC: CPT | Performed by: NURSE PRACTITIONER

## 2021-06-04 PROCEDURE — 83735 ASSAY OF MAGNESIUM: CPT | Performed by: STUDENT IN AN ORGANIZED HEALTH CARE EDUCATION/TRAINING PROGRAM

## 2021-06-04 PROCEDURE — C9113 INJ PANTOPRAZOLE SODIUM, VIA: HCPCS | Performed by: STUDENT IN AN ORGANIZED HEALTH CARE EDUCATION/TRAINING PROGRAM

## 2021-06-04 PROCEDURE — 36415 COLL VENOUS BLD VENIPUNCTURE: CPT | Performed by: NURSE PRACTITIONER

## 2021-06-04 PROCEDURE — A4216 STERILE WATER/SALINE, 10 ML: HCPCS | Performed by: STUDENT IN AN ORGANIZED HEALTH CARE EDUCATION/TRAINING PROGRAM

## 2021-06-04 PROCEDURE — 84100 ASSAY OF PHOSPHORUS: CPT | Performed by: STUDENT IN AN ORGANIZED HEALTH CARE EDUCATION/TRAINING PROGRAM

## 2021-06-04 PROCEDURE — 94761 N-INVAS EAR/PLS OXIMETRY MLT: CPT

## 2021-06-04 PROCEDURE — 36415 COLL VENOUS BLD VENIPUNCTURE: CPT | Performed by: STUDENT IN AN ORGANIZED HEALTH CARE EDUCATION/TRAINING PROGRAM

## 2021-06-04 RX ORDER — HYDRALAZINE HYDROCHLORIDE 25 MG/1
50 TABLET, FILM COATED ORAL EVERY 8 HOURS
Status: DISCONTINUED | OUTPATIENT
Start: 2021-06-04 | End: 2021-06-04 | Stop reason: HOSPADM

## 2021-06-04 RX ORDER — AMLODIPINE BESYLATE 5 MG/1
10 TABLET ORAL DAILY
Status: DISCONTINUED | OUTPATIENT
Start: 2021-06-04 | End: 2021-06-04 | Stop reason: HOSPADM

## 2021-06-04 RX ADMIN — Medication: at 12:06

## 2021-06-04 RX ADMIN — HYDROCORTISONE: 1 CREAM TOPICAL at 12:06

## 2021-06-04 RX ADMIN — AMLODIPINE BESYLATE 10 MG: 5 TABLET ORAL at 08:06

## 2021-06-04 RX ADMIN — Medication 3 ML: at 06:06

## 2021-06-04 RX ADMIN — POTASSIUM & SODIUM PHOSPHATES POWDER PACK 280-160-250 MG 2 PACKET: 280-160-250 PACK at 05:06

## 2021-06-04 RX ADMIN — ATORVASTATIN CALCIUM 40 MG: 40 TABLET, FILM COATED ORAL at 08:06

## 2021-06-04 RX ADMIN — HYDRALAZINE HYDROCHLORIDE 50 MG: 25 TABLET, FILM COATED ORAL at 08:06

## 2021-06-04 RX ADMIN — KETOCONAZOLE: 20 SHAMPOO, SUSPENSION TOPICAL at 12:06

## 2021-06-04 RX ADMIN — Medication 10 ML: at 06:06

## 2021-06-04 RX ADMIN — HYDRALAZINE HYDROCHLORIDE 50 MG: 25 TABLET, FILM COATED ORAL at 02:06

## 2021-06-04 RX ADMIN — FLUOXETINE HYDROCHLORIDE 20 MG: 20 CAPSULE ORAL at 08:06

## 2021-06-04 RX ADMIN — Medication 10 ML: at 11:06

## 2021-06-04 RX ADMIN — RISPERIDONE 3 MG: 1 TABLET ORAL at 08:06

## 2021-06-04 RX ADMIN — MODAFINIL 200 MG: 200 TABLET ORAL at 11:06

## 2021-06-04 RX ADMIN — MAGNESIUM OXIDE TAB 400 MG (241.3 MG ELEMENTAL MG) 800 MG: 400 (241.3 MG) TAB at 05:06

## 2021-06-04 RX ADMIN — POTASSIUM & SODIUM PHOSPHATES POWDER PACK 280-160-250 MG 2 PACKET: 280-160-250 PACK at 11:06

## 2021-06-04 RX ADMIN — MUPIROCIN: 20 OINTMENT TOPICAL at 08:06

## 2021-06-04 RX ADMIN — AMANTADINE HYDROCHLORIDE 100 MG: 100 CAPSULE, LIQUID FILLED ORAL at 08:06

## 2021-06-04 RX ADMIN — VALPROIC ACID 1000 MG: 250 SOLUTION ORAL at 08:06

## 2021-06-04 RX ADMIN — MAGNESIUM OXIDE TAB 400 MG (241.3 MG ELEMENTAL MG) 800 MG: 400 (241.3 MG) TAB at 11:06

## 2021-06-04 RX ADMIN — PANTOPRAZOLE SODIUM 40 MG: 40 INJECTION, POWDER, FOR SOLUTION INTRAVENOUS at 08:06

## 2021-06-04 RX ADMIN — CETIRIZINE HYDROCHLORIDE 10 MG: 10 TABLET, FILM COATED ORAL at 08:06

## 2021-07-07 ENCOUNTER — LAB VISIT (OUTPATIENT)
Dept: LAB | Facility: HOSPITAL | Age: 41
End: 2021-07-07
Attending: SPECIALIST
Payer: MEDICAID

## 2021-07-07 DIAGNOSIS — R40.3 PERSISTENT VEGETATIVE STATE: Primary | ICD-10-CM

## 2021-07-07 DIAGNOSIS — I69.321 DYSPHASIA FOLLOWING CEREBRAL INFARCTION: ICD-10-CM

## 2021-07-07 DIAGNOSIS — I69.354 HEMIPLGA FOLLOWING CEREBRAL INFRC AFFECTING LEFT NONDOM SIDE: ICD-10-CM

## 2021-07-07 LAB — OB PNL STL: POSITIVE

## 2021-07-07 PROCEDURE — 82272 OCCULT BLD FECES 1-3 TESTS: CPT | Performed by: SPECIALIST

## 2021-07-10 ENCOUNTER — ANESTHESIA EVENT (OUTPATIENT)
Dept: ENDOSCOPY | Facility: HOSPITAL | Age: 41
DRG: 377 | End: 2021-07-10
Payer: MEDICAID

## 2021-07-10 ENCOUNTER — OUTSIDE PLACE OF SERVICE (OUTPATIENT)
Dept: INFECTIOUS DISEASES | Facility: CLINIC | Age: 41
End: 2021-07-10
Payer: MEDICAID

## 2021-07-10 ENCOUNTER — ANESTHESIA (OUTPATIENT)
Dept: ENDOSCOPY | Facility: HOSPITAL | Age: 41
DRG: 377 | End: 2021-07-10
Payer: MEDICAID

## 2021-07-10 ENCOUNTER — HOSPITAL ENCOUNTER (INPATIENT)
Facility: HOSPITAL | Age: 41
LOS: 4 days | DRG: 377 | End: 2021-07-14
Attending: EMERGENCY MEDICINE | Admitting: INTERNAL MEDICINE
Payer: MEDICAID

## 2021-07-10 DIAGNOSIS — R00.0 TACHYCARDIA: ICD-10-CM

## 2021-07-10 DIAGNOSIS — K92.2 GI BLEED: ICD-10-CM

## 2021-07-10 DIAGNOSIS — K92.1 HEMATOCHEZIA: Primary | ICD-10-CM

## 2021-07-10 DIAGNOSIS — E87.0 HYPERNATREMIA: ICD-10-CM

## 2021-07-10 DIAGNOSIS — K92.2 LOWER GI BLEED: ICD-10-CM

## 2021-07-10 PROBLEM — Z71.89 GOALS OF CARE, COUNSELING/DISCUSSION: Status: ACTIVE | Noted: 2021-07-10

## 2021-07-10 LAB
ABO + RH BLD: NORMAL
ALBUMIN SERPL BCP-MCNC: 2.2 G/DL (ref 3.5–5.2)
ALP SERPL-CCNC: 115 U/L (ref 55–135)
ALT SERPL W/O P-5'-P-CCNC: 39 U/L (ref 10–44)
ANION GAP SERPL CALC-SCNC: 10 MMOL/L (ref 8–16)
ANION GAP SERPL CALC-SCNC: 10 MMOL/L (ref 8–16)
ANION GAP SERPL CALC-SCNC: 13 MMOL/L (ref 8–16)
ANION GAP SERPL CALC-SCNC: 13 MMOL/L (ref 8–16)
ANION GAP SERPL CALC-SCNC: 8 MMOL/L (ref 8–16)
AST SERPL-CCNC: 38 U/L (ref 10–40)
BACTERIA #/AREA URNS HPF: ABNORMAL /HPF
BASOPHILS # BLD AUTO: 0.03 K/UL (ref 0–0.2)
BASOPHILS # BLD AUTO: 0.04 K/UL (ref 0–0.2)
BASOPHILS # BLD AUTO: 0.05 K/UL (ref 0–0.2)
BASOPHILS NFR BLD: 0.2 % (ref 0–1.9)
BASOPHILS NFR BLD: 0.3 % (ref 0–1.9)
BASOPHILS NFR BLD: 0.3 % (ref 0–1.9)
BILIRUB SERPL-MCNC: 0.5 MG/DL (ref 0.1–1)
BILIRUB UR QL STRIP: NEGATIVE
BLD GP AB SCN CELLS X3 SERPL QL: NORMAL
BNP SERPL-MCNC: <10 PG/ML (ref 0–99)
BUN SERPL-MCNC: 112 MG/DL (ref 6–20)
BUN SERPL-MCNC: 113 MG/DL (ref 6–20)
BUN SERPL-MCNC: 116 MG/DL (ref 6–20)
BUN SERPL-MCNC: 118 MG/DL (ref 6–20)
BUN SERPL-MCNC: 124 MG/DL (ref 6–20)
CALCIUM SERPL-MCNC: 8.3 MG/DL (ref 8.7–10.5)
CALCIUM SERPL-MCNC: 8.3 MG/DL (ref 8.7–10.5)
CALCIUM SERPL-MCNC: 8.4 MG/DL (ref 8.7–10.5)
CALCIUM SERPL-MCNC: 8.4 MG/DL (ref 8.7–10.5)
CALCIUM SERPL-MCNC: 9.5 MG/DL (ref 8.7–10.5)
CHLORIDE SERPL-SCNC: 127 MMOL/L (ref 95–110)
CHLORIDE SERPL-SCNC: 127 MMOL/L (ref 95–110)
CHLORIDE SERPL-SCNC: 128 MMOL/L (ref 95–110)
CLARITY UR: CLEAR
CO2 SERPL-SCNC: 23 MMOL/L (ref 23–29)
CO2 SERPL-SCNC: 24 MMOL/L (ref 23–29)
CO2 SERPL-SCNC: 25 MMOL/L (ref 23–29)
CO2 SERPL-SCNC: 26 MMOL/L (ref 23–29)
CO2 SERPL-SCNC: 26 MMOL/L (ref 23–29)
COLOR UR: YELLOW
CREAT SERPL-MCNC: 4.3 MG/DL (ref 0.5–1.4)
CREAT SERPL-MCNC: 4.4 MG/DL (ref 0.5–1.4)
CREAT SERPL-MCNC: 4.5 MG/DL (ref 0.5–1.4)
CREAT SERPL-MCNC: 4.5 MG/DL (ref 0.5–1.4)
CREAT SERPL-MCNC: 4.8 MG/DL (ref 0.5–1.4)
CREAT UR-MCNC: 52.4 MG/DL (ref 23–375)
DIFFERENTIAL METHOD: ABNORMAL
EOSINOPHIL # BLD AUTO: 0.2 K/UL (ref 0–0.5)
EOSINOPHIL # BLD AUTO: 0.2 K/UL (ref 0–0.5)
EOSINOPHIL # BLD AUTO: 0.7 K/UL (ref 0–0.5)
EOSINOPHIL NFR BLD: 1.4 % (ref 0–8)
EOSINOPHIL NFR BLD: 1.6 % (ref 0–8)
EOSINOPHIL NFR BLD: 4.2 % (ref 0–8)
ERYTHROCYTE [DISTWIDTH] IN BLOOD BY AUTOMATED COUNT: 16.7 % (ref 11.5–14.5)
ERYTHROCYTE [DISTWIDTH] IN BLOOD BY AUTOMATED COUNT: 17.3 % (ref 11.5–14.5)
ERYTHROCYTE [DISTWIDTH] IN BLOOD BY AUTOMATED COUNT: 17.7 % (ref 11.5–14.5)
EST. GFR  (AFRICAN AMERICAN): 16 ML/MIN/1.73 M^2
EST. GFR  (AFRICAN AMERICAN): 18 ML/MIN/1.73 M^2
EST. GFR  (AFRICAN AMERICAN): 19 ML/MIN/1.73 M^2
EST. GFR  (NON AFRICAN AMERICAN): 14 ML/MIN/1.73 M^2
EST. GFR  (NON AFRICAN AMERICAN): 15 ML/MIN/1.73 M^2
EST. GFR  (NON AFRICAN AMERICAN): 15 ML/MIN/1.73 M^2
EST. GFR  (NON AFRICAN AMERICAN): 16 ML/MIN/1.73 M^2
EST. GFR  (NON AFRICAN AMERICAN): 16 ML/MIN/1.73 M^2
GLUCOSE SERPL-MCNC: 117 MG/DL (ref 70–110)
GLUCOSE SERPL-MCNC: 119 MG/DL (ref 70–110)
GLUCOSE SERPL-MCNC: 158 MG/DL (ref 70–110)
GLUCOSE SERPL-MCNC: 181 MG/DL (ref 70–110)
GLUCOSE SERPL-MCNC: 255 MG/DL (ref 70–110)
GLUCOSE UR QL STRIP: NEGATIVE
HCT VFR BLD AUTO: 26.3 % (ref 40–54)
HCT VFR BLD AUTO: 26.7 % (ref 40–54)
HCT VFR BLD AUTO: 31.3 % (ref 40–54)
HGB BLD-MCNC: 7.8 G/DL (ref 14–18)
HGB BLD-MCNC: 8 G/DL (ref 14–18)
HGB BLD-MCNC: 8.9 G/DL (ref 14–18)
HGB UR QL STRIP: ABNORMAL
HYALINE CASTS #/AREA URNS LPF: 0 /LPF
IMM GRANULOCYTES # BLD AUTO: 0.16 K/UL (ref 0–0.04)
IMM GRANULOCYTES # BLD AUTO: 0.17 K/UL (ref 0–0.04)
IMM GRANULOCYTES # BLD AUTO: 0.18 K/UL (ref 0–0.04)
IMM GRANULOCYTES NFR BLD AUTO: 1.1 % (ref 0–0.5)
INR PPP: 1.1 (ref 0.8–1.2)
KETONES UR QL STRIP: NEGATIVE
LACTATE SERPL-SCNC: 0.9 MMOL/L (ref 0.5–2.2)
LACTATE SERPL-SCNC: 1.3 MMOL/L (ref 0.5–2.2)
LEUKOCYTE ESTERASE UR QL STRIP: ABNORMAL
LYMPHOCYTES # BLD AUTO: 1.3 K/UL (ref 1–4.8)
LYMPHOCYTES # BLD AUTO: 1.8 K/UL (ref 1–4.8)
LYMPHOCYTES # BLD AUTO: 2.1 K/UL (ref 1–4.8)
LYMPHOCYTES NFR BLD: 12.7 % (ref 18–48)
LYMPHOCYTES NFR BLD: 13.3 % (ref 18–48)
LYMPHOCYTES NFR BLD: 7.9 % (ref 18–48)
MAGNESIUM SERPL-MCNC: 2.4 MG/DL (ref 1.6–2.6)
MCH RBC QN AUTO: 29.3 PG (ref 27–31)
MCH RBC QN AUTO: 29.8 PG (ref 27–31)
MCH RBC QN AUTO: 30.2 PG (ref 27–31)
MCHC RBC AUTO-ENTMCNC: 28.4 G/DL (ref 32–36)
MCHC RBC AUTO-ENTMCNC: 29.7 G/DL (ref 32–36)
MCHC RBC AUTO-ENTMCNC: 30 G/DL (ref 32–36)
MCV RBC AUTO: 100 FL (ref 82–98)
MCV RBC AUTO: 101 FL (ref 82–98)
MCV RBC AUTO: 103 FL (ref 82–98)
MICROSCOPIC COMMENT: ABNORMAL
MONOCYTES # BLD AUTO: 1.2 K/UL (ref 0.3–1)
MONOCYTES # BLD AUTO: 1.3 K/UL (ref 0.3–1)
MONOCYTES # BLD AUTO: 1.6 K/UL (ref 0.3–1)
MONOCYTES NFR BLD: 10 % (ref 4–15)
MONOCYTES NFR BLD: 7.4 % (ref 4–15)
MONOCYTES NFR BLD: 8.7 % (ref 4–15)
NEUTROPHILS # BLD AUTO: 10.9 K/UL (ref 1.8–7.7)
NEUTROPHILS # BLD AUTO: 11.2 K/UL (ref 1.8–7.7)
NEUTROPHILS # BLD AUTO: 12.9 K/UL (ref 1.8–7.7)
NEUTROPHILS NFR BLD: 71.1 % (ref 38–73)
NEUTROPHILS NFR BLD: 75.7 % (ref 38–73)
NEUTROPHILS NFR BLD: 81.9 % (ref 38–73)
NITRITE UR QL STRIP: NEGATIVE
NRBC BLD-RTO: 0 /100 WBC
OSMOLALITY SERPL: 377 MOSM/KG (ref 280–300)
PH UR STRIP: 7 [PH] (ref 5–8)
PLATELET # BLD AUTO: 183 K/UL (ref 150–450)
PLATELET # BLD AUTO: 195 K/UL (ref 150–450)
PLATELET # BLD AUTO: 239 K/UL (ref 150–450)
PMV BLD AUTO: 10.6 FL (ref 9.2–12.9)
PMV BLD AUTO: 11 FL (ref 9.2–12.9)
PMV BLD AUTO: 11.1 FL (ref 9.2–12.9)
POCT GLUCOSE: 175 MG/DL (ref 70–110)
POTASSIUM SERPL-SCNC: 5.1 MMOL/L (ref 3.5–5.1)
POTASSIUM SERPL-SCNC: 5.2 MMOL/L (ref 3.5–5.1)
POTASSIUM SERPL-SCNC: 5.2 MMOL/L (ref 3.5–5.1)
POTASSIUM SERPL-SCNC: 5.7 MMOL/L (ref 3.5–5.1)
POTASSIUM SERPL-SCNC: 5.7 MMOL/L (ref 3.5–5.1)
PROCALCITONIN SERPL IA-MCNC: 0.37 NG/ML
PROT SERPL-MCNC: 7.1 G/DL (ref 6–8.4)
PROT UR QL STRIP: ABNORMAL
PROTHROMBIN TIME: 11.9 SEC (ref 9–12.5)
RBC # BLD AUTO: 2.62 M/UL (ref 4.6–6.2)
RBC # BLD AUTO: 2.65 M/UL (ref 4.6–6.2)
RBC # BLD AUTO: 3.04 M/UL (ref 4.6–6.2)
RBC #/AREA URNS HPF: 8 /HPF (ref 0–4)
SARS-COV-2 RDRP RESP QL NAA+PROBE: NEGATIVE
SODIUM SERPL-SCNC: 161 MMOL/L (ref 136–145)
SODIUM SERPL-SCNC: 162 MMOL/L (ref 136–145)
SODIUM SERPL-SCNC: 163 MMOL/L (ref 136–145)
SODIUM SERPL-SCNC: 163 MMOL/L (ref 136–145)
SODIUM SERPL-SCNC: 167 MMOL/L (ref 136–145)
SODIUM UR-SCNC: 36 MMOL/L (ref 20–250)
SP GR UR STRIP: 1.01 (ref 1–1.03)
URN SPEC COLLECT METH UR: ABNORMAL
UROBILINOGEN UR STRIP-ACNC: 1 EU/DL
WBC # BLD AUTO: 14.43 K/UL (ref 3.9–12.7)
WBC # BLD AUTO: 15.74 K/UL (ref 3.9–12.7)
WBC # BLD AUTO: 15.76 K/UL (ref 3.9–12.7)
WBC #/AREA URNS HPF: 4 /HPF (ref 0–5)

## 2021-07-10 PROCEDURE — C9113 INJ PANTOPRAZOLE SODIUM, VIA: HCPCS | Performed by: INTERNAL MEDICINE

## 2021-07-10 PROCEDURE — 63600175 PHARM REV CODE 636 W HCPCS: Performed by: INTERNAL MEDICINE

## 2021-07-10 PROCEDURE — 20000000 HC ICU ROOM

## 2021-07-10 PROCEDURE — 88341 IMHCHEM/IMCYTCHM EA ADD ANTB: CPT | Mod: 26,,, | Performed by: PATHOLOGY

## 2021-07-10 PROCEDURE — 88305 TISSUE EXAM BY PATHOLOGIST: CPT | Performed by: PATHOLOGY

## 2021-07-10 PROCEDURE — 36430 TRANSFUSION BLD/BLD COMPNT: CPT

## 2021-07-10 PROCEDURE — 25000003 PHARM REV CODE 250: Performed by: EMERGENCY MEDICINE

## 2021-07-10 PROCEDURE — 99292 CRITICAL CARE ADDL 30 MIN: CPT

## 2021-07-10 PROCEDURE — 83735 ASSAY OF MAGNESIUM: CPT | Performed by: INTERNAL MEDICINE

## 2021-07-10 PROCEDURE — 43239 PR EGD, FLEX, W/BIOPSY, SGL/MULTI: ICD-10-PCS | Mod: ,,, | Performed by: INTERNAL MEDICINE

## 2021-07-10 PROCEDURE — 88305 TISSUE EXAM BY PATHOLOGIST: CPT | Mod: 26,,, | Performed by: PATHOLOGY

## 2021-07-10 PROCEDURE — D9220A PRA ANESTHESIA: ICD-10-PCS | Mod: ANES,,, | Performed by: ANESTHESIOLOGY

## 2021-07-10 PROCEDURE — 43255 PR EGD, FLEX, W/CTRL BLEED, ANY METHOD: ICD-10-PCS | Mod: 59,,, | Performed by: INTERNAL MEDICINE

## 2021-07-10 PROCEDURE — 36415 COLL VENOUS BLD VENIPUNCTURE: CPT | Performed by: EMERGENCY MEDICINE

## 2021-07-10 PROCEDURE — 83930 ASSAY OF BLOOD OSMOLALITY: CPT | Performed by: INTERNAL MEDICINE

## 2021-07-10 PROCEDURE — 96375 TX/PRO/DX INJ NEW DRUG ADDON: CPT

## 2021-07-10 PROCEDURE — D9220A PRA ANESTHESIA: ICD-10-PCS | Mod: CRNA,,, | Performed by: NURSE ANESTHETIST, CERTIFIED REGISTERED

## 2021-07-10 PROCEDURE — 88342 IMHCHEM/IMCYTCHM 1ST ANTB: CPT | Mod: 26,,, | Performed by: PATHOLOGY

## 2021-07-10 PROCEDURE — 82570 ASSAY OF URINE CREATININE: CPT | Performed by: INTERNAL MEDICINE

## 2021-07-10 PROCEDURE — 25000003 PHARM REV CODE 250: Performed by: NURSE ANESTHETIST, CERTIFIED REGISTERED

## 2021-07-10 PROCEDURE — 83605 ASSAY OF LACTIC ACID: CPT | Performed by: EMERGENCY MEDICINE

## 2021-07-10 PROCEDURE — 94761 N-INVAS EAR/PLS OXIMETRY MLT: CPT

## 2021-07-10 PROCEDURE — 43239 EGD BIOPSY SINGLE/MULTIPLE: CPT | Performed by: INTERNAL MEDICINE

## 2021-07-10 PROCEDURE — 88341 IMHCHEM/IMCYTCHM EA ADD ANTB: CPT | Performed by: PATHOLOGY

## 2021-07-10 PROCEDURE — 43255 EGD CONTROL BLEEDING ANY: CPT | Mod: 59,,, | Performed by: INTERNAL MEDICINE

## 2021-07-10 PROCEDURE — 85610 PROTHROMBIN TIME: CPT | Performed by: EMERGENCY MEDICINE

## 2021-07-10 PROCEDURE — 36415 COLL VENOUS BLD VENIPUNCTURE: CPT | Performed by: INTERNAL MEDICINE

## 2021-07-10 PROCEDURE — 83935 ASSAY OF URINE OSMOLALITY: CPT | Performed by: INTERNAL MEDICINE

## 2021-07-10 PROCEDURE — 99223 1ST HOSP IP/OBS HIGH 75: CPT | Mod: 25,,, | Performed by: INTERNAL MEDICINE

## 2021-07-10 PROCEDURE — 25000003 PHARM REV CODE 250: Performed by: INTERNAL MEDICINE

## 2021-07-10 PROCEDURE — 94640 AIRWAY INHALATION TREATMENT: CPT

## 2021-07-10 PROCEDURE — 93010 ELECTROCARDIOGRAM REPORT: CPT | Mod: ,,, | Performed by: INTERNAL MEDICINE

## 2021-07-10 PROCEDURE — 43255 EGD CONTROL BLEEDING ANY: CPT | Performed by: INTERNAL MEDICINE

## 2021-07-10 PROCEDURE — 99291 CRITICAL CARE FIRST HOUR: CPT | Mod: 25

## 2021-07-10 PROCEDURE — 87040 BLOOD CULTURE FOR BACTERIA: CPT | Performed by: EMERGENCY MEDICINE

## 2021-07-10 PROCEDURE — 63600175 PHARM REV CODE 636 W HCPCS: Mod: JA | Performed by: EMERGENCY MEDICINE

## 2021-07-10 PROCEDURE — U0002 COVID-19 LAB TEST NON-CDC: HCPCS | Performed by: EMERGENCY MEDICINE

## 2021-07-10 PROCEDURE — 88342 IMHCHEM/IMCYTCHM 1ST ANTB: CPT | Performed by: PATHOLOGY

## 2021-07-10 PROCEDURE — 25000242 PHARM REV CODE 250 ALT 637 W/ HCPCS: Performed by: EMERGENCY MEDICINE

## 2021-07-10 PROCEDURE — 63600175 PHARM REV CODE 636 W HCPCS: Performed by: NURSE ANESTHETIST, CERTIFIED REGISTERED

## 2021-07-10 PROCEDURE — 43239 EGD BIOPSY SINGLE/MULTIPLE: CPT | Mod: ,,, | Performed by: INTERNAL MEDICINE

## 2021-07-10 PROCEDURE — 27201012 HC FORCEPS, HOT/COLD, DISP: Performed by: INTERNAL MEDICINE

## 2021-07-10 PROCEDURE — 93010 EKG 12-LEAD: ICD-10-PCS | Mod: ,,, | Performed by: INTERNAL MEDICINE

## 2021-07-10 PROCEDURE — 99233 PR SUBSEQUENT HOSPITAL CARE,LEVL III: ICD-10-PCS | Mod: S$GLB,,, | Performed by: INTERNAL MEDICINE

## 2021-07-10 PROCEDURE — D9220A PRA ANESTHESIA: Mod: ANES,,, | Performed by: ANESTHESIOLOGY

## 2021-07-10 PROCEDURE — 88341 PR IHC OR ICC EACH ADD'L SINGLE ANTIBODY  STAINPR: ICD-10-PCS | Mod: 26,,, | Performed by: PATHOLOGY

## 2021-07-10 PROCEDURE — 27201038 HC PROBE, BI-POLAR: Performed by: INTERNAL MEDICINE

## 2021-07-10 PROCEDURE — 80053 COMPREHEN METABOLIC PANEL: CPT | Performed by: EMERGENCY MEDICINE

## 2021-07-10 PROCEDURE — 81000 URINALYSIS NONAUTO W/SCOPE: CPT | Performed by: EMERGENCY MEDICINE

## 2021-07-10 PROCEDURE — 88342 CHG IMMUNOCYTOCHEMISTRY: ICD-10-PCS | Mod: 26,,, | Performed by: PATHOLOGY

## 2021-07-10 PROCEDURE — 84300 ASSAY OF URINE SODIUM: CPT | Performed by: INTERNAL MEDICINE

## 2021-07-10 PROCEDURE — 99233 SBSQ HOSP IP/OBS HIGH 50: CPT | Mod: S$GLB,,, | Performed by: INTERNAL MEDICINE

## 2021-07-10 PROCEDURE — 93005 ELECTROCARDIOGRAM TRACING: CPT

## 2021-07-10 PROCEDURE — 86920 COMPATIBILITY TEST SPIN: CPT | Performed by: EMERGENCY MEDICINE

## 2021-07-10 PROCEDURE — C9113 INJ PANTOPRAZOLE SODIUM, VIA: HCPCS | Performed by: EMERGENCY MEDICINE

## 2021-07-10 PROCEDURE — 83880 ASSAY OF NATRIURETIC PEPTIDE: CPT | Performed by: EMERGENCY MEDICINE

## 2021-07-10 PROCEDURE — P9016 RBC LEUKOCYTES REDUCED: HCPCS | Performed by: EMERGENCY MEDICINE

## 2021-07-10 PROCEDURE — 99223 PR INITIAL HOSPITAL CARE,LEVL III: ICD-10-PCS | Mod: 25,,, | Performed by: INTERNAL MEDICINE

## 2021-07-10 PROCEDURE — 88305 TISSUE EXAM BY PATHOLOGIST: ICD-10-PCS | Mod: 26,,, | Performed by: PATHOLOGY

## 2021-07-10 PROCEDURE — 85025 COMPLETE CBC W/AUTO DIFF WBC: CPT | Mod: 91 | Performed by: INTERNAL MEDICINE

## 2021-07-10 PROCEDURE — 37000009 HC ANESTHESIA EA ADD 15 MINS: Performed by: INTERNAL MEDICINE

## 2021-07-10 PROCEDURE — 86900 BLOOD TYPING SEROLOGIC ABO: CPT | Performed by: EMERGENCY MEDICINE

## 2021-07-10 PROCEDURE — 37000008 HC ANESTHESIA 1ST 15 MINUTES: Performed by: INTERNAL MEDICINE

## 2021-07-10 PROCEDURE — 85025 COMPLETE CBC W/AUTO DIFF WBC: CPT | Performed by: EMERGENCY MEDICINE

## 2021-07-10 PROCEDURE — D9220A PRA ANESTHESIA: Mod: CRNA,,, | Performed by: NURSE ANESTHETIST, CERTIFIED REGISTERED

## 2021-07-10 PROCEDURE — 80048 BASIC METABOLIC PNL TOTAL CA: CPT | Mod: 91 | Performed by: INTERNAL MEDICINE

## 2021-07-10 PROCEDURE — 96374 THER/PROPH/DIAG INJ IV PUSH: CPT | Mod: 59

## 2021-07-10 PROCEDURE — 83605 ASSAY OF LACTIC ACID: CPT | Mod: 91 | Performed by: INTERNAL MEDICINE

## 2021-07-10 PROCEDURE — 84145 PROCALCITONIN (PCT): CPT | Performed by: INTERNAL MEDICINE

## 2021-07-10 RX ORDER — OCTREOTIDE ACETATE 100 UG/ML
50 INJECTION, SOLUTION INTRAVENOUS; SUBCUTANEOUS
Status: COMPLETED | OUTPATIENT
Start: 2021-07-10 | End: 2021-07-10

## 2021-07-10 RX ORDER — SODIUM CHLORIDE 9 MG/ML
INJECTION, SOLUTION INTRAVENOUS CONTINUOUS
Status: DISCONTINUED | OUTPATIENT
Start: 2021-07-10 | End: 2021-07-10

## 2021-07-10 RX ORDER — SODIUM CHLORIDE 450 MG/100ML
INJECTION, SOLUTION INTRAVENOUS CONTINUOUS
Status: DISCONTINUED | OUTPATIENT
Start: 2021-07-10 | End: 2021-07-11

## 2021-07-10 RX ORDER — PIPERACILLIN SODIUM, TAZOBACTAM SODIUM 3; .375 G/15ML; G/15ML
3.38 INJECTION, POWDER, LYOPHILIZED, FOR SOLUTION INTRAVENOUS EVERY 8 HOURS
Status: ON HOLD | COMMUNITY
Start: 2021-07-04 | End: 2021-07-14 | Stop reason: HOSPADM

## 2021-07-10 RX ORDER — VALPROIC ACID 250 MG/5ML
1000 SOLUTION ORAL EVERY 12 HOURS
Status: DISCONTINUED | OUTPATIENT
Start: 2021-07-10 | End: 2021-07-14 | Stop reason: HOSPADM

## 2021-07-10 RX ORDER — PROPOFOL 10 MG/ML
VIAL (ML) INTRAVENOUS
Status: DISCONTINUED | OUTPATIENT
Start: 2021-07-10 | End: 2021-07-10

## 2021-07-10 RX ORDER — MODAFINIL 200 MG/1
200 TABLET ORAL DAILY
COMMUNITY

## 2021-07-10 RX ORDER — LIDOCAINE HCL/PF 100 MG/5ML
SYRINGE (ML) INTRAVENOUS
Status: DISCONTINUED | OUTPATIENT
Start: 2021-07-10 | End: 2021-07-10

## 2021-07-10 RX ORDER — LORAZEPAM 0.5 MG/1
0.5 TABLET ORAL EVERY 6 HOURS PRN
COMMUNITY
End: 2024-02-29 | Stop reason: CLARIF

## 2021-07-10 RX ORDER — KETAMINE HYDROCHLORIDE 10 MG/ML
INJECTION, SOLUTION INTRAMUSCULAR; INTRAVENOUS
Status: DISCONTINUED | OUTPATIENT
Start: 2021-07-10 | End: 2021-07-10

## 2021-07-10 RX ORDER — CALCIUM GLUCONATE 98 MG/ML
1 INJECTION, SOLUTION INTRAVENOUS
Status: DISCONTINUED | OUTPATIENT
Start: 2021-07-10 | End: 2021-07-10

## 2021-07-10 RX ORDER — ATORVASTATIN CALCIUM 40 MG/1
40 TABLET, FILM COATED ORAL DAILY
Status: DISCONTINUED | OUTPATIENT
Start: 2021-07-11 | End: 2021-07-14 | Stop reason: HOSPADM

## 2021-07-10 RX ORDER — VANCOMYCIN HCL IN 5 % DEXTROSE 1G/250ML
1000 PLASTIC BAG, INJECTION (ML) INTRAVENOUS
Status: DISCONTINUED | OUTPATIENT
Start: 2021-07-10 | End: 2021-07-10

## 2021-07-10 RX ORDER — SODIUM CHLORIDE 9 MG/ML
INJECTION, SOLUTION INTRAVENOUS CONTINUOUS
Status: CANCELLED | OUTPATIENT
Start: 2021-07-10

## 2021-07-10 RX ORDER — HYDRALAZINE HYDROCHLORIDE 50 MG/1
50 TABLET, FILM COATED ORAL EVERY 8 HOURS
Status: ON HOLD | COMMUNITY
End: 2021-07-14 | Stop reason: HOSPADM

## 2021-07-10 RX ORDER — LORAZEPAM 0.5 MG/1
0.5 TABLET ORAL EVERY 6 HOURS PRN
Status: DISCONTINUED | OUTPATIENT
Start: 2021-07-10 | End: 2021-07-14 | Stop reason: HOSPADM

## 2021-07-10 RX ORDER — EPINEPHRINE 0.1 MG/ML
INJECTION INTRAVENOUS
Status: DISCONTINUED
Start: 2021-07-10 | End: 2021-07-10 | Stop reason: WASHOUT

## 2021-07-10 RX ORDER — SODIUM CHLORIDE 450 MG/100ML
INJECTION, SOLUTION INTRAVENOUS CONTINUOUS
Status: DISCONTINUED | OUTPATIENT
Start: 2021-07-10 | End: 2021-07-10

## 2021-07-10 RX ORDER — FAMOTIDINE 20 MG/1
20 TABLET, FILM COATED ORAL DAILY
Status: ON HOLD | COMMUNITY
End: 2021-07-14 | Stop reason: HOSPADM

## 2021-07-10 RX ORDER — AMLODIPINE BESYLATE 10 MG/1
10 TABLET ORAL DAILY
Status: ON HOLD | COMMUNITY
End: 2021-07-14 | Stop reason: HOSPADM

## 2021-07-10 RX ORDER — ALBUTEROL SULFATE 2.5 MG/.5ML
10 SOLUTION RESPIRATORY (INHALATION)
Status: COMPLETED | OUTPATIENT
Start: 2021-07-10 | End: 2021-07-10

## 2021-07-10 RX ORDER — VANCOMYCIN HCL IN 5 % DEXTROSE 1G/250ML
1000 PLASTIC BAG, INJECTION (ML) INTRAVENOUS ONCE
Status: DISCONTINUED | OUTPATIENT
Start: 2021-07-10 | End: 2021-07-10

## 2021-07-10 RX ORDER — DEXTROSE MONOHYDRATE 50 MG/ML
INJECTION, SOLUTION INTRAVENOUS CONTINUOUS
Status: DISCONTINUED | OUTPATIENT
Start: 2021-07-10 | End: 2021-07-10

## 2021-07-10 RX ORDER — ATORVASTATIN CALCIUM 40 MG/1
40 TABLET, FILM COATED ORAL DAILY
Status: DISCONTINUED | OUTPATIENT
Start: 2021-07-10 | End: 2021-07-10

## 2021-07-10 RX ORDER — PANTOPRAZOLE SODIUM 40 MG/10ML
80 INJECTION, POWDER, LYOPHILIZED, FOR SOLUTION INTRAVENOUS
Status: COMPLETED | OUTPATIENT
Start: 2021-07-10 | End: 2021-07-10

## 2021-07-10 RX ORDER — RISPERIDONE 1 MG/1
2 TABLET ORAL NIGHTLY
Status: DISCONTINUED | OUTPATIENT
Start: 2021-07-10 | End: 2021-07-10

## 2021-07-10 RX ORDER — MUPIROCIN 20 MG/G
OINTMENT TOPICAL 2 TIMES DAILY
Status: DISCONTINUED | OUTPATIENT
Start: 2021-07-10 | End: 2021-07-14 | Stop reason: HOSPADM

## 2021-07-10 RX ORDER — RISPERIDONE 2 MG/1
2 TABLET ORAL NIGHTLY
Status: ON HOLD | COMMUNITY
End: 2021-07-14 | Stop reason: HOSPADM

## 2021-07-10 RX ADMIN — DEXTROSE: 5 SOLUTION INTRAVENOUS at 01:07

## 2021-07-10 RX ADMIN — MUPIROCIN: 20 OINTMENT TOPICAL at 08:07

## 2021-07-10 RX ADMIN — PROPOFOL 10 MG: 10 INJECTION, EMULSION INTRAVENOUS at 10:07

## 2021-07-10 RX ADMIN — KETAMINE HYDROCHLORIDE 15 MG: 10 INJECTION, SOLUTION INTRAMUSCULAR; INTRAVENOUS at 10:07

## 2021-07-10 RX ADMIN — INSULIN HUMAN 10 UNITS: 100 INJECTION, SOLUTION PARENTERAL at 02:07

## 2021-07-10 RX ADMIN — SODIUM CHLORIDE: 0.45 INJECTION, SOLUTION INTRAVENOUS at 04:07

## 2021-07-10 RX ADMIN — DEXTROSE MONOHYDRATE 25 G: 500 INJECTION PARENTERAL at 02:07

## 2021-07-10 RX ADMIN — OCTREOTIDE ACETATE 50 MCG/HR: 1000 INJECTION, SOLUTION INTRAVENOUS; SUBCUTANEOUS at 08:07

## 2021-07-10 RX ADMIN — SODIUM CHLORIDE 1000 ML: 0.9 INJECTION, SOLUTION INTRAVENOUS at 07:07

## 2021-07-10 RX ADMIN — PANTOPRAZOLE SODIUM 8 MG/HR: 40 INJECTION, POWDER, FOR SOLUTION INTRAVENOUS at 09:07

## 2021-07-10 RX ADMIN — VALPROIC ACID 1000 MG: 250 SOLUTION ORAL at 08:07

## 2021-07-10 RX ADMIN — PIPERACILLIN AND TAZOBACTAM 4.5 G: 4; .5 INJECTION, POWDER, LYOPHILIZED, FOR SOLUTION INTRAVENOUS; PARENTERAL at 09:07

## 2021-07-10 RX ADMIN — OCTREOTIDE ACETATE 50 MCG: 100 INJECTION, SOLUTION INTRAVENOUS; SUBCUTANEOUS at 07:07

## 2021-07-10 RX ADMIN — SODIUM CHLORIDE: 0.45 INJECTION, SOLUTION INTRAVENOUS at 08:07

## 2021-07-10 RX ADMIN — LIDOCAINE HYDROCHLORIDE 75 MG: 20 INJECTION INTRAVENOUS at 10:07

## 2021-07-10 RX ADMIN — VANCOMYCIN HYDROCHLORIDE 1000 MG: 1 INJECTION, POWDER, LYOPHILIZED, FOR SOLUTION INTRAVENOUS at 01:07

## 2021-07-10 RX ADMIN — ALBUTEROL SULFATE 10 MG: 2.5 SOLUTION RESPIRATORY (INHALATION) at 11:07

## 2021-07-10 RX ADMIN — PANTOPRAZOLE SODIUM 80 MG: 40 INJECTION, POWDER, FOR SOLUTION INTRAVENOUS at 07:07

## 2021-07-10 RX ADMIN — SODIUM CHLORIDE: 0.9 INJECTION, SOLUTION INTRAVENOUS at 06:07

## 2021-07-11 PROBLEM — A41.9 SEPSIS, UNSPECIFIED ORGANISM: Status: RESOLVED | Noted: 2021-05-28 | Resolved: 2021-07-11

## 2021-07-11 LAB
ANION GAP SERPL CALC-SCNC: 10 MMOL/L (ref 8–16)
ANION GAP SERPL CALC-SCNC: 12 MMOL/L (ref 8–16)
ANION GAP SERPL CALC-SCNC: 6 MMOL/L (ref 8–16)
ANION GAP SERPL CALC-SCNC: 7 MMOL/L (ref 8–16)
ANION GAP SERPL CALC-SCNC: 8 MMOL/L (ref 8–16)
ANION GAP SERPL CALC-SCNC: 8 MMOL/L (ref 8–16)
ANION GAP SERPL CALC-SCNC: 9 MMOL/L (ref 8–16)
ANION GAP SERPL CALC-SCNC: 9 MMOL/L (ref 8–16)
BASOPHILS # BLD AUTO: 0.02 K/UL (ref 0–0.2)
BASOPHILS # BLD AUTO: 0.03 K/UL (ref 0–0.2)
BASOPHILS NFR BLD: 0.1 % (ref 0–1.9)
BASOPHILS NFR BLD: 0.2 % (ref 0–1.9)
BLD PROD TYP BPU: NORMAL
BLD PROD TYP BPU: NORMAL
BLOOD UNIT EXPIRATION DATE: NORMAL
BLOOD UNIT EXPIRATION DATE: NORMAL
BLOOD UNIT TYPE CODE: 5100
BLOOD UNIT TYPE CODE: 5100
BLOOD UNIT TYPE: NORMAL
BLOOD UNIT TYPE: NORMAL
BUN SERPL-MCNC: 102 MG/DL (ref 6–20)
BUN SERPL-MCNC: 106 MG/DL (ref 6–20)
BUN SERPL-MCNC: 108 MG/DL (ref 6–20)
BUN SERPL-MCNC: 112 MG/DL (ref 6–20)
BUN SERPL-MCNC: 83 MG/DL (ref 6–20)
BUN SERPL-MCNC: 87 MG/DL (ref 6–20)
BUN SERPL-MCNC: 93 MG/DL (ref 6–20)
BUN SERPL-MCNC: 96 MG/DL (ref 6–20)
CALCIUM SERPL-MCNC: 8.1 MG/DL (ref 8.7–10.5)
CALCIUM SERPL-MCNC: 8.2 MG/DL (ref 8.7–10.5)
CALCIUM SERPL-MCNC: 8.2 MG/DL (ref 8.7–10.5)
CALCIUM SERPL-MCNC: 8.3 MG/DL (ref 8.7–10.5)
CALCIUM SERPL-MCNC: 8.4 MG/DL (ref 8.7–10.5)
CALCIUM SERPL-MCNC: 8.4 MG/DL (ref 8.7–10.5)
CHLORIDE SERPL-SCNC: 124 MMOL/L (ref 95–110)
CHLORIDE SERPL-SCNC: 124 MMOL/L (ref 95–110)
CHLORIDE SERPL-SCNC: 126 MMOL/L (ref 95–110)
CHLORIDE SERPL-SCNC: 128 MMOL/L (ref 95–110)
CHLORIDE SERPL-SCNC: 128 MMOL/L (ref 95–110)
CHLORIDE SERPL-SCNC: 129 MMOL/L (ref 95–110)
CO2 SERPL-SCNC: 24 MMOL/L (ref 23–29)
CO2 SERPL-SCNC: 24 MMOL/L (ref 23–29)
CO2 SERPL-SCNC: 25 MMOL/L (ref 23–29)
CO2 SERPL-SCNC: 26 MMOL/L (ref 23–29)
CODING SYSTEM: NORMAL
CODING SYSTEM: NORMAL
CREAT SERPL-MCNC: 3.3 MG/DL (ref 0.5–1.4)
CREAT SERPL-MCNC: 3.4 MG/DL (ref 0.5–1.4)
CREAT SERPL-MCNC: 3.6 MG/DL (ref 0.5–1.4)
CREAT SERPL-MCNC: 3.7 MG/DL (ref 0.5–1.4)
CREAT SERPL-MCNC: 4 MG/DL (ref 0.5–1.4)
CREAT SERPL-MCNC: 4.1 MG/DL (ref 0.5–1.4)
CREAT SERPL-MCNC: 4.2 MG/DL (ref 0.5–1.4)
CREAT SERPL-MCNC: 4.2 MG/DL (ref 0.5–1.4)
DIFFERENTIAL METHOD: ABNORMAL
DIFFERENTIAL METHOD: ABNORMAL
DISPENSE STATUS: NORMAL
DISPENSE STATUS: NORMAL
EOSINOPHIL # BLD AUTO: 0.4 K/UL (ref 0–0.5)
EOSINOPHIL # BLD AUTO: 0.5 K/UL (ref 0–0.5)
EOSINOPHIL NFR BLD: 2.6 % (ref 0–8)
EOSINOPHIL NFR BLD: 3.2 % (ref 0–8)
ERYTHROCYTE [DISTWIDTH] IN BLOOD BY AUTOMATED COUNT: 17.4 % (ref 11.5–14.5)
ERYTHROCYTE [DISTWIDTH] IN BLOOD BY AUTOMATED COUNT: 17.6 % (ref 11.5–14.5)
EST. GFR  (AFRICAN AMERICAN): 19 ML/MIN/1.73 M^2
EST. GFR  (AFRICAN AMERICAN): 19 ML/MIN/1.73 M^2
EST. GFR  (AFRICAN AMERICAN): 20 ML/MIN/1.73 M^2
EST. GFR  (AFRICAN AMERICAN): 20 ML/MIN/1.73 M^2
EST. GFR  (AFRICAN AMERICAN): 22 ML/MIN/1.73 M^2
EST. GFR  (AFRICAN AMERICAN): 23 ML/MIN/1.73 M^2
EST. GFR  (AFRICAN AMERICAN): 25 ML/MIN/1.73 M^2
EST. GFR  (AFRICAN AMERICAN): 26 ML/MIN/1.73 M^2
EST. GFR  (NON AFRICAN AMERICAN): 17 ML/MIN/1.73 M^2
EST. GFR  (NON AFRICAN AMERICAN): 18 ML/MIN/1.73 M^2
EST. GFR  (NON AFRICAN AMERICAN): 19 ML/MIN/1.73 M^2
EST. GFR  (NON AFRICAN AMERICAN): 20 ML/MIN/1.73 M^2
EST. GFR  (NON AFRICAN AMERICAN): 21 ML/MIN/1.73 M^2
EST. GFR  (NON AFRICAN AMERICAN): 22 ML/MIN/1.73 M^2
GLUCOSE SERPL-MCNC: 100 MG/DL (ref 70–110)
GLUCOSE SERPL-MCNC: 100 MG/DL (ref 70–110)
GLUCOSE SERPL-MCNC: 105 MG/DL (ref 70–110)
GLUCOSE SERPL-MCNC: 127 MG/DL (ref 70–110)
GLUCOSE SERPL-MCNC: 128 MG/DL (ref 70–110)
GLUCOSE SERPL-MCNC: 128 MG/DL (ref 70–110)
GLUCOSE SERPL-MCNC: 152 MG/DL (ref 70–110)
GLUCOSE SERPL-MCNC: 159 MG/DL (ref 70–110)
HCT VFR BLD AUTO: 24.6 % (ref 40–54)
HCT VFR BLD AUTO: 25.6 % (ref 40–54)
HGB BLD-MCNC: 7.3 G/DL (ref 14–18)
HGB BLD-MCNC: 7.6 G/DL (ref 14–18)
IMM GRANULOCYTES # BLD AUTO: 0.13 K/UL (ref 0–0.04)
IMM GRANULOCYTES # BLD AUTO: 0.15 K/UL (ref 0–0.04)
IMM GRANULOCYTES NFR BLD AUTO: 0.9 % (ref 0–0.5)
IMM GRANULOCYTES NFR BLD AUTO: 1 % (ref 0–0.5)
LYMPHOCYTES # BLD AUTO: 1.8 K/UL (ref 1–4.8)
LYMPHOCYTES # BLD AUTO: 1.9 K/UL (ref 1–4.8)
LYMPHOCYTES NFR BLD: 12.2 % (ref 18–48)
LYMPHOCYTES NFR BLD: 13.2 % (ref 18–48)
MCH RBC QN AUTO: 29.9 PG (ref 27–31)
MCH RBC QN AUTO: 29.9 PG (ref 27–31)
MCHC RBC AUTO-ENTMCNC: 29.7 G/DL (ref 32–36)
MCHC RBC AUTO-ENTMCNC: 29.7 G/DL (ref 32–36)
MCV RBC AUTO: 101 FL (ref 82–98)
MCV RBC AUTO: 101 FL (ref 82–98)
MONOCYTES # BLD AUTO: 0.9 K/UL (ref 0.3–1)
MONOCYTES # BLD AUTO: 0.9 K/UL (ref 0.3–1)
MONOCYTES NFR BLD: 6.1 % (ref 4–15)
MONOCYTES NFR BLD: 6.5 % (ref 4–15)
NEUTROPHILS # BLD AUTO: 10.7 K/UL (ref 1.8–7.7)
NEUTROPHILS # BLD AUTO: 11.1 K/UL (ref 1.8–7.7)
NEUTROPHILS NFR BLD: 76.5 % (ref 38–73)
NEUTROPHILS NFR BLD: 77.5 % (ref 38–73)
NRBC BLD-RTO: 0 /100 WBC
NRBC BLD-RTO: 0 /100 WBC
NUM UNITS TRANS PACKED RBC: NORMAL
NUM UNITS TRANS PACKED RBC: NORMAL
OSMOLALITY UR: 493 MOSM/KG (ref 50–1200)
PLATELET # BLD AUTO: 187 K/UL (ref 150–450)
PLATELET # BLD AUTO: 194 K/UL (ref 150–450)
PMV BLD AUTO: 11.3 FL (ref 9.2–12.9)
PMV BLD AUTO: 11.4 FL (ref 9.2–12.9)
POTASSIUM SERPL-SCNC: 4.5 MMOL/L (ref 3.5–5.1)
POTASSIUM SERPL-SCNC: 4.6 MMOL/L (ref 3.5–5.1)
POTASSIUM SERPL-SCNC: 4.7 MMOL/L (ref 3.5–5.1)
POTASSIUM SERPL-SCNC: 4.9 MMOL/L (ref 3.5–5.1)
POTASSIUM SERPL-SCNC: 4.9 MMOL/L (ref 3.5–5.1)
POTASSIUM SERPL-SCNC: 5.5 MMOL/L (ref 3.5–5.1)
POTASSIUM SERPL-SCNC: 5.6 MMOL/L (ref 3.5–5.1)
POTASSIUM SERPL-SCNC: 5.7 MMOL/L (ref 3.5–5.1)
RBC # BLD AUTO: 2.44 M/UL (ref 4.6–6.2)
RBC # BLD AUTO: 2.54 M/UL (ref 4.6–6.2)
SODIUM SERPL-SCNC: 155 MMOL/L (ref 136–145)
SODIUM SERPL-SCNC: 158 MMOL/L (ref 136–145)
SODIUM SERPL-SCNC: 159 MMOL/L (ref 136–145)
SODIUM SERPL-SCNC: 159 MMOL/L (ref 136–145)
SODIUM SERPL-SCNC: 161 MMOL/L (ref 136–145)
SODIUM SERPL-SCNC: 162 MMOL/L (ref 136–145)
SODIUM SERPL-SCNC: 163 MMOL/L (ref 136–145)
SODIUM SERPL-SCNC: 164 MMOL/L (ref 136–145)
WBC # BLD AUTO: 14.04 K/UL (ref 3.9–12.7)
WBC # BLD AUTO: 14.37 K/UL (ref 3.9–12.7)

## 2021-07-11 PROCEDURE — 20000000 HC ICU ROOM

## 2021-07-11 PROCEDURE — 25000003 PHARM REV CODE 250: Performed by: INTERNAL MEDICINE

## 2021-07-11 PROCEDURE — 63600175 PHARM REV CODE 636 W HCPCS: Performed by: INTERNAL MEDICINE

## 2021-07-11 PROCEDURE — 84443 ASSAY THYROID STIM HORMONE: CPT | Performed by: INTERNAL MEDICINE

## 2021-07-11 PROCEDURE — 99232 SBSQ HOSP IP/OBS MODERATE 35: CPT | Mod: ,,, | Performed by: INTERNAL MEDICINE

## 2021-07-11 PROCEDURE — 80048 BASIC METABOLIC PNL TOTAL CA: CPT | Mod: 91 | Performed by: INTERNAL MEDICINE

## 2021-07-11 PROCEDURE — P9016 RBC LEUKOCYTES REDUCED: HCPCS | Performed by: EMERGENCY MEDICINE

## 2021-07-11 PROCEDURE — 85025 COMPLETE CBC W/AUTO DIFF WBC: CPT | Mod: 91 | Performed by: INTERNAL MEDICINE

## 2021-07-11 PROCEDURE — 99232 PR SUBSEQUENT HOSPITAL CARE,LEVL II: ICD-10-PCS | Mod: ,,, | Performed by: INTERNAL MEDICINE

## 2021-07-11 PROCEDURE — 94761 N-INVAS EAR/PLS OXIMETRY MLT: CPT

## 2021-07-11 PROCEDURE — 85025 COMPLETE CBC W/AUTO DIFF WBC: CPT | Performed by: INTERNAL MEDICINE

## 2021-07-11 PROCEDURE — 99232 SBSQ HOSP IP/OBS MODERATE 35: CPT | Mod: S$GLB,,, | Performed by: INTERNAL MEDICINE

## 2021-07-11 PROCEDURE — 36430 TRANSFUSION BLD/BLD COMPNT: CPT

## 2021-07-11 PROCEDURE — 36415 COLL VENOUS BLD VENIPUNCTURE: CPT | Performed by: INTERNAL MEDICINE

## 2021-07-11 PROCEDURE — 99232 PR SUBSEQUENT HOSPITAL CARE,LEVL II: ICD-10-PCS | Mod: S$GLB,,, | Performed by: INTERNAL MEDICINE

## 2021-07-11 PROCEDURE — C9113 INJ PANTOPRAZOLE SODIUM, VIA: HCPCS | Performed by: INTERNAL MEDICINE

## 2021-07-11 PROCEDURE — 80048 BASIC METABOLIC PNL TOTAL CA: CPT | Performed by: INTERNAL MEDICINE

## 2021-07-11 RX ORDER — HYDROCODONE BITARTRATE AND ACETAMINOPHEN 500; 5 MG/1; MG/1
TABLET ORAL
Status: DISCONTINUED | OUTPATIENT
Start: 2021-07-11 | End: 2021-07-14 | Stop reason: HOSPADM

## 2021-07-11 RX ORDER — PANTOPRAZOLE SODIUM 40 MG/10ML
40 INJECTION, POWDER, LYOPHILIZED, FOR SOLUTION INTRAVENOUS 2 TIMES DAILY
Status: DISCONTINUED | OUTPATIENT
Start: 2021-07-11 | End: 2021-07-14 | Stop reason: HOSPADM

## 2021-07-11 RX ORDER — DEXTROSE MONOHYDRATE 50 MG/ML
INJECTION, SOLUTION INTRAVENOUS CONTINUOUS
Status: DISCONTINUED | OUTPATIENT
Start: 2021-07-11 | End: 2021-07-12

## 2021-07-11 RX ADMIN — ATORVASTATIN CALCIUM 40 MG: 40 TABLET, FILM COATED ORAL at 08:07

## 2021-07-11 RX ADMIN — INSULIN HUMAN 10 UNITS: 100 INJECTION, SOLUTION PARENTERAL at 08:07

## 2021-07-11 RX ADMIN — VALPROIC ACID 1000 MG: 250 SOLUTION ORAL at 09:07

## 2021-07-11 RX ADMIN — MUPIROCIN: 20 OINTMENT TOPICAL at 09:07

## 2021-07-11 RX ADMIN — VALPROIC ACID 1000 MG: 250 SOLUTION ORAL at 08:07

## 2021-07-11 RX ADMIN — SODIUM CHLORIDE: 0.45 INJECTION, SOLUTION INTRAVENOUS at 05:07

## 2021-07-11 RX ADMIN — PANTOPRAZOLE SODIUM 40 MG: 40 INJECTION, POWDER, FOR SOLUTION INTRAVENOUS at 09:07

## 2021-07-11 RX ADMIN — MUPIROCIN: 20 OINTMENT TOPICAL at 08:07

## 2021-07-11 RX ADMIN — PANTOPRAZOLE SODIUM 40 MG: 40 INJECTION, POWDER, FOR SOLUTION INTRAVENOUS at 10:07

## 2021-07-11 RX ADMIN — DEXTROSE: 5 SOLUTION INTRAVENOUS at 08:07

## 2021-07-11 RX ADMIN — DEXTROSE MONOHYDRATE 25 G: 500 INJECTION PARENTERAL at 08:07

## 2021-07-12 ENCOUNTER — OUTSIDE PLACE OF SERVICE (OUTPATIENT)
Dept: INFECTIOUS DISEASES | Facility: CLINIC | Age: 41
End: 2021-07-12
Payer: MEDICAID

## 2021-07-12 DIAGNOSIS — R19.7 DIARRHEA: ICD-10-CM

## 2021-07-12 DIAGNOSIS — D72.829 LEUCOCYTOSIS: ICD-10-CM

## 2021-07-12 DIAGNOSIS — K92.2 LOWER GI BLEED: ICD-10-CM

## 2021-07-12 LAB
ALBUMIN SERPL BCP-MCNC: 1.8 G/DL (ref 3.5–5.2)
ANION GAP SERPL CALC-SCNC: 8 MMOL/L (ref 8–16)
ANION GAP SERPL CALC-SCNC: 9 MMOL/L (ref 8–16)
ANION GAP SERPL CALC-SCNC: 9 MMOL/L (ref 8–16)
BASOPHILS # BLD AUTO: 0.03 K/UL (ref 0–0.2)
BASOPHILS NFR BLD: 0.2 % (ref 0–1.9)
BUN SERPL-MCNC: 76 MG/DL (ref 6–20)
BUN SERPL-MCNC: 76 MG/DL (ref 6–20)
BUN SERPL-MCNC: 79 MG/DL (ref 6–20)
CALCIUM SERPL-MCNC: 8 MG/DL (ref 8.7–10.5)
CALCIUM SERPL-MCNC: 8.1 MG/DL (ref 8.7–10.5)
CALCIUM SERPL-MCNC: 8.1 MG/DL (ref 8.7–10.5)
CHLORIDE SERPL-SCNC: 121 MMOL/L (ref 95–110)
CHLORIDE SERPL-SCNC: 121 MMOL/L (ref 95–110)
CHLORIDE SERPL-SCNC: 122 MMOL/L (ref 95–110)
CO2 SERPL-SCNC: 24 MMOL/L (ref 23–29)
CO2 SERPL-SCNC: 24 MMOL/L (ref 23–29)
CO2 SERPL-SCNC: 25 MMOL/L (ref 23–29)
CREAT SERPL-MCNC: 3 MG/DL (ref 0.5–1.4)
CREAT SERPL-MCNC: 3.1 MG/DL (ref 0.5–1.4)
CREAT SERPL-MCNC: 3.2 MG/DL (ref 0.5–1.4)
DIFFERENTIAL METHOD: ABNORMAL
EOSINOPHIL # BLD AUTO: 0.6 K/UL (ref 0–0.5)
EOSINOPHIL NFR BLD: 4.2 % (ref 0–8)
ERYTHROCYTE [DISTWIDTH] IN BLOOD BY AUTOMATED COUNT: 17.2 % (ref 11.5–14.5)
EST. GFR  (AFRICAN AMERICAN): 27 ML/MIN/1.73 M^2
EST. GFR  (AFRICAN AMERICAN): 28 ML/MIN/1.73 M^2
EST. GFR  (AFRICAN AMERICAN): 29 ML/MIN/1.73 M^2
EST. GFR  (NON AFRICAN AMERICAN): 23 ML/MIN/1.73 M^2
EST. GFR  (NON AFRICAN AMERICAN): 24 ML/MIN/1.73 M^2
EST. GFR  (NON AFRICAN AMERICAN): 25 ML/MIN/1.73 M^2
GLUCOSE SERPL-MCNC: 117 MG/DL (ref 70–110)
GLUCOSE SERPL-MCNC: 120 MG/DL (ref 70–110)
GLUCOSE SERPL-MCNC: 123 MG/DL (ref 70–110)
HCT VFR BLD AUTO: 24.6 % (ref 40–54)
HGB BLD-MCNC: 7.4 G/DL (ref 14–18)
IMM GRANULOCYTES # BLD AUTO: 0.11 K/UL (ref 0–0.04)
IMM GRANULOCYTES NFR BLD AUTO: 0.8 % (ref 0–0.5)
LYMPHOCYTES # BLD AUTO: 1.8 K/UL (ref 1–4.8)
LYMPHOCYTES NFR BLD: 12.4 % (ref 18–48)
MCH RBC QN AUTO: 29.7 PG (ref 27–31)
MCHC RBC AUTO-ENTMCNC: 30.1 G/DL (ref 32–36)
MCV RBC AUTO: 99 FL (ref 82–98)
MONOCYTES # BLD AUTO: 0.9 K/UL (ref 0.3–1)
MONOCYTES NFR BLD: 6.5 % (ref 4–15)
NEUTROPHILS # BLD AUTO: 10.9 K/UL (ref 1.8–7.7)
NEUTROPHILS NFR BLD: 75.9 % (ref 38–73)
NRBC BLD-RTO: 0 /100 WBC
PHOSPHATE SERPL-MCNC: 4.2 MG/DL (ref 2.7–4.5)
PLATELET # BLD AUTO: 204 K/UL (ref 150–450)
PMV BLD AUTO: 11.4 FL (ref 9.2–12.9)
POTASSIUM SERPL-SCNC: 4.2 MMOL/L (ref 3.5–5.1)
POTASSIUM SERPL-SCNC: 4.2 MMOL/L (ref 3.5–5.1)
POTASSIUM SERPL-SCNC: 4.5 MMOL/L (ref 3.5–5.1)
PROCALCITONIN SERPL IA-MCNC: 0.14 NG/ML
RBC # BLD AUTO: 2.49 M/UL (ref 4.6–6.2)
SODIUM SERPL-SCNC: 149 MMOL/L (ref 136–145)
SODIUM SERPL-SCNC: 150 MMOL/L (ref 136–145)
SODIUM SERPL-SCNC: 151 MMOL/L (ref 136–145)
SODIUM SERPL-SCNC: 154 MMOL/L (ref 136–145)
SODIUM SERPL-SCNC: 154 MMOL/L (ref 136–145)
SODIUM SERPL-SCNC: 155 MMOL/L (ref 136–145)
WBC # BLD AUTO: 14.4 K/UL (ref 3.9–12.7)

## 2021-07-12 PROCEDURE — 85025 COMPLETE CBC W/AUTO DIFF WBC: CPT | Performed by: INTERNAL MEDICINE

## 2021-07-12 PROCEDURE — 80048 BASIC METABOLIC PNL TOTAL CA: CPT | Performed by: INTERNAL MEDICINE

## 2021-07-12 PROCEDURE — 84295 ASSAY OF SERUM SODIUM: CPT | Mod: 91 | Performed by: INTERNAL MEDICINE

## 2021-07-12 PROCEDURE — 63600175 PHARM REV CODE 636 W HCPCS: Performed by: INTERNAL MEDICINE

## 2021-07-12 PROCEDURE — 25000003 PHARM REV CODE 250: Performed by: INTERNAL MEDICINE

## 2021-07-12 PROCEDURE — 80069 RENAL FUNCTION PANEL: CPT | Performed by: INTERNAL MEDICINE

## 2021-07-12 PROCEDURE — 94761 N-INVAS EAR/PLS OXIMETRY MLT: CPT

## 2021-07-12 PROCEDURE — 20000000 HC ICU ROOM

## 2021-07-12 PROCEDURE — 99232 SBSQ HOSP IP/OBS MODERATE 35: CPT | Mod: S$GLB,,, | Performed by: INTERNAL MEDICINE

## 2021-07-12 PROCEDURE — C9113 INJ PANTOPRAZOLE SODIUM, VIA: HCPCS | Performed by: INTERNAL MEDICINE

## 2021-07-12 PROCEDURE — 99232 PR SUBSEQUENT HOSPITAL CARE,LEVL II: ICD-10-PCS | Mod: S$GLB,,, | Performed by: INTERNAL MEDICINE

## 2021-07-12 PROCEDURE — 84145 PROCALCITONIN (PCT): CPT | Performed by: INTERNAL MEDICINE

## 2021-07-12 PROCEDURE — 80048 BASIC METABOLIC PNL TOTAL CA: CPT | Mod: 91 | Performed by: INTERNAL MEDICINE

## 2021-07-12 RX ORDER — DEXTROSE MONOHYDRATE 50 MG/ML
INJECTION, SOLUTION INTRAVENOUS CONTINUOUS
Status: DISCONTINUED | OUTPATIENT
Start: 2021-07-12 | End: 2021-07-14

## 2021-07-12 RX ORDER — DEXTROSE MONOHYDRATE 50 MG/ML
INJECTION, SOLUTION INTRAVENOUS CONTINUOUS
Status: DISCONTINUED | OUTPATIENT
Start: 2021-07-12 | End: 2021-07-12

## 2021-07-12 RX ADMIN — PANTOPRAZOLE SODIUM 40 MG: 40 INJECTION, POWDER, FOR SOLUTION INTRAVENOUS at 09:07

## 2021-07-12 RX ADMIN — VALPROIC ACID 1000 MG: 250 SOLUTION ORAL at 09:07

## 2021-07-12 RX ADMIN — MUPIROCIN: 20 OINTMENT TOPICAL at 09:07

## 2021-07-12 RX ADMIN — VALPROIC ACID 1000 MG: 250 SOLUTION ORAL at 08:07

## 2021-07-12 RX ADMIN — DEXTROSE: 5 SOLUTION INTRAVENOUS at 08:07

## 2021-07-12 RX ADMIN — ATORVASTATIN CALCIUM 40 MG: 40 TABLET, FILM COATED ORAL at 08:07

## 2021-07-12 RX ADMIN — PANTOPRAZOLE SODIUM 40 MG: 40 INJECTION, POWDER, FOR SOLUTION INTRAVENOUS at 08:07

## 2021-07-12 RX ADMIN — DEXTROSE 150 ML/HR: 5 SOLUTION INTRAVENOUS at 05:07

## 2021-07-13 LAB
ANION GAP SERPL CALC-SCNC: 8 MMOL/L (ref 8–16)
BASOPHILS # BLD AUTO: 0.01 K/UL (ref 0–0.2)
BASOPHILS # BLD AUTO: 0.01 K/UL (ref 0–0.2)
BASOPHILS NFR BLD: 0.1 % (ref 0–1.9)
BASOPHILS NFR BLD: 0.1 % (ref 0–1.9)
BUN SERPL-MCNC: 51 MG/DL (ref 6–20)
CALCIUM SERPL-MCNC: 7.9 MG/DL (ref 8.7–10.5)
CHLORIDE SERPL-SCNC: 116 MMOL/L (ref 95–110)
CO2 SERPL-SCNC: 23 MMOL/L (ref 23–29)
CREAT SERPL-MCNC: 2.2 MG/DL (ref 0.5–1.4)
DIFFERENTIAL METHOD: ABNORMAL
DIFFERENTIAL METHOD: ABNORMAL
EOSINOPHIL # BLD AUTO: 0.6 K/UL (ref 0–0.5)
EOSINOPHIL # BLD AUTO: 0.6 K/UL (ref 0–0.5)
EOSINOPHIL NFR BLD: 4 % (ref 0–8)
EOSINOPHIL NFR BLD: 4 % (ref 0–8)
ERYTHROCYTE [DISTWIDTH] IN BLOOD BY AUTOMATED COUNT: 15.9 % (ref 11.5–14.5)
ERYTHROCYTE [DISTWIDTH] IN BLOOD BY AUTOMATED COUNT: 15.9 % (ref 11.5–14.5)
EST. GFR  (AFRICAN AMERICAN): 42 ML/MIN/1.73 M^2
EST. GFR  (NON AFRICAN AMERICAN): 36 ML/MIN/1.73 M^2
GLUCOSE SERPL-MCNC: 89 MG/DL (ref 70–110)
HCT VFR BLD AUTO: 24.4 % (ref 40–54)
HCT VFR BLD AUTO: 24.4 % (ref 40–54)
HGB BLD-MCNC: 7.3 G/DL (ref 14–18)
HGB BLD-MCNC: 7.3 G/DL (ref 14–18)
IMM GRANULOCYTES # BLD AUTO: 0.11 K/UL (ref 0–0.04)
IMM GRANULOCYTES # BLD AUTO: 0.11 K/UL (ref 0–0.04)
IMM GRANULOCYTES NFR BLD AUTO: 0.8 % (ref 0–0.5)
IMM GRANULOCYTES NFR BLD AUTO: 0.8 % (ref 0–0.5)
LYMPHOCYTES # BLD AUTO: 1.8 K/UL (ref 1–4.8)
LYMPHOCYTES # BLD AUTO: 1.8 K/UL (ref 1–4.8)
LYMPHOCYTES NFR BLD: 12.9 % (ref 18–48)
LYMPHOCYTES NFR BLD: 12.9 % (ref 18–48)
MCH RBC QN AUTO: 29.1 PG (ref 27–31)
MCH RBC QN AUTO: 29.1 PG (ref 27–31)
MCHC RBC AUTO-ENTMCNC: 29.9 G/DL (ref 32–36)
MCHC RBC AUTO-ENTMCNC: 29.9 G/DL (ref 32–36)
MCV RBC AUTO: 97 FL (ref 82–98)
MCV RBC AUTO: 97 FL (ref 82–98)
MONOCYTES # BLD AUTO: 0.9 K/UL (ref 0.3–1)
MONOCYTES # BLD AUTO: 0.9 K/UL (ref 0.3–1)
MONOCYTES NFR BLD: 6.2 % (ref 4–15)
MONOCYTES NFR BLD: 6.2 % (ref 4–15)
NEUTROPHILS # BLD AUTO: 10.7 K/UL (ref 1.8–7.7)
NEUTROPHILS # BLD AUTO: 10.7 K/UL (ref 1.8–7.7)
NEUTROPHILS NFR BLD: 76 % (ref 38–73)
NEUTROPHILS NFR BLD: 76 % (ref 38–73)
NRBC BLD-RTO: 0 /100 WBC
NRBC BLD-RTO: 0 /100 WBC
PLATELET # BLD AUTO: 212 K/UL (ref 150–450)
PLATELET # BLD AUTO: 212 K/UL (ref 150–450)
PMV BLD AUTO: 11.1 FL (ref 9.2–12.9)
PMV BLD AUTO: 11.1 FL (ref 9.2–12.9)
POTASSIUM SERPL-SCNC: 3.9 MMOL/L (ref 3.5–5.1)
RBC # BLD AUTO: 2.51 M/UL (ref 4.6–6.2)
RBC # BLD AUTO: 2.51 M/UL (ref 4.6–6.2)
SODIUM SERPL-SCNC: 145 MMOL/L (ref 136–145)
SODIUM SERPL-SCNC: 145 MMOL/L (ref 136–145)
SODIUM SERPL-SCNC: 146 MMOL/L (ref 136–145)
SODIUM SERPL-SCNC: 147 MMOL/L (ref 136–145)
SODIUM SERPL-SCNC: 148 MMOL/L (ref 136–145)
WBC # BLD AUTO: 14.11 K/UL (ref 3.9–12.7)
WBC # BLD AUTO: 14.11 K/UL (ref 3.9–12.7)

## 2021-07-13 PROCEDURE — 63600175 PHARM REV CODE 636 W HCPCS: Performed by: INTERNAL MEDICINE

## 2021-07-13 PROCEDURE — 80048 BASIC METABOLIC PNL TOTAL CA: CPT | Performed by: INTERNAL MEDICINE

## 2021-07-13 PROCEDURE — 84295 ASSAY OF SERUM SODIUM: CPT | Mod: 91 | Performed by: HOSPITALIST

## 2021-07-13 PROCEDURE — 85025 COMPLETE CBC W/AUTO DIFF WBC: CPT | Performed by: INTERNAL MEDICINE

## 2021-07-13 PROCEDURE — 84295 ASSAY OF SERUM SODIUM: CPT | Performed by: INTERNAL MEDICINE

## 2021-07-13 PROCEDURE — 20000000 HC ICU ROOM

## 2021-07-13 PROCEDURE — 84295 ASSAY OF SERUM SODIUM: CPT | Mod: 91 | Performed by: INTERNAL MEDICINE

## 2021-07-13 PROCEDURE — 25000003 PHARM REV CODE 250: Performed by: INTERNAL MEDICINE

## 2021-07-13 PROCEDURE — C9113 INJ PANTOPRAZOLE SODIUM, VIA: HCPCS | Performed by: INTERNAL MEDICINE

## 2021-07-13 PROCEDURE — 94761 N-INVAS EAR/PLS OXIMETRY MLT: CPT

## 2021-07-13 RX ADMIN — LORAZEPAM 0.5 MG: 0.5 TABLET ORAL at 01:07

## 2021-07-13 RX ADMIN — VALPROIC ACID 1000 MG: 250 SOLUTION ORAL at 09:07

## 2021-07-13 RX ADMIN — MUPIROCIN: 20 OINTMENT TOPICAL at 10:07

## 2021-07-13 RX ADMIN — DEXTROSE: 5 SOLUTION INTRAVENOUS at 07:07

## 2021-07-13 RX ADMIN — DEXTROSE: 5 SOLUTION INTRAVENOUS at 03:07

## 2021-07-13 RX ADMIN — ATORVASTATIN CALCIUM 40 MG: 40 TABLET, FILM COATED ORAL at 09:07

## 2021-07-13 RX ADMIN — PANTOPRAZOLE SODIUM 40 MG: 40 INJECTION, POWDER, FOR SOLUTION INTRAVENOUS at 09:07

## 2021-07-13 RX ADMIN — MUPIROCIN: 20 OINTMENT TOPICAL at 09:07

## 2021-07-13 RX ADMIN — PANTOPRAZOLE SODIUM 40 MG: 40 INJECTION, POWDER, FOR SOLUTION INTRAVENOUS at 10:07

## 2021-07-13 RX ADMIN — VALPROIC ACID 1000 MG: 250 SOLUTION ORAL at 10:07

## 2021-07-14 VITALS
OXYGEN SATURATION: 100 % | DIASTOLIC BLOOD PRESSURE: 65 MMHG | SYSTOLIC BLOOD PRESSURE: 153 MMHG | HEIGHT: 69 IN | WEIGHT: 180.75 LBS | TEMPERATURE: 99 F | RESPIRATION RATE: 15 BRPM | BODY MASS INDEX: 26.77 KG/M2 | HEART RATE: 52 BPM

## 2021-07-14 LAB
ANION GAP SERPL CALC-SCNC: 7 MMOL/L (ref 8–16)
BASOPHILS # BLD AUTO: 0.02 K/UL (ref 0–0.2)
BASOPHILS NFR BLD: 0.2 % (ref 0–1.9)
BUN SERPL-MCNC: 40 MG/DL (ref 6–20)
CALCIUM SERPL-MCNC: 7.9 MG/DL (ref 8.7–10.5)
CHLORIDE SERPL-SCNC: 114 MMOL/L (ref 95–110)
CO2 SERPL-SCNC: 24 MMOL/L (ref 23–29)
CREAT SERPL-MCNC: 1.9 MG/DL (ref 0.5–1.4)
DIFFERENTIAL METHOD: ABNORMAL
EOSINOPHIL # BLD AUTO: 0.6 K/UL (ref 0–0.5)
EOSINOPHIL NFR BLD: 4.2 % (ref 0–8)
ERYTHROCYTE [DISTWIDTH] IN BLOOD BY AUTOMATED COUNT: 15.5 % (ref 11.5–14.5)
EST. GFR  (AFRICAN AMERICAN): 50 ML/MIN/1.73 M^2
EST. GFR  (NON AFRICAN AMERICAN): 43 ML/MIN/1.73 M^2
GLUCOSE SERPL-MCNC: 119 MG/DL (ref 70–110)
HCT VFR BLD AUTO: 24.1 % (ref 40–54)
HGB BLD-MCNC: 7.4 G/DL (ref 14–18)
IMM GRANULOCYTES # BLD AUTO: 0.1 K/UL (ref 0–0.04)
IMM GRANULOCYTES NFR BLD AUTO: 0.8 % (ref 0–0.5)
LYMPHOCYTES # BLD AUTO: 1.6 K/UL (ref 1–4.8)
LYMPHOCYTES NFR BLD: 11.9 % (ref 18–48)
MCH RBC QN AUTO: 29.8 PG (ref 27–31)
MCHC RBC AUTO-ENTMCNC: 30.7 G/DL (ref 32–36)
MCV RBC AUTO: 97 FL (ref 82–98)
MONOCYTES # BLD AUTO: 0.8 K/UL (ref 0.3–1)
MONOCYTES NFR BLD: 6 % (ref 4–15)
NEUTROPHILS # BLD AUTO: 10.2 K/UL (ref 1.8–7.7)
NEUTROPHILS NFR BLD: 76.9 % (ref 38–73)
NRBC BLD-RTO: 0 /100 WBC
PLATELET # BLD AUTO: 212 K/UL (ref 150–450)
PMV BLD AUTO: 10.9 FL (ref 9.2–12.9)
POTASSIUM SERPL-SCNC: 4 MMOL/L (ref 3.5–5.1)
RBC # BLD AUTO: 2.48 M/UL (ref 4.6–6.2)
SODIUM SERPL-SCNC: 145 MMOL/L (ref 136–145)
WBC # BLD AUTO: 13.18 K/UL (ref 3.9–12.7)

## 2021-07-14 PROCEDURE — 85025 COMPLETE CBC W/AUTO DIFF WBC: CPT | Performed by: INTERNAL MEDICINE

## 2021-07-14 PROCEDURE — 94761 N-INVAS EAR/PLS OXIMETRY MLT: CPT

## 2021-07-14 PROCEDURE — 92610 EVALUATE SWALLOWING FUNCTION: CPT

## 2021-07-14 PROCEDURE — 63600175 PHARM REV CODE 636 W HCPCS: Performed by: INTERNAL MEDICINE

## 2021-07-14 PROCEDURE — C9113 INJ PANTOPRAZOLE SODIUM, VIA: HCPCS | Performed by: INTERNAL MEDICINE

## 2021-07-14 PROCEDURE — 25000003 PHARM REV CODE 250: Performed by: INTERNAL MEDICINE

## 2021-07-14 PROCEDURE — 80048 BASIC METABOLIC PNL TOTAL CA: CPT | Performed by: INTERNAL MEDICINE

## 2021-07-14 RX ORDER — PANTOPRAZOLE SODIUM 40 MG/1
40 TABLET, DELAYED RELEASE ORAL 2 TIMES DAILY
Qty: 112 TABLET | Refills: 0 | Status: SHIPPED | OUTPATIENT
Start: 2021-07-14 | End: 2022-10-12

## 2021-07-14 RX ADMIN — PANTOPRAZOLE SODIUM 40 MG: 40 INJECTION, POWDER, FOR SOLUTION INTRAVENOUS at 09:07

## 2021-07-14 RX ADMIN — VALPROIC ACID 1000 MG: 250 SOLUTION ORAL at 09:07

## 2021-07-14 RX ADMIN — ATORVASTATIN CALCIUM 40 MG: 40 TABLET, FILM COATED ORAL at 09:07

## 2021-07-15 LAB
BACTERIA BLD CULT: NORMAL
BACTERIA BLD CULT: NORMAL
FINAL PATHOLOGIC DIAGNOSIS: NORMAL
GROSS: NORMAL
Lab: NORMAL
MICROSCOPIC EXAM: NORMAL

## 2021-07-28 ENCOUNTER — HOSPITAL ENCOUNTER (INPATIENT)
Facility: HOSPITAL | Age: 41
LOS: 5 days | Discharge: HOME | DRG: 698 | End: 2021-08-02
Attending: EMERGENCY MEDICINE | Admitting: HOSPITALIST
Payer: MEDICAID

## 2021-07-28 DIAGNOSIS — Z91.89 AT HIGH RISK FOR PRESSURE INJURY OF SKIN: ICD-10-CM

## 2021-07-28 DIAGNOSIS — N12 PYELONEPHRITIS: Primary | ICD-10-CM

## 2021-07-28 DIAGNOSIS — L89.303 PRESSURE INJURY OF BUTTOCK, STAGE 3, UNSPECIFIED LATERALITY: ICD-10-CM

## 2021-07-28 DIAGNOSIS — R65.20 SEVERE SEPSIS: ICD-10-CM

## 2021-07-28 DIAGNOSIS — A41.9 SEVERE SEPSIS: ICD-10-CM

## 2021-07-28 DIAGNOSIS — A41.9 SEPSIS: ICD-10-CM

## 2021-07-28 DIAGNOSIS — R00.0 TACHYCARDIA: ICD-10-CM

## 2021-07-28 DIAGNOSIS — I69.359 CVA, OLD, HEMIPARESIS: ICD-10-CM

## 2021-07-28 DIAGNOSIS — R07.9 CHEST PAIN: ICD-10-CM

## 2021-07-28 LAB
ALBUMIN SERPL BCP-MCNC: 2.9 G/DL (ref 3.5–5.2)
ALP SERPL-CCNC: 133 U/L (ref 55–135)
ALT SERPL W/O P-5'-P-CCNC: 16 U/L (ref 10–44)
ANION GAP SERPL CALC-SCNC: 22 MMOL/L (ref 8–16)
AST SERPL-CCNC: 22 U/L (ref 10–40)
BACTERIA #/AREA URNS HPF: ABNORMAL /HPF
BASOPHILS NFR BLD: 0 % (ref 0–1.9)
BILIRUB SERPL-MCNC: 0.7 MG/DL (ref 0.1–1)
BILIRUB UR QL STRIP: NEGATIVE
BUN SERPL-MCNC: 56 MG/DL (ref 6–20)
CALCIUM SERPL-MCNC: 10.7 MG/DL (ref 8.7–10.5)
CAOX CRY URNS QL MICRO: ABNORMAL
CHLORIDE SERPL-SCNC: 95 MMOL/L (ref 95–110)
CLARITY UR: ABNORMAL
CO2 SERPL-SCNC: 17 MMOL/L (ref 23–29)
COLOR UR: YELLOW
CREAT SERPL-MCNC: 3.7 MG/DL (ref 0.5–1.4)
DIFFERENTIAL METHOD: ABNORMAL
EOSINOPHIL NFR BLD: 0 % (ref 0–8)
ERYTHROCYTE [DISTWIDTH] IN BLOOD BY AUTOMATED COUNT: 16 % (ref 11.5–14.5)
EST. GFR  (AFRICAN AMERICAN): 22 ML/MIN/1.73 M^2
EST. GFR  (NON AFRICAN AMERICAN): 19 ML/MIN/1.73 M^2
GLUCOSE SERPL-MCNC: 134 MG/DL (ref 70–110)
GLUCOSE UR QL STRIP: NEGATIVE
HCT VFR BLD AUTO: 38.6 % (ref 40–54)
HGB BLD-MCNC: 12.3 G/DL (ref 14–18)
HGB UR QL STRIP: NEGATIVE
HYALINE CASTS #/AREA URNS LPF: 2 /LPF
IMM GRANULOCYTES # BLD AUTO: ABNORMAL K/UL
IMM GRANULOCYTES NFR BLD AUTO: ABNORMAL %
KETONES UR QL STRIP: NEGATIVE
LACTATE SERPL-SCNC: 11.2 MMOL/L (ref 0.5–2.2)
LEUKOCYTE ESTERASE UR QL STRIP: ABNORMAL
LYMPHOCYTES NFR BLD: 5 % (ref 18–48)
MCH RBC QN AUTO: 29.6 PG (ref 27–31)
MCHC RBC AUTO-ENTMCNC: 31.9 G/DL (ref 32–36)
MCV RBC AUTO: 93 FL (ref 82–98)
MICROSCOPIC COMMENT: ABNORMAL
MONOCYTES NFR BLD: 9 % (ref 4–15)
NEUTROPHILS NFR BLD: 86 % (ref 38–73)
NITRITE UR QL STRIP: POSITIVE
NRBC BLD-RTO: 0 /100 WBC
PH UR STRIP: >8 [PH] (ref 5–8)
PLATELET # BLD AUTO: 288 K/UL (ref 150–450)
PLATELET BLD QL SMEAR: ABNORMAL
PMV BLD AUTO: 9.8 FL (ref 9.2–12.9)
POTASSIUM SERPL-SCNC: 5.9 MMOL/L (ref 3.5–5.1)
PROT SERPL-MCNC: 8.2 G/DL (ref 6–8.4)
PROT UR QL STRIP: ABNORMAL
RBC # BLD AUTO: 4.16 M/UL (ref 4.6–6.2)
RBC #/AREA URNS HPF: 1 /HPF (ref 0–4)
SARS-COV-2 RDRP RESP QL NAA+PROBE: NEGATIVE
SODIUM SERPL-SCNC: 134 MMOL/L (ref 136–145)
SP GR UR STRIP: <=1.005 (ref 1–1.03)
TRI-PHOS CRY URNS QL MICRO: ABNORMAL
URN SPEC COLLECT METH UR: ABNORMAL
UROBILINOGEN UR STRIP-ACNC: NEGATIVE EU/DL
VALPROATE SERPL-MCNC: 43.8 UG/ML (ref 50–100)
WBC # BLD AUTO: 28.92 K/UL (ref 3.9–12.7)
WBC #/AREA URNS HPF: 75 /HPF (ref 0–5)
WBC CLUMPS URNS QL MICRO: ABNORMAL

## 2021-07-28 PROCEDURE — 80053 COMPREHEN METABOLIC PANEL: CPT | Performed by: EMERGENCY MEDICINE

## 2021-07-28 PROCEDURE — 63600175 PHARM REV CODE 636 W HCPCS: Performed by: EMERGENCY MEDICINE

## 2021-07-28 PROCEDURE — 87186 SC STD MICRODIL/AGAR DIL: CPT | Mod: 59 | Performed by: EMERGENCY MEDICINE

## 2021-07-28 PROCEDURE — 87040 BLOOD CULTURE FOR BACTERIA: CPT | Mod: 59 | Performed by: EMERGENCY MEDICINE

## 2021-07-28 PROCEDURE — 85007 BL SMEAR W/DIFF WBC COUNT: CPT | Performed by: EMERGENCY MEDICINE

## 2021-07-28 PROCEDURE — 96365 THER/PROPH/DIAG IV INF INIT: CPT

## 2021-07-28 PROCEDURE — 96361 HYDRATE IV INFUSION ADD-ON: CPT

## 2021-07-28 PROCEDURE — 25000003 PHARM REV CODE 250: Performed by: EMERGENCY MEDICINE

## 2021-07-28 PROCEDURE — 87077 CULTURE AEROBIC IDENTIFY: CPT | Mod: 59 | Performed by: EMERGENCY MEDICINE

## 2021-07-28 PROCEDURE — 83605 ASSAY OF LACTIC ACID: CPT | Performed by: EMERGENCY MEDICINE

## 2021-07-28 PROCEDURE — 81000 URINALYSIS NONAUTO W/SCOPE: CPT | Performed by: EMERGENCY MEDICINE

## 2021-07-28 PROCEDURE — 87086 URINE CULTURE/COLONY COUNT: CPT | Performed by: EMERGENCY MEDICINE

## 2021-07-28 PROCEDURE — U0002 COVID-19 LAB TEST NON-CDC: HCPCS | Performed by: EMERGENCY MEDICINE

## 2021-07-28 PROCEDURE — 85027 COMPLETE CBC AUTOMATED: CPT | Performed by: EMERGENCY MEDICINE

## 2021-07-28 PROCEDURE — 80164 ASSAY DIPROPYLACETIC ACD TOT: CPT | Performed by: EMERGENCY MEDICINE

## 2021-07-28 PROCEDURE — 99285 EMERGENCY DEPT VISIT HI MDM: CPT | Mod: 25

## 2021-07-28 PROCEDURE — 93005 ELECTROCARDIOGRAM TRACING: CPT

## 2021-07-28 PROCEDURE — 12000002 HC ACUTE/MED SURGE SEMI-PRIVATE ROOM

## 2021-07-28 PROCEDURE — 51702 INSERT TEMP BLADDER CATH: CPT

## 2021-07-28 PROCEDURE — 36415 COLL VENOUS BLD VENIPUNCTURE: CPT | Performed by: EMERGENCY MEDICINE

## 2021-07-28 RX ORDER — ACETAMINOPHEN 650 MG/20.3ML
1000 LIQUID ORAL
Status: COMPLETED | OUTPATIENT
Start: 2021-07-28 | End: 2021-07-28

## 2021-07-28 RX ORDER — ACETAMINOPHEN 325 MG/1
650 TABLET ORAL EVERY 8 HOURS PRN
Status: ON HOLD | COMMUNITY
End: 2023-01-06 | Stop reason: HOSPADM

## 2021-07-28 RX ADMIN — ACETAMINOPHEN 999.01 MG: 160 SOLUTION ORAL at 09:07

## 2021-07-28 RX ADMIN — PIPERACILLIN AND TAZOBACTAM 4.5 G: 4; .5 INJECTION, POWDER, LYOPHILIZED, FOR SOLUTION INTRAVENOUS; PARENTERAL at 10:07

## 2021-07-28 RX ADMIN — SODIUM CHLORIDE, SODIUM LACTATE, POTASSIUM CHLORIDE, AND CALCIUM CHLORIDE 2400 ML: .6; .31; .03; .02 INJECTION, SOLUTION INTRAVENOUS at 09:07

## 2021-07-29 PROBLEM — N12 PYELONEPHRITIS: Status: ACTIVE | Noted: 2021-07-29

## 2021-07-29 PROBLEM — R33.9 URINARY RETENTION: Status: ACTIVE | Noted: 2021-07-29

## 2021-07-29 LAB
ALBUMIN SERPL BCP-MCNC: 2.1 G/DL (ref 3.5–5.2)
ALP SERPL-CCNC: 99 U/L (ref 55–135)
ALT SERPL W/O P-5'-P-CCNC: 17 U/L (ref 10–44)
ANION GAP SERPL CALC-SCNC: 16 MMOL/L (ref 8–16)
ANION GAP SERPL CALC-SCNC: 9 MMOL/L (ref 8–16)
AST SERPL-CCNC: 26 U/L (ref 10–40)
BASOPHILS # BLD AUTO: 0.07 K/UL (ref 0–0.2)
BASOPHILS NFR BLD: 0.3 % (ref 0–1.9)
BILIRUB SERPL-MCNC: 0.5 MG/DL (ref 0.1–1)
BUN SERPL-MCNC: 55 MG/DL (ref 6–20)
BUN SERPL-MCNC: 56 MG/DL (ref 6–20)
CALCIUM SERPL-MCNC: 9.1 MG/DL (ref 8.7–10.5)
CALCIUM SERPL-MCNC: 9.4 MG/DL (ref 8.7–10.5)
CHLORIDE SERPL-SCNC: 99 MMOL/L (ref 95–110)
CHLORIDE SERPL-SCNC: 99 MMOL/L (ref 95–110)
CO2 SERPL-SCNC: 17 MMOL/L (ref 23–29)
CO2 SERPL-SCNC: 25 MMOL/L (ref 23–29)
CREAT SERPL-MCNC: 2.9 MG/DL (ref 0.5–1.4)
CREAT SERPL-MCNC: 3.2 MG/DL (ref 0.5–1.4)
DIFFERENTIAL METHOD: ABNORMAL
EOSINOPHIL # BLD AUTO: 0 K/UL (ref 0–0.5)
EOSINOPHIL NFR BLD: 0 % (ref 0–8)
ERYTHROCYTE [DISTWIDTH] IN BLOOD BY AUTOMATED COUNT: 15.9 % (ref 11.5–14.5)
EST. GFR  (AFRICAN AMERICAN): 27 ML/MIN/1.73 M^2
EST. GFR  (AFRICAN AMERICAN): 30 ML/MIN/1.73 M^2
EST. GFR  (NON AFRICAN AMERICAN): 23 ML/MIN/1.73 M^2
EST. GFR  (NON AFRICAN AMERICAN): 26 ML/MIN/1.73 M^2
GLUCOSE SERPL-MCNC: 104 MG/DL (ref 70–110)
GLUCOSE SERPL-MCNC: 121 MG/DL (ref 70–110)
HCT VFR BLD AUTO: 31.4 % (ref 40–54)
HGB BLD-MCNC: 9.8 G/DL (ref 14–18)
IMM GRANULOCYTES # BLD AUTO: 0.23 K/UL (ref 0–0.04)
IMM GRANULOCYTES NFR BLD AUTO: 0.9 % (ref 0–0.5)
LACTATE SERPL-SCNC: 1.9 MMOL/L (ref 0.5–2.2)
LACTATE SERPL-SCNC: 4.3 MMOL/L (ref 0.5–2.2)
LYMPHOCYTES # BLD AUTO: 1.7 K/UL (ref 1–4.8)
LYMPHOCYTES NFR BLD: 6.2 % (ref 18–48)
MAGNESIUM SERPL-MCNC: 1.8 MG/DL (ref 1.6–2.6)
MCH RBC QN AUTO: 28.9 PG (ref 27–31)
MCHC RBC AUTO-ENTMCNC: 31.2 G/DL (ref 32–36)
MCV RBC AUTO: 93 FL (ref 82–98)
MONOCYTES # BLD AUTO: 2.3 K/UL (ref 0.3–1)
MONOCYTES NFR BLD: 8.6 % (ref 4–15)
NEUTROPHILS # BLD AUTO: 22.5 K/UL (ref 1.8–7.7)
NEUTROPHILS NFR BLD: 84 % (ref 38–73)
NRBC BLD-RTO: 0 /100 WBC
PLATELET # BLD AUTO: 162 K/UL (ref 150–450)
PLATELET BLD QL SMEAR: ABNORMAL
PMV BLD AUTO: 9.6 FL (ref 9.2–12.9)
POTASSIUM SERPL-SCNC: 5.1 MMOL/L (ref 3.5–5.1)
POTASSIUM SERPL-SCNC: 5.2 MMOL/L (ref 3.5–5.1)
PROT SERPL-MCNC: 6 G/DL (ref 6–8.4)
RBC # BLD AUTO: 3.39 M/UL (ref 4.6–6.2)
SODIUM SERPL-SCNC: 132 MMOL/L (ref 136–145)
SODIUM SERPL-SCNC: 133 MMOL/L (ref 136–145)
WBC # BLD AUTO: 26.78 K/UL (ref 3.9–12.7)

## 2021-07-29 PROCEDURE — 63600175 PHARM REV CODE 636 W HCPCS: Performed by: NURSE PRACTITIONER

## 2021-07-29 PROCEDURE — 63600175 PHARM REV CODE 636 W HCPCS: Performed by: EMERGENCY MEDICINE

## 2021-07-29 PROCEDURE — C9113 INJ PANTOPRAZOLE SODIUM, VIA: HCPCS | Performed by: HOSPITALIST

## 2021-07-29 PROCEDURE — C9113 INJ PANTOPRAZOLE SODIUM, VIA: HCPCS | Performed by: NURSE PRACTITIONER

## 2021-07-29 PROCEDURE — 80048 BASIC METABOLIC PNL TOTAL CA: CPT | Performed by: NURSE PRACTITIONER

## 2021-07-29 PROCEDURE — 25000003 PHARM REV CODE 250: Performed by: NURSE PRACTITIONER

## 2021-07-29 PROCEDURE — 63600175 PHARM REV CODE 636 W HCPCS: Performed by: HOSPITALIST

## 2021-07-29 PROCEDURE — 83735 ASSAY OF MAGNESIUM: CPT | Performed by: NURSE PRACTITIONER

## 2021-07-29 PROCEDURE — 85025 COMPLETE CBC W/AUTO DIFF WBC: CPT | Performed by: NURSE PRACTITIONER

## 2021-07-29 PROCEDURE — 80053 COMPREHEN METABOLIC PANEL: CPT | Performed by: NURSE PRACTITIONER

## 2021-07-29 PROCEDURE — 25000003 PHARM REV CODE 250: Performed by: HOSPITALIST

## 2021-07-29 PROCEDURE — 25000003 PHARM REV CODE 250: Performed by: EMERGENCY MEDICINE

## 2021-07-29 PROCEDURE — 11000001 HC ACUTE MED/SURG PRIVATE ROOM

## 2021-07-29 PROCEDURE — 36415 COLL VENOUS BLD VENIPUNCTURE: CPT | Performed by: NURSE PRACTITIONER

## 2021-07-29 PROCEDURE — 83605 ASSAY OF LACTIC ACID: CPT | Mod: 91 | Performed by: NURSE PRACTITIONER

## 2021-07-29 RX ORDER — SODIUM CHLORIDE 0.9 % (FLUSH) 0.9 %
10 SYRINGE (ML) INJECTION
Status: DISCONTINUED | OUTPATIENT
Start: 2021-07-29 | End: 2021-08-02 | Stop reason: HOSPADM

## 2021-07-29 RX ORDER — VALPROIC ACID 250 MG/5ML
1000 SOLUTION ORAL EVERY 12 HOURS
Status: DISCONTINUED | OUTPATIENT
Start: 2021-07-29 | End: 2021-08-02 | Stop reason: HOSPADM

## 2021-07-29 RX ORDER — LANOLIN ALCOHOL/MO/W.PET/CERES
800 CREAM (GRAM) TOPICAL
Status: DISCONTINUED | OUTPATIENT
Start: 2021-07-29 | End: 2021-08-02 | Stop reason: HOSPADM

## 2021-07-29 RX ORDER — PANTOPRAZOLE SODIUM 40 MG/10ML
40 INJECTION, POWDER, LYOPHILIZED, FOR SOLUTION INTRAVENOUS 2 TIMES DAILY
Status: DISCONTINUED | OUTPATIENT
Start: 2021-07-29 | End: 2021-08-02 | Stop reason: HOSPADM

## 2021-07-29 RX ORDER — ACETAMINOPHEN 325 MG/1
650 TABLET ORAL EVERY 4 HOURS PRN
Status: DISCONTINUED | OUTPATIENT
Start: 2021-07-29 | End: 2021-08-02 | Stop reason: HOSPADM

## 2021-07-29 RX ORDER — MUPIROCIN 20 MG/G
OINTMENT TOPICAL 2 TIMES DAILY
Status: DISCONTINUED | OUTPATIENT
Start: 2021-07-29 | End: 2021-08-02 | Stop reason: HOSPADM

## 2021-07-29 RX ORDER — ONDANSETRON 2 MG/ML
4 INJECTION INTRAMUSCULAR; INTRAVENOUS EVERY 8 HOURS PRN
Status: DISCONTINUED | OUTPATIENT
Start: 2021-07-29 | End: 2021-08-02 | Stop reason: HOSPADM

## 2021-07-29 RX ORDER — IBUPROFEN 200 MG
16 TABLET ORAL
Status: DISCONTINUED | OUTPATIENT
Start: 2021-07-29 | End: 2021-08-02 | Stop reason: HOSPADM

## 2021-07-29 RX ORDER — LORAZEPAM 0.5 MG/1
0.5 TABLET ORAL EVERY 12 HOURS PRN
Status: DISCONTINUED | OUTPATIENT
Start: 2021-07-29 | End: 2021-08-02 | Stop reason: HOSPADM

## 2021-07-29 RX ORDER — FLUOXETINE HYDROCHLORIDE 20 MG/1
20 CAPSULE ORAL DAILY
Status: DISCONTINUED | OUTPATIENT
Start: 2021-07-29 | End: 2021-08-02 | Stop reason: HOSPADM

## 2021-07-29 RX ORDER — SODIUM CHLORIDE 9 MG/ML
INJECTION, SOLUTION INTRAVENOUS CONTINUOUS
Status: DISCONTINUED | OUTPATIENT
Start: 2021-07-29 | End: 2021-07-31

## 2021-07-29 RX ORDER — GLUCAGON 1 MG
1 KIT INJECTION
Status: DISCONTINUED | OUTPATIENT
Start: 2021-07-29 | End: 2021-08-02 | Stop reason: HOSPADM

## 2021-07-29 RX ORDER — ATORVASTATIN CALCIUM 40 MG/1
40 TABLET, FILM COATED ORAL DAILY
Status: DISCONTINUED | OUTPATIENT
Start: 2021-07-29 | End: 2021-08-02 | Stop reason: HOSPADM

## 2021-07-29 RX ORDER — ENOXAPARIN SODIUM 100 MG/ML
30 INJECTION SUBCUTANEOUS EVERY 24 HOURS
Status: DISCONTINUED | OUTPATIENT
Start: 2021-07-29 | End: 2021-07-31

## 2021-07-29 RX ORDER — IBUPROFEN 200 MG
24 TABLET ORAL
Status: DISCONTINUED | OUTPATIENT
Start: 2021-07-29 | End: 2021-08-02 | Stop reason: HOSPADM

## 2021-07-29 RX ADMIN — ATORVASTATIN CALCIUM 40 MG: 40 TABLET, FILM COATED ORAL at 10:07

## 2021-07-29 RX ADMIN — PIPERACILLIN SODIUM AND TAZOBACTAM SODIUM 3.38 G: 3; .375 INJECTION, POWDER, LYOPHILIZED, FOR SOLUTION INTRAVENOUS at 10:07

## 2021-07-29 RX ADMIN — PIPERACILLIN SODIUM AND TAZOBACTAM SODIUM 3.38 G: 3; .375 INJECTION, POWDER, LYOPHILIZED, FOR SOLUTION INTRAVENOUS at 04:07

## 2021-07-29 RX ADMIN — SODIUM CHLORIDE: 0.9 INJECTION, SOLUTION INTRAVENOUS at 02:07

## 2021-07-29 RX ADMIN — VALPROIC ACID 1000 MG: 250 SOLUTION ORAL at 10:07

## 2021-07-29 RX ADMIN — VANCOMYCIN HYDROCHLORIDE 1250 MG: 1.25 INJECTION, POWDER, LYOPHILIZED, FOR SOLUTION INTRAVENOUS at 02:07

## 2021-07-29 RX ADMIN — PANTOPRAZOLE SODIUM 40 MG: 40 INJECTION, POWDER, FOR SOLUTION INTRAVENOUS at 10:07

## 2021-07-29 RX ADMIN — ENOXAPARIN SODIUM 30 MG: 100 INJECTION SUBCUTANEOUS at 04:07

## 2021-07-29 RX ADMIN — MUPIROCIN: 20 OINTMENT TOPICAL at 12:07

## 2021-07-29 RX ADMIN — FLUOXETINE HYDROCHLORIDE 20 MG: 20 CAPSULE ORAL at 10:07

## 2021-07-29 RX ADMIN — VALPROIC ACID 1000 MG: 250 SOLUTION ORAL at 08:07

## 2021-07-29 RX ADMIN — MUPIROCIN: 20 OINTMENT TOPICAL at 08:07

## 2021-07-30 PROBLEM — E87.5 HYPERKALEMIA: Status: RESOLVED | Noted: 2021-04-10 | Resolved: 2021-07-30

## 2021-07-30 PROBLEM — R65.20 SEVERE SEPSIS: Status: RESOLVED | Noted: 2021-05-28 | Resolved: 2021-07-30

## 2021-07-30 PROBLEM — A41.9 SEVERE SEPSIS: Status: RESOLVED | Noted: 2021-05-28 | Resolved: 2021-07-30

## 2021-07-30 LAB
ALBUMIN SERPL BCP-MCNC: 2 G/DL (ref 3.5–5.2)
ALP SERPL-CCNC: 93 U/L (ref 55–135)
ALT SERPL W/O P-5'-P-CCNC: 16 U/L (ref 10–44)
ANION GAP SERPL CALC-SCNC: 10 MMOL/L (ref 8–16)
AST SERPL-CCNC: 20 U/L (ref 10–40)
BACTERIA UR CULT: NORMAL
BACTERIA UR CULT: NORMAL
BASOPHILS # BLD AUTO: 0.06 K/UL (ref 0–0.2)
BASOPHILS NFR BLD: 0.4 % (ref 0–1.9)
BILIRUB SERPL-MCNC: 0.3 MG/DL (ref 0.1–1)
BUN SERPL-MCNC: 52 MG/DL (ref 6–20)
CALCIUM SERPL-MCNC: 8.9 MG/DL (ref 8.7–10.5)
CHLORIDE SERPL-SCNC: 104 MMOL/L (ref 95–110)
CO2 SERPL-SCNC: 24 MMOL/L (ref 23–29)
CREAT SERPL-MCNC: 2.2 MG/DL (ref 0.5–1.4)
DIFFERENTIAL METHOD: ABNORMAL
EOSINOPHIL # BLD AUTO: 0.4 K/UL (ref 0–0.5)
EOSINOPHIL NFR BLD: 2.2 % (ref 0–8)
ERYTHROCYTE [DISTWIDTH] IN BLOOD BY AUTOMATED COUNT: 15.7 % (ref 11.5–14.5)
EST. GFR  (AFRICAN AMERICAN): 42 ML/MIN/1.73 M^2
EST. GFR  (NON AFRICAN AMERICAN): 36 ML/MIN/1.73 M^2
GLUCOSE SERPL-MCNC: 85 MG/DL (ref 70–110)
HCT VFR BLD AUTO: 28.1 % (ref 40–54)
HGB BLD-MCNC: 8.5 G/DL (ref 14–18)
IMM GRANULOCYTES # BLD AUTO: 0.1 K/UL (ref 0–0.04)
IMM GRANULOCYTES NFR BLD AUTO: 0.6 % (ref 0–0.5)
LYMPHOCYTES # BLD AUTO: 1.4 K/UL (ref 1–4.8)
LYMPHOCYTES NFR BLD: 8.6 % (ref 18–48)
MAGNESIUM SERPL-MCNC: 2 MG/DL (ref 1.6–2.6)
MCH RBC QN AUTO: 28.8 PG (ref 27–31)
MCHC RBC AUTO-ENTMCNC: 30.2 G/DL (ref 32–36)
MCV RBC AUTO: 95 FL (ref 82–98)
MONOCYTES # BLD AUTO: 1.2 K/UL (ref 0.3–1)
MONOCYTES NFR BLD: 7.5 % (ref 4–15)
NEUTROPHILS # BLD AUTO: 12.9 K/UL (ref 1.8–7.7)
NEUTROPHILS NFR BLD: 80.7 % (ref 38–73)
NRBC BLD-RTO: 0 /100 WBC
PLATELET # BLD AUTO: 155 K/UL (ref 150–450)
PMV BLD AUTO: 9.9 FL (ref 9.2–12.9)
POTASSIUM SERPL-SCNC: 4.2 MMOL/L (ref 3.5–5.1)
PROT SERPL-MCNC: 5.8 G/DL (ref 6–8.4)
RBC # BLD AUTO: 2.95 M/UL (ref 4.6–6.2)
SODIUM SERPL-SCNC: 138 MMOL/L (ref 136–145)
VANCOMYCIN SERPL-MCNC: 12.6 UG/ML
WBC # BLD AUTO: 15.99 K/UL (ref 3.9–12.7)

## 2021-07-30 PROCEDURE — 63600175 PHARM REV CODE 636 W HCPCS: Performed by: HOSPITALIST

## 2021-07-30 PROCEDURE — 36410 VNPNXR 3YR/> PHY/QHP DX/THER: CPT

## 2021-07-30 PROCEDURE — 36415 COLL VENOUS BLD VENIPUNCTURE: CPT | Performed by: HOSPITALIST

## 2021-07-30 PROCEDURE — 11000001 HC ACUTE MED/SURG PRIVATE ROOM

## 2021-07-30 PROCEDURE — 80053 COMPREHEN METABOLIC PANEL: CPT | Performed by: HOSPITALIST

## 2021-07-30 PROCEDURE — 76937 US GUIDE VASCULAR ACCESS: CPT

## 2021-07-30 PROCEDURE — C9113 INJ PANTOPRAZOLE SODIUM, VIA: HCPCS | Performed by: HOSPITALIST

## 2021-07-30 PROCEDURE — C1751 CATH, INF, PER/CENT/MIDLINE: HCPCS

## 2021-07-30 PROCEDURE — 83735 ASSAY OF MAGNESIUM: CPT | Performed by: HOSPITALIST

## 2021-07-30 PROCEDURE — 80202 ASSAY OF VANCOMYCIN: CPT | Performed by: HOSPITALIST

## 2021-07-30 PROCEDURE — 25000003 PHARM REV CODE 250: Performed by: HOSPITALIST

## 2021-07-30 PROCEDURE — 85025 COMPLETE CBC W/AUTO DIFF WBC: CPT | Performed by: HOSPITALIST

## 2021-07-30 RX ADMIN — ATORVASTATIN CALCIUM 40 MG: 40 TABLET, FILM COATED ORAL at 09:07

## 2021-07-30 RX ADMIN — ENOXAPARIN SODIUM 30 MG: 100 INJECTION SUBCUTANEOUS at 04:07

## 2021-07-30 RX ADMIN — VALPROIC ACID 1000 MG: 250 SOLUTION ORAL at 08:07

## 2021-07-30 RX ADMIN — VANCOMYCIN HYDROCHLORIDE 1500 MG: 1.5 INJECTION, POWDER, LYOPHILIZED, FOR SOLUTION INTRAVENOUS at 05:07

## 2021-07-30 RX ADMIN — PIPERACILLIN SODIUM AND TAZOBACTAM SODIUM 3.38 G: 3; .375 INJECTION, POWDER, LYOPHILIZED, FOR SOLUTION INTRAVENOUS at 04:07

## 2021-07-30 RX ADMIN — VALPROIC ACID 1000 MG: 250 SOLUTION ORAL at 09:07

## 2021-07-30 RX ADMIN — PANTOPRAZOLE SODIUM 40 MG: 40 INJECTION, POWDER, FOR SOLUTION INTRAVENOUS at 09:07

## 2021-07-30 RX ADMIN — PIPERACILLIN SODIUM AND TAZOBACTAM SODIUM 3.38 G: 3; .375 INJECTION, POWDER, LYOPHILIZED, FOR SOLUTION INTRAVENOUS at 10:07

## 2021-07-30 RX ADMIN — SODIUM CHLORIDE: 0.9 INJECTION, SOLUTION INTRAVENOUS at 03:07

## 2021-07-30 RX ADMIN — PANTOPRAZOLE SODIUM 40 MG: 40 INJECTION, POWDER, FOR SOLUTION INTRAVENOUS at 08:07

## 2021-07-30 RX ADMIN — MUPIROCIN: 20 OINTMENT TOPICAL at 08:07

## 2021-07-30 RX ADMIN — MUPIROCIN: 20 OINTMENT TOPICAL at 09:07

## 2021-07-30 RX ADMIN — FLUOXETINE HYDROCHLORIDE 20 MG: 20 CAPSULE ORAL at 09:07

## 2021-07-30 RX ADMIN — PIPERACILLIN SODIUM AND TAZOBACTAM SODIUM 3.38 G: 3; .375 INJECTION, POWDER, LYOPHILIZED, FOR SOLUTION INTRAVENOUS at 09:07

## 2021-07-30 RX ADMIN — SODIUM CHLORIDE: 0.9 INJECTION, SOLUTION INTRAVENOUS at 04:07

## 2021-07-31 PROBLEM — Z16.12 ESBL (EXTENDED SPECTRUM BETA-LACTAMASE) PRODUCING BACTERIA INFECTION: Status: ACTIVE | Noted: 2021-07-31

## 2021-07-31 PROBLEM — A49.9 ESBL (EXTENDED SPECTRUM BETA-LACTAMASE) PRODUCING BACTERIA INFECTION: Status: ACTIVE | Noted: 2021-07-31

## 2021-07-31 LAB
ALBUMIN SERPL BCP-MCNC: 1.9 G/DL (ref 3.5–5.2)
ALP SERPL-CCNC: 93 U/L (ref 55–135)
ALT SERPL W/O P-5'-P-CCNC: 16 U/L (ref 10–44)
ANION GAP SERPL CALC-SCNC: 10 MMOL/L (ref 8–16)
AST SERPL-CCNC: 19 U/L (ref 10–40)
BACTERIA BLD CULT: ABNORMAL
BASOPHILS # BLD AUTO: 0.03 K/UL (ref 0–0.2)
BASOPHILS NFR BLD: 0.3 % (ref 0–1.9)
BILIRUB SERPL-MCNC: 0.3 MG/DL (ref 0.1–1)
BUN SERPL-MCNC: 41 MG/DL (ref 6–20)
CALCIUM SERPL-MCNC: 8.5 MG/DL (ref 8.7–10.5)
CHLORIDE SERPL-SCNC: 108 MMOL/L (ref 95–110)
CO2 SERPL-SCNC: 22 MMOL/L (ref 23–29)
CREAT SERPL-MCNC: 1.6 MG/DL (ref 0.5–1.4)
DIFFERENTIAL METHOD: ABNORMAL
EOSINOPHIL # BLD AUTO: 0.4 K/UL (ref 0–0.5)
EOSINOPHIL NFR BLD: 3.7 % (ref 0–8)
ERYTHROCYTE [DISTWIDTH] IN BLOOD BY AUTOMATED COUNT: 15.5 % (ref 11.5–14.5)
EST. GFR  (AFRICAN AMERICAN): >60 ML/MIN/1.73 M^2
EST. GFR  (NON AFRICAN AMERICAN): 53 ML/MIN/1.73 M^2
GLUCOSE SERPL-MCNC: 117 MG/DL (ref 70–110)
HCT VFR BLD AUTO: 27.2 % (ref 40–54)
HGB BLD-MCNC: 8.3 G/DL (ref 14–18)
IMM GRANULOCYTES # BLD AUTO: 0.05 K/UL (ref 0–0.04)
IMM GRANULOCYTES NFR BLD AUTO: 0.5 % (ref 0–0.5)
LYMPHOCYTES # BLD AUTO: 1.1 K/UL (ref 1–4.8)
LYMPHOCYTES NFR BLD: 10.2 % (ref 18–48)
MAGNESIUM SERPL-MCNC: 1.6 MG/DL (ref 1.6–2.6)
MCH RBC QN AUTO: 29.1 PG (ref 27–31)
MCHC RBC AUTO-ENTMCNC: 30.5 G/DL (ref 32–36)
MCV RBC AUTO: 95 FL (ref 82–98)
MONOCYTES # BLD AUTO: 0.9 K/UL (ref 0.3–1)
MONOCYTES NFR BLD: 8.2 % (ref 4–15)
NEUTROPHILS # BLD AUTO: 8.1 K/UL (ref 1.8–7.7)
NEUTROPHILS NFR BLD: 77.1 % (ref 38–73)
NRBC BLD-RTO: 0 /100 WBC
PLATELET # BLD AUTO: 149 K/UL (ref 150–450)
PMV BLD AUTO: 10 FL (ref 9.2–12.9)
POTASSIUM SERPL-SCNC: 3.7 MMOL/L (ref 3.5–5.1)
PROT SERPL-MCNC: 5.6 G/DL (ref 6–8.4)
RBC # BLD AUTO: 2.85 M/UL (ref 4.6–6.2)
SODIUM SERPL-SCNC: 140 MMOL/L (ref 136–145)
VANCOMYCIN SERPL-MCNC: 21.3 UG/ML
WBC # BLD AUTO: 10.54 K/UL (ref 3.9–12.7)

## 2021-07-31 PROCEDURE — 83735 ASSAY OF MAGNESIUM: CPT | Performed by: HOSPITALIST

## 2021-07-31 PROCEDURE — 25000003 PHARM REV CODE 250: Performed by: HOSPITALIST

## 2021-07-31 PROCEDURE — 63600175 PHARM REV CODE 636 W HCPCS: Performed by: HOSPITALIST

## 2021-07-31 PROCEDURE — 11000001 HC ACUTE MED/SURG PRIVATE ROOM

## 2021-07-31 PROCEDURE — C9113 INJ PANTOPRAZOLE SODIUM, VIA: HCPCS | Performed by: HOSPITALIST

## 2021-07-31 PROCEDURE — 36415 COLL VENOUS BLD VENIPUNCTURE: CPT | Performed by: HOSPITALIST

## 2021-07-31 PROCEDURE — 80202 ASSAY OF VANCOMYCIN: CPT | Performed by: HOSPITALIST

## 2021-07-31 PROCEDURE — 85025 COMPLETE CBC W/AUTO DIFF WBC: CPT | Performed by: HOSPITALIST

## 2021-07-31 PROCEDURE — 80053 COMPREHEN METABOLIC PANEL: CPT | Performed by: HOSPITALIST

## 2021-07-31 RX ORDER — VANCOMYCIN HCL IN 5 % DEXTROSE 1G/250ML
1000 PLASTIC BAG, INJECTION (ML) INTRAVENOUS ONCE
Status: COMPLETED | OUTPATIENT
Start: 2021-07-31 | End: 2021-07-31

## 2021-07-31 RX ORDER — LOSARTAN POTASSIUM 25 MG/1
50 TABLET ORAL DAILY
Status: DISCONTINUED | OUTPATIENT
Start: 2021-07-31 | End: 2021-08-02 | Stop reason: HOSPADM

## 2021-07-31 RX ORDER — ENOXAPARIN SODIUM 100 MG/ML
40 INJECTION SUBCUTANEOUS EVERY 24 HOURS
Status: DISCONTINUED | OUTPATIENT
Start: 2021-07-31 | End: 2021-08-02 | Stop reason: HOSPADM

## 2021-07-31 RX ORDER — VANCOMYCIN HCL IN 5 % DEXTROSE 1G/250ML
1000 PLASTIC BAG, INJECTION (ML) INTRAVENOUS ONCE
Status: DISCONTINUED | OUTPATIENT
Start: 2021-07-31 | End: 2021-07-31 | Stop reason: DRUGHIGH

## 2021-07-31 RX ADMIN — LORAZEPAM 0.5 MG: 0.5 TABLET ORAL at 04:07

## 2021-07-31 RX ADMIN — MUPIROCIN: 20 OINTMENT TOPICAL at 08:07

## 2021-07-31 RX ADMIN — VANCOMYCIN HYDROCHLORIDE 1000 MG: 1 INJECTION, POWDER, LYOPHILIZED, FOR SOLUTION INTRAVENOUS at 05:07

## 2021-07-31 RX ADMIN — PIPERACILLIN SODIUM AND TAZOBACTAM SODIUM 3.38 G: 3; .375 INJECTION, POWDER, LYOPHILIZED, FOR SOLUTION INTRAVENOUS at 04:07

## 2021-07-31 RX ADMIN — ERTAPENEM 1 G: 1 INJECTION INTRAMUSCULAR; INTRAVENOUS at 05:07

## 2021-07-31 RX ADMIN — ACETAMINOPHEN 650 MG: 325 TABLET ORAL at 09:07

## 2021-07-31 RX ADMIN — ENOXAPARIN SODIUM 40 MG: 40 INJECTION SUBCUTANEOUS at 04:07

## 2021-07-31 RX ADMIN — LORAZEPAM 0.5 MG: 0.5 TABLET ORAL at 09:07

## 2021-07-31 RX ADMIN — LOSARTAN POTASSIUM 50 MG: 25 TABLET, FILM COATED ORAL at 04:07

## 2021-07-31 RX ADMIN — PIPERACILLIN SODIUM AND TAZOBACTAM SODIUM 3.38 G: 3; .375 INJECTION, POWDER, LYOPHILIZED, FOR SOLUTION INTRAVENOUS at 09:07

## 2021-07-31 RX ADMIN — ATORVASTATIN CALCIUM 40 MG: 40 TABLET, FILM COATED ORAL at 08:07

## 2021-07-31 RX ADMIN — PANTOPRAZOLE SODIUM 40 MG: 40 INJECTION, POWDER, FOR SOLUTION INTRAVENOUS at 09:07

## 2021-07-31 RX ADMIN — MUPIROCIN: 20 OINTMENT TOPICAL at 09:07

## 2021-07-31 RX ADMIN — PANTOPRAZOLE SODIUM 40 MG: 40 INJECTION, POWDER, FOR SOLUTION INTRAVENOUS at 08:07

## 2021-07-31 RX ADMIN — VALPROIC ACID 1000 MG: 250 SOLUTION ORAL at 09:07

## 2021-07-31 RX ADMIN — FLUOXETINE HYDROCHLORIDE 20 MG: 20 CAPSULE ORAL at 08:07

## 2021-07-31 RX ADMIN — VALPROIC ACID 1000 MG: 250 SOLUTION ORAL at 08:07

## 2021-08-01 ENCOUNTER — OUTSIDE PLACE OF SERVICE (OUTPATIENT)
Dept: INFECTIOUS DISEASES | Facility: CLINIC | Age: 41
End: 2021-08-01
Payer: MEDICAID

## 2021-08-01 DIAGNOSIS — R33.9 URINARY RETENTION: ICD-10-CM

## 2021-08-01 DIAGNOSIS — A49.9 ESBL (EXTENDED SPECTRUM BETA-LACTAMASE) PRODUCING BACTERIA INFECTION: ICD-10-CM

## 2021-08-01 DIAGNOSIS — R78.81 BACTEREMIA: ICD-10-CM

## 2021-08-01 DIAGNOSIS — Z16.12 ESBL (EXTENDED SPECTRUM BETA-LACTAMASE) PRODUCING BACTERIA INFECTION: ICD-10-CM

## 2021-08-01 DIAGNOSIS — N17.9 AKI (ACUTE KIDNEY INJURY): ICD-10-CM

## 2021-08-01 LAB
ALBUMIN SERPL BCP-MCNC: 2.2 G/DL (ref 3.5–5.2)
ALP SERPL-CCNC: 130 U/L (ref 55–135)
ALT SERPL W/O P-5'-P-CCNC: 17 U/L (ref 10–44)
ANION GAP SERPL CALC-SCNC: 12 MMOL/L (ref 8–16)
AST SERPL-CCNC: 23 U/L (ref 10–40)
BASOPHILS # BLD AUTO: 0.03 K/UL (ref 0–0.2)
BASOPHILS NFR BLD: 0.3 % (ref 0–1.9)
BILIRUB SERPL-MCNC: 0.3 MG/DL (ref 0.1–1)
BUN SERPL-MCNC: 28 MG/DL (ref 6–20)
CALCIUM SERPL-MCNC: 8.9 MG/DL (ref 8.7–10.5)
CHLORIDE SERPL-SCNC: 107 MMOL/L (ref 95–110)
CO2 SERPL-SCNC: 22 MMOL/L (ref 23–29)
CREAT SERPL-MCNC: 1.3 MG/DL (ref 0.5–1.4)
DIFFERENTIAL METHOD: ABNORMAL
EOSINOPHIL # BLD AUTO: 0.4 K/UL (ref 0–0.5)
EOSINOPHIL NFR BLD: 3.9 % (ref 0–8)
ERYTHROCYTE [DISTWIDTH] IN BLOOD BY AUTOMATED COUNT: 15.5 % (ref 11.5–14.5)
EST. GFR  (AFRICAN AMERICAN): >60 ML/MIN/1.73 M^2
EST. GFR  (NON AFRICAN AMERICAN): >60 ML/MIN/1.73 M^2
GLUCOSE SERPL-MCNC: 85 MG/DL (ref 70–110)
HCT VFR BLD AUTO: 34.4 % (ref 40–54)
HGB BLD-MCNC: 10 G/DL (ref 14–18)
IMM GRANULOCYTES # BLD AUTO: 0.08 K/UL (ref 0–0.04)
IMM GRANULOCYTES NFR BLD AUTO: 0.9 % (ref 0–0.5)
LYMPHOCYTES # BLD AUTO: 1.2 K/UL (ref 1–4.8)
LYMPHOCYTES NFR BLD: 13.3 % (ref 18–48)
MAGNESIUM SERPL-MCNC: 1.6 MG/DL (ref 1.6–2.6)
MCH RBC QN AUTO: 28.5 PG (ref 27–31)
MCHC RBC AUTO-ENTMCNC: 29.1 G/DL (ref 32–36)
MCV RBC AUTO: 98 FL (ref 82–98)
MONOCYTES # BLD AUTO: 0.9 K/UL (ref 0.3–1)
MONOCYTES NFR BLD: 10.4 % (ref 4–15)
NEUTROPHILS # BLD AUTO: 6.4 K/UL (ref 1.8–7.7)
NEUTROPHILS NFR BLD: 71.2 % (ref 38–73)
NRBC BLD-RTO: 0 /100 WBC
PLATELET # BLD AUTO: 146 K/UL (ref 150–450)
PMV BLD AUTO: 10.4 FL (ref 9.2–12.9)
POTASSIUM SERPL-SCNC: 3.9 MMOL/L (ref 3.5–5.1)
PROT SERPL-MCNC: 6.2 G/DL (ref 6–8.4)
RBC # BLD AUTO: 3.51 M/UL (ref 4.6–6.2)
SODIUM SERPL-SCNC: 141 MMOL/L (ref 136–145)
WBC # BLD AUTO: 8.97 K/UL (ref 3.9–12.7)

## 2021-08-01 PROCEDURE — 85025 COMPLETE CBC W/AUTO DIFF WBC: CPT | Performed by: HOSPITALIST

## 2021-08-01 PROCEDURE — 25000003 PHARM REV CODE 250: Performed by: HOSPITALIST

## 2021-08-01 PROCEDURE — 87040 BLOOD CULTURE FOR BACTERIA: CPT | Performed by: HOSPITALIST

## 2021-08-01 PROCEDURE — 63600175 PHARM REV CODE 636 W HCPCS: Performed by: HOSPITALIST

## 2021-08-01 PROCEDURE — 99223 PR INITIAL HOSPITAL CARE,LEVL III: ICD-10-PCS | Mod: S$GLB,,, | Performed by: INTERNAL MEDICINE

## 2021-08-01 PROCEDURE — 99223 1ST HOSP IP/OBS HIGH 75: CPT | Mod: S$GLB,,, | Performed by: INTERNAL MEDICINE

## 2021-08-01 PROCEDURE — 80053 COMPREHEN METABOLIC PANEL: CPT | Performed by: HOSPITALIST

## 2021-08-01 PROCEDURE — 11000001 HC ACUTE MED/SURG PRIVATE ROOM

## 2021-08-01 PROCEDURE — C9113 INJ PANTOPRAZOLE SODIUM, VIA: HCPCS | Performed by: HOSPITALIST

## 2021-08-01 PROCEDURE — 36415 COLL VENOUS BLD VENIPUNCTURE: CPT | Performed by: HOSPITALIST

## 2021-08-01 PROCEDURE — 83735 ASSAY OF MAGNESIUM: CPT | Performed by: HOSPITALIST

## 2021-08-01 RX ORDER — AMLODIPINE BESYLATE 2.5 MG/1
5 TABLET ORAL DAILY
Status: DISCONTINUED | OUTPATIENT
Start: 2021-08-01 | End: 2021-08-02 | Stop reason: HOSPADM

## 2021-08-01 RX ORDER — CLONIDINE HYDROCHLORIDE 0.1 MG/1
0.1 TABLET ORAL 3 TIMES DAILY PRN
Status: DISCONTINUED | OUTPATIENT
Start: 2021-08-02 | End: 2021-08-02 | Stop reason: HOSPADM

## 2021-08-01 RX ADMIN — MUPIROCIN: 20 OINTMENT TOPICAL at 09:08

## 2021-08-01 RX ADMIN — VALPROIC ACID 1000 MG: 250 SOLUTION ORAL at 08:08

## 2021-08-01 RX ADMIN — VALPROIC ACID 1000 MG: 250 SOLUTION ORAL at 09:08

## 2021-08-01 RX ADMIN — MUPIROCIN: 20 OINTMENT TOPICAL at 08:08

## 2021-08-01 RX ADMIN — PANTOPRAZOLE SODIUM 40 MG: 40 INJECTION, POWDER, FOR SOLUTION INTRAVENOUS at 09:08

## 2021-08-01 RX ADMIN — LORAZEPAM 0.5 MG: 0.5 TABLET ORAL at 01:08

## 2021-08-01 RX ADMIN — PANTOPRAZOLE SODIUM 40 MG: 40 INJECTION, POWDER, FOR SOLUTION INTRAVENOUS at 08:08

## 2021-08-01 RX ADMIN — ERTAPENEM 1 G: 1 INJECTION INTRAMUSCULAR; INTRAVENOUS at 05:08

## 2021-08-01 RX ADMIN — LOSARTAN POTASSIUM 50 MG: 25 TABLET, FILM COATED ORAL at 09:08

## 2021-08-01 RX ADMIN — FLUOXETINE HYDROCHLORIDE 20 MG: 20 CAPSULE ORAL at 09:08

## 2021-08-01 RX ADMIN — AMLODIPINE BESYLATE 5 MG: 2.5 TABLET ORAL at 01:08

## 2021-08-01 RX ADMIN — ATORVASTATIN CALCIUM 40 MG: 40 TABLET, FILM COATED ORAL at 09:08

## 2021-08-01 RX ADMIN — ENOXAPARIN SODIUM 40 MG: 40 INJECTION SUBCUTANEOUS at 05:08

## 2021-08-02 VITALS
RESPIRATION RATE: 20 BRPM | SYSTOLIC BLOOD PRESSURE: 158 MMHG | OXYGEN SATURATION: 96 % | BODY MASS INDEX: 27.11 KG/M2 | HEART RATE: 60 BPM | WEIGHT: 183 LBS | DIASTOLIC BLOOD PRESSURE: 84 MMHG | TEMPERATURE: 97 F | HEIGHT: 69 IN

## 2021-08-02 LAB
ALBUMIN SERPL BCP-MCNC: 2.3 G/DL (ref 3.5–5.2)
ALP SERPL-CCNC: 126 U/L (ref 55–135)
ALT SERPL W/O P-5'-P-CCNC: 23 U/L (ref 10–44)
ANION GAP SERPL CALC-SCNC: 11 MMOL/L (ref 8–16)
AST SERPL-CCNC: 31 U/L (ref 10–40)
BASOPHILS # BLD AUTO: 0.04 K/UL (ref 0–0.2)
BASOPHILS NFR BLD: 0.4 % (ref 0–1.9)
BILIRUB SERPL-MCNC: 0.3 MG/DL (ref 0.1–1)
BUN SERPL-MCNC: 21 MG/DL (ref 6–20)
CALCIUM SERPL-MCNC: 8.8 MG/DL (ref 8.7–10.5)
CHLORIDE SERPL-SCNC: 105 MMOL/L (ref 95–110)
CO2 SERPL-SCNC: 23 MMOL/L (ref 23–29)
CREAT SERPL-MCNC: 1.1 MG/DL (ref 0.5–1.4)
DIFFERENTIAL METHOD: ABNORMAL
EOSINOPHIL # BLD AUTO: 0.5 K/UL (ref 0–0.5)
EOSINOPHIL NFR BLD: 4 % (ref 0–8)
ERYTHROCYTE [DISTWIDTH] IN BLOOD BY AUTOMATED COUNT: 15.4 % (ref 11.5–14.5)
EST. GFR  (AFRICAN AMERICAN): >60 ML/MIN/1.73 M^2
EST. GFR  (NON AFRICAN AMERICAN): >60 ML/MIN/1.73 M^2
GLUCOSE SERPL-MCNC: 99 MG/DL (ref 70–110)
HCT VFR BLD AUTO: 31.5 % (ref 40–54)
HGB BLD-MCNC: 9.6 G/DL (ref 14–18)
IMM GRANULOCYTES # BLD AUTO: 0.2 K/UL (ref 0–0.04)
IMM GRANULOCYTES NFR BLD AUTO: 1.8 % (ref 0–0.5)
LYMPHOCYTES # BLD AUTO: 1.4 K/UL (ref 1–4.8)
LYMPHOCYTES NFR BLD: 12.9 % (ref 18–48)
MAGNESIUM SERPL-MCNC: 1.6 MG/DL (ref 1.6–2.6)
MCH RBC QN AUTO: 29.4 PG (ref 27–31)
MCHC RBC AUTO-ENTMCNC: 30.5 G/DL (ref 32–36)
MCV RBC AUTO: 97 FL (ref 82–98)
MONOCYTES # BLD AUTO: 1 K/UL (ref 0.3–1)
MONOCYTES NFR BLD: 8.5 % (ref 4–15)
NEUTROPHILS # BLD AUTO: 8.1 K/UL (ref 1.8–7.7)
NEUTROPHILS NFR BLD: 72.4 % (ref 38–73)
NRBC BLD-RTO: 0 /100 WBC
PLATELET # BLD AUTO: 154 K/UL (ref 150–450)
PMV BLD AUTO: 10.6 FL (ref 9.2–12.9)
POTASSIUM SERPL-SCNC: 3.8 MMOL/L (ref 3.5–5.1)
PROT SERPL-MCNC: 6.2 G/DL (ref 6–8.4)
RBC # BLD AUTO: 3.26 M/UL (ref 4.6–6.2)
SODIUM SERPL-SCNC: 139 MMOL/L (ref 136–145)
WBC # BLD AUTO: 11.19 K/UL (ref 3.9–12.7)

## 2021-08-02 PROCEDURE — 25000003 PHARM REV CODE 250: Performed by: HOSPITALIST

## 2021-08-02 PROCEDURE — 80053 COMPREHEN METABOLIC PANEL: CPT | Performed by: HOSPITALIST

## 2021-08-02 PROCEDURE — 83735 ASSAY OF MAGNESIUM: CPT | Performed by: HOSPITALIST

## 2021-08-02 PROCEDURE — 85025 COMPLETE CBC W/AUTO DIFF WBC: CPT | Performed by: HOSPITALIST

## 2021-08-02 PROCEDURE — 25000003 PHARM REV CODE 250: Performed by: NURSE PRACTITIONER

## 2021-08-02 PROCEDURE — C9113 INJ PANTOPRAZOLE SODIUM, VIA: HCPCS | Performed by: HOSPITALIST

## 2021-08-02 PROCEDURE — 63600175 PHARM REV CODE 636 W HCPCS: Performed by: HOSPITALIST

## 2021-08-02 PROCEDURE — 36415 COLL VENOUS BLD VENIPUNCTURE: CPT | Performed by: HOSPITALIST

## 2021-08-02 RX ORDER — AMLODIPINE BESYLATE 5 MG/1
5 TABLET ORAL DAILY
Qty: 30 TABLET | Refills: 11 | Status: SHIPPED | OUTPATIENT
Start: 2021-08-02 | End: 2024-02-29

## 2021-08-02 RX ORDER — LOSARTAN POTASSIUM 50 MG/1
50 TABLET ORAL DAILY
Qty: 90 TABLET | Refills: 3 | Status: ON HOLD | OUTPATIENT
Start: 2021-08-02 | End: 2022-10-27 | Stop reason: HOSPADM

## 2021-08-02 RX ORDER — CLONIDINE HYDROCHLORIDE 0.1 MG/1
0.1 TABLET ORAL 3 TIMES DAILY PRN
Qty: 90 TABLET | Refills: 0
Start: 2021-08-02 | End: 2024-02-29

## 2021-08-02 RX ADMIN — CLONIDINE HYDROCHLORIDE 0.1 MG: 0.1 TABLET ORAL at 01:08

## 2021-08-02 RX ADMIN — MUPIROCIN: 20 OINTMENT TOPICAL at 10:08

## 2021-08-02 RX ADMIN — AMLODIPINE BESYLATE 5 MG: 2.5 TABLET ORAL at 09:08

## 2021-08-02 RX ADMIN — VALPROIC ACID 1000 MG: 250 SOLUTION ORAL at 09:08

## 2021-08-02 RX ADMIN — LOSARTAN POTASSIUM 50 MG: 25 TABLET, FILM COATED ORAL at 09:08

## 2021-08-02 RX ADMIN — FLUOXETINE HYDROCHLORIDE 20 MG: 20 CAPSULE ORAL at 09:08

## 2021-08-02 RX ADMIN — ATORVASTATIN CALCIUM 40 MG: 40 TABLET, FILM COATED ORAL at 09:08

## 2021-08-02 RX ADMIN — CLONIDINE HYDROCHLORIDE 0.1 MG: 0.1 TABLET ORAL at 12:08

## 2021-08-02 RX ADMIN — PANTOPRAZOLE SODIUM 40 MG: 40 INJECTION, POWDER, FOR SOLUTION INTRAVENOUS at 09:08

## 2021-08-06 LAB — BACTERIA BLD CULT: NORMAL

## 2021-08-08 ENCOUNTER — HOSPITAL ENCOUNTER (INPATIENT)
Facility: HOSPITAL | Age: 41
LOS: 3 days | Discharge: NURSING, REHAB OR LONG-TERM CARE | DRG: 871 | End: 2021-08-11
Attending: EMERGENCY MEDICINE | Admitting: HOSPITALIST
Payer: MEDICAID

## 2021-08-08 DIAGNOSIS — L89.322 PRESSURE INJURY OF LEFT BUTTOCK, STAGE 2: ICD-10-CM

## 2021-08-08 DIAGNOSIS — R41.82 ALTERED MENTAL STATUS: ICD-10-CM

## 2021-08-08 DIAGNOSIS — A41.9 SEPSIS, DUE TO UNSPECIFIED ORGANISM, UNSPECIFIED WHETHER ACUTE ORGAN DYSFUNCTION PRESENT: Primary | ICD-10-CM

## 2021-08-08 DIAGNOSIS — Z91.89 AT HIGH RISK FOR PRESSURE INJURY OF SKIN: ICD-10-CM

## 2021-08-08 DIAGNOSIS — R00.0 TACHYCARDIA: ICD-10-CM

## 2021-08-08 DIAGNOSIS — R07.9 CHEST PAIN: ICD-10-CM

## 2021-08-08 DIAGNOSIS — R41.82 ALTERED MENTAL STATUS, UNSPECIFIED ALTERED MENTAL STATUS TYPE: ICD-10-CM

## 2021-08-08 DIAGNOSIS — L89.312 PRESSURE INJURY OF RIGHT BUTTOCK, STAGE 2: ICD-10-CM

## 2021-08-08 LAB
ALBUMIN SERPL BCP-MCNC: 3.1 G/DL (ref 3.5–5.2)
ALP SERPL-CCNC: 114 U/L (ref 55–135)
ALT SERPL W/O P-5'-P-CCNC: 13 U/L (ref 10–44)
ANION GAP SERPL CALC-SCNC: 12 MMOL/L (ref 8–16)
AST SERPL-CCNC: 17 U/L (ref 10–40)
BACTERIA #/AREA URNS HPF: ABNORMAL /HPF
BASOPHILS NFR BLD: 0 % (ref 0–1.9)
BILIRUB SERPL-MCNC: 0.3 MG/DL (ref 0.1–1)
BILIRUB UR QL STRIP: NEGATIVE
BUN SERPL-MCNC: 28 MG/DL (ref 6–20)
CALCIUM SERPL-MCNC: 9.8 MG/DL (ref 8.7–10.5)
CHLORIDE SERPL-SCNC: 98 MMOL/L (ref 95–110)
CLARITY UR: CLEAR
CO2 SERPL-SCNC: 24 MMOL/L (ref 23–29)
COLOR UR: YELLOW
CREAT SERPL-MCNC: 1.7 MG/DL (ref 0.5–1.4)
DIFFERENTIAL METHOD: ABNORMAL
EOSINOPHIL NFR BLD: 0 % (ref 0–8)
ERYTHROCYTE [DISTWIDTH] IN BLOOD BY AUTOMATED COUNT: 16.2 % (ref 11.5–14.5)
EST. GFR  (AFRICAN AMERICAN): 57 ML/MIN/1.73 M^2
EST. GFR  (NON AFRICAN AMERICAN): 49 ML/MIN/1.73 M^2
GLUCOSE SERPL-MCNC: 153 MG/DL (ref 70–110)
GLUCOSE UR QL STRIP: NEGATIVE
HCT VFR BLD AUTO: 37.4 % (ref 40–54)
HGB BLD-MCNC: 11.5 G/DL (ref 14–18)
HGB UR QL STRIP: ABNORMAL
IMM GRANULOCYTES # BLD AUTO: ABNORMAL K/UL
IMM GRANULOCYTES NFR BLD AUTO: ABNORMAL %
KETONES UR QL STRIP: NEGATIVE
LACTATE SERPL-SCNC: 1 MMOL/L (ref 0.5–2.2)
LACTATE SERPL-SCNC: 1 MMOL/L (ref 0.5–2.2)
LEUKOCYTE ESTERASE UR QL STRIP: ABNORMAL
LYMPHOCYTES NFR BLD: 3 % (ref 18–48)
MCH RBC QN AUTO: 29.7 PG (ref 27–31)
MCHC RBC AUTO-ENTMCNC: 30.7 G/DL (ref 32–36)
MCV RBC AUTO: 97 FL (ref 82–98)
MICROSCOPIC COMMENT: ABNORMAL
MONOCYTES NFR BLD: 7 % (ref 4–15)
NEUTROPHILS NFR BLD: 90 % (ref 38–73)
NITRITE UR QL STRIP: NEGATIVE
NRBC BLD-RTO: 0 /100 WBC
PH UR STRIP: 6 [PH] (ref 5–8)
PLATELET # BLD AUTO: 495 K/UL (ref 150–450)
PLATELET BLD QL SMEAR: ABNORMAL
PMV BLD AUTO: 9 FL (ref 9.2–12.9)
POTASSIUM SERPL-SCNC: 4.9 MMOL/L (ref 3.5–5.1)
PROT SERPL-MCNC: 8.3 G/DL (ref 6–8.4)
PROT UR QL STRIP: ABNORMAL
RBC # BLD AUTO: 3.87 M/UL (ref 4.6–6.2)
RBC #/AREA URNS HPF: 9 /HPF (ref 0–4)
SARS-COV-2 RDRP RESP QL NAA+PROBE: NEGATIVE
SODIUM SERPL-SCNC: 134 MMOL/L (ref 136–145)
SP GR UR STRIP: 1.01 (ref 1–1.03)
SQUAMOUS #/AREA URNS HPF: 2 /HPF
URN SPEC COLLECT METH UR: ABNORMAL
UROBILINOGEN UR STRIP-ACNC: NEGATIVE EU/DL
WBC # BLD AUTO: 39.57 K/UL (ref 3.9–12.7)
WBC #/AREA URNS HPF: 7 /HPF (ref 0–5)

## 2021-08-08 PROCEDURE — 96366 THER/PROPH/DIAG IV INF ADDON: CPT

## 2021-08-08 PROCEDURE — 85027 COMPLETE CBC AUTOMATED: CPT | Performed by: EMERGENCY MEDICINE

## 2021-08-08 PROCEDURE — 93010 EKG 12-LEAD: ICD-10-PCS | Mod: ,,, | Performed by: GENERAL PRACTICE

## 2021-08-08 PROCEDURE — 11000001 HC ACUTE MED/SURG PRIVATE ROOM

## 2021-08-08 PROCEDURE — U0002 COVID-19 LAB TEST NON-CDC: HCPCS | Performed by: EMERGENCY MEDICINE

## 2021-08-08 PROCEDURE — 93010 ELECTROCARDIOGRAM REPORT: CPT | Mod: ,,, | Performed by: GENERAL PRACTICE

## 2021-08-08 PROCEDURE — 27000207 HC ISOLATION

## 2021-08-08 PROCEDURE — 36415 COLL VENOUS BLD VENIPUNCTURE: CPT | Performed by: EMERGENCY MEDICINE

## 2021-08-08 PROCEDURE — 96367 TX/PROPH/DG ADDL SEQ IV INF: CPT

## 2021-08-08 PROCEDURE — 25000003 PHARM REV CODE 250: Performed by: HOSPITALIST

## 2021-08-08 PROCEDURE — 85007 BL SMEAR W/DIFF WBC COUNT: CPT | Performed by: EMERGENCY MEDICINE

## 2021-08-08 PROCEDURE — 63600175 PHARM REV CODE 636 W HCPCS: Performed by: HOSPITALIST

## 2021-08-08 PROCEDURE — 80053 COMPREHEN METABOLIC PANEL: CPT | Performed by: EMERGENCY MEDICINE

## 2021-08-08 PROCEDURE — 87086 URINE CULTURE/COLONY COUNT: CPT | Performed by: EMERGENCY MEDICINE

## 2021-08-08 PROCEDURE — 83605 ASSAY OF LACTIC ACID: CPT | Performed by: EMERGENCY MEDICINE

## 2021-08-08 PROCEDURE — 63600175 PHARM REV CODE 636 W HCPCS: Performed by: EMERGENCY MEDICINE

## 2021-08-08 PROCEDURE — 81000 URINALYSIS NONAUTO W/SCOPE: CPT | Performed by: EMERGENCY MEDICINE

## 2021-08-08 PROCEDURE — 87040 BLOOD CULTURE FOR BACTERIA: CPT | Performed by: EMERGENCY MEDICINE

## 2021-08-08 PROCEDURE — 93005 ELECTROCARDIOGRAM TRACING: CPT

## 2021-08-08 PROCEDURE — 25000003 PHARM REV CODE 250: Performed by: EMERGENCY MEDICINE

## 2021-08-08 PROCEDURE — 96365 THER/PROPH/DIAG IV INF INIT: CPT

## 2021-08-08 PROCEDURE — 99291 CRITICAL CARE FIRST HOUR: CPT | Mod: 25

## 2021-08-08 RX ORDER — VALPROIC ACID 250 MG/5ML
1000 SOLUTION ORAL EVERY 12 HOURS
Status: DISCONTINUED | OUTPATIENT
Start: 2021-08-08 | End: 2021-08-11 | Stop reason: HOSPADM

## 2021-08-08 RX ORDER — LANOLIN ALCOHOL/MO/W.PET/CERES
800 CREAM (GRAM) TOPICAL
Status: DISCONTINUED | OUTPATIENT
Start: 2021-08-08 | End: 2021-08-11 | Stop reason: HOSPADM

## 2021-08-08 RX ORDER — IBUPROFEN 200 MG
24 TABLET ORAL
Status: DISCONTINUED | OUTPATIENT
Start: 2021-08-08 | End: 2021-08-11 | Stop reason: HOSPADM

## 2021-08-08 RX ORDER — ENOXAPARIN SODIUM 100 MG/ML
40 INJECTION SUBCUTANEOUS EVERY 24 HOURS
Status: DISCONTINUED | OUTPATIENT
Start: 2021-08-08 | End: 2021-08-11 | Stop reason: HOSPADM

## 2021-08-08 RX ORDER — LOSARTAN POTASSIUM 25 MG/1
50 TABLET ORAL DAILY
Status: DISCONTINUED | OUTPATIENT
Start: 2021-08-09 | End: 2021-08-11 | Stop reason: HOSPADM

## 2021-08-08 RX ORDER — FLUOXETINE 10 MG/1
20 CAPSULE ORAL DAILY
Status: DISCONTINUED | OUTPATIENT
Start: 2021-08-09 | End: 2021-08-11 | Stop reason: HOSPADM

## 2021-08-08 RX ORDER — ATORVASTATIN CALCIUM 40 MG/1
40 TABLET, FILM COATED ORAL DAILY
Status: DISCONTINUED | OUTPATIENT
Start: 2021-08-09 | End: 2021-08-11 | Stop reason: HOSPADM

## 2021-08-08 RX ORDER — AMLODIPINE BESYLATE 5 MG/1
5 TABLET ORAL DAILY
Status: DISCONTINUED | OUTPATIENT
Start: 2021-08-09 | End: 2021-08-11 | Stop reason: HOSPADM

## 2021-08-08 RX ORDER — ACETAMINOPHEN 325 MG/1
650 TABLET ORAL EVERY 4 HOURS PRN
Status: DISCONTINUED | OUTPATIENT
Start: 2021-08-08 | End: 2021-08-11 | Stop reason: HOSPADM

## 2021-08-08 RX ORDER — IBUPROFEN 200 MG
16 TABLET ORAL
Status: DISCONTINUED | OUTPATIENT
Start: 2021-08-08 | End: 2021-08-11 | Stop reason: HOSPADM

## 2021-08-08 RX ORDER — GLUCAGON 1 MG
1 KIT INJECTION
Status: DISCONTINUED | OUTPATIENT
Start: 2021-08-08 | End: 2021-08-11 | Stop reason: HOSPADM

## 2021-08-08 RX ORDER — SODIUM CHLORIDE 0.9 % (FLUSH) 0.9 %
10 SYRINGE (ML) INJECTION
Status: DISCONTINUED | OUTPATIENT
Start: 2021-08-08 | End: 2021-08-11 | Stop reason: HOSPADM

## 2021-08-08 RX ORDER — SODIUM CHLORIDE 9 MG/ML
INJECTION, SOLUTION INTRAVENOUS CONTINUOUS
Status: DISCONTINUED | OUTPATIENT
Start: 2021-08-08 | End: 2021-08-11

## 2021-08-08 RX ORDER — ERTAPENEM 1 G/1
INJECTION, POWDER, LYOPHILIZED, FOR SOLUTION INTRAMUSCULAR; INTRAVENOUS
Status: ON HOLD | COMMUNITY
Start: 2021-08-02 | End: 2021-08-08

## 2021-08-08 RX ORDER — PANTOPRAZOLE SODIUM 40 MG/1
40 TABLET, DELAYED RELEASE ORAL 2 TIMES DAILY
Status: DISCONTINUED | OUTPATIENT
Start: 2021-08-08 | End: 2021-08-11 | Stop reason: HOSPADM

## 2021-08-08 RX ORDER — MUPIROCIN 20 MG/G
OINTMENT TOPICAL 2 TIMES DAILY
Status: DISCONTINUED | OUTPATIENT
Start: 2021-08-08 | End: 2021-08-11 | Stop reason: HOSPADM

## 2021-08-08 RX ADMIN — ENOXAPARIN SODIUM 40 MG: 100 INJECTION SUBCUTANEOUS at 09:08

## 2021-08-08 RX ADMIN — SODIUM CHLORIDE, SODIUM LACTATE, POTASSIUM CHLORIDE, AND CALCIUM CHLORIDE 2448 ML: .6; .31; .03; .02 INJECTION, SOLUTION INTRAVENOUS at 02:08

## 2021-08-08 RX ADMIN — VALPROIC ACID 1000 MG: 250 SOLUTION ORAL at 09:08

## 2021-08-08 RX ADMIN — VANCOMYCIN HYDROCHLORIDE 1750 MG: 1 INJECTION, POWDER, LYOPHILIZED, FOR SOLUTION INTRAVENOUS at 05:08

## 2021-08-08 RX ADMIN — MUPIROCIN: 20 OINTMENT TOPICAL at 09:08

## 2021-08-08 RX ADMIN — PIPERACILLIN AND TAZOBACTAM 4.5 G: 4; .5 INJECTION, POWDER, LYOPHILIZED, FOR SOLUTION INTRAVENOUS; PARENTERAL at 02:08

## 2021-08-08 RX ADMIN — ERTAPENEM 1 G: 1 INJECTION INTRAMUSCULAR; INTRAVENOUS at 09:08

## 2021-08-08 RX ADMIN — SODIUM CHLORIDE: 0.9 INJECTION, SOLUTION INTRAVENOUS at 09:08

## 2021-08-09 ENCOUNTER — OUTSIDE PLACE OF SERVICE (OUTPATIENT)
Dept: INFECTIOUS DISEASES | Facility: CLINIC | Age: 41
End: 2021-08-09
Payer: MEDICAID

## 2021-08-09 DIAGNOSIS — Z16.12 ESBL (EXTENDED SPECTRUM BETA-LACTAMASE) PRODUCING BACTERIA INFECTION: ICD-10-CM

## 2021-08-09 DIAGNOSIS — A41.9 SEPSIS: ICD-10-CM

## 2021-08-09 DIAGNOSIS — R78.81 BACTEREMIA: ICD-10-CM

## 2021-08-09 DIAGNOSIS — A49.9 ESBL (EXTENDED SPECTRUM BETA-LACTAMASE) PRODUCING BACTERIA INFECTION: ICD-10-CM

## 2021-08-09 LAB
ALBUMIN SERPL BCP-MCNC: 2.7 G/DL (ref 3.5–5.2)
ALP SERPL-CCNC: 106 U/L (ref 55–135)
ALT SERPL W/O P-5'-P-CCNC: 12 U/L (ref 10–44)
ANION GAP SERPL CALC-SCNC: 12 MMOL/L (ref 8–16)
AST SERPL-CCNC: 18 U/L (ref 10–40)
BASOPHILS # BLD AUTO: 0.04 K/UL (ref 0–0.2)
BASOPHILS NFR BLD: 0.2 % (ref 0–1.9)
BILIRUB SERPL-MCNC: 0.3 MG/DL (ref 0.1–1)
BUN SERPL-MCNC: 23 MG/DL (ref 6–20)
CALCIUM SERPL-MCNC: 9.5 MG/DL (ref 8.7–10.5)
CHLORIDE SERPL-SCNC: 99 MMOL/L (ref 95–110)
CO2 SERPL-SCNC: 24 MMOL/L (ref 23–29)
CREAT SERPL-MCNC: 1.2 MG/DL (ref 0.5–1.4)
DIFFERENTIAL METHOD: ABNORMAL
EOSINOPHIL # BLD AUTO: 0 K/UL (ref 0–0.5)
EOSINOPHIL NFR BLD: 0 % (ref 0–8)
ERYTHROCYTE [DISTWIDTH] IN BLOOD BY AUTOMATED COUNT: 15.9 % (ref 11.5–14.5)
EST. GFR  (AFRICAN AMERICAN): >60 ML/MIN/1.73 M^2
EST. GFR  (NON AFRICAN AMERICAN): >60 ML/MIN/1.73 M^2
GLUCOSE SERPL-MCNC: 87 MG/DL (ref 70–110)
HCT VFR BLD AUTO: 35.6 % (ref 40–54)
HGB BLD-MCNC: 10.6 G/DL (ref 14–18)
IMM GRANULOCYTES # BLD AUTO: 0.37 K/UL (ref 0–0.04)
IMM GRANULOCYTES NFR BLD AUTO: 1.4 % (ref 0–0.5)
LYMPHOCYTES # BLD AUTO: 1.7 K/UL (ref 1–4.8)
LYMPHOCYTES NFR BLD: 6.5 % (ref 18–48)
MAGNESIUM SERPL-MCNC: 1.8 MG/DL (ref 1.6–2.6)
MCH RBC QN AUTO: 28.7 PG (ref 27–31)
MCHC RBC AUTO-ENTMCNC: 29.8 G/DL (ref 32–36)
MCV RBC AUTO: 97 FL (ref 82–98)
MONOCYTES # BLD AUTO: 1.2 K/UL (ref 0.3–1)
MONOCYTES NFR BLD: 4.7 % (ref 4–15)
NEUTROPHILS # BLD AUTO: 22.4 K/UL (ref 1.8–7.7)
NEUTROPHILS NFR BLD: 87.2 % (ref 38–73)
NRBC BLD-RTO: 0 /100 WBC
PLATELET # BLD AUTO: 404 K/UL (ref 150–450)
PMV BLD AUTO: 9.1 FL (ref 9.2–12.9)
POTASSIUM SERPL-SCNC: 4.9 MMOL/L (ref 3.5–5.1)
PROT SERPL-MCNC: 7 G/DL (ref 6–8.4)
RBC # BLD AUTO: 3.69 M/UL (ref 4.6–6.2)
SODIUM SERPL-SCNC: 135 MMOL/L (ref 136–145)
WBC # BLD AUTO: 25.67 K/UL (ref 3.9–12.7)

## 2021-08-09 PROCEDURE — 25500020 PHARM REV CODE 255

## 2021-08-09 PROCEDURE — 25000003 PHARM REV CODE 250: Performed by: HOSPITALIST

## 2021-08-09 PROCEDURE — 80053 COMPREHEN METABOLIC PANEL: CPT | Performed by: HOSPITALIST

## 2021-08-09 PROCEDURE — A9698 NON-RAD CONTRAST MATERIALNOC: HCPCS

## 2021-08-09 PROCEDURE — 63600175 PHARM REV CODE 636 W HCPCS: Performed by: HOSPITALIST

## 2021-08-09 PROCEDURE — 99223 PR INITIAL HOSPITAL CARE,LEVL III: ICD-10-PCS | Mod: S$GLB,,, | Performed by: INTERNAL MEDICINE

## 2021-08-09 PROCEDURE — 27000221 HC OXYGEN, UP TO 24 HOURS

## 2021-08-09 PROCEDURE — 95816 PR EEG,W/AWAKE & DROWSY RECORD: ICD-10-PCS | Mod: 26,,, | Performed by: PSYCHIATRY & NEUROLOGY

## 2021-08-09 PROCEDURE — 99223 1ST HOSP IP/OBS HIGH 75: CPT | Mod: S$GLB,,, | Performed by: INTERNAL MEDICINE

## 2021-08-09 PROCEDURE — 11000001 HC ACUTE MED/SURG PRIVATE ROOM

## 2021-08-09 PROCEDURE — 95819 EEG AWAKE AND ASLEEP: CPT

## 2021-08-09 PROCEDURE — 85025 COMPLETE CBC W/AUTO DIFF WBC: CPT | Performed by: HOSPITALIST

## 2021-08-09 PROCEDURE — A9585 GADOBUTROL INJECTION: HCPCS | Performed by: HOSPITALIST

## 2021-08-09 PROCEDURE — 27000207 HC ISOLATION

## 2021-08-09 PROCEDURE — 83735 ASSAY OF MAGNESIUM: CPT | Performed by: HOSPITALIST

## 2021-08-09 PROCEDURE — 95816 EEG AWAKE AND DROWSY: CPT | Mod: 26,,, | Performed by: PSYCHIATRY & NEUROLOGY

## 2021-08-09 PROCEDURE — 94761 N-INVAS EAR/PLS OXIMETRY MLT: CPT

## 2021-08-09 PROCEDURE — 25500020 PHARM REV CODE 255: Performed by: HOSPITALIST

## 2021-08-09 PROCEDURE — 36415 COLL VENOUS BLD VENIPUNCTURE: CPT | Performed by: HOSPITALIST

## 2021-08-09 RX ORDER — GADOBUTROL 604.72 MG/ML
8 INJECTION INTRAVENOUS
Status: COMPLETED | OUTPATIENT
Start: 2021-08-09 | End: 2021-08-09

## 2021-08-09 RX ADMIN — PANTOPRAZOLE SODIUM 40 MG: 40 TABLET, DELAYED RELEASE ORAL at 08:08

## 2021-08-09 RX ADMIN — VALPROIC ACID 1000 MG: 250 SOLUTION ORAL at 10:08

## 2021-08-09 RX ADMIN — VALPROIC ACID 1000 MG: 250 SOLUTION ORAL at 08:08

## 2021-08-09 RX ADMIN — ERTAPENEM 1 G: 1 INJECTION INTRAMUSCULAR; INTRAVENOUS at 09:08

## 2021-08-09 RX ADMIN — IOHEXOL 75 ML: 350 INJECTION, SOLUTION INTRAVENOUS at 11:08

## 2021-08-09 RX ADMIN — GADOBUTROL 8 ML: 604.72 INJECTION INTRAVENOUS at 10:08

## 2021-08-09 RX ADMIN — IOHEXOL 75 ML: 9 SOLUTION ORAL at 11:08

## 2021-08-09 RX ADMIN — ENOXAPARIN SODIUM 40 MG: 100 INJECTION SUBCUTANEOUS at 05:08

## 2021-08-09 RX ADMIN — LOSARTAN POTASSIUM 50 MG: 25 TABLET, FILM COATED ORAL at 10:08

## 2021-08-09 RX ADMIN — LEVETIRACETAM 750 MG: 500 INJECTION, SOLUTION, CONCENTRATE INTRAVENOUS at 03:08

## 2021-08-09 RX ADMIN — FLUOXETINE 20 MG: 10 CAPSULE ORAL at 10:08

## 2021-08-09 RX ADMIN — PANTOPRAZOLE SODIUM 40 MG: 40 TABLET, DELAYED RELEASE ORAL at 10:08

## 2021-08-09 RX ADMIN — ATORVASTATIN CALCIUM 40 MG: 40 TABLET, FILM COATED ORAL at 10:08

## 2021-08-09 RX ADMIN — AMLODIPINE BESYLATE 5 MG: 5 TABLET ORAL at 10:08

## 2021-08-09 RX ADMIN — IOHEXOL 1000 ML: 9 SOLUTION ORAL at 10:08

## 2021-08-09 RX ADMIN — MUPIROCIN: 20 OINTMENT TOPICAL at 09:08

## 2021-08-10 ENCOUNTER — OUTSIDE PLACE OF SERVICE (OUTPATIENT)
Dept: INFECTIOUS DISEASES | Facility: CLINIC | Age: 41
End: 2021-08-10
Payer: MEDICAID

## 2021-08-10 DIAGNOSIS — N39.0 UTI (URINARY TRACT INFECTION): ICD-10-CM

## 2021-08-10 DIAGNOSIS — R50.9 FEVER: ICD-10-CM

## 2021-08-10 DIAGNOSIS — D72.829 LEUKOCYTOSIS: ICD-10-CM

## 2021-08-10 DIAGNOSIS — Z16.12 ESBL (EXTENDED SPECTRUM BETA-LACTAMASE) PRODUCING BACTERIA INFECTION: ICD-10-CM

## 2021-08-10 DIAGNOSIS — A49.9 ESBL (EXTENDED SPECTRUM BETA-LACTAMASE) PRODUCING BACTERIA INFECTION: ICD-10-CM

## 2021-08-10 LAB
ALBUMIN SERPL BCP-MCNC: 2.6 G/DL (ref 3.5–5.2)
ALP SERPL-CCNC: 82 U/L (ref 55–135)
ALT SERPL W/O P-5'-P-CCNC: 11 U/L (ref 10–44)
AMMONIA PLAS-SCNC: 34 UMOL/L (ref 10–50)
ANION GAP SERPL CALC-SCNC: 8 MMOL/L (ref 8–16)
AST SERPL-CCNC: 13 U/L (ref 10–40)
BACTERIA UR CULT: NO GROWTH
BASOPHILS # BLD AUTO: 0.02 K/UL (ref 0–0.2)
BASOPHILS NFR BLD: 0.1 % (ref 0–1.9)
BILIRUB SERPL-MCNC: 0.5 MG/DL (ref 0.1–1)
BUN SERPL-MCNC: 15 MG/DL (ref 6–20)
CALCIUM SERPL-MCNC: 9.1 MG/DL (ref 8.7–10.5)
CHLORIDE SERPL-SCNC: 97 MMOL/L (ref 95–110)
CO2 SERPL-SCNC: 26 MMOL/L (ref 23–29)
CREAT SERPL-MCNC: 1.2 MG/DL (ref 0.5–1.4)
CRP SERPL-MCNC: 23.2 MG/L (ref 0–8.2)
DIFFERENTIAL METHOD: ABNORMAL
EOSINOPHIL # BLD AUTO: 0 K/UL (ref 0–0.5)
EOSINOPHIL NFR BLD: 0.1 % (ref 0–8)
ERYTHROCYTE [DISTWIDTH] IN BLOOD BY AUTOMATED COUNT: 15.9 % (ref 11.5–14.5)
EST. GFR  (AFRICAN AMERICAN): >60 ML/MIN/1.73 M^2
EST. GFR  (NON AFRICAN AMERICAN): >60 ML/MIN/1.73 M^2
GLUCOSE SERPL-MCNC: 83 MG/DL (ref 70–110)
HCT VFR BLD AUTO: 31 % (ref 40–54)
HGB BLD-MCNC: 9.3 G/DL (ref 14–18)
IMM GRANULOCYTES # BLD AUTO: 0.14 K/UL (ref 0–0.04)
IMM GRANULOCYTES NFR BLD AUTO: 0.8 % (ref 0–0.5)
LYMPHOCYTES # BLD AUTO: 2 K/UL (ref 1–4.8)
LYMPHOCYTES NFR BLD: 11.8 % (ref 18–48)
MAGNESIUM SERPL-MCNC: 1.6 MG/DL (ref 1.6–2.6)
MCH RBC QN AUTO: 29.2 PG (ref 27–31)
MCHC RBC AUTO-ENTMCNC: 30 G/DL (ref 32–36)
MCV RBC AUTO: 97 FL (ref 82–98)
MONOCYTES # BLD AUTO: 1.4 K/UL (ref 0.3–1)
MONOCYTES NFR BLD: 8.1 % (ref 4–15)
NEUTROPHILS # BLD AUTO: 13.3 K/UL (ref 1.8–7.7)
NEUTROPHILS NFR BLD: 79.1 % (ref 38–73)
NRBC BLD-RTO: 0 /100 WBC
PLATELET # BLD AUTO: 341 K/UL (ref 150–450)
PMV BLD AUTO: 9.1 FL (ref 9.2–12.9)
POTASSIUM SERPL-SCNC: 4.3 MMOL/L (ref 3.5–5.1)
PROCALCITONIN SERPL IA-MCNC: 0.53 NG/ML
PROT SERPL-MCNC: 6.6 G/DL (ref 6–8.4)
RBC # BLD AUTO: 3.19 M/UL (ref 4.6–6.2)
SODIUM SERPL-SCNC: 131 MMOL/L (ref 136–145)
VALPROATE SERPL-MCNC: <12.5 UG/ML (ref 50–100)
VIT B12 SERPL-MCNC: 889 PG/ML (ref 210–950)
WBC # BLD AUTO: 16.85 K/UL (ref 3.9–12.7)

## 2021-08-10 PROCEDURE — 86480 TB TEST CELL IMMUN MEASURE: CPT | Performed by: INTERNAL MEDICINE

## 2021-08-10 PROCEDURE — 27000207 HC ISOLATION

## 2021-08-10 PROCEDURE — 99232 SBSQ HOSP IP/OBS MODERATE 35: CPT | Mod: S$GLB,,, | Performed by: INTERNAL MEDICINE

## 2021-08-10 PROCEDURE — 27000221 HC OXYGEN, UP TO 24 HOURS

## 2021-08-10 PROCEDURE — 63600175 PHARM REV CODE 636 W HCPCS: Performed by: HOSPITALIST

## 2021-08-10 PROCEDURE — 84145 PROCALCITONIN (PCT): CPT | Performed by: INTERNAL MEDICINE

## 2021-08-10 PROCEDURE — 82140 ASSAY OF AMMONIA: CPT | Performed by: INTERNAL MEDICINE

## 2021-08-10 PROCEDURE — 84425 ASSAY OF VITAMIN B-1: CPT | Performed by: NURSE PRACTITIONER

## 2021-08-10 PROCEDURE — 83735 ASSAY OF MAGNESIUM: CPT | Performed by: HOSPITALIST

## 2021-08-10 PROCEDURE — 25000003 PHARM REV CODE 250: Performed by: HOSPITALIST

## 2021-08-10 PROCEDURE — 94761 N-INVAS EAR/PLS OXIMETRY MLT: CPT

## 2021-08-10 PROCEDURE — 36415 COLL VENOUS BLD VENIPUNCTURE: CPT | Performed by: INTERNAL MEDICINE

## 2021-08-10 PROCEDURE — 85025 COMPLETE CBC W/AUTO DIFF WBC: CPT | Performed by: HOSPITALIST

## 2021-08-10 PROCEDURE — 99232 PR SUBSEQUENT HOSPITAL CARE,LEVL II: ICD-10-PCS | Mod: S$GLB,,, | Performed by: INTERNAL MEDICINE

## 2021-08-10 PROCEDURE — 82607 VITAMIN B-12: CPT | Performed by: NURSE PRACTITIONER

## 2021-08-10 PROCEDURE — 86140 C-REACTIVE PROTEIN: CPT | Performed by: INTERNAL MEDICINE

## 2021-08-10 PROCEDURE — 11000001 HC ACUTE MED/SURG PRIVATE ROOM

## 2021-08-10 PROCEDURE — 80053 COMPREHEN METABOLIC PANEL: CPT | Performed by: HOSPITALIST

## 2021-08-10 PROCEDURE — 80164 ASSAY DIPROPYLACETIC ACD TOT: CPT | Performed by: INTERNAL MEDICINE

## 2021-08-10 RX ADMIN — SODIUM CHLORIDE: 0.9 INJECTION, SOLUTION INTRAVENOUS at 02:08

## 2021-08-10 RX ADMIN — LOSARTAN POTASSIUM 50 MG: 25 TABLET, FILM COATED ORAL at 10:08

## 2021-08-10 RX ADMIN — MUPIROCIN: 20 OINTMENT TOPICAL at 09:08

## 2021-08-10 RX ADMIN — LEVETIRACETAM 750 MG: 500 INJECTION, SOLUTION, CONCENTRATE INTRAVENOUS at 10:08

## 2021-08-10 RX ADMIN — ACETAMINOPHEN 650 MG: 325 TABLET ORAL at 02:08

## 2021-08-10 RX ADMIN — ENOXAPARIN SODIUM 40 MG: 100 INJECTION SUBCUTANEOUS at 05:08

## 2021-08-10 RX ADMIN — ERTAPENEM 1 G: 1 INJECTION INTRAMUSCULAR; INTRAVENOUS at 09:08

## 2021-08-10 RX ADMIN — PANTOPRAZOLE SODIUM 40 MG: 40 TABLET, DELAYED RELEASE ORAL at 09:08

## 2021-08-10 RX ADMIN — PANTOPRAZOLE SODIUM 40 MG: 40 TABLET, DELAYED RELEASE ORAL at 10:08

## 2021-08-10 RX ADMIN — AMLODIPINE BESYLATE 5 MG: 5 TABLET ORAL at 10:08

## 2021-08-10 RX ADMIN — VALPROIC ACID 1000 MG: 250 SOLUTION ORAL at 09:08

## 2021-08-10 RX ADMIN — FLUOXETINE 20 MG: 10 CAPSULE ORAL at 10:08

## 2021-08-10 RX ADMIN — ATORVASTATIN CALCIUM 40 MG: 40 TABLET, FILM COATED ORAL at 10:08

## 2021-08-10 RX ADMIN — VALPROIC ACID 1000 MG: 250 SOLUTION ORAL at 10:08

## 2021-08-11 VITALS
HEIGHT: 69 IN | RESPIRATION RATE: 16 BRPM | SYSTOLIC BLOOD PRESSURE: 150 MMHG | WEIGHT: 184.75 LBS | BODY MASS INDEX: 27.36 KG/M2 | DIASTOLIC BLOOD PRESSURE: 83 MMHG | HEART RATE: 48 BPM | TEMPERATURE: 97 F | OXYGEN SATURATION: 97 %

## 2021-08-11 LAB
ALBUMIN SERPL BCP-MCNC: 2.6 G/DL (ref 3.5–5.2)
ALP SERPL-CCNC: 79 U/L (ref 55–135)
ALT SERPL W/O P-5'-P-CCNC: 11 U/L (ref 10–44)
ANION GAP SERPL CALC-SCNC: 13 MMOL/L (ref 8–16)
AST SERPL-CCNC: 17 U/L (ref 10–40)
BASOPHILS # BLD AUTO: 0.03 K/UL (ref 0–0.2)
BASOPHILS NFR BLD: 0.2 % (ref 0–1.9)
BILIRUB SERPL-MCNC: 0.4 MG/DL (ref 0.1–1)
BUN SERPL-MCNC: 14 MG/DL (ref 6–20)
CALCIUM SERPL-MCNC: 9.3 MG/DL (ref 8.7–10.5)
CHLORIDE SERPL-SCNC: 100 MMOL/L (ref 95–110)
CO2 SERPL-SCNC: 21 MMOL/L (ref 23–29)
CREAT SERPL-MCNC: 1 MG/DL (ref 0.5–1.4)
DIFFERENTIAL METHOD: ABNORMAL
EOSINOPHIL # BLD AUTO: 0.1 K/UL (ref 0–0.5)
EOSINOPHIL NFR BLD: 0.6 % (ref 0–8)
ERYTHROCYTE [DISTWIDTH] IN BLOOD BY AUTOMATED COUNT: 15.8 % (ref 11.5–14.5)
EST. GFR  (AFRICAN AMERICAN): >60 ML/MIN/1.73 M^2
EST. GFR  (NON AFRICAN AMERICAN): >60 ML/MIN/1.73 M^2
GLUCOSE SERPL-MCNC: 74 MG/DL (ref 70–110)
HCT VFR BLD AUTO: 37.2 % (ref 40–54)
HGB BLD-MCNC: 11.1 G/DL (ref 14–18)
IMM GRANULOCYTES # BLD AUTO: 0.09 K/UL (ref 0–0.04)
IMM GRANULOCYTES NFR BLD AUTO: 0.7 % (ref 0–0.5)
LYMPHOCYTES # BLD AUTO: 2.3 K/UL (ref 1–4.8)
LYMPHOCYTES NFR BLD: 18.2 % (ref 18–48)
MAGNESIUM SERPL-MCNC: 1.6 MG/DL (ref 1.6–2.6)
MCH RBC QN AUTO: 29.2 PG (ref 27–31)
MCHC RBC AUTO-ENTMCNC: 29.8 G/DL (ref 32–36)
MCV RBC AUTO: 98 FL (ref 82–98)
MONOCYTES # BLD AUTO: 2 K/UL (ref 0.3–1)
MONOCYTES NFR BLD: 15.9 % (ref 4–15)
NEUTROPHILS # BLD AUTO: 8.1 K/UL (ref 1.8–7.7)
NEUTROPHILS NFR BLD: 64.4 % (ref 38–73)
NRBC BLD-RTO: 0 /100 WBC
PLATELET # BLD AUTO: 297 K/UL (ref 150–450)
PMV BLD AUTO: 9.1 FL (ref 9.2–12.9)
POTASSIUM SERPL-SCNC: 4.3 MMOL/L (ref 3.5–5.1)
PROT SERPL-MCNC: 6.8 G/DL (ref 6–8.4)
RBC # BLD AUTO: 3.8 M/UL (ref 4.6–6.2)
SODIUM SERPL-SCNC: 134 MMOL/L (ref 136–145)
WBC # BLD AUTO: 12.54 K/UL (ref 3.9–12.7)

## 2021-08-11 PROCEDURE — 25000003 PHARM REV CODE 250: Performed by: HOSPITALIST

## 2021-08-11 PROCEDURE — 94761 N-INVAS EAR/PLS OXIMETRY MLT: CPT

## 2021-08-11 PROCEDURE — 36415 COLL VENOUS BLD VENIPUNCTURE: CPT | Performed by: HOSPITALIST

## 2021-08-11 PROCEDURE — 80053 COMPREHEN METABOLIC PANEL: CPT | Performed by: HOSPITALIST

## 2021-08-11 PROCEDURE — 63600175 PHARM REV CODE 636 W HCPCS: Performed by: HOSPITALIST

## 2021-08-11 PROCEDURE — 83735 ASSAY OF MAGNESIUM: CPT | Performed by: HOSPITALIST

## 2021-08-11 PROCEDURE — 85025 COMPLETE CBC W/AUTO DIFF WBC: CPT | Performed by: HOSPITALIST

## 2021-08-11 PROCEDURE — 27000221 HC OXYGEN, UP TO 24 HOURS

## 2021-08-11 RX ORDER — LEVETIRACETAM 750 MG/1
750 TABLET ORAL 2 TIMES DAILY
Qty: 60 TABLET | Refills: 1 | Status: ON HOLD | OUTPATIENT
Start: 2021-08-11 | End: 2022-10-27

## 2021-08-11 RX ADMIN — ATORVASTATIN CALCIUM 40 MG: 40 TABLET, FILM COATED ORAL at 08:08

## 2021-08-11 RX ADMIN — AMLODIPINE BESYLATE 5 MG: 5 TABLET ORAL at 08:08

## 2021-08-11 RX ADMIN — VALPROIC ACID 1000 MG: 250 SOLUTION ORAL at 08:08

## 2021-08-11 RX ADMIN — PANTOPRAZOLE SODIUM 40 MG: 40 TABLET, DELAYED RELEASE ORAL at 08:08

## 2021-08-11 RX ADMIN — FLUOXETINE 20 MG: 10 CAPSULE ORAL at 08:08

## 2021-08-11 RX ADMIN — LOSARTAN POTASSIUM 50 MG: 25 TABLET, FILM COATED ORAL at 08:08

## 2021-08-11 RX ADMIN — LEVETIRACETAM 750 MG: 500 INJECTION, SOLUTION, CONCENTRATE INTRAVENOUS at 08:08

## 2021-08-11 RX ADMIN — MUPIROCIN: 20 OINTMENT TOPICAL at 08:08

## 2021-08-12 ENCOUNTER — TELEPHONE (OUTPATIENT)
Dept: MEDSURG UNIT | Facility: HOSPITAL | Age: 41
End: 2021-08-12

## 2021-08-13 LAB
MAYO MISCELLANEOUS RESULT (REF): NORMAL
VIT B1 BLD-MCNC: 89 UG/L (ref 38–122)

## 2021-08-14 LAB
BACTERIA BLD CULT: NORMAL
BACTERIA BLD CULT: NORMAL

## 2021-08-22 ENCOUNTER — HOSPITAL ENCOUNTER (INPATIENT)
Facility: HOSPITAL | Age: 41
LOS: 3 days | DRG: 689 | End: 2021-08-25
Attending: EMERGENCY MEDICINE | Admitting: STUDENT IN AN ORGANIZED HEALTH CARE EDUCATION/TRAINING PROGRAM
Payer: MEDICAID

## 2021-08-22 DIAGNOSIS — R07.9 CHEST PAIN: ICD-10-CM

## 2021-08-22 DIAGNOSIS — N30.00 ACUTE CYSTITIS WITHOUT HEMATURIA: Primary | ICD-10-CM

## 2021-08-22 PROBLEM — K56.41 FECAL IMPACTION: Status: ACTIVE | Noted: 2021-08-22

## 2021-08-22 PROBLEM — N39.0 UTI (URINARY TRACT INFECTION): Status: ACTIVE | Noted: 2021-08-22

## 2021-08-22 LAB
ALBUMIN SERPL BCP-MCNC: 3.3 G/DL (ref 3.5–5.2)
ALP SERPL-CCNC: 101 U/L (ref 55–135)
ALT SERPL W/O P-5'-P-CCNC: 13 U/L (ref 10–44)
ANION GAP SERPL CALC-SCNC: 13 MMOL/L (ref 8–16)
APTT PPP: 30.9 SEC (ref 25.6–35.8)
AST SERPL-CCNC: 17 U/L (ref 10–40)
BACTERIA #/AREA URNS HPF: ABNORMAL /HPF
BASOPHILS # BLD AUTO: 0.07 K/UL (ref 0–0.2)
BASOPHILS NFR BLD: 0.5 % (ref 0–1.9)
BILIRUB SERPL-MCNC: 0.8 MG/DL (ref 0.1–1)
BILIRUB UR QL STRIP: NEGATIVE
BUN SERPL-MCNC: 33 MG/DL (ref 6–20)
CALCIUM SERPL-MCNC: 9.7 MG/DL (ref 8.7–10.5)
CHLORIDE SERPL-SCNC: 100 MMOL/L (ref 95–110)
CLARITY UR: ABNORMAL
CO2 SERPL-SCNC: 25 MMOL/L (ref 23–29)
COLOR UR: YELLOW
CREAT SERPL-MCNC: 1.8 MG/DL (ref 0.5–1.4)
DIFFERENTIAL METHOD: ABNORMAL
EOSINOPHIL # BLD AUTO: 0.6 K/UL (ref 0–0.5)
EOSINOPHIL NFR BLD: 4.7 % (ref 0–8)
ERYTHROCYTE [DISTWIDTH] IN BLOOD BY AUTOMATED COUNT: 16.2 % (ref 11.5–14.5)
EST. GFR  (AFRICAN AMERICAN): 53.2 ML/MIN/1.73 M^2
EST. GFR  (NON AFRICAN AMERICAN): 46.1 ML/MIN/1.73 M^2
GLUCOSE SERPL-MCNC: 93 MG/DL (ref 70–110)
GLUCOSE UR QL STRIP: NEGATIVE
HCT VFR BLD AUTO: 36.4 % (ref 40–54)
HGB BLD-MCNC: 11.3 G/DL (ref 14–18)
HGB UR QL STRIP: NEGATIVE
HYALINE CASTS #/AREA URNS LPF: 12 /LPF
IMM GRANULOCYTES # BLD AUTO: 0.09 K/UL (ref 0–0.04)
IMM GRANULOCYTES NFR BLD AUTO: 0.7 % (ref 0–0.5)
INR PPP: 0.9
KETONES UR QL STRIP: NEGATIVE
LACTATE SERPL-SCNC: 1.2 MMOL/L (ref 0.5–1.9)
LEUKOCYTE ESTERASE UR QL STRIP: ABNORMAL
LIPASE SERPL-CCNC: 40 U/L (ref 4–60)
LYMPHOCYTES # BLD AUTO: 2 K/UL (ref 1–4.8)
LYMPHOCYTES NFR BLD: 15.3 % (ref 18–48)
MAGNESIUM SERPL-MCNC: 1.8 MG/DL (ref 1.6–2.6)
MCH RBC QN AUTO: 28.8 PG (ref 27–31)
MCHC RBC AUTO-ENTMCNC: 31 G/DL (ref 32–36)
MCV RBC AUTO: 93 FL (ref 82–98)
MICROSCOPIC COMMENT: ABNORMAL
MONOCYTES # BLD AUTO: 1 K/UL (ref 0.3–1)
MONOCYTES NFR BLD: 7.5 % (ref 4–15)
NEUTROPHILS # BLD AUTO: 9.4 K/UL (ref 1.8–7.7)
NEUTROPHILS NFR BLD: 71.3 % (ref 38–73)
NITRITE UR QL STRIP: POSITIVE
NRBC BLD-RTO: 0 /100 WBC
PH UR STRIP: 8 [PH] (ref 5–8)
PLATELET # BLD AUTO: 249 K/UL (ref 150–450)
PMV BLD AUTO: 9.9 FL (ref 9.2–12.9)
POTASSIUM SERPL-SCNC: 4.6 MMOL/L (ref 3.5–5.1)
PROT SERPL-MCNC: 7.3 G/DL (ref 6–8.4)
PROT UR QL STRIP: ABNORMAL
PROTHROMBIN TIME: 12.1 SEC (ref 11.8–14.3)
RBC # BLD AUTO: 3.92 M/UL (ref 4.6–6.2)
RBC #/AREA URNS HPF: 1 /HPF (ref 0–4)
SARS-COV-2 RDRP RESP QL NAA+PROBE: NEGATIVE
SODIUM SERPL-SCNC: 138 MMOL/L (ref 136–145)
SP GR UR STRIP: 1.01 (ref 1–1.03)
SQUAMOUS #/AREA URNS HPF: 4 /HPF
TRI-PHOS CRY URNS QL MICRO: ABNORMAL
TROPONIN I SERPL DL<=0.01 NG/ML-MCNC: <0.03 NG/ML
URN SPEC COLLECT METH UR: ABNORMAL
UROBILINOGEN UR STRIP-ACNC: NEGATIVE EU/DL
WBC # BLD AUTO: 13.17 K/UL (ref 3.9–12.7)
WBC #/AREA URNS HPF: >100 /HPF (ref 0–5)

## 2021-08-22 PROCEDURE — U0002 COVID-19 LAB TEST NON-CDC: HCPCS | Performed by: EMERGENCY MEDICINE

## 2021-08-22 PROCEDURE — 87086 URINE CULTURE/COLONY COUNT: CPT | Performed by: EMERGENCY MEDICINE

## 2021-08-22 PROCEDURE — 83735 ASSAY OF MAGNESIUM: CPT | Performed by: EMERGENCY MEDICINE

## 2021-08-22 PROCEDURE — 12000002 HC ACUTE/MED SURGE SEMI-PRIVATE ROOM

## 2021-08-22 PROCEDURE — 93010 ELECTROCARDIOGRAM REPORT: CPT | Mod: ,,, | Performed by: SPECIALIST

## 2021-08-22 PROCEDURE — 83605 ASSAY OF LACTIC ACID: CPT | Performed by: EMERGENCY MEDICINE

## 2021-08-22 PROCEDURE — 81001 URINALYSIS AUTO W/SCOPE: CPT | Performed by: EMERGENCY MEDICINE

## 2021-08-22 PROCEDURE — 93005 ELECTROCARDIOGRAM TRACING: CPT | Performed by: SPECIALIST

## 2021-08-22 PROCEDURE — 85610 PROTHROMBIN TIME: CPT | Performed by: EMERGENCY MEDICINE

## 2021-08-22 PROCEDURE — 99285 EMERGENCY DEPT VISIT HI MDM: CPT | Mod: 25

## 2021-08-22 PROCEDURE — 87077 CULTURE AEROBIC IDENTIFY: CPT | Performed by: EMERGENCY MEDICINE

## 2021-08-22 PROCEDURE — 80053 COMPREHEN METABOLIC PANEL: CPT | Performed by: EMERGENCY MEDICINE

## 2021-08-22 PROCEDURE — 87186 SC STD MICRODIL/AGAR DIL: CPT | Performed by: EMERGENCY MEDICINE

## 2021-08-22 PROCEDURE — 63600175 PHARM REV CODE 636 W HCPCS: Performed by: EMERGENCY MEDICINE

## 2021-08-22 PROCEDURE — 87040 BLOOD CULTURE FOR BACTERIA: CPT | Mod: 59 | Performed by: EMERGENCY MEDICINE

## 2021-08-22 PROCEDURE — 84484 ASSAY OF TROPONIN QUANT: CPT | Performed by: EMERGENCY MEDICINE

## 2021-08-22 PROCEDURE — 93010 EKG 12-LEAD: ICD-10-PCS | Mod: ,,, | Performed by: SPECIALIST

## 2021-08-22 PROCEDURE — 85025 COMPLETE CBC W/AUTO DIFF WBC: CPT | Performed by: EMERGENCY MEDICINE

## 2021-08-22 PROCEDURE — 96365 THER/PROPH/DIAG IV INF INIT: CPT

## 2021-08-22 PROCEDURE — 85730 THROMBOPLASTIN TIME PARTIAL: CPT | Performed by: EMERGENCY MEDICINE

## 2021-08-22 PROCEDURE — 83690 ASSAY OF LIPASE: CPT | Performed by: EMERGENCY MEDICINE

## 2021-08-22 RX ORDER — ATORVASTATIN CALCIUM 40 MG/1
40 TABLET, FILM COATED ORAL DAILY
Status: DISCONTINUED | OUTPATIENT
Start: 2021-08-23 | End: 2021-08-25 | Stop reason: HOSPADM

## 2021-08-22 RX ORDER — SODIUM CHLORIDE 0.9 % (FLUSH) 0.9 %
10 SYRINGE (ML) INJECTION
Status: DISCONTINUED | OUTPATIENT
Start: 2021-08-23 | End: 2021-08-25 | Stop reason: HOSPADM

## 2021-08-22 RX ORDER — PANTOPRAZOLE SODIUM 40 MG/1
40 TABLET, DELAYED RELEASE ORAL 2 TIMES DAILY
Status: DISCONTINUED | OUTPATIENT
Start: 2021-08-23 | End: 2021-08-25 | Stop reason: HOSPADM

## 2021-08-22 RX ORDER — FLUOXETINE HYDROCHLORIDE 20 MG/1
20 CAPSULE ORAL DAILY
Status: DISCONTINUED | OUTPATIENT
Start: 2021-08-23 | End: 2021-08-25 | Stop reason: HOSPADM

## 2021-08-22 RX ORDER — LOSARTAN POTASSIUM 50 MG/1
50 TABLET ORAL DAILY
Status: DISCONTINUED | OUTPATIENT
Start: 2021-08-23 | End: 2021-08-25 | Stop reason: HOSPADM

## 2021-08-22 RX ORDER — AMLODIPINE BESYLATE 5 MG/1
5 TABLET ORAL DAILY
Status: DISCONTINUED | OUTPATIENT
Start: 2021-08-23 | End: 2021-08-25 | Stop reason: HOSPADM

## 2021-08-22 RX ORDER — TALC
6 POWDER (GRAM) TOPICAL NIGHTLY PRN
Status: DISCONTINUED | OUTPATIENT
Start: 2021-08-23 | End: 2021-08-25 | Stop reason: HOSPADM

## 2021-08-22 RX ORDER — ONDANSETRON 2 MG/ML
4 INJECTION INTRAMUSCULAR; INTRAVENOUS EVERY 8 HOURS PRN
Status: DISCONTINUED | OUTPATIENT
Start: 2021-08-23 | End: 2021-08-25 | Stop reason: HOSPADM

## 2021-08-22 RX ORDER — MODAFINIL 100 MG/1
200 TABLET ORAL DAILY
Status: DISCONTINUED | OUTPATIENT
Start: 2021-08-23 | End: 2021-08-25 | Stop reason: HOSPADM

## 2021-08-22 RX ORDER — HYDROMORPHONE HYDROCHLORIDE 1 MG/ML
0.5 INJECTION, SOLUTION INTRAMUSCULAR; INTRAVENOUS; SUBCUTANEOUS EVERY 6 HOURS PRN
Status: DISCONTINUED | OUTPATIENT
Start: 2021-08-23 | End: 2021-08-25 | Stop reason: HOSPADM

## 2021-08-22 RX ORDER — VALPROIC ACID 250 MG/5ML
1000 SOLUTION ORAL EVERY 12 HOURS
Status: DISCONTINUED | OUTPATIENT
Start: 2021-08-23 | End: 2021-08-25 | Stop reason: HOSPADM

## 2021-08-22 RX ORDER — SODIUM CHLORIDE 9 MG/ML
INJECTION, SOLUTION INTRAVENOUS CONTINUOUS
Status: ACTIVE | OUTPATIENT
Start: 2021-08-23 | End: 2021-08-24

## 2021-08-22 RX ORDER — CLONIDINE HYDROCHLORIDE 0.1 MG/1
0.1 TABLET ORAL 3 TIMES DAILY PRN
Status: DISCONTINUED | OUTPATIENT
Start: 2021-08-23 | End: 2021-08-25 | Stop reason: HOSPADM

## 2021-08-22 RX ORDER — LORAZEPAM 0.5 MG/1
0.5 TABLET ORAL EVERY 6 HOURS PRN
Status: DISCONTINUED | OUTPATIENT
Start: 2021-08-23 | End: 2021-08-25 | Stop reason: HOSPADM

## 2021-08-22 RX ADMIN — PIPERACILLIN AND TAZOBACTAM 3.38 G: 3; .375 INJECTION, POWDER, LYOPHILIZED, FOR SOLUTION INTRAVENOUS; PARENTERAL at 08:08

## 2021-08-23 LAB
ALBUMIN SERPL BCP-MCNC: 3.3 G/DL (ref 3.5–5.2)
ALP SERPL-CCNC: 94 U/L (ref 55–135)
ALT SERPL W/O P-5'-P-CCNC: 12 U/L (ref 10–44)
ANION GAP SERPL CALC-SCNC: 10 MMOL/L (ref 8–16)
AST SERPL-CCNC: 17 U/L (ref 10–40)
BASOPHILS # BLD AUTO: 0.06 K/UL (ref 0–0.2)
BASOPHILS NFR BLD: 0.5 % (ref 0–1.9)
BILIRUB SERPL-MCNC: 1.1 MG/DL (ref 0.1–1)
BUN SERPL-MCNC: 30 MG/DL (ref 6–20)
CALCIUM SERPL-MCNC: 9.9 MG/DL (ref 8.7–10.5)
CHLORIDE SERPL-SCNC: 104 MMOL/L (ref 95–110)
CO2 SERPL-SCNC: 28 MMOL/L (ref 23–29)
CREAT SERPL-MCNC: 1.8 MG/DL (ref 0.5–1.4)
DIFFERENTIAL METHOD: ABNORMAL
EOSINOPHIL # BLD AUTO: 0.5 K/UL (ref 0–0.5)
EOSINOPHIL NFR BLD: 4.5 % (ref 0–8)
ERYTHROCYTE [DISTWIDTH] IN BLOOD BY AUTOMATED COUNT: 16.6 % (ref 11.5–14.5)
EST. GFR  (AFRICAN AMERICAN): 53.2 ML/MIN/1.73 M^2
EST. GFR  (NON AFRICAN AMERICAN): 46.1 ML/MIN/1.73 M^2
GLUCOSE SERPL-MCNC: 82 MG/DL (ref 70–110)
GLUCOSE SERPL-MCNC: 86 MG/DL (ref 70–110)
HCT VFR BLD AUTO: 36.7 % (ref 40–54)
HGB BLD-MCNC: 11.7 G/DL (ref 14–18)
IMM GRANULOCYTES # BLD AUTO: 0.07 K/UL (ref 0–0.04)
IMM GRANULOCYTES NFR BLD AUTO: 0.6 % (ref 0–0.5)
LYMPHOCYTES # BLD AUTO: 1.8 K/UL (ref 1–4.8)
LYMPHOCYTES NFR BLD: 14.8 % (ref 18–48)
MCH RBC QN AUTO: 29.9 PG (ref 27–31)
MCHC RBC AUTO-ENTMCNC: 31.9 G/DL (ref 32–36)
MCV RBC AUTO: 94 FL (ref 82–98)
MONOCYTES # BLD AUTO: 1 K/UL (ref 0.3–1)
MONOCYTES NFR BLD: 8.6 % (ref 4–15)
NEUTROPHILS # BLD AUTO: 8.6 K/UL (ref 1.8–7.7)
NEUTROPHILS NFR BLD: 71 % (ref 38–73)
NRBC BLD-RTO: 0 /100 WBC
PLATELET # BLD AUTO: 256 K/UL (ref 150–450)
PMV BLD AUTO: 10.1 FL (ref 9.2–12.9)
POTASSIUM SERPL-SCNC: 4.5 MMOL/L (ref 3.5–5.1)
PROT SERPL-MCNC: 7.4 G/DL (ref 6–8.4)
RBC # BLD AUTO: 3.91 M/UL (ref 4.6–6.2)
SODIUM SERPL-SCNC: 142 MMOL/L (ref 136–145)
TROPONIN I SERPL DL<=0.01 NG/ML-MCNC: 0.03 NG/ML
TROPONIN I SERPL DL<=0.01 NG/ML-MCNC: <0.03 NG/ML
VALPROATE SERPL-MCNC: 18.4 UG/ML (ref 50–100)
WBC # BLD AUTO: 12.13 K/UL (ref 3.9–12.7)

## 2021-08-23 PROCEDURE — 36415 COLL VENOUS BLD VENIPUNCTURE: CPT | Performed by: NURSE PRACTITIONER

## 2021-08-23 PROCEDURE — 25000003 PHARM REV CODE 250: Performed by: NURSE PRACTITIONER

## 2021-08-23 PROCEDURE — 80053 COMPREHEN METABOLIC PANEL: CPT | Performed by: NURSE PRACTITIONER

## 2021-08-23 PROCEDURE — 63600175 PHARM REV CODE 636 W HCPCS: Performed by: INTERNAL MEDICINE

## 2021-08-23 PROCEDURE — 80164 ASSAY DIPROPYLACETIC ACD TOT: CPT | Performed by: NURSE PRACTITIONER

## 2021-08-23 PROCEDURE — 84484 ASSAY OF TROPONIN QUANT: CPT | Mod: 91 | Performed by: NURSE PRACTITIONER

## 2021-08-23 PROCEDURE — 85025 COMPLETE CBC W/AUTO DIFF WBC: CPT | Performed by: NURSE PRACTITIONER

## 2021-08-23 PROCEDURE — 12000002 HC ACUTE/MED SURGE SEMI-PRIVATE ROOM

## 2021-08-23 PROCEDURE — 99233 PR SUBSEQUENT HOSPITAL CARE,LEVL III: ICD-10-PCS | Mod: ,,, | Performed by: INTERNAL MEDICINE

## 2021-08-23 PROCEDURE — 84484 ASSAY OF TROPONIN QUANT: CPT | Performed by: NURSE PRACTITIONER

## 2021-08-23 PROCEDURE — 63600175 PHARM REV CODE 636 W HCPCS: Performed by: NURSE PRACTITIONER

## 2021-08-23 PROCEDURE — 99233 SBSQ HOSP IP/OBS HIGH 50: CPT | Mod: ,,, | Performed by: INTERNAL MEDICINE

## 2021-08-23 PROCEDURE — 25000003 PHARM REV CODE 250: Performed by: STUDENT IN AN ORGANIZED HEALTH CARE EDUCATION/TRAINING PROGRAM

## 2021-08-23 PROCEDURE — 25000003 PHARM REV CODE 250: Performed by: INTERNAL MEDICINE

## 2021-08-23 RX ORDER — LACTULOSE 10 G/15ML
20 SOLUTION ORAL DAILY
Status: DISCONTINUED | OUTPATIENT
Start: 2021-08-23 | End: 2021-08-25 | Stop reason: HOSPADM

## 2021-08-23 RX ORDER — DEXTROMETHORPHAN POLISTIREX 30 MG/5 ML
1 SUSPENSION, EXTENDED RELEASE 12 HR ORAL EVERY 8 HOURS
Status: COMPLETED | OUTPATIENT
Start: 2021-08-23 | End: 2021-08-24

## 2021-08-23 RX ORDER — MEROPENEM AND SODIUM CHLORIDE 1 G/50ML
1 INJECTION, SOLUTION INTRAVENOUS
Status: DISCONTINUED | OUTPATIENT
Start: 2021-08-23 | End: 2021-08-24

## 2021-08-23 RX ADMIN — AMANTADINE HYDROCHLORIDE 100 MG: 100 CAPSULE, LIQUID FILLED ORAL at 10:08

## 2021-08-23 RX ADMIN — HYDROMORPHONE HYDROCHLORIDE 0.5 MG: 1 INJECTION, SOLUTION INTRAMUSCULAR; INTRAVENOUS; SUBCUTANEOUS at 08:08

## 2021-08-23 RX ADMIN — VALPROIC ACID 1000 MG: 500 SOLUTION ORAL at 10:08

## 2021-08-23 RX ADMIN — LOSARTAN POTASSIUM 50 MG: 50 TABLET, FILM COATED ORAL at 10:08

## 2021-08-23 RX ADMIN — PANTOPRAZOLE SODIUM 40 MG: 40 TABLET, DELAYED RELEASE ORAL at 08:08

## 2021-08-23 RX ADMIN — PIPERACILLIN AND TAZOBACTAM 3.38 G: 3; .375 INJECTION, POWDER, LYOPHILIZED, FOR SOLUTION INTRAVENOUS; PARENTERAL at 05:08

## 2021-08-23 RX ADMIN — LEVETIRACETAM 750 MG: 250 TABLET, FILM COATED ORAL at 10:08

## 2021-08-23 RX ADMIN — FLUOXETINE 20 MG: 20 CAPSULE ORAL at 10:08

## 2021-08-23 RX ADMIN — MELATONIN TAB 3 MG 6 MG: 3 TAB at 08:08

## 2021-08-23 RX ADMIN — LACTULOSE 20 G: 20 SOLUTION ORAL at 10:08

## 2021-08-23 RX ADMIN — PANTOPRAZOLE SODIUM 40 MG: 40 TABLET, DELAYED RELEASE ORAL at 10:08

## 2021-08-23 RX ADMIN — SODIUM POLYSTYRENE SULFONATE 45 G: 15 SUSPENSION ORAL; RECTAL at 10:08

## 2021-08-23 RX ADMIN — LORAZEPAM 0.5 MG: 0.5 TABLET ORAL at 08:08

## 2021-08-23 RX ADMIN — ATORVASTATIN CALCIUM 40 MG: 40 TABLET, FILM COATED ORAL at 10:08

## 2021-08-23 RX ADMIN — MODAFINIL 200 MG: 100 TABLET ORAL at 10:08

## 2021-08-23 RX ADMIN — PIPERACILLIN AND TAZOBACTAM 3.38 G: 3; .375 INJECTION, POWDER, LYOPHILIZED, FOR SOLUTION INTRAVENOUS; PARENTERAL at 02:08

## 2021-08-23 RX ADMIN — SODIUM CHLORIDE: 0.9 INJECTION, SOLUTION INTRAVENOUS at 02:08

## 2021-08-23 RX ADMIN — LEVETIRACETAM 750 MG: 250 TABLET, FILM COATED ORAL at 08:08

## 2021-08-23 RX ADMIN — VALPROIC ACID 1000 MG: 500 SOLUTION ORAL at 09:08

## 2021-08-23 RX ADMIN — AMLODIPINE BESYLATE 5 MG: 5 TABLET ORAL at 10:08

## 2021-08-23 RX ADMIN — MEROPENEM AND SODIUM CHLORIDE 1 G: 1 INJECTION, SOLUTION INTRAVENOUS at 05:08

## 2021-08-23 RX ADMIN — MINERAL OIL 1 ENEMA: 100 ENEMA RECTAL at 10:08

## 2021-08-24 PROBLEM — N28.89 RENAL MASS: Status: ACTIVE | Noted: 2021-08-24

## 2021-08-24 PROBLEM — K56.41 FECAL IMPACTION: Status: RESOLVED | Noted: 2021-08-22 | Resolved: 2021-08-24

## 2021-08-24 LAB
ALBUMIN SERPL BCP-MCNC: 2.9 G/DL (ref 3.5–5.2)
ALP SERPL-CCNC: 78 U/L (ref 55–135)
ALT SERPL W/O P-5'-P-CCNC: 13 U/L (ref 10–44)
ANION GAP SERPL CALC-SCNC: 9 MMOL/L (ref 8–16)
AST SERPL-CCNC: 14 U/L (ref 10–40)
BACTERIA UR CULT: ABNORMAL
BASOPHILS # BLD AUTO: 0.05 K/UL (ref 0–0.2)
BASOPHILS NFR BLD: 0.5 % (ref 0–1.9)
BILIRUB SERPL-MCNC: 1 MG/DL (ref 0.1–1)
BUN SERPL-MCNC: 24 MG/DL (ref 6–20)
CALCIUM SERPL-MCNC: 9.1 MG/DL (ref 8.7–10.5)
CHLORIDE SERPL-SCNC: 104 MMOL/L (ref 95–110)
CO2 SERPL-SCNC: 27 MMOL/L (ref 23–29)
CREAT SERPL-MCNC: 1.8 MG/DL (ref 0.5–1.4)
DIFFERENTIAL METHOD: ABNORMAL
EOSINOPHIL # BLD AUTO: 0.6 K/UL (ref 0–0.5)
EOSINOPHIL NFR BLD: 6 % (ref 0–8)
ERYTHROCYTE [DISTWIDTH] IN BLOOD BY AUTOMATED COUNT: 16 % (ref 11.5–14.5)
EST. GFR  (AFRICAN AMERICAN): 53.2 ML/MIN/1.73 M^2
EST. GFR  (NON AFRICAN AMERICAN): 46.1 ML/MIN/1.73 M^2
GLUCOSE SERPL-MCNC: 114 MG/DL (ref 70–110)
GLUCOSE SERPL-MCNC: 82 MG/DL (ref 70–110)
GLUCOSE SERPL-MCNC: 92 MG/DL (ref 70–110)
GLUCOSE SERPL-MCNC: 98 MG/DL (ref 70–110)
HCT VFR BLD AUTO: 33.2 % (ref 40–54)
HGB BLD-MCNC: 10 G/DL (ref 14–18)
IMM GRANULOCYTES # BLD AUTO: 0.08 K/UL (ref 0–0.04)
IMM GRANULOCYTES NFR BLD AUTO: 0.8 % (ref 0–0.5)
LYMPHOCYTES # BLD AUTO: 1.8 K/UL (ref 1–4.8)
LYMPHOCYTES NFR BLD: 17.2 % (ref 18–48)
MAGNESIUM SERPL-MCNC: 1.8 MG/DL (ref 1.6–2.6)
MCH RBC QN AUTO: 28.8 PG (ref 27–31)
MCHC RBC AUTO-ENTMCNC: 30.1 G/DL (ref 32–36)
MCV RBC AUTO: 96 FL (ref 82–98)
MONOCYTES # BLD AUTO: 0.9 K/UL (ref 0.3–1)
MONOCYTES NFR BLD: 8.2 % (ref 4–15)
NEUTROPHILS # BLD AUTO: 6.9 K/UL (ref 1.8–7.7)
NEUTROPHILS NFR BLD: 67.3 % (ref 38–73)
NRBC BLD-RTO: 0 /100 WBC
PLATELET # BLD AUTO: 213 K/UL (ref 150–450)
PMV BLD AUTO: 10.3 FL (ref 9.2–12.9)
POTASSIUM SERPL-SCNC: 4.3 MMOL/L (ref 3.5–5.1)
PROT SERPL-MCNC: 6.3 G/DL (ref 6–8.4)
RBC # BLD AUTO: 3.47 M/UL (ref 4.6–6.2)
SODIUM SERPL-SCNC: 140 MMOL/L (ref 136–145)
WBC # BLD AUTO: 10.31 K/UL (ref 3.9–12.7)

## 2021-08-24 PROCEDURE — 99449 PR INTERPROF, PHONE/INTERNET/EHR, CONSULT, >= 31 MINS: ICD-10-PCS | Mod: ,,, | Performed by: UROLOGY

## 2021-08-24 PROCEDURE — 83735 ASSAY OF MAGNESIUM: CPT | Performed by: STUDENT IN AN ORGANIZED HEALTH CARE EDUCATION/TRAINING PROGRAM

## 2021-08-24 PROCEDURE — 63600175 PHARM REV CODE 636 W HCPCS: Performed by: NURSE PRACTITIONER

## 2021-08-24 PROCEDURE — 25000003 PHARM REV CODE 250: Performed by: STUDENT IN AN ORGANIZED HEALTH CARE EDUCATION/TRAINING PROGRAM

## 2021-08-24 PROCEDURE — 25000003 PHARM REV CODE 250: Performed by: INTERNAL MEDICINE

## 2021-08-24 PROCEDURE — 80053 COMPREHEN METABOLIC PANEL: CPT | Performed by: NURSE PRACTITIONER

## 2021-08-24 PROCEDURE — 99449 NTRPROF PH1/NTRNET/EHR 31/>: CPT | Mod: ,,, | Performed by: UROLOGY

## 2021-08-24 PROCEDURE — 85025 COMPLETE CBC W/AUTO DIFF WBC: CPT | Performed by: NURSE PRACTITIONER

## 2021-08-24 PROCEDURE — 63600175 PHARM REV CODE 636 W HCPCS: Performed by: INTERNAL MEDICINE

## 2021-08-24 PROCEDURE — 36415 COLL VENOUS BLD VENIPUNCTURE: CPT | Performed by: NURSE PRACTITIONER

## 2021-08-24 PROCEDURE — 25000003 PHARM REV CODE 250: Performed by: NURSE PRACTITIONER

## 2021-08-24 PROCEDURE — 12000002 HC ACUTE/MED SURGE SEMI-PRIVATE ROOM

## 2021-08-24 RX ORDER — SODIUM CHLORIDE 9 MG/ML
INJECTION, SOLUTION INTRAVENOUS CONTINUOUS
Status: DISCONTINUED | OUTPATIENT
Start: 2021-08-24 | End: 2021-08-25 | Stop reason: HOSPADM

## 2021-08-24 RX ADMIN — HYDROMORPHONE HYDROCHLORIDE 0.5 MG: 1 INJECTION, SOLUTION INTRAMUSCULAR; INTRAVENOUS; SUBCUTANEOUS at 04:08

## 2021-08-24 RX ADMIN — FLUOXETINE 20 MG: 20 CAPSULE ORAL at 10:08

## 2021-08-24 RX ADMIN — PANTOPRAZOLE SODIUM 40 MG: 40 TABLET, DELAYED RELEASE ORAL at 09:08

## 2021-08-24 RX ADMIN — LEVETIRACETAM 750 MG: 250 TABLET, FILM COATED ORAL at 09:08

## 2021-08-24 RX ADMIN — PANTOPRAZOLE SODIUM 40 MG: 40 TABLET, DELAYED RELEASE ORAL at 10:08

## 2021-08-24 RX ADMIN — LORAZEPAM 0.5 MG: 0.5 TABLET ORAL at 04:08

## 2021-08-24 RX ADMIN — MEROPENEM AND SODIUM CHLORIDE 1 G: 1 INJECTION, SOLUTION INTRAVENOUS at 03:08

## 2021-08-24 RX ADMIN — LACTULOSE 20 G: 20 SOLUTION ORAL at 10:08

## 2021-08-24 RX ADMIN — CEFTRIAXONE SODIUM 1 G: 1 INJECTION, POWDER, FOR SOLUTION INTRAMUSCULAR; INTRAVENOUS at 03:08

## 2021-08-24 RX ADMIN — ATORVASTATIN CALCIUM 40 MG: 40 TABLET, FILM COATED ORAL at 10:08

## 2021-08-24 RX ADMIN — MELATONIN TAB 3 MG 6 MG: 3 TAB at 09:08

## 2021-08-24 RX ADMIN — VALPROIC ACID 1000 MG: 500 SOLUTION ORAL at 10:08

## 2021-08-24 RX ADMIN — LORAZEPAM 0.5 MG: 0.5 TABLET ORAL at 09:08

## 2021-08-24 RX ADMIN — LOSARTAN POTASSIUM 50 MG: 50 TABLET, FILM COATED ORAL at 10:08

## 2021-08-24 RX ADMIN — VALPROIC ACID 1000 MG: 500 SOLUTION ORAL at 09:08

## 2021-08-24 RX ADMIN — HYDROMORPHONE HYDROCHLORIDE 0.5 MG: 1 INJECTION, SOLUTION INTRAMUSCULAR; INTRAVENOUS; SUBCUTANEOUS at 11:08

## 2021-08-24 RX ADMIN — MINERAL OIL 1 ENEMA: 100 ENEMA RECTAL at 06:08

## 2021-08-24 RX ADMIN — AMLODIPINE BESYLATE 5 MG: 5 TABLET ORAL at 10:08

## 2021-08-24 RX ADMIN — SODIUM CHLORIDE: 0.9 INJECTION, SOLUTION INTRAVENOUS at 03:08

## 2021-08-24 RX ADMIN — AMANTADINE HYDROCHLORIDE 100 MG: 100 CAPSULE, LIQUID FILLED ORAL at 10:08

## 2021-08-24 RX ADMIN — LEVETIRACETAM 750 MG: 250 TABLET, FILM COATED ORAL at 10:08

## 2021-08-24 RX ADMIN — MODAFINIL 200 MG: 100 TABLET ORAL at 10:08

## 2021-08-24 RX ADMIN — MEROPENEM AND SODIUM CHLORIDE 1 G: 1 INJECTION, SOLUTION INTRAVENOUS at 10:08

## 2021-08-25 VITALS
DIASTOLIC BLOOD PRESSURE: 98 MMHG | HEART RATE: 64 BPM | HEIGHT: 69 IN | WEIGHT: 181.44 LBS | SYSTOLIC BLOOD PRESSURE: 165 MMHG | RESPIRATION RATE: 18 BRPM | BODY MASS INDEX: 26.87 KG/M2 | TEMPERATURE: 98 F | OXYGEN SATURATION: 100 %

## 2021-08-25 LAB
ALBUMIN SERPL BCP-MCNC: 2.9 G/DL (ref 3.5–5.2)
ALP SERPL-CCNC: 82 U/L (ref 55–135)
ALT SERPL W/O P-5'-P-CCNC: 11 U/L (ref 10–44)
ANION GAP SERPL CALC-SCNC: 7 MMOL/L (ref 8–16)
AST SERPL-CCNC: 15 U/L (ref 10–40)
BASOPHILS # BLD AUTO: 0.06 K/UL (ref 0–0.2)
BASOPHILS NFR BLD: 0.6 % (ref 0–1.9)
BILIRUB SERPL-MCNC: 0.9 MG/DL (ref 0.1–1)
BUN SERPL-MCNC: 17 MG/DL (ref 6–20)
CALCIUM SERPL-MCNC: 9.2 MG/DL (ref 8.7–10.5)
CHLORIDE SERPL-SCNC: 106 MMOL/L (ref 95–110)
CO2 SERPL-SCNC: 29 MMOL/L (ref 23–29)
CREAT SERPL-MCNC: 1.5 MG/DL (ref 0.5–1.4)
DIFFERENTIAL METHOD: ABNORMAL
EOSINOPHIL # BLD AUTO: 0.7 K/UL (ref 0–0.5)
EOSINOPHIL NFR BLD: 7.3 % (ref 0–8)
ERYTHROCYTE [DISTWIDTH] IN BLOOD BY AUTOMATED COUNT: 15.9 % (ref 11.5–14.5)
EST. GFR  (AFRICAN AMERICAN): >60 ML/MIN/1.73 M^2
EST. GFR  (NON AFRICAN AMERICAN): 57.4 ML/MIN/1.73 M^2
GLUCOSE SERPL-MCNC: 74 MG/DL (ref 70–110)
GLUCOSE SERPL-MCNC: 89 MG/DL (ref 70–110)
HCT VFR BLD AUTO: 32.1 % (ref 40–54)
HGB BLD-MCNC: 9.8 G/DL (ref 14–18)
IMM GRANULOCYTES # BLD AUTO: 0.05 K/UL (ref 0–0.04)
IMM GRANULOCYTES NFR BLD AUTO: 0.5 % (ref 0–0.5)
LYMPHOCYTES # BLD AUTO: 1.8 K/UL (ref 1–4.8)
LYMPHOCYTES NFR BLD: 18.5 % (ref 18–48)
MCH RBC QN AUTO: 29.7 PG (ref 27–31)
MCHC RBC AUTO-ENTMCNC: 30.5 G/DL (ref 32–36)
MCV RBC AUTO: 97 FL (ref 82–98)
MONOCYTES # BLD AUTO: 0.7 K/UL (ref 0.3–1)
MONOCYTES NFR BLD: 7.6 % (ref 4–15)
NEUTROPHILS # BLD AUTO: 6.4 K/UL (ref 1.8–7.7)
NEUTROPHILS NFR BLD: 65.5 % (ref 38–73)
NRBC BLD-RTO: 0 /100 WBC
PLATELET # BLD AUTO: 202 K/UL (ref 150–450)
PMV BLD AUTO: 10.3 FL (ref 9.2–12.9)
POTASSIUM SERPL-SCNC: 4.6 MMOL/L (ref 3.5–5.1)
PROT SERPL-MCNC: 6.6 G/DL (ref 6–8.4)
RBC # BLD AUTO: 3.3 M/UL (ref 4.6–6.2)
SODIUM SERPL-SCNC: 142 MMOL/L (ref 136–145)
WBC # BLD AUTO: 9.78 K/UL (ref 3.9–12.7)

## 2021-08-25 PROCEDURE — 85025 COMPLETE CBC W/AUTO DIFF WBC: CPT | Performed by: NURSE PRACTITIONER

## 2021-08-25 PROCEDURE — 25000003 PHARM REV CODE 250: Performed by: NURSE PRACTITIONER

## 2021-08-25 PROCEDURE — 36415 COLL VENOUS BLD VENIPUNCTURE: CPT | Performed by: NURSE PRACTITIONER

## 2021-08-25 PROCEDURE — 25000003 PHARM REV CODE 250: Performed by: STUDENT IN AN ORGANIZED HEALTH CARE EDUCATION/TRAINING PROGRAM

## 2021-08-25 PROCEDURE — 80053 COMPREHEN METABOLIC PANEL: CPT | Performed by: NURSE PRACTITIONER

## 2021-08-25 RX ORDER — LACTULOSE 10 G/15ML
20 SOLUTION ORAL; RECTAL 2 TIMES DAILY PRN
Qty: 473 ML | Refills: 0 | Status: SHIPPED | OUTPATIENT
Start: 2021-08-25 | End: 2021-09-24

## 2021-08-25 RX ORDER — SULFAMETHOXAZOLE AND TRIMETHOPRIM 400; 80 MG/1; MG/1
2 TABLET ORAL 2 TIMES DAILY
Qty: 40 TABLET | Refills: 0 | Status: SHIPPED | OUTPATIENT
Start: 2021-08-25 | End: 2021-09-04

## 2021-08-25 RX ORDER — POLYETHYLENE GLYCOL 3350 17 G/17G
17 POWDER, FOR SOLUTION ORAL DAILY
Qty: 30 EACH | Refills: 3 | Status: SHIPPED | OUTPATIENT
Start: 2021-08-25 | End: 2021-09-24

## 2021-08-25 RX ADMIN — AMLODIPINE BESYLATE 5 MG: 5 TABLET ORAL at 11:08

## 2021-08-25 RX ADMIN — ATORVASTATIN CALCIUM 40 MG: 40 TABLET, FILM COATED ORAL at 09:08

## 2021-08-25 RX ADMIN — AMANTADINE HYDROCHLORIDE 100 MG: 100 CAPSULE, LIQUID FILLED ORAL at 09:08

## 2021-08-25 RX ADMIN — FLUOXETINE 20 MG: 20 CAPSULE ORAL at 11:08

## 2021-08-25 RX ADMIN — MODAFINIL 200 MG: 100 TABLET ORAL at 09:08

## 2021-08-25 RX ADMIN — LACTULOSE 20 G: 20 SOLUTION ORAL at 11:08

## 2021-08-25 RX ADMIN — VALPROIC ACID 1000 MG: 500 SOLUTION ORAL at 09:08

## 2021-08-25 RX ADMIN — LOSARTAN POTASSIUM 50 MG: 50 TABLET, FILM COATED ORAL at 11:08

## 2021-08-25 RX ADMIN — LEVETIRACETAM 750 MG: 250 TABLET, FILM COATED ORAL at 11:08

## 2021-08-25 RX ADMIN — PANTOPRAZOLE SODIUM 40 MG: 40 TABLET, DELAYED RELEASE ORAL at 11:08

## 2021-08-27 LAB
BACTERIA BLD CULT: NORMAL
BACTERIA BLD CULT: NORMAL

## 2021-11-30 ENCOUNTER — HOSPITAL ENCOUNTER (EMERGENCY)
Facility: HOSPITAL | Age: 41
Discharge: HOME | End: 2021-11-30
Attending: EMERGENCY MEDICINE
Payer: MEDICAID

## 2021-11-30 VITALS
HEART RATE: 77 BPM | WEIGHT: 190 LBS | HEIGHT: 69 IN | RESPIRATION RATE: 18 BRPM | BODY MASS INDEX: 28.14 KG/M2 | SYSTOLIC BLOOD PRESSURE: 126 MMHG | OXYGEN SATURATION: 100 % | DIASTOLIC BLOOD PRESSURE: 76 MMHG | TEMPERATURE: 99 F

## 2021-11-30 DIAGNOSIS — T83.9XXA FOLEY CATHETER PROBLEM, INITIAL ENCOUNTER: Primary | ICD-10-CM

## 2021-11-30 PROCEDURE — 99283 EMERGENCY DEPT VISIT LOW MDM: CPT | Mod: 25

## 2021-11-30 PROCEDURE — 51702 INSERT TEMP BLADDER CATH: CPT

## 2021-11-30 PROCEDURE — 25000003 PHARM REV CODE 250: Performed by: EMERGENCY MEDICINE

## 2021-11-30 RX ORDER — LIDOCAINE HYDROCHLORIDE 20 MG/ML
JELLY TOPICAL
Status: COMPLETED | OUTPATIENT
Start: 2021-11-30 | End: 2021-11-30

## 2021-11-30 RX ORDER — LIDOCAINE HYDROCHLORIDE 20 MG/ML
JELLY TOPICAL
Status: DISCONTINUED
Start: 2021-11-30 | End: 2021-12-01 | Stop reason: HOSPADM

## 2021-11-30 RX ADMIN — LIDOCAINE HYDROCHLORIDE 10 ML: 20 JELLY TOPICAL at 07:11

## 2022-01-17 ENCOUNTER — LAB VISIT (OUTPATIENT)
Dept: LAB | Facility: HOSPITAL | Age: 42
End: 2022-01-17
Attending: FAMILY MEDICINE
Payer: MEDICAID

## 2022-01-17 DIAGNOSIS — N39.0 UTI (URINARY TRACT INFECTION): Primary | ICD-10-CM

## 2022-01-17 LAB
AMORPH CRY URNS QL MICRO: ABNORMAL
BACTERIA #/AREA URNS HPF: ABNORMAL /HPF
BILIRUB UR QL STRIP: NEGATIVE
CLARITY UR: ABNORMAL
COLOR UR: YELLOW
GLUCOSE UR QL STRIP: NEGATIVE
HGB UR QL STRIP: ABNORMAL
KETONES UR QL STRIP: NEGATIVE
LEUKOCYTE ESTERASE UR QL STRIP: ABNORMAL
MICROSCOPIC COMMENT: ABNORMAL
NITRITE UR QL STRIP: POSITIVE
PH UR STRIP: 8 [PH] (ref 5–8)
PROT UR QL STRIP: NEGATIVE
RBC #/AREA URNS HPF: 3 /HPF (ref 0–4)
SP GR UR STRIP: 1.01 (ref 1–1.03)
SQUAMOUS #/AREA URNS HPF: 2 /HPF
URN SPEC COLLECT METH UR: ABNORMAL
UROBILINOGEN UR STRIP-ACNC: NEGATIVE EU/DL
WBC #/AREA URNS HPF: >100 /HPF (ref 0–5)

## 2022-01-17 PROCEDURE — 81000 URINALYSIS NONAUTO W/SCOPE: CPT | Performed by: FAMILY MEDICINE

## 2022-01-17 PROCEDURE — 87086 URINE CULTURE/COLONY COUNT: CPT | Performed by: FAMILY MEDICINE

## 2022-01-18 LAB
BACTERIA UR CULT: NORMAL
BACTERIA UR CULT: NORMAL

## 2022-02-12 ENCOUNTER — LAB VISIT (OUTPATIENT)
Dept: LAB | Facility: HOSPITAL | Age: 42
End: 2022-02-12
Attending: FAMILY MEDICINE
Payer: MEDICAID

## 2022-02-12 DIAGNOSIS — R50.9 FEVER: Primary | ICD-10-CM

## 2022-02-12 LAB
BACTERIA #/AREA URNS HPF: ABNORMAL /HPF
BILIRUB UR QL STRIP: NEGATIVE
CLARITY UR: ABNORMAL
COLOR UR: YELLOW
GLUCOSE UR QL STRIP: NEGATIVE
HGB UR QL STRIP: ABNORMAL
HYALINE CASTS #/AREA URNS LPF: 0 /LPF
KETONES UR QL STRIP: NEGATIVE
LEUKOCYTE ESTERASE UR QL STRIP: ABNORMAL
MICROSCOPIC COMMENT: ABNORMAL
NITRITE UR QL STRIP: POSITIVE
PH UR STRIP: 7 [PH] (ref 5–8)
PROT UR QL STRIP: ABNORMAL
RBC #/AREA URNS HPF: 10 /HPF (ref 0–4)
SP GR UR STRIP: 1.01 (ref 1–1.03)
URN SPEC COLLECT METH UR: ABNORMAL
UROBILINOGEN UR STRIP-ACNC: NEGATIVE EU/DL
WBC #/AREA URNS HPF: >100 /HPF (ref 0–5)

## 2022-02-12 PROCEDURE — 81000 URINALYSIS NONAUTO W/SCOPE: CPT | Performed by: FAMILY MEDICINE

## 2022-02-12 PROCEDURE — 87086 URINE CULTURE/COLONY COUNT: CPT | Performed by: FAMILY MEDICINE

## 2022-02-13 LAB
BACTERIA UR CULT: NORMAL
BACTERIA UR CULT: NORMAL

## 2022-02-25 ENCOUNTER — HOSPITAL ENCOUNTER (EMERGENCY)
Facility: HOSPITAL | Age: 42
Discharge: HOME | End: 2022-02-25
Attending: EMERGENCY MEDICINE
Payer: MEDICAID

## 2022-02-25 VITALS
HEART RATE: 88 BPM | SYSTOLIC BLOOD PRESSURE: 136 MMHG | RESPIRATION RATE: 20 BRPM | TEMPERATURE: 97 F | OXYGEN SATURATION: 98 % | DIASTOLIC BLOOD PRESSURE: 89 MMHG

## 2022-02-25 DIAGNOSIS — K94.23 GASTROSTOMY TUBE DYSFUNCTION: Primary | ICD-10-CM

## 2022-02-25 PROCEDURE — 99284 EMERGENCY DEPT VISIT MOD MDM: CPT | Mod: 25

## 2022-02-25 PROCEDURE — 43762 RPLC GTUBE NO REVJ TRC: CPT

## 2022-02-25 NOTE — ED PROVIDER NOTES
Encounter Date: 2/24/2022       History     Chief Complaint   Patient presents with    Abdominal Pain     Pt sent here from [facility] for issues with PEG tube      Patient is a 41-year-old male with a past medical history of Asperger's syndrome old CVA with hemiparesis hypertension functional quadriplegia who presents the emergency room for evaluation of a PEG tube replacement.  It fell out.  No other complaints at this time.  Patient is limited historian.        Review of patient's allergies indicates:  No Known Allergies  Past Medical History:   Diagnosis Date    Anticoagulant long-term use     baby asa per chart    Asperger's syndrome     CVA, old, hemiparesis     Encounter for blood transfusion     Essential hypertension, malignant     Functional quadriplegia 5/28/2021    PEG (percutaneous endoscopic gastrostomy) status      Past Surgical History:   Procedure Laterality Date    COLONOSCOPY N/A 6/3/2021    Procedure: COLONOSCOPY;  Surgeon: Rosario Meeks MD;  Location: [facility];  Service: Endoscopy;  Laterality: N/A;    ESOPHAGOGASTRODUODENOSCOPY N/A 7/10/2021    Procedure: EGD (ESOPHAGOGASTRODUODENOSCOPY);  Surgeon: Rosario Meeks MD;  Location: [facility];  Service: Endoscopy;  Laterality: N/A;    ESOPHAGOGASTRODUODENOSCOPY W/ PEG N/A 4/1/2021    Procedure: EGD, WITH PEG TUBE INSERTION;  Surgeon: En Zheng MD;  Location: [facility];  Service: Endoscopy;  Laterality: N/A;     No family history on file.  Social History     Tobacco Use    Smoking status: Never Smoker    Smokeless tobacco: Never Used   Substance Use Topics    Alcohol use: Not Currently    Drug use: Not Currently     Review of Systems   Constitutional: Negative for fever.   Respiratory: Negative for cough and shortness of breath.    Cardiovascular: Negative for chest pain.   Gastrointestinal: Negative for abdominal pain, diarrhea, nausea and vomiting.   Genitourinary: Negative for dysuria and flank pain.    Musculoskeletal: Negative for back pain.   Neurological: Negative for weakness and numbness.       Physical Exam     Initial Vitals   BP Pulse Resp Temp SpO2   02/25/22 0023 02/25/22 0022 02/25/22 0022 02/25/22 0022 02/25/22 0022   136/85 88 20 97.1 °F (36.2 °C) 98 %      MAP       --                Physical Exam    Nursing note and vitals reviewed.  Constitutional: He appears well-developed and well-nourished. No distress.   HENT:   Head: Normocephalic and atraumatic.   Mouth/Throat: Oropharynx is clear and moist.   Eyes: Conjunctivae and EOM are normal. Pupils are equal, round, and reactive to light.   Neck: Neck supple.   Pulmonary/Chest: He has no wheezes. He has no rhonchi. He has no rales.   Abdominal: Abdomen is soft. There is no abdominal tenderness.   G-tube fistula left upper quadrant   Musculoskeletal:      Cervical back: Neck supple.     Neurological: He is alert and oriented to person, place, and time. He has normal strength. No sensory deficit. GCS score is 15. GCS eye subscore is 4. GCS verbal subscore is 5. GCS motor subscore is 6.         ED Course   Feeding Tube    Date/Time: 2/25/2022 1:08 AM  Location procedure was performed: [facility] EMERGENCY DEPARTMENT  Performed by: Telly Benítez MD  Authorized by: Telly Benítez MD   Consent given by: patient  Patient identity confirmed: verbally with patient  Indications: tube dislodged    Sedation:  Patient sedated: no    Local anesthesia used: no    Anesthesia:  Local anesthesia used: no  Tube type: gastrostomy  Tube size: 20.  Endoscope used: no  Bulb inflation volume: 20 (ml)  Bulb inflation fluid: sterile water  Placement/position confirmation: x-ray  Complications: No        Labs Reviewed - No data to display       Imaging Results    None          Medications - No data to display  Medical Decision Making:   g tube replaced w/o complication.  Gastric contents easily aspirated.  No complications.  Stable for discharge                      Clinical  Impression:   Final diagnoses:  [K94.23] Gastrostomy tube dysfunction (Primary)          ED Disposition Condition    Discharge Stable        ED Prescriptions     None        Follow-up Information    None          Telly Benítez MD  02/25/22 0109

## 2022-03-21 ENCOUNTER — APPOINTMENT (OUTPATIENT)
Dept: LAB | Facility: HOSPITAL | Age: 42
End: 2022-03-21
Attending: FAMILY MEDICINE
Payer: MEDICAID

## 2022-03-21 DIAGNOSIS — I69.321 DYSPHASIA STATUS POST CEREBROVASCULAR ACCIDENT: ICD-10-CM

## 2022-03-21 DIAGNOSIS — N39.0 UTI (URINARY TRACT INFECTION): Primary | ICD-10-CM

## 2022-03-21 LAB
BACTERIA #/AREA URNS HPF: ABNORMAL /HPF
BILIRUB UR QL STRIP: NEGATIVE
CLARITY UR: ABNORMAL
COLOR UR: YELLOW
GLUCOSE UR QL STRIP: NEGATIVE
HGB UR QL STRIP: ABNORMAL
HYALINE CASTS #/AREA URNS LPF: 0 /LPF
KETONES UR QL STRIP: NEGATIVE
LEUKOCYTE ESTERASE UR QL STRIP: ABNORMAL
MICROSCOPIC COMMENT: ABNORMAL
NITRITE UR QL STRIP: POSITIVE
PH UR STRIP: >8 [PH] (ref 5–8)
PROT UR QL STRIP: ABNORMAL
RBC #/AREA URNS HPF: 2 /HPF (ref 0–4)
SP GR UR STRIP: 1.01 (ref 1–1.03)
TRI-PHOS CRY URNS QL MICRO: ABNORMAL
URN SPEC COLLECT METH UR: ABNORMAL
UROBILINOGEN UR STRIP-ACNC: 1 EU/DL
WBC #/AREA URNS HPF: 40 /HPF (ref 0–5)

## 2022-03-21 PROCEDURE — 87077 CULTURE AEROBIC IDENTIFY: CPT | Performed by: FAMILY MEDICINE

## 2022-03-21 PROCEDURE — 87086 URINE CULTURE/COLONY COUNT: CPT | Performed by: FAMILY MEDICINE

## 2022-03-21 PROCEDURE — 87088 URINE BACTERIA CULTURE: CPT | Performed by: FAMILY MEDICINE

## 2022-03-21 PROCEDURE — 87186 SC STD MICRODIL/AGAR DIL: CPT | Performed by: FAMILY MEDICINE

## 2022-03-21 PROCEDURE — 81000 URINALYSIS NONAUTO W/SCOPE: CPT | Performed by: FAMILY MEDICINE

## 2022-03-21 PROCEDURE — 87147 CULTURE TYPE IMMUNOLOGIC: CPT | Performed by: FAMILY MEDICINE

## 2022-03-24 LAB
BACTERIA UR CULT: ABNORMAL
BACTERIA UR CULT: ABNORMAL

## 2022-06-13 ENCOUNTER — HOSPITAL ENCOUNTER (EMERGENCY)
Facility: HOSPITAL | Age: 42
Discharge: HOME | End: 2022-06-13
Attending: EMERGENCY MEDICINE
Payer: MEDICAID

## 2022-06-13 VITALS
DIASTOLIC BLOOD PRESSURE: 83 MMHG | BODY MASS INDEX: 28.14 KG/M2 | TEMPERATURE: 98 F | HEART RATE: 87 BPM | SYSTOLIC BLOOD PRESSURE: 141 MMHG | WEIGHT: 190 LBS | HEIGHT: 69 IN | RESPIRATION RATE: 18 BRPM | OXYGEN SATURATION: 97 %

## 2022-06-13 DIAGNOSIS — K94.23 PEG TUBE MALFUNCTION: Primary | ICD-10-CM

## 2022-06-13 PROCEDURE — 99283 EMERGENCY DEPT VISIT LOW MDM: CPT | Mod: 25

## 2022-06-13 PROCEDURE — 43762 RPLC GTUBE NO REVJ TRC: CPT

## 2022-06-14 NOTE — ED PROVIDER NOTES
Encounter Date: 6/13/2022       History     Chief Complaint   Patient presents with    peg tube replacement     Sent from [place] to replace PEG tube     Patient is a 41-year-old male who presents to emergency room for evaluation of a removed peg tube.  He lives at [facility] and has a history of old CVA functional quadriplegia Asperger's syndrome.  The tube is been present since April 2021.  There are no other complaints at this time.        Review of patient's allergies indicates:  No Known Allergies  Past Medical History:   Diagnosis Date    Anticoagulant long-term use     baby asa per chart    Asperger's syndrome     CVA, old, hemiparesis     Encounter for blood transfusion     Essential hypertension, malignant     Functional quadriplegia 5/28/2021    PEG (percutaneous endoscopic gastrostomy) status      Past Surgical History:   Procedure Laterality Date    COLONOSCOPY N/A 6/3/2021    Procedure: COLONOSCOPY;  Surgeon: Rosario Meeks MD;  Location: [facility];  Service: Endoscopy;  Laterality: N/A;    ESOPHAGOGASTRODUODENOSCOPY N/A 7/10/2021    Procedure: EGD (ESOPHAGOGASTRODUODENOSCOPY);  Surgeon: Rosario Meeks MD;  Location: [facility];  Service: Endoscopy;  Laterality: N/A;    ESOPHAGOGASTRODUODENOSCOPY W/ PEG N/A 4/1/2021    Procedure: EGD, WITH PEG TUBE INSERTION;  Surgeon: En Zheng MD;  Location: [facility];  Service: Endoscopy;  Laterality: N/A;     History reviewed. No pertinent family history.  Social History     Tobacco Use    Smoking status: Never Smoker    Smokeless tobacco: Never Used   Substance Use Topics    Alcohol use: Not Currently    Drug use: Not Currently     Review of Systems   Unable to perform ROS: Other   Constitutional: Negative for fever.   Respiratory: Negative for shortness of breath.    Cardiovascular: Negative for chest pain.   Gastrointestinal: Negative for vomiting.       Physical Exam     Initial Vitals [06/13/22 2058]   BP Pulse Resp Temp  SpO2   (!) 141/83 87 18 97.8 °F (36.6 °C) 97 %      MAP       --         Physical Exam    Nursing note and vitals reviewed.  Constitutional:   Sitting on stretcher in no acute distress   HENT:   Head: Normocephalic and atraumatic.   Neck: Neck supple.   Cardiovascular: Normal rate, regular rhythm, normal heart sounds and intact distal pulses.   Pulmonary/Chest: Breath sounds normal. He has no wheezes. He has no rhonchi. He has no rales.   Abdominal:   Flat abdomen.  PEG tube fistula noted in the left upper quadrant.  Soft abdomen.  Small amount of blood near the the fistula site.  A small amount of granulation tissue   Musculoskeletal:         General: No edema.      Cervical back: Neck supple.     Neurological: He is alert.   Skin: Skin is warm and dry. No rash noted.         ED Course   Procedures  Labs Reviewed - No data to display       Imaging Results    None          Medications - No data to display              ED Course as of 06/16/22 0855   Mon Jun 13, 2022 2113 G-tube is replaced at the bedside.  I was able aspirate gastric contents as well as flush with air and here gastric bubble.  Patient appears to be stable for discharge. [JS]      ED Course User Index  [JS] Telly Benítez MD             Clinical Impression:   Final diagnoses:  [K94.23] PEG tube malfunction (Primary)          ED Disposition Condition    Discharge Stable        ED Prescriptions     None        Follow-up Information     Follow up With Specialties Details Why Contact Info    Elly Mathew, DO Family Medicine  As needed [address] [facility] [address]  [facility] [address]  [phone]      [facility] - Emergency Dept Emergency Medicine  If symptoms worsen [address] [place] [id number] [phone]           Telly Benítez MD  06/16/22 6591

## 2022-06-14 NOTE — DISCHARGE INSTRUCTIONS
The PEG tube was replaced here in the emergency room and is functional for use.  There may be a small amount of bleeding near the site over the next 2 days.  Do routine wound care with  barrier cream.

## 2022-06-14 NOTE — ED NOTES
At D/C, Anrdea Leon Jr. is AA & responsive, his skin is warm and dry, follow up care discussed at length with patient/family to include medications and to follow-up with MD; patient/family given discharge instructions along with prescriptions, as indicated, and care sheets.  Report called to Karin.

## 2022-09-09 ENCOUNTER — HOSPITAL ENCOUNTER (EMERGENCY)
Facility: HOSPITAL | Age: 42
Discharge: HOME | End: 2022-09-09
Attending: EMERGENCY MEDICINE
Payer: MEDICAID

## 2022-09-09 VITALS
OXYGEN SATURATION: 97 % | TEMPERATURE: 97 F | HEIGHT: 69 IN | SYSTOLIC BLOOD PRESSURE: 90 MMHG | WEIGHT: 190 LBS | HEART RATE: 70 BPM | RESPIRATION RATE: 18 BRPM | DIASTOLIC BLOOD PRESSURE: 62 MMHG | BODY MASS INDEX: 28.14 KG/M2

## 2022-09-09 DIAGNOSIS — T85.528A GASTROJEJUNOSTOMY TUBE DISLODGEMENT: ICD-10-CM

## 2022-09-09 PROCEDURE — 99283 EMERGENCY DEPT VISIT LOW MDM: CPT | Mod: 25

## 2022-09-09 PROCEDURE — 43762 RPLC GTUBE NO REVJ TRC: CPT

## 2022-09-09 PROCEDURE — 25500020 PHARM REV CODE 255: Performed by: EMERGENCY MEDICINE

## 2022-09-09 RX ADMIN — DIATRIZOATE MEGLUMINE AND DIATRIZOATE SODIUM 30 ML: 660; 100 LIQUID ORAL; RECTAL at 09:09

## 2022-09-09 NOTE — ED PROVIDER NOTES
Encounter Date: 9/9/2022    SCRIBE #1 NOTE: Letty FARIAS am scribing for, and in the presence of,  Trenton Collado MD.     History     Chief Complaint   Patient presents with    PEG tube replacement      Time seen by provider: 9:07 AM on 09/09/2022    Andrea Leon Jr. is a 42 y.o. male with a history of Asperger's syndrome, CVA, and quadriplegia with a PEG tube who presents to the ED from [facility] for a PEG tube replacement. Balloon doesn't work.    The history is provided by the nursing home.   Review of patient's allergies indicates:  No Known Allergies  Past Medical History:   Diagnosis Date    Anticoagulant long-term use     baby asa per chart    Asperger's syndrome     CVA, old, hemiparesis     Encounter for blood transfusion     Essential hypertension, malignant     Functional quadriplegia 5/28/2021    PEG (percutaneous endoscopic gastrostomy) status      Past Surgical History:   Procedure Laterality Date    COLONOSCOPY N/A 6/3/2021    Procedure: COLONOSCOPY;  Surgeon: Rosario Meeks MD;  Location: [facility];  Service: Endoscopy;  Laterality: N/A;    ESOPHAGOGASTRODUODENOSCOPY N/A 7/10/2021    Procedure: EGD (ESOPHAGOGASTRODUODENOSCOPY);  Surgeon: Rosario Meeks MD;  Location: [facility];  Service: Endoscopy;  Laterality: N/A;    ESOPHAGOGASTRODUODENOSCOPY W/ PEG N/A 4/1/2021    Procedure: EGD, WITH PEG TUBE INSERTION;  Surgeon: En Zheng MD;  Location: [facility];  Service: Endoscopy;  Laterality: N/A;     No family history on file.  Social History     Tobacco Use    Smoking status: Never    Smokeless tobacco: Never   Substance Use Topics    Alcohol use: Not Currently    Drug use: Not Currently     Review of Systems   Unable to perform ROS: Other (pt unable to give hx)     Physical Exam     Initial Vitals [09/09/22 0907]   BP Pulse Resp Temp SpO2   123/83 (!) 58 16 97 °F (36.1 °C) 99 %      MAP       --         Physical Exam    Nursing note and vitals  reviewed.  Constitutional: He appears well-nourished. He is not diaphoretic.  Non-toxic appearance. No distress.   HENT:   Head: Normocephalic and atraumatic.   Eyes: EOM are normal.   Neck: Neck supple.   Cardiovascular:  Normal rate, regular rhythm and normal heart sounds.     Exam reveals no gallop and no friction rub.       No murmur heard.  Pulmonary/Chest: Breath sounds normal. No respiratory distress. He has no wheezes. He has no rhonchi. He has no rales.   Abdominal:   PEG tube in place with a blood clot of the ostomy.   Musculoskeletal:      Cervical back: Neck supple.      Comments: Contractures     Neurological: He is alert.   Quadriplegia.   Skin: Skin is warm and dry. No rash noted.       ED Course   Feeding Tube    Date/Time: 9/9/2022 12:12 PM  Location procedure was performed: [facility] EMERGENCY DEPARTMENT  Performed by: Trenton Collado MD  Authorized by: Trenton Collado MD   Consent: The procedure was performed in an emergent situation.  Indications: tube malfunction    Sedation:  Patient sedated: no    Local anesthesia used: no    Anesthesia:  Local anesthesia used: no  Tube type: gastrostomy  Patient position: supine  Procedure type: replacement  Tube size: 18 Fr  Endoscope used: no  Bulb inflation volume: 20 (ml)  Bulb inflation fluid: sterile water  Placement/position confirmation: x-ray  Tube placement difficulty: none  Complications: No  Specimens: No  Implants: No  Patient tolerance: patient tolerated the procedure well with no immediate complications      Labs Reviewed - No data to display       Imaging Results              X-Ray Abdomen AP 1 View (KUB) (Final result)  Result time 09/09/22 11:05:32      Final result by Mike Delgadillo MD (09/09/22 11:05:32)                   Impression:      Appropriate PEG position.      Electronically signed by: Mike Delgadillo  Date:    09/09/2022  Time:    11:05               Narrative:    EXAMINATION:  XR ABDOMEN AP 1 VIEW    CLINICAL  HISTORY:  Displacement of other gastrointestinal prosthetic devices, implants and grafts, initial encounter    TECHNIQUE:  AP View(s) of the abdomen was performed.  Patient was administered 30 cc of Gastroview through the PEG tube.    COMPARISON:  08/10/2021    FINDINGS:  There is a PEG tube.  Gastroview fills the stomach and proceed into the proximal duodenum.  There is no extraluminal extravasation of contrast.  Nonobstructive bowel gas pattern.                                       Medications   diatrizoate meglumineand-diatrizoate sodium (GASTROVIEW) solution 30 mL (30 mLs Per G Tube Given 9/9/22 0957)     Medical Decision Making:   History:   Old Medical Records: I decided to obtain old medical records.  Clinical Tests:   Radiological Study: Ordered and Reviewed        Scribe Attestation:   Scribe #1: I performed the above scribed service and the documentation accurately describes the services I performed. I attest to the accuracy of the note.      ED Course as of 09/09/22 1213   Fri Sep 09, 2022   0919 BP: 123/83 [EF]   0919 Temp: 97 °F (36.1 °C) [EF]   0919 Temp src: Axillary [EF]   0919 Pulse(!): 58 [EF]   0919 Resp: 16 [EF]   0919 SpO2: 99 % [EF]   1113 X-Ray Abdomen AP 1 View (KUB) [EF]      ED Course User Index  [EF] Trenton Collado MD           I, Dr. Collado, personally performed the services described in this documentation. All medical record entries made by the scribe were at my direction and in my presence.  I have reviewed the chart and agree that the record reflects my personal performance and is accurate and complete.12:13 PM 09/09/2022    Clinical Impression:   Final diagnoses:  [T85.528A] Gastrojejunostomy tube dislodgement      ED Disposition Condition    Discharge Stable          ED Prescriptions    None       Follow-up Information       Follow up With Specialties Details Why Contact Info    [facility] Emergency Dept Emergency Medicine  As needed, If symptoms worsen [address] [place] [address]  Drive  [facility] [id number] [phone]            42-year-old male with history of quadriplegia presents with a nonfunctional G-tube.  Balloon does not work anymore.  This was easily replaced in the ER and confirmed with an x-ray.     Trenton Collado MD  09/09/22 1832

## 2022-09-09 NOTE — ED NOTES
"Called report to Aleksandr (nursing staff @ [facility]) re: pending return to their facility. Per same, "I will call Asiya an arrange transport". NAD/no change (#[phone])  "

## 2022-10-12 ENCOUNTER — HOSPITAL ENCOUNTER (EMERGENCY)
Facility: HOSPITAL | Age: 42
Discharge: ANOTHER HOSPITAL | End: 2022-10-12
Attending: EMERGENCY MEDICINE
Payer: MEDICAID

## 2022-10-12 ENCOUNTER — HOSPITAL ENCOUNTER (INPATIENT)
Facility: HOSPITAL | Age: 42
LOS: 5 days | DRG: 871 | End: 2022-10-17
Attending: INTERNAL MEDICINE | Admitting: INTERNAL MEDICINE
Payer: MEDICAID

## 2022-10-12 VITALS
OXYGEN SATURATION: 99 % | HEART RATE: 88 BPM | SYSTOLIC BLOOD PRESSURE: 97 MMHG | TEMPERATURE: 100 F | DIASTOLIC BLOOD PRESSURE: 56 MMHG | WEIGHT: 190.06 LBS | HEIGHT: 69 IN | RESPIRATION RATE: 18 BRPM | BODY MASS INDEX: 28.15 KG/M2

## 2022-10-12 DIAGNOSIS — R00.1 BRADYCARDIA: ICD-10-CM

## 2022-10-12 DIAGNOSIS — Z45.2 ENCOUNTER FOR CENTRAL LINE PLACEMENT: ICD-10-CM

## 2022-10-12 DIAGNOSIS — E87.5 HYPERKALEMIA: ICD-10-CM

## 2022-10-12 DIAGNOSIS — N39.0 SEPSIS DUE TO URINARY TRACT INFECTION: Primary | ICD-10-CM

## 2022-10-12 DIAGNOSIS — A41.9 SEPSIS DUE TO URINARY TRACT INFECTION: Primary | ICD-10-CM

## 2022-10-12 DIAGNOSIS — R00.0 TACHYCARDIA: ICD-10-CM

## 2022-10-12 DIAGNOSIS — N17.9 ACUTE RENAL FAILURE: ICD-10-CM

## 2022-10-12 PROBLEM — R31.9 URINARY TRACT INFECTION WITH HEMATURIA: Status: ACTIVE | Noted: 2021-08-22

## 2022-10-12 LAB
ALBUMIN SERPL BCP-MCNC: 3.6 G/DL (ref 3.5–5.2)
ALP SERPL-CCNC: 127 U/L (ref 55–135)
ALT SERPL W/O P-5'-P-CCNC: 19 U/L (ref 10–44)
ANION GAP SERPL CALC-SCNC: 10 MMOL/L (ref 8–16)
ANION GAP SERPL CALC-SCNC: 15 MMOL/L (ref 8–16)
AST SERPL-CCNC: 26 U/L (ref 10–40)
BACTERIA #/AREA URNS HPF: ABNORMAL /HPF
BASOPHILS # BLD AUTO: 0.06 K/UL (ref 0–0.2)
BASOPHILS NFR BLD: 0.4 % (ref 0–1.9)
BILIRUB SERPL-MCNC: 0.6 MG/DL (ref 0.1–1)
BILIRUB UR QL STRIP: NEGATIVE
BUN SERPL-MCNC: 101 MG/DL (ref 6–20)
BUN SERPL-MCNC: 91 MG/DL (ref 6–20)
CALCIUM SERPL-MCNC: 8.8 MG/DL (ref 8.7–10.5)
CALCIUM SERPL-MCNC: 9.8 MG/DL (ref 8.7–10.5)
CHLORIDE SERPL-SCNC: 117 MMOL/L (ref 95–110)
CHLORIDE SERPL-SCNC: 119 MMOL/L (ref 95–110)
CLARITY UR: CLEAR
CO2 SERPL-SCNC: 25 MMOL/L (ref 23–29)
CO2 SERPL-SCNC: 27 MMOL/L (ref 23–29)
COLOR UR: YELLOW
CREAT SERPL-MCNC: 3.3 MG/DL (ref 0.5–1.4)
CREAT SERPL-MCNC: 3.7 MG/DL (ref 0.5–1.4)
DIFFERENTIAL METHOD: ABNORMAL
EOSINOPHIL # BLD AUTO: 0 K/UL (ref 0–0.5)
EOSINOPHIL NFR BLD: 0.1 % (ref 0–8)
ERYTHROCYTE [DISTWIDTH] IN BLOOD BY AUTOMATED COUNT: 15.9 % (ref 11.5–14.5)
EST. GFR  (NO RACE VARIABLE): 20 ML/MIN/1.73 M^2
EST. GFR  (NO RACE VARIABLE): 23.1 ML/MIN/1.73 M^2
GLUCOSE SERPL-MCNC: 109 MG/DL (ref 70–110)
GLUCOSE SERPL-MCNC: 125 MG/DL (ref 70–110)
GLUCOSE UR QL STRIP: NEGATIVE
HCT VFR BLD AUTO: 53.6 % (ref 40–54)
HGB BLD-MCNC: 17.4 G/DL (ref 14–18)
HGB UR QL STRIP: ABNORMAL
IMM GRANULOCYTES # BLD AUTO: 0.05 K/UL (ref 0–0.04)
IMM GRANULOCYTES NFR BLD AUTO: 0.3 % (ref 0–0.5)
KETONES UR QL STRIP: NEGATIVE
LACTATE SERPL-SCNC: 2.4 MMOL/L (ref 0.5–2.2)
LACTATE SERPL-SCNC: 3 MMOL/L (ref 0.5–2.2)
LEUKOCYTE ESTERASE UR QL STRIP: ABNORMAL
LYMPHOCYTES # BLD AUTO: 1.6 K/UL (ref 1–4.8)
LYMPHOCYTES NFR BLD: 10.9 % (ref 18–48)
MCH RBC QN AUTO: 32.8 PG (ref 27–31)
MCHC RBC AUTO-ENTMCNC: 32.5 G/DL (ref 32–36)
MCV RBC AUTO: 101 FL (ref 82–98)
MICROSCOPIC COMMENT: ABNORMAL
MONOCYTES # BLD AUTO: 1.5 K/UL (ref 0.3–1)
MONOCYTES NFR BLD: 9.6 % (ref 4–15)
NEUTROPHILS # BLD AUTO: 11.8 K/UL (ref 1.8–7.7)
NEUTROPHILS NFR BLD: 78.7 % (ref 38–73)
NITRITE UR QL STRIP: POSITIVE
NRBC BLD-RTO: 0 /100 WBC
PH UR STRIP: 6 [PH] (ref 5–8)
PLATELET # BLD AUTO: 167 K/UL (ref 150–450)
PMV BLD AUTO: 12.9 FL (ref 9.2–12.9)
POCT GLUCOSE: 111 MG/DL (ref 70–110)
POCT GLUCOSE: 120 MG/DL (ref 70–110)
POCT GLUCOSE: 168 MG/DL (ref 70–110)
POTASSIUM SERPL-SCNC: 4.4 MMOL/L (ref 3.5–5.1)
POTASSIUM SERPL-SCNC: 5.7 MMOL/L (ref 3.5–5.1)
PROT SERPL-MCNC: 8.2 G/DL (ref 6–8.4)
PROT UR QL STRIP: NEGATIVE
RBC # BLD AUTO: 5.31 M/UL (ref 4.6–6.2)
RBC #/AREA URNS HPF: 7 /HPF (ref 0–4)
SARS-COV-2 RDRP RESP QL NAA+PROBE: NEGATIVE
SODIUM SERPL-SCNC: 154 MMOL/L (ref 136–145)
SODIUM SERPL-SCNC: 159 MMOL/L (ref 136–145)
SP GR UR STRIP: 1.01 (ref 1–1.03)
SQUAMOUS #/AREA URNS HPF: 2 /HPF
URN SPEC COLLECT METH UR: ABNORMAL
UROBILINOGEN UR STRIP-ACNC: NEGATIVE EU/DL
VALPROATE SERPL-MCNC: <10 UG/ML (ref 50–100)
WBC # BLD AUTO: 15.03 K/UL (ref 3.9–12.7)
WBC #/AREA URNS HPF: >100 /HPF (ref 0–5)

## 2022-10-12 PROCEDURE — 93010 ELECTROCARDIOGRAM REPORT: CPT | Mod: ,,, | Performed by: SPECIALIST

## 2022-10-12 PROCEDURE — 27000221 HC OXYGEN, UP TO 24 HOURS

## 2022-10-12 PROCEDURE — 99900031 HC PATIENT EDUCATION (STAT)

## 2022-10-12 PROCEDURE — 93010 EKG 12-LEAD: ICD-10-PCS | Mod: ,,, | Performed by: SPECIALIST

## 2022-10-12 PROCEDURE — 87040 BLOOD CULTURE FOR BACTERIA: CPT | Mod: 59 | Performed by: EMERGENCY MEDICINE

## 2022-10-12 PROCEDURE — 25000003 PHARM REV CODE 250: Performed by: INTERNAL MEDICINE

## 2022-10-12 PROCEDURE — 63600175 PHARM REV CODE 636 W HCPCS: Performed by: INTERNAL MEDICINE

## 2022-10-12 PROCEDURE — 36556 INSERT NON-TUNNEL CV CATH: CPT | Mod: RT

## 2022-10-12 PROCEDURE — 63600175 PHARM REV CODE 636 W HCPCS: Performed by: EMERGENCY MEDICINE

## 2022-10-12 PROCEDURE — U0002 COVID-19 LAB TEST NON-CDC: HCPCS | Performed by: EMERGENCY MEDICINE

## 2022-10-12 PROCEDURE — 96361 HYDRATE IV INFUSION ADD-ON: CPT

## 2022-10-12 PROCEDURE — 85025 COMPLETE CBC W/AUTO DIFF WBC: CPT | Performed by: EMERGENCY MEDICINE

## 2022-10-12 PROCEDURE — S5010 5% DEXTROSE AND 0.45% SALINE: HCPCS | Performed by: EMERGENCY MEDICINE

## 2022-10-12 PROCEDURE — 96366 THER/PROPH/DIAG IV INF ADDON: CPT

## 2022-10-12 PROCEDURE — 94640 AIRWAY INHALATION TREATMENT: CPT

## 2022-10-12 PROCEDURE — 87088 URINE BACTERIA CULTURE: CPT | Performed by: EMERGENCY MEDICINE

## 2022-10-12 PROCEDURE — 80164 ASSAY DIPROPYLACETIC ACD TOT: CPT | Performed by: INTERNAL MEDICINE

## 2022-10-12 PROCEDURE — 96376 TX/PRO/DX INJ SAME DRUG ADON: CPT | Mod: 59

## 2022-10-12 PROCEDURE — 94761 N-INVAS EAR/PLS OXIMETRY MLT: CPT

## 2022-10-12 PROCEDURE — 99285 EMERGENCY DEPT VISIT HI MDM: CPT | Mod: 25

## 2022-10-12 PROCEDURE — 87086 URINE CULTURE/COLONY COUNT: CPT | Performed by: EMERGENCY MEDICINE

## 2022-10-12 PROCEDURE — 93005 ELECTROCARDIOGRAM TRACING: CPT

## 2022-10-12 PROCEDURE — 81000 URINALYSIS NONAUTO W/SCOPE: CPT | Performed by: EMERGENCY MEDICINE

## 2022-10-12 PROCEDURE — 87186 SC STD MICRODIL/AGAR DIL: CPT | Performed by: EMERGENCY MEDICINE

## 2022-10-12 PROCEDURE — 80053 COMPREHEN METABOLIC PANEL: CPT | Performed by: EMERGENCY MEDICINE

## 2022-10-12 PROCEDURE — 25000242 PHARM REV CODE 250 ALT 637 W/ HCPCS: Performed by: INTERNAL MEDICINE

## 2022-10-12 PROCEDURE — 25000003 PHARM REV CODE 250: Performed by: EMERGENCY MEDICINE

## 2022-10-12 PROCEDURE — 96368 THER/DIAG CONCURRENT INF: CPT

## 2022-10-12 PROCEDURE — 25000003 PHARM REV CODE 250

## 2022-10-12 PROCEDURE — 87077 CULTURE AEROBIC IDENTIFY: CPT | Performed by: EMERGENCY MEDICINE

## 2022-10-12 PROCEDURE — 96365 THER/PROPH/DIAG IV INF INIT: CPT

## 2022-10-12 PROCEDURE — 96375 TX/PRO/DX INJ NEW DRUG ADDON: CPT

## 2022-10-12 PROCEDURE — 20000000 HC ICU ROOM

## 2022-10-12 PROCEDURE — 80048 BASIC METABOLIC PNL TOTAL CA: CPT | Performed by: INTERNAL MEDICINE

## 2022-10-12 PROCEDURE — 36415 COLL VENOUS BLD VENIPUNCTURE: CPT | Performed by: EMERGENCY MEDICINE

## 2022-10-12 PROCEDURE — 63600175 PHARM REV CODE 636 W HCPCS

## 2022-10-12 PROCEDURE — 99900035 HC TECH TIME PER 15 MIN (STAT)

## 2022-10-12 PROCEDURE — 83605 ASSAY OF LACTIC ACID: CPT | Performed by: EMERGENCY MEDICINE

## 2022-10-12 PROCEDURE — 25000242 PHARM REV CODE 250 ALT 637 W/ HCPCS: Performed by: EMERGENCY MEDICINE

## 2022-10-12 PROCEDURE — 96367 TX/PROPH/DG ADDL SEQ IV INF: CPT

## 2022-10-12 RX ORDER — NOREPINEPHRINE BITARTRATE/D5W 4MG/250ML
0.05 PLASTIC BAG, INJECTION (ML) INTRAVENOUS CONTINUOUS
Status: DISCONTINUED | OUTPATIENT
Start: 2022-10-12 | End: 2022-10-12 | Stop reason: HOSPADM

## 2022-10-12 RX ORDER — NOREPINEPHRINE BITARTRATE/D5W 4MG/250ML
PLASTIC BAG, INJECTION (ML) INTRAVENOUS
Status: COMPLETED
Start: 2022-10-12 | End: 2022-10-12

## 2022-10-12 RX ORDER — NOREPINEPHRINE BITARTRATE/D5W 4MG/250ML
PLASTIC BAG, INJECTION (ML) INTRAVENOUS
Status: DISPENSED
Start: 2022-10-12 | End: 2022-10-13

## 2022-10-12 RX ORDER — SODIUM CHLORIDE, SODIUM LACTATE, POTASSIUM CHLORIDE, CALCIUM CHLORIDE 600; 310; 30; 20 MG/100ML; MG/100ML; MG/100ML; MG/100ML
INJECTION, SOLUTION INTRAVENOUS CONTINUOUS
Status: DISCONTINUED | OUTPATIENT
Start: 2022-10-12 | End: 2022-10-13

## 2022-10-12 RX ORDER — ACETAMINOPHEN 650 MG/1
650 SUPPOSITORY RECTAL EVERY 8 HOURS PRN
Status: DISCONTINUED | OUTPATIENT
Start: 2022-10-12 | End: 2022-10-17 | Stop reason: HOSPADM

## 2022-10-12 RX ORDER — ATORVASTATIN CALCIUM 40 MG/1
40 TABLET, FILM COATED ORAL DAILY
Status: DISCONTINUED | OUTPATIENT
Start: 2022-10-12 | End: 2022-10-17 | Stop reason: HOSPADM

## 2022-10-12 RX ORDER — LORAZEPAM 2 MG/ML
INJECTION INTRAMUSCULAR
Status: COMPLETED
Start: 2022-10-12 | End: 2022-10-12

## 2022-10-12 RX ORDER — ALBUTEROL SULFATE 0.83 MG/ML
10 SOLUTION RESPIRATORY (INHALATION) ONCE
Status: COMPLETED | OUTPATIENT
Start: 2022-10-12 | End: 2022-10-12

## 2022-10-12 RX ORDER — FLUOXETINE HYDROCHLORIDE 20 MG/1
20 CAPSULE ORAL DAILY
Status: DISCONTINUED | OUTPATIENT
Start: 2022-10-13 | End: 2022-10-16

## 2022-10-12 RX ORDER — DEXTROSE MONOHYDRATE AND SODIUM CHLORIDE 5; .45 G/100ML; G/100ML
INJECTION, SOLUTION INTRAVENOUS CONTINUOUS
Status: DISCONTINUED | OUTPATIENT
Start: 2022-10-12 | End: 2022-10-12

## 2022-10-12 RX ORDER — ACETAMINOPHEN 650 MG/20.3ML
1000 LIQUID ORAL
Status: COMPLETED | OUTPATIENT
Start: 2022-10-12 | End: 2022-10-12

## 2022-10-12 RX ORDER — ACETAMINOPHEN 325 MG/1
650 TABLET ORAL EVERY 8 HOURS PRN
Status: DISCONTINUED | OUTPATIENT
Start: 2022-10-12 | End: 2022-10-12

## 2022-10-12 RX ORDER — POLYETHYLENE GLYCOL 3350 17 G/17G
17 POWDER, FOR SOLUTION ORAL DAILY
COMMUNITY

## 2022-10-12 RX ORDER — MORPHINE SULFATE 2 MG/ML
2 INJECTION, SOLUTION INTRAMUSCULAR; INTRAVENOUS EVERY 4 HOURS PRN
Status: DISCONTINUED | OUTPATIENT
Start: 2022-10-12 | End: 2022-10-17 | Stop reason: HOSPADM

## 2022-10-12 RX ORDER — ONDANSETRON 2 MG/ML
4 INJECTION INTRAMUSCULAR; INTRAVENOUS EVERY 8 HOURS PRN
Status: DISCONTINUED | OUTPATIENT
Start: 2022-10-12 | End: 2022-10-17 | Stop reason: HOSPADM

## 2022-10-12 RX ORDER — LEVETIRACETAM 500 MG/5ML
750 INJECTION, SOLUTION, CONCENTRATE INTRAVENOUS EVERY 12 HOURS
Status: DISCONTINUED | OUTPATIENT
Start: 2022-10-12 | End: 2022-10-12

## 2022-10-12 RX ORDER — VALPROIC ACID 250 MG/5ML
250 SOLUTION ORAL NIGHTLY
Status: DISCONTINUED | OUTPATIENT
Start: 2022-10-12 | End: 2022-10-17 | Stop reason: HOSPADM

## 2022-10-12 RX ORDER — ALBUTEROL SULFATE 2.5 MG/.5ML
10 SOLUTION RESPIRATORY (INHALATION)
Status: COMPLETED | OUTPATIENT
Start: 2022-10-12 | End: 2022-10-12

## 2022-10-12 RX ORDER — CLONIDINE HYDROCHLORIDE 0.1 MG/1
0.1 TABLET ORAL 3 TIMES DAILY PRN
Status: DISCONTINUED | OUTPATIENT
Start: 2022-10-12 | End: 2022-10-17 | Stop reason: HOSPADM

## 2022-10-12 RX ORDER — POLYETHYLENE GLYCOL 3350 17 G/17G
17 POWDER, FOR SOLUTION ORAL 2 TIMES DAILY PRN
Status: DISCONTINUED | OUTPATIENT
Start: 2022-10-12 | End: 2022-10-17 | Stop reason: HOSPADM

## 2022-10-12 RX ORDER — TALC
6 POWDER (GRAM) TOPICAL NIGHTLY PRN
Status: DISCONTINUED | OUTPATIENT
Start: 2022-10-12 | End: 2022-10-17 | Stop reason: HOSPADM

## 2022-10-12 RX ORDER — HYDROCODONE BITARTRATE AND ACETAMINOPHEN 5; 325 MG/1; MG/1
1 TABLET ORAL EVERY 4 HOURS PRN
Status: DISCONTINUED | OUTPATIENT
Start: 2022-10-12 | End: 2022-10-17 | Stop reason: HOSPADM

## 2022-10-12 RX ORDER — CALCIUM GLUCONATE 20 MG/ML
1 INJECTION, SOLUTION INTRAVENOUS
Status: COMPLETED | OUTPATIENT
Start: 2022-10-12 | End: 2022-10-12

## 2022-10-12 RX ORDER — FAMOTIDINE 20 MG/1
20 TABLET, FILM COATED ORAL 2 TIMES DAILY
Status: DISCONTINUED | OUTPATIENT
Start: 2022-10-12 | End: 2022-10-13

## 2022-10-12 RX ORDER — VALPROIC ACID 250 MG/5ML
SOLUTION ORAL
Status: ON HOLD | COMMUNITY
Start: 2022-09-24 | End: 2022-10-27 | Stop reason: HOSPADM

## 2022-10-12 RX ORDER — ONDANSETRON 4 MG/1
TABLET, ORALLY DISINTEGRATING ORAL
COMMUNITY
End: 2023-01-03 | Stop reason: CLARIF

## 2022-10-12 RX ORDER — DEXTROSE MONOHYDRATE AND SODIUM CHLORIDE 5; .45 G/100ML; G/100ML
1000 INJECTION, SOLUTION INTRAVENOUS
Status: COMPLETED | OUTPATIENT
Start: 2022-10-12 | End: 2022-10-12

## 2022-10-12 RX ORDER — FAMOTIDINE 20 MG/1
20 TABLET, FILM COATED ORAL EVERY MORNING
COMMUNITY
Start: 2022-09-12 | End: 2024-02-29 | Stop reason: CLARIF

## 2022-10-12 RX ORDER — ENOXAPARIN SODIUM 100 MG/ML
30 INJECTION SUBCUTANEOUS
Status: DISCONTINUED | OUTPATIENT
Start: 2022-10-12 | End: 2022-10-13

## 2022-10-12 RX ORDER — NOREPINEPHRINE BITARTRATE/D5W 4MG/250ML
0-3 PLASTIC BAG, INJECTION (ML) INTRAVENOUS CONTINUOUS
Status: DISCONTINUED | OUTPATIENT
Start: 2022-10-12 | End: 2022-10-14

## 2022-10-12 RX ORDER — LORAZEPAM 2 MG/ML
1 INJECTION INTRAMUSCULAR
Status: COMPLETED | OUTPATIENT
Start: 2022-10-12 | End: 2022-10-12

## 2022-10-12 RX ORDER — AMLODIPINE BESYLATE 5 MG/1
5 TABLET ORAL DAILY
Status: DISCONTINUED | OUTPATIENT
Start: 2022-10-13 | End: 2022-10-13

## 2022-10-12 RX ADMIN — PIPERACILLIN SODIUM AND TAZOBACTAM SODIUM 3.38 G: 3; .375 INJECTION, POWDER, LYOPHILIZED, FOR SOLUTION INTRAVENOUS at 10:10

## 2022-10-12 RX ADMIN — Medication 0.1 MCG/KG/MIN: at 06:10

## 2022-10-12 RX ADMIN — ENOXAPARIN SODIUM 30 MG: 30 INJECTION SUBCUTANEOUS at 09:10

## 2022-10-12 RX ADMIN — CALCIUM GLUCONATE 1 G: 20 INJECTION, SOLUTION INTRAVENOUS at 03:10

## 2022-10-12 RX ADMIN — LORAZEPAM 1 MG: 2 INJECTION INTRAMUSCULAR at 05:10

## 2022-10-12 RX ADMIN — Medication 0.15 MCG/KG/MIN: at 06:10

## 2022-10-12 RX ADMIN — ALBUTEROL SULFATE 10 MG: 2.5 SOLUTION RESPIRATORY (INHALATION) at 11:10

## 2022-10-12 RX ADMIN — SODIUM CHLORIDE 500 ML: 0.9 INJECTION, SOLUTION INTRAVENOUS at 05:10

## 2022-10-12 RX ADMIN — DEXTROSE MONOHYDRATE 25 G: 25 INJECTION, SOLUTION INTRAVENOUS at 03:10

## 2022-10-12 RX ADMIN — SODIUM CHLORIDE, SODIUM LACTATE, POTASSIUM CHLORIDE, AND CALCIUM CHLORIDE 1000 ML: .6; .31; .03; .02 INJECTION, SOLUTION INTRAVENOUS at 01:10

## 2022-10-12 RX ADMIN — Medication 0.05 MCG/KG/MIN: at 06:10

## 2022-10-12 RX ADMIN — FAMOTIDINE 20 MG: 20 TABLET ORAL at 09:10

## 2022-10-12 RX ADMIN — VANCOMYCIN HYDROCHLORIDE 1750 MG: 1 INJECTION, POWDER, LYOPHILIZED, FOR SOLUTION INTRAVENOUS at 03:10

## 2022-10-12 RX ADMIN — Medication 0.07 MCG/KG/MIN: at 06:10

## 2022-10-12 RX ADMIN — ACETAMINOPHEN 999.01 MG: 160 SOLUTION ORAL at 05:10

## 2022-10-12 RX ADMIN — PIPERACILLIN SODIUM AND TAZOBACTAM SODIUM 4.5 G: 4; .5 INJECTION, POWDER, LYOPHILIZED, FOR SOLUTION INTRAVENOUS at 03:10

## 2022-10-12 RX ADMIN — INSULIN HUMAN 10 UNITS: 100 INJECTION, SOLUTION PARENTERAL at 03:10

## 2022-10-12 RX ADMIN — NOREPINEPHRINE BITARTRATE 0.16 MCG/KG/MIN: 4 INJECTION, SOLUTION INTRAVENOUS at 07:10

## 2022-10-12 RX ADMIN — ATORVASTATIN CALCIUM 40 MG: 40 TABLET, FILM COATED ORAL at 09:10

## 2022-10-12 RX ADMIN — VALPROIC ACID 250 MG: 250 SOLUTION ORAL at 11:10

## 2022-10-12 RX ADMIN — ALBUTEROL SULFATE 10 MG: 2.5 SOLUTION RESPIRATORY (INHALATION) at 03:10

## 2022-10-12 RX ADMIN — LEVETIRACETAM: 500 INJECTION, SOLUTION INTRAVENOUS at 10:10

## 2022-10-12 RX ADMIN — LORAZEPAM 1 MG: 2 INJECTION INTRAMUSCULAR; INTRAVENOUS at 05:10

## 2022-10-12 RX ADMIN — DEXTROSE AND SODIUM CHLORIDE 1000 ML: 5; .45 INJECTION, SOLUTION INTRAVENOUS at 06:10

## 2022-10-12 RX ADMIN — SODIUM CHLORIDE, SODIUM LACTATE, POTASSIUM CHLORIDE, AND CALCIUM CHLORIDE: .6; .31; .03; .02 INJECTION, SOLUTION INTRAVENOUS at 09:10

## 2022-10-12 RX ADMIN — Medication 4 MG: at 06:10

## 2022-10-12 NOTE — ED PROVIDER NOTES
Encounter Date: 10/12/2022       History     Chief Complaint   Patient presents with    Fever     Sent from [facility] for evaluation-  Reports pt normally smiles and is not smiling today   Pt is baseline nonverbal     Urinary Tract Infection     On abx currently treating for UTI      Patient is a 42-year-old male with a past medical history of a CVA with hemiparesis Asperger's hypertension functional quadriplegia PEG tube dependent bedbound who lives at a local long-term nursing care facility who presents the emergency room because of change in mental status.  The patient is normally more awake alert and smiling and this morning he is altered and not smiling.  He has a strong smell of the urine.  History is limited as it was obtained from EMS who obtained further history from the nursing home.  No documented fevers but they were concerned about infection.    Review of patient's allergies indicates:  No Known Allergies  Past Medical History:   Diagnosis Date    Anticoagulant long-term use     baby asa per chart    Asperger's syndrome     CVA, old, hemiparesis     Encounter for blood transfusion     Essential hypertension, malignant     Functional quadriplegia 5/28/2021    PEG (percutaneous endoscopic gastrostomy) status      Past Surgical History:   Procedure Laterality Date    COLONOSCOPY N/A 6/3/2021    Procedure: COLONOSCOPY;  Surgeon: Rosario Meeks MD;  Location: [facility];  Service: Endoscopy;  Laterality: N/A;    ESOPHAGOGASTRODUODENOSCOPY N/A 7/10/2021    Procedure: EGD (ESOPHAGOGASTRODUODENOSCOPY);  Surgeon: Rosario Meeks MD;  Location: [facility];  Service: Endoscopy;  Laterality: N/A;    ESOPHAGOGASTRODUODENOSCOPY W/ PEG N/A 4/1/2021    Procedure: EGD, WITH PEG TUBE INSERTION;  Surgeon: En Zheng MD;  Location: [facility];  Service: Endoscopy;  Laterality: N/A;     No family history on file.  Social History     Tobacco Use    Smoking status: Never    Smokeless tobacco: Never   Substance  Use Topics    Alcohol use: Not Currently    Drug use: Not Currently     Review of Systems   Unable to perform ROS: Patient nonverbal     Physical Exam     Initial Vitals   BP Pulse Resp Temp SpO2   10/12/22 1230 10/12/22 1230 10/12/22 1230 10/12/22 1602 10/12/22 1230   (!) 162/92 (!) 129 (!) 28 (!) 101 °F (38.3 °C) 98 %      MAP       --                Physical Exam    Constitutional: He is diaphoretic.   Chronically ill debilitated middle-aged male lying in bed appears lethargic.   HENT:   Head: Normocephalic.   Mouth/Throat: Oropharynx is clear and moist.   Dry lips dry tongue with dry crusted mucus   Eyes: Pupils are equal, round, and reactive to light.   Neck: Neck supple.   Normal range of motion.  Cardiovascular:  Normal rate, regular rhythm and normal heart sounds.           Pulmonary/Chest: Breath sounds normal. He has no wheezes. He has no rhonchi. He has no rales.   Abdominal: Abdomen is soft. There is no abdominal tenderness.   Feeding tube left upper quadrant   Genitourinary:    Genitourinary Comments: Circumcised, erythematous rash near the groin, strong smell of urine     Musculoskeletal:      Cervical back: Normal range of motion and neck supple.      Comments: Spastic bilateral upper lower extremities     Neurological:   Lethargic but arousable with movement while rolling him to look at his back side, hemiparesis on the left able to partially move the right   Skin:   Mild decubitus ulcer left lateral malleolus.  Stage I decubitus buttock   Psychiatric:   Unable to assess       ED Course   Critical Care    Date/Time: 10/12/2022 7:42 PM  Performed by: Telly Benítez MD  Authorized by: Telly Benítez MD   Direct patient critical care time: 30 minutes  Additional history critical care time: 10 minutes  Ordering / reviewing critical care time: 5 minutes  Documentation critical care time: 5 minutes  Consulting other physicians critical care time: 10 minutes  Total critical care time (exclusive of  procedural time) : 60 minutes  Critical care was necessary to treat or prevent imminent or life-threatening deterioration of the following conditions: sepsis.  Critical care was time spent personally by me on the following activities: discussions with consultants, evaluation of patient's response to treatment, examination of patient, obtaining history from patient or surrogate, ordering and performing treatments and interventions, ordering and review of laboratory studies and ordering and review of radiographic studies.      Central Line    Date/Time: 10/13/2022 12:42 AM  Performed by: Telly Benítez MD  Authorized by: Telly Benítez MD     Location procedure was performed:  [facility] EMERGENCY DEPARTMENT  Consent Done ?:  Emergent Situation  Indications:  Med administration and vascular access  Preparation:  Skin prepped with ChloraPrep  Skin prep agent dried: Skin prep agent completely dried prior to procedure    Sterile barriers: All five maximal sterile barriers used - gloves, gown, cap, mask and large sterile sheet    Hand hygiene: Hand hygiene performed immediately prior to central venous catheter insertion    Location:  Right internal jugular  Catheter type:  Triple lumen  Catheter size:  7.5 Fr  Inserted Catheter Length (cm):  13  Ultrasound guidance: Yes    Vessel Caliber:  Medium  Comprressibility:  Normal  Needle advanced into vessel with real time ultrasound guidance.    Manometry: No    Number of attempts:  1  Securement:  Line sutured, chlorhexidine patch, sterile dressing applied and blood return through all ports  Complications: No    XRay:  Placement verified by x-ray  Adverse Events:  NoneTermination Site: superior vena cava  Labs Reviewed   CBC W/ AUTO DIFFERENTIAL - Abnormal; Notable for the following components:       Result Value    WBC 15.03 (*)      (*)     MCH 32.8 (*)     RDW 15.9 (*)     Gran # (ANC) 11.8 (*)     Immature Grans (Abs) 0.05 (*)     Mono # 1.5 (*)     Gran % 78.7 (*)      Lymph % 10.9 (*)     All other components within normal limits   COMPREHENSIVE METABOLIC PANEL - Abnormal; Notable for the following components:    Sodium 159 (*)     Potassium 5.7 (*)     Chloride 119 (*)      (*)     Creatinine 3.7 (*)     eGFR 20 (*)     All other components within normal limits   LACTIC ACID, PLASMA - Abnormal; Notable for the following components:    Lactate (Lactic Acid) 2.4 (*)     All other components within normal limits   URINALYSIS, REFLEX TO URINE CULTURE - Abnormal; Notable for the following components:    Occult Blood UA Trace (*)     Nitrite, UA Positive (*)     Leukocytes, UA 3+ (*)     All other components within normal limits    Narrative:     Specimen Source->Urine   LACTIC ACID, PLASMA - Abnormal; Notable for the following components:    Lactate (Lactic Acid) 3.0 (*)     All other components within normal limits   URINALYSIS MICROSCOPIC - Abnormal; Notable for the following components:    RBC, UA 7 (*)     WBC, UA >100 (*)     Bacteria Few (*)     All other components within normal limits    Narrative:     Specimen Source->Urine   POCT GLUCOSE - Abnormal; Notable for the following components:    POCT Glucose 111 (*)     All other components within normal limits   POCT GLUCOSE - Abnormal; Notable for the following components:    POCT Glucose 168 (*)     All other components within normal limits   POCT GLUCOSE - Abnormal; Notable for the following components:    POCT Glucose 120 (*)     All other components within normal limits   CULTURE, BLOOD   CULTURE, BLOOD   CULTURE, URINE   SARS-COV-2 RNA AMPLIFICATION, QUAL          Imaging Results              X-Ray Chest AP Portable (Final result)  Result time 10/12/22 18:25:58      Final result by Prudencio Deutsch Jr., MD (10/12/22 18:25:58)                   Impression:      Central line placed in good position without pneumothorax.      Electronically signed by: Prudencio Deutsch MD  Date:    10/12/2022  Time:    18:25                Narrative:    EXAMINATION:  XR CHEST AP PORTABLE    CLINICAL HISTORY:  Encounter for adjustment and management of vascular access device    TECHNIQUE:  Single frontal view of the chest was performed.    COMPARISON:  Chest of October 12, 2022 at 13:13    FINDINGS:  A central line has been placed ending at the right neck and ending in the superior vena cava.  The mediastinal and cardiac size and contours are normal.  No intrapulmonary mass or infiltrate is seen.  No pneumothorax or pleural effusion is noted.                                       CT Renal Stone Study ABD Pelvis WO (Final result)  Result time 10/12/22 15:13:27      Final result by Mike Delgadillo MD (10/12/22 15:13:27)                   Impression:      1. Multiple vesicoliths.  No kidney or ureteral stones.  No obstructive uropathy.  2. Query mild diverticulitis along the descending colon.  3. Large volume of stool in the rectum which could indicate impaction.  4. Ectasia ascending aorta.      Electronically signed by: Mike Delgadillo  Date:    10/12/2022  Time:    15:13               Narrative:    EXAMINATION:  CT RENAL STONE STUDY ABD PELVIS WO    CLINICAL HISTORY:  Flank pain, kidney stone suspected;yesika;    TECHNIQUE:  Low dose axial images, sagittal and coronal reformations were obtained from the lung bases to the pubic symphysis.  Contrast was not administered.    COMPARISON:  08/22/2021    FINDINGS:  lung bases clear. Ascending aorta 4.2 cm.  Normal size heart with coronary artery disease. Nonspecific gallbladder dilation.    No hydroureteronephrosis or calcified nephroureterolithiasis.  Remaining solid abdominal organs are grossly unremarkable on this noncontrast exam.    There is no enteric contrast which limits bowel assessment.  There is a PEG tube.  Under distension likely accounts for gastric wall thickening.  No dilated small bowel loops.  There is scattered colonic diverticula.  Along the descending colon segment there is  focal wall thickening and adjacent fat stranding.  There is a large volume stool ball in the rectum with rectal lumen distended up to 8 cm.    Atherosclerosis.  Normal size prostate.  There is dependent debris and multiple stones in the urinary bladder.    Subtle superior endplate compression fracture of T12 which is remote in age.  Lower lumbar facet degenerative change.  Mild bilateral hip degenerative change.                                       X-Ray Chest AP Portable (Final result)  Result time 10/12/22 13:22:10      Final result by Prudencio Deutsch Jr., MD (10/12/22 13:22:10)                   Impression:      No acute abnormality.      Electronically signed by: Prudencio Deutsch MD  Date:    10/12/2022  Time:    13:22               Narrative:    EXAMINATION:  XR CHEST AP PORTABLE    CLINICAL HISTORY:  Sepsis;    TECHNIQUE:  Single frontal view of the chest was performed.    COMPARISON:  Chest of August 22, 2021    FINDINGS:  The lungs are clear, with normal appearance of pulmonary vasculature and no pleural effusion or pneumothorax.    The cardiac silhouette is normal in size. The hilar and mediastinal contours are unremarkable.    Bones are intact.                                       Medications   lactated ringers bolus 1,000 mL (0 mLs Intravenous Stopped 10/12/22 1446)   piperacillin-tazobactam 4.5 g in dextrose 5 % 100 mL IVPB (ready to mix system) (0 g Intravenous Stopped 10/12/22 1600)   vancomycin (VANCOCIN) 1,750 mg in dextrose 5 % 500 mL IVPB (0 mg Intravenous Stopped 10/12/22 1751)   albuterol sulfate nebulizer solution 10 mg (10 mg Nebulization Given 10/12/22 1504)   calcium gluconate 1 g in NS IVPB (premixed) (0 g Intravenous Stopped 10/12/22 1635)   insulin regular injection 10 Units 0.1 mL (10 Units Intravenous Given 10/12/22 1559)   dextrose 50% injection 25 g (25 g Intravenous Given 10/12/22 1527)   sodium chloride 0.9% bolus 1,000 mL (0 mLs Intravenous Stopped 10/12/22 1700)    acetaminophen oral solution 999.0148 mg (999.0148 mg Oral Given 10/12/22 1704)   sodium chloride 0.9% bolus 500 mL (0 mLs Intravenous Stopped 10/12/22 1740)   NORepinephrine bitartrate-D5W 4 mg/250 mL (16 mcg/mL) infusion Soln (  Not Given 10/12/22 1813)   LORazepam injection 1 mg (1 mg Intravenous Given 10/12/22 1745)   dextrose 5 % and 0.45 % NaCl infusion (1,000 mLs Intravenous New Bag 10/12/22 1822)                 ED Course as of 10/13/22 0043   Wed Oct 12, 2022   1320 Chest x-ray independently interpreted by me as no acute cardiopulmonary process. [JS]   1324 EKG independently interpreted by as rate 128 sinus tachycardia left axis deviation T-wave inversion in lateral leads 1 aVL T-wave flattening in lateral leads V4 through V6 no ST segment elevation or significant depression [JS]   1433 Patient has urinary tract infection hypernatremia hyperkalemia acute kidney insufficiency and is likely septic.  In the past he has had hydronephrosis and hydroureter but no definitive stone with positive urine and blood cultures triggering sepsis and an ICU admission.  I will give him IV fluids broad-spectrum antibiotics and order CT to evaluate whether not he has ureterolithiasis.  I suspect he has pyelonephritis. [JS]   1541 Hospitalist have reviewed the patient's case and feel that it would be best for him to be in an ICU setting given his electrolyte abnormalities dehydration acute kidney insufficiency renal insufficiency and likely urosepsis with persistent tachycardia [JS]   1555 118, 122/90, 98% [JS]   1652 Patient started becoming slightly hypotensive.  He remains febrile.  I will given Tylenol and a complete 30 milliliter/kilogram bolus of IV fluids.  If he remains hypotensive after that time I will place a central venous line and start him on norepinephrine.  Awaiting acceptance for an ICU.  [JS]   1710 Patient accepted by Dr. Victor [facility].  Will place central line.   [JS]       ED Course User Index  [JS] Telly Benítez MD                 Clinical Impression:   Final diagnoses:  [R00.0] Tachycardia  [A41.9, N39.0] Sepsis due to urinary tract infection (Primary)  [E87.5] Hyperkalemia  [Z45.2] Encounter for central line placement        ED Disposition Condition    Transfer to Another Facility Stable                Telly Benítez MD  10/13/22 0043

## 2022-10-12 NOTE — PROVIDER TRANSFER
Outside Transfer Acceptance Note / Regional Referral Center    Referring facility: [facility]  Referring provider: LASHAE JAY  Accepting facility:   [facility]  Accepting provider: MERLENE CARBONE  Admitting provider: JULIO MATTHEW  Reason for transfer:  Need ICU care  Transfer diagnosis: PRABHJOT/Sepsis  Transfer specialty requested: Hospital Medicine  Transfer specialty notified: yes  Transfer level: NUMBER 1-5: 2  Bed type requested: ICU  Isolation status: No active isolations   Admission class or status: IP- Inpatient      Narrative     42-year-old male with a history of Asperger's, prior stroke with hemiparesis, hypertension, ESBL UTI (July 2021), functional quadriplegia, bedbound status, and G-tube was transferred from a nursing care facility to [facility] due to change in mental status.  He is normally awake and alert and smiling, but on the morning of presentation he was altered.  He had a strong smell of urine.  He was noted to have dry lips and mouth, erythematous rash near the groin, and spastic bilateral upper and lower extremities.  He was lethargic but would arouse.  He had hemiparesis on the left but was able to partially move the right side of his body.  Mild decubitus noted on the left lateral malleolus with stage 1 buttock decubitus. In the emergency department he received albuterol nebulizer, calcium, insulin and dextrose, bolus IV fluid, Zosyn, and vancomycin.  Blood pressure decreased during his stay, and he received additional volume administration.  Central line is being placed, and pressors will be added if needed.  Will plan [facility] under Hospital Medicine service for further treatment of hypernatremia, hyperkalemia, encephalopathy, acute kidney injury, and possible sepsis.    COVID negative  Sodium 159, potassium 5.7, chloride 119, CO2 25, , creatinine 3.7 (baseline 1.5), glucose  109, AST 26, ALT 19, lactic acid 2.4, white blood cells 15.03, hemoglobin 17.4, hematocrit 53.6, platelets 167  urinalysis with trace blood/positive nitrite/3+ leukocytes/greater than 100 white blood cells/7 red blood cells/few bacteria  Blood cultures ordered    CT renal stone protocol showed multiple vesicle its with no kidney or ureteral stone.  No obstructive uropathy.  Possible mild diverticulitis in the descending colon.  Large volume of stool in the rectum which could indicate impaction.  Ectasia of the ascending aorta.    Chest x-ray had no acute abnormality.    EKG had sinus tachycardia with ventricular rate 128, left axis deviation, ST/T-wave abnormality in the lateral leads.    Objective     Vitals: Temp: 99.9 °F (37.7 °C) (10/12/22 1715)  Pulse: (!) 119 (10/12/22 1602)  Resp: 20 (10/12/22 1602)  BP: 96/67 (10/12/22 1602)  SpO2: 97 % (10/12/22 1602)  Recent Labs: CBC:   Recent Labs   Lab 10/12/22  1328   WBC 15.03*   HGB 17.4   HCT 53.6        CMP:   Recent Labs   Lab 10/12/22  1329   *   K 5.7*   *   CO2 25      *   CREATININE 3.7*   CALCIUM 9.8   PROT 8.2   ALBUMIN 3.6   BILITOT 0.6   ALKPHOS 127   AST 26   ALT 19   ANIONGAP 15     Lactic Acid:   Recent Labs   Lab 10/12/22  1328 10/12/22  1645   LACTATE 2.4* 3.0*       IV access:        Peripheral IV - Single Lumen 10/12/22 1313 22 G Anterior;Left Forearm (Active)   Site Assessment Clean;Dry;Intact 10/12/22 1313   Dressing Status Clean;Dry;Intact 10/12/22 1313            Peripheral IV - Single Lumen 10/12/22 1550 20 G Anterior;Distal;Right Upper Arm (Active)       Allergies: Review of patient's allergies indicates:  No Known Allergies   NPO: No    Instructions    Admit to Hospital Medicine    Upon patient arrival to floor, please contact Hospital Medicine on call.     KAVON Victor MD  Hospital Medicine Staff  Cell: [phone]

## 2022-10-12 NOTE — ED NOTES
Assumed care  Received report from LORRAINE Flannery and LORRAINE Cam. Patient lying in bed in semi gregory's position. No known distress at this time. Patient non verbal and unable to respond to questions.

## 2022-10-12 NOTE — ED NOTES
Pt arrived via ambulance from [facility]. LOC change reported due to hard to arose, along with not smiling like he does daily. Altered skin integrity to left foot present upon arrival.

## 2022-10-13 LAB
ALBUMIN SERPL BCP-MCNC: 2.7 G/DL (ref 3.5–5.2)
ALBUMIN SERPL BCP-MCNC: 2.7 G/DL (ref 3.5–5.2)
ALP SERPL-CCNC: 69 U/L (ref 55–135)
ALP SERPL-CCNC: 70 U/L (ref 55–135)
ALT SERPL W/O P-5'-P-CCNC: 15 U/L (ref 10–44)
ALT SERPL W/O P-5'-P-CCNC: 15 U/L (ref 10–44)
ANION GAP SERPL CALC-SCNC: 3 MMOL/L (ref 8–16)
ANION GAP SERPL CALC-SCNC: 3 MMOL/L (ref 8–16)
ANION GAP SERPL CALC-SCNC: 4 MMOL/L (ref 8–16)
ANION GAP SERPL CALC-SCNC: 9 MMOL/L (ref 8–16)
ANION GAP SERPL CALC-SCNC: 9 MMOL/L (ref 8–16)
AST SERPL-CCNC: 21 U/L (ref 10–40)
AST SERPL-CCNC: 21 U/L (ref 10–40)
BASOPHILS # BLD AUTO: 0.08 K/UL (ref 0–0.2)
BASOPHILS NFR BLD: 0.4 % (ref 0–1.9)
BILIRUB SERPL-MCNC: 1.2 MG/DL (ref 0.1–1)
BILIRUB SERPL-MCNC: 1.3 MG/DL (ref 0.1–1)
BUN SERPL-MCNC: 63 MG/DL (ref 6–20)
BUN SERPL-MCNC: 72 MG/DL (ref 6–20)
BUN SERPL-MCNC: 72 MG/DL (ref 6–20)
BUN SERPL-MCNC: 85 MG/DL (ref 6–20)
BUN SERPL-MCNC: 89 MG/DL (ref 6–20)
CALCIUM SERPL-MCNC: 8.1 MG/DL (ref 8.7–10.5)
CALCIUM SERPL-MCNC: 8.1 MG/DL (ref 8.7–10.5)
CALCIUM SERPL-MCNC: 8.3 MG/DL (ref 8.7–10.5)
CALCIUM SERPL-MCNC: 8.3 MG/DL (ref 8.7–10.5)
CALCIUM SERPL-MCNC: 8.5 MG/DL (ref 8.7–10.5)
CHLORIDE SERPL-SCNC: 116 MMOL/L (ref 95–110)
CHLORIDE SERPL-SCNC: 118 MMOL/L (ref 95–110)
CHLORIDE SERPL-SCNC: 118 MMOL/L (ref 95–110)
CO2 SERPL-SCNC: 26 MMOL/L (ref 23–29)
CO2 SERPL-SCNC: 27 MMOL/L (ref 23–29)
CO2 SERPL-SCNC: 29 MMOL/L (ref 23–29)
CREAT SERPL-MCNC: 2.3 MG/DL (ref 0.5–1.4)
CREAT SERPL-MCNC: 2.4 MG/DL (ref 0.5–1.4)
CREAT SERPL-MCNC: 2.4 MG/DL (ref 0.5–1.4)
CREAT SERPL-MCNC: 2.8 MG/DL (ref 0.5–1.4)
CREAT SERPL-MCNC: 3 MG/DL (ref 0.5–1.4)
DIFFERENTIAL METHOD: ABNORMAL
EOSINOPHIL # BLD AUTO: 0 K/UL (ref 0–0.5)
EOSINOPHIL NFR BLD: 0.1 % (ref 0–8)
ERYTHROCYTE [DISTWIDTH] IN BLOOD BY AUTOMATED COUNT: 15.2 % (ref 11.5–14.5)
EST. GFR  (NO RACE VARIABLE): 25.9 ML/MIN/1.73 M^2
EST. GFR  (NO RACE VARIABLE): 28.2 ML/MIN/1.73 M^2
EST. GFR  (NO RACE VARIABLE): 33.9 ML/MIN/1.73 M^2
EST. GFR  (NO RACE VARIABLE): 33.9 ML/MIN/1.73 M^2
EST. GFR  (NO RACE VARIABLE): 35.7 ML/MIN/1.73 M^2
GLUCOSE SERPL-MCNC: 104 MG/DL (ref 70–110)
GLUCOSE SERPL-MCNC: 104 MG/DL (ref 70–110)
GLUCOSE SERPL-MCNC: 130 MG/DL (ref 70–110)
GLUCOSE SERPL-MCNC: 132 MG/DL (ref 70–110)
GLUCOSE SERPL-MCNC: 89 MG/DL (ref 70–110)
HCT VFR BLD AUTO: 34.5 % (ref 40–54)
HCT VFR BLD AUTO: 38.5 % (ref 40–54)
HGB BLD-MCNC: 10.5 G/DL (ref 14–18)
HGB BLD-MCNC: 11.7 G/DL (ref 14–18)
IMM GRANULOCYTES # BLD AUTO: 0.1 K/UL (ref 0–0.04)
IMM GRANULOCYTES NFR BLD AUTO: 0.5 % (ref 0–0.5)
LACTATE SERPL-SCNC: 0.9 MMOL/L (ref 0.5–1.9)
LYMPHOCYTES # BLD AUTO: 1.9 K/UL (ref 1–4.8)
LYMPHOCYTES NFR BLD: 10.3 % (ref 18–48)
MCH RBC QN AUTO: 32.1 PG (ref 27–31)
MCHC RBC AUTO-ENTMCNC: 30.4 G/DL (ref 32–36)
MCV RBC AUTO: 106 FL (ref 82–98)
MONOCYTES # BLD AUTO: 2.1 K/UL (ref 0.3–1)
MONOCYTES NFR BLD: 11.5 % (ref 4–15)
NEUTROPHILS # BLD AUTO: 14.2 K/UL (ref 1.8–7.7)
NEUTROPHILS NFR BLD: 77.2 % (ref 38–73)
NRBC BLD-RTO: 0 /100 WBC
PLATELET # BLD AUTO: 120 K/UL (ref 150–450)
PMV BLD AUTO: 12.7 FL (ref 9.2–12.9)
POTASSIUM SERPL-SCNC: 3.9 MMOL/L (ref 3.5–5.1)
POTASSIUM SERPL-SCNC: 3.9 MMOL/L (ref 3.5–5.1)
POTASSIUM SERPL-SCNC: 4 MMOL/L (ref 3.5–5.1)
POTASSIUM SERPL-SCNC: 4.3 MMOL/L (ref 3.5–5.1)
POTASSIUM SERPL-SCNC: 4.3 MMOL/L (ref 3.5–5.1)
PREALB SERPL-MCNC: 31 MG/DL (ref 20–43)
PROT SERPL-MCNC: 5.6 G/DL (ref 6–8.4)
PROT SERPL-MCNC: 5.9 G/DL (ref 6–8.4)
RBC # BLD AUTO: 3.65 M/UL (ref 4.6–6.2)
SODIUM SERPL-SCNC: 147 MMOL/L (ref 136–145)
SODIUM SERPL-SCNC: 147 MMOL/L (ref 136–145)
SODIUM SERPL-SCNC: 149 MMOL/L (ref 136–145)
SODIUM SERPL-SCNC: 151 MMOL/L (ref 136–145)
SODIUM SERPL-SCNC: 152 MMOL/L (ref 136–145)
SODIUM UR-SCNC: 94 MMOL/L (ref 20–250)
TSH SERPL DL<=0.005 MIU/L-ACNC: 0.82 UIU/ML (ref 0.34–5.6)
VANCOMYCIN SERPL-MCNC: 16.9 UG/ML
WBC # BLD AUTO: 18.42 K/UL (ref 3.9–12.7)

## 2022-10-13 PROCEDURE — 25000003 PHARM REV CODE 250: Performed by: INTERNAL MEDICINE

## 2022-10-13 PROCEDURE — 84443 ASSAY THYROID STIM HORMONE: CPT | Performed by: INTERNAL MEDICINE

## 2022-10-13 PROCEDURE — 80048 BASIC METABOLIC PNL TOTAL CA: CPT | Performed by: INTERNAL MEDICINE

## 2022-10-13 PROCEDURE — 63600175 PHARM REV CODE 636 W HCPCS: Performed by: INTERNAL MEDICINE

## 2022-10-13 PROCEDURE — 84134 ASSAY OF PREALBUMIN: CPT | Performed by: INTERNAL MEDICINE

## 2022-10-13 PROCEDURE — 80053 COMPREHEN METABOLIC PANEL: CPT | Performed by: INTERNAL MEDICINE

## 2022-10-13 PROCEDURE — 80048 BASIC METABOLIC PNL TOTAL CA: CPT | Mod: 91 | Performed by: INTERNAL MEDICINE

## 2022-10-13 PROCEDURE — 94761 N-INVAS EAR/PLS OXIMETRY MLT: CPT

## 2022-10-13 PROCEDURE — 99900035 HC TECH TIME PER 15 MIN (STAT)

## 2022-10-13 PROCEDURE — 99900031 HC PATIENT EDUCATION (STAT)

## 2022-10-13 PROCEDURE — 80202 ASSAY OF VANCOMYCIN: CPT | Performed by: INTERNAL MEDICINE

## 2022-10-13 PROCEDURE — 99223 1ST HOSP IP/OBS HIGH 75: CPT | Mod: ,,, | Performed by: INTERNAL MEDICINE

## 2022-10-13 PROCEDURE — 83605 ASSAY OF LACTIC ACID: CPT | Performed by: INTERNAL MEDICINE

## 2022-10-13 PROCEDURE — 85018 HEMOGLOBIN: CPT | Performed by: INTERNAL MEDICINE

## 2022-10-13 PROCEDURE — 20000000 HC ICU ROOM

## 2022-10-13 PROCEDURE — 99223 PR INITIAL HOSPITAL CARE,LEVL III: ICD-10-PCS | Mod: ,,, | Performed by: INTERNAL MEDICINE

## 2022-10-13 PROCEDURE — 85014 HEMATOCRIT: CPT | Performed by: INTERNAL MEDICINE

## 2022-10-13 PROCEDURE — 85025 COMPLETE CBC W/AUTO DIFF WBC: CPT | Performed by: INTERNAL MEDICINE

## 2022-10-13 PROCEDURE — 51798 US URINE CAPACITY MEASURE: CPT

## 2022-10-13 PROCEDURE — 84300 ASSAY OF URINE SODIUM: CPT | Performed by: INTERNAL MEDICINE

## 2022-10-13 RX ORDER — MEROPENEM AND SODIUM CHLORIDE 1 G/50ML
1 INJECTION, SOLUTION INTRAVENOUS
Status: DISCONTINUED | OUTPATIENT
Start: 2022-10-13 | End: 2022-10-17

## 2022-10-13 RX ORDER — SODIUM CHLORIDE 450 MG/100ML
INJECTION, SOLUTION INTRAVENOUS CONTINUOUS
Status: DISCONTINUED | OUTPATIENT
Start: 2022-10-13 | End: 2022-10-13

## 2022-10-13 RX ORDER — BISACODYL 10 MG
10 SUPPOSITORY, RECTAL RECTAL DAILY
Status: DISCONTINUED | OUTPATIENT
Start: 2022-10-13 | End: 2022-10-17 | Stop reason: HOSPADM

## 2022-10-13 RX ORDER — ENOXAPARIN SODIUM 100 MG/ML
40 INJECTION SUBCUTANEOUS
Status: DISCONTINUED | OUTPATIENT
Start: 2022-10-13 | End: 2022-10-17 | Stop reason: HOSPADM

## 2022-10-13 RX ORDER — FAMOTIDINE 20 MG/1
20 TABLET, FILM COATED ORAL DAILY
Status: DISCONTINUED | OUTPATIENT
Start: 2022-10-13 | End: 2022-10-17

## 2022-10-13 RX ORDER — SODIUM CHLORIDE 450 MG/100ML
INJECTION, SOLUTION INTRAVENOUS CONTINUOUS
Status: DISCONTINUED | OUTPATIENT
Start: 2022-10-13 | End: 2022-10-16

## 2022-10-13 RX ADMIN — ENOXAPARIN SODIUM 40 MG: 40 INJECTION SUBCUTANEOUS at 04:10

## 2022-10-13 RX ADMIN — ATORVASTATIN CALCIUM 40 MG: 40 TABLET, FILM COATED ORAL at 10:10

## 2022-10-13 RX ADMIN — LEVETIRACETAM: 500 INJECTION, SOLUTION INTRAVENOUS at 01:10

## 2022-10-13 RX ADMIN — NOREPINEPHRINE BITARTRATE 0.07 MCG/KG/MIN: 4 INJECTION, SOLUTION INTRAVENOUS at 08:10

## 2022-10-13 RX ADMIN — AMANTADINE 100 MG: 100 CAPSULE ORAL at 08:10

## 2022-10-13 RX ADMIN — NOREPINEPHRINE BITARTRATE 0.14 MCG/KG/MIN: 4 INJECTION, SOLUTION INTRAVENOUS at 02:10

## 2022-10-13 RX ADMIN — PIPERACILLIN SODIUM AND TAZOBACTAM SODIUM 3.38 G: 3; .375 INJECTION, POWDER, LYOPHILIZED, FOR SOLUTION INTRAVENOUS at 08:10

## 2022-10-13 RX ADMIN — FLUOXETINE 20 MG: 20 CAPSULE ORAL at 08:10

## 2022-10-13 RX ADMIN — VALPROIC ACID 250 MG: 250 SOLUTION ORAL at 10:10

## 2022-10-13 RX ADMIN — SODIUM CHLORIDE: 0.45 INJECTION, SOLUTION INTRAVENOUS at 11:10

## 2022-10-13 RX ADMIN — MELATONIN 6 MG: at 10:10

## 2022-10-13 RX ADMIN — AMLODIPINE BESYLATE 5 MG: 5 TABLET ORAL at 08:10

## 2022-10-13 RX ADMIN — BISACODYL 10 MG: 10 SUPPOSITORY RECTAL at 11:10

## 2022-10-13 RX ADMIN — MEROPENEM AND SODIUM CHLORIDE 1 G: 1 INJECTION, SOLUTION INTRAVENOUS at 11:10

## 2022-10-13 RX ADMIN — SODIUM CHLORIDE, SODIUM LACTATE, POTASSIUM CHLORIDE, AND CALCIUM CHLORIDE: .6; .31; .03; .02 INJECTION, SOLUTION INTRAVENOUS at 05:10

## 2022-10-13 RX ADMIN — LEVETIRACETAM: 500 INJECTION, SOLUTION INTRAVENOUS at 10:10

## 2022-10-13 RX ADMIN — FAMOTIDINE 20 MG: 20 TABLET ORAL at 08:10

## 2022-10-13 NOTE — PLAN OF CARE
[facility]  Initial Discharge Assessment       Primary Care Provider: Elly Mathew DO    Admission Diagnosis: Acute renal failure [N17.9]    Admission Date: 10/12/2022    Discharge assessment complete. Patient is a penitentiary resident at [facility]. No HH/HD/coumadin. Discharge plan is return to [facility]. Patient will need ambulance transport on discharge. CM following    Discharge Barriers Identified: None    Payor: MEDICAID / [facility] / Product Type: Managed Medicaid /     Extended Emergency Contact Information  Primary Emergency Contact: Tadeo Leonnie  Home Phone: [phone]  Mobile Phone: [phone]  Relation: Mother  Preferred language: English   needed? No  Secondary Emergency Contact: luma walker  Mobile Phone: [phone]  Relation: Step parent  Preferred language: English   needed? No    Discharge Plan A: Return to nursing home  Discharge Plan B: Return to Nursing Home      [id number] - [place] - [address] AT [place] [address]  [address] [place] [place] [address]  Phone: [phone] Fax: [phone]      Initial Assessment (most recent)       Adult Discharge Assessment - 10/13/22 0909          Discharge Assessment    Assessment Type Discharge Planning Assessment     Confirmed/corrected address, phone number and insurance Yes     Confirmed Demographics Correct on Facesheet     Source of Information health record     Reason For Admission AMS     Lives With facility resident     Facility Arrived From: [facility] - penitentiary resident     Do you expect to return to your current living situation? Yes     Do you have help at home or someone to help you manage your care at home? Yes     Who are your caregiver(s) and their phone number(s)? facility staff     Walking or Climbing Stairs Difficulty ambulation difficulty, dependent;transferring difficulty, dependent      Dressing/Bathing Difficulty bathing difficulty, dependent;dressing difficulty, dependent     Readmission within 30 days? No     Patient currently being followed by outpatient case management? No     Do you currently have service(s) that help you manage your care at home? No     Do you take prescription medications? Yes     Do you have prescription coverage? Yes     Coverage medicaid     Do you have any problems affording any of your prescribed medications? No     Is the patient taking medications as prescribed? yes     Who is going to help you get home at discharge? facility staff     How do you get to doctors appointments? health plan transportation     Are you on dialysis? No     Do you take coumadin? No     Discharge Plan A Return to nursing home     Discharge Plan B Return to Nursing Home     DME Needed Upon Discharge  none     Discharge Plan discussed with: Patient     Discharge Barriers Identified None

## 2022-10-13 NOTE — ASSESSMENT & PLAN NOTE
Inpatient admission with volume replacement; repeat labs ordered to monitor his potassium and renal function; continue current antibiotic coverage; dietician consultation in AM for tube feeds; check Depakote levels; change levetiracetam from po/G-tube to iv; TSH with AM labs; continue his transfer regimen otherwise; Nephrology consultation in AM

## 2022-10-13 NOTE — CONSULTS
"Critical access hospital  Adult Nutrition   Consult Note (Nutrition Support Management)    SUMMARY     Recommendations  1) Initiate PEG feedings of Vital AF 1.2 at 20 mls/h advancing by 10 mls every 4 hours to the goal rate of 70 mls/h providing 2016 kcals, 126 g protein and 1460 mls water.   2) FWF's of 95 mls every 4 hours to clear tube and meet fluid needs.   3) RD will monitor tolerance, weight, labs, etc and make recommendations accordingly.    Goals:   1) Pt to meet estimated energy, protein and fluid needs.   2) Pts Na+ and other labs to trend towards being WNL's.    Nutrition Goal Status: goal not met    Dietitian Rounds Brief  Consult for TF and fluid needs: Observed pt lying in bed in ICU today and his nurse was standing over him as well and I informed her that I had put his TF recommendation in. Will follow dailyprn.    Diet order:   Current Diet Order: NPO      Evaluation of Received Nutrient/Fluid Intake  Energy Calories Required: not meeting needs  Protein Required: not meeting needs  Fluid Required: not meeting needs     % Intake of Estimated Energy Needs: 0%  % Meal Intake: NPO      Intake/Output Summary (Last 24 hours) at 10/13/2022 1141  Last data filed at 10/13/2022 1007  Gross per 24 hour   Intake 1571.96 ml   Output 1350 ml   Net 221.96 ml        Anthropometrics  Temp: 97.6 °F (36.4 °C)  Height Method: Stated  Height: 5' 9" (175.3 cm)  Height (inches): 69 in  Weight Method: Bed Scale  Weight: 72.6 kg (160 lb 0.9 oz)  Weight (lb): 160.06 lb  Ideal Body Weight (IBW), Male: 160 lb  % Ideal Body Weight, Male (lb): 96.73 %  BMI (Calculated): 23.6  BMI Grade: 18.5-24.9 - normal       Estimated/Assessed Needs  Weight Used For Calorie Calculations: 72.6 kg (160 lb 0.9 oz)  Energy Calorie Requirements (kcal): 2920-8041 kcals/day (25-30 kcals/kg ABW)  Energy Need Method: Kcal/kg  Protein Requirements:  g/day (1.2-2 g/kg IBW)  Weight Used For Protein Calculations: 73 kg (160 lb 15 oz)     Estimated " Fluid Requirement Method: RDA Method  RDA Method (mL): 1815       Reason for Assessment  Reason For Assessment: consult  Relevant Medical History: Asperger's syndrome, CVA, old, hemiparesis, Essential hypertension, malignant, PEG (percutaneous endoscopic gastrostomy) status, Functional quadriplegia, Anticoagulant long-term use, baby asa per chart, Encounter for blood transfusion  Interdisciplinary Rounds:  (no rounds...Marlon not here)    Nutrition/Diet History  Food Allergies: NKFA  Factors Affecting Nutritional Intake: NPO    Nutrition Risk Screen  Nutrition Risk Screen: dysphagia or difficulty swallowing, tube feeding or parenteral nutrition     MST Score: 3  Have you recently lost weight without trying?: Unsure  Weight loss score: 2  Have you been eating poorly because of a decreased appetite?: Yes (unable to answer)  Appetite score: 1       Weight History:  Wt Readings from Last 5 Encounters:   10/13/22 72.6 kg (160 lb 0.9 oz)   10/12/22 86.2 kg (190 lb 0.6 oz)   10/12/22 86.2 kg (190 lb 0.6 oz)   09/09/22 86.2 kg (190 lb)   06/13/22 86.2 kg (190 lb)        Lab/Procedures/Meds: Pertinent Labs/Meds Reviewed    Medications:Pertinent Medications Reviewed  Scheduled Meds:   amantadine HCL  100 mg Per G Tube Daily    atorvastatin  40 mg Per G Tube Daily    bisacodyL  10 mg Rectal Daily    enoxparin  40 mg Subcutaneous Q24H    famotidine  20 mg Oral Daily    FLUoxetine  20 mg Per G Tube Daily    custom IVPB builder   Intravenous Q12H    meropenem (MERREM) IVPB  1 g Intravenous Q12H    valproic acid (as sodium salt)  250 mg Per G Tube QHS     Continuous Infusions:   sodium chloride 0.45% 75 mL/hr at 10/13/22 1112    NORepinephrine bitartrate-D5W 0.07 mcg/kg/min (10/13/22 0847)     PRN Meds:.acetaminophen, cloNIDine, HYDROcodone-acetaminophen, melatonin, morphine, ondansetron, polyethylene glycol, Pharmacy to dose Vancomycin consult **AND** vancomycin - pharmacy to dose    Labs: Pertinent Labs Reviewed  Clinical  Chemistry:  Recent Labs   Lab 10/13/22  0400   *   K 4.3   *   CO2 27   *   BUN 85*   CREATININE 2.8*   CALCIUM 8.3*   PROT 5.9*   ALBUMIN 2.7*   BILITOT 1.3*   ALKPHOS 70   AST 21   ALT 15   ANIONGAP 9     CBC:   Recent Labs   Lab 10/13/22  0400   WBC 18.42*   RBC 3.65*   HGB 11.7*   HCT 38.5*   *   *   MCH 32.1*   MCHC 30.4*     Diabetes:  Recent Labs   Lab 10/12/22  1441 10/12/22  1653 10/12/22  1825   POCTGLUCOSE 111* 168* 120*     Thyroid & Parathyroid:  Recent Labs   Lab 10/13/22  0400   TSH 0.820       Monitor and Evaluation  Food and Nutrient Intake: enteral nutrition intake  Food and Nutrient Adminstration: enteral and parenteral nutrition administration  Knowledge/Beliefs/Attitudes: food and nutrition knowledge/skill, beliefs and attitudes  Physical Activity and Function: nutrition-related ADLs and IADLs, factors affecting access to physical activity  Anthropometric Measurements: weight, weight change, body mass index  Biochemical Data, Medical Tests and Procedures: lipid profile, inflammatory profile, glucose/endocrine profile, gastrointestinal profile, electrolyte and renal panel  Nutrition-Focused Physical Findings: overall appearance     Nutrition Risk  Level of Risk/Frequency of Follow-up: high     Nutrition Follow-Up         Denise Donato RD 10/13/2022 11:41 AM

## 2022-10-13 NOTE — CARE UPDATE
10/12/22 1958   Patient Assessment/Suction   Level of Consciousness (AVPU) alert   Respiratory Effort Normal;Unlabored   Expansion/Accessory Muscles/Retractions no use of accessory muscles   Rhythm/Pattern, Respiratory unlabored   Cough Frequency infrequent   PRE-TX-O2   O2 Device (Oxygen Therapy) nasal cannula   $ Is the patient on Low Flow Oxygen? Yes   Flow (L/min) 3.5   SpO2 100 %   Pulse Oximetry Type Continuous   $ Pulse Oximetry - Multiple Charge Pulse Oximetry - Multiple   Pulse 81   Resp (!) 21   /65   Education   $ Education DME Oxygen;15 min   Respiratory Evaluation   $ Care Plan Tech Time 15 min

## 2022-10-13 NOTE — CONSULTS
Pulmonary/Critical Care Consult      PATIENT NAME: Andrea Leon Jr.  MRN: [id number]  TODAY'S DATE: 10/13/2022  10:26 AM  ADMIT DATE: 10/12/2022  AGE: 42 y.o. : 1980    CONSULT REQUESTED BY: Calixto Meyer MD    REASON FOR CONSULT:   Septic shock    HISTORY OF PRESENT ILLNESS   Andrea Leon Jr. is a 42 y.o. male with a PMH of Asperger's, prior stroke with hemiparesis, hypertension, ESBL UTI (2021), functional quadriplegia, bedbound status, and G-tube was transferred from a nursing care facility to [facility] on whom we have been consulted for septic shock.    The patient was found to be less interactive than normal and senrt to ED. Here, he was found to be in shock and have a dirty UA. He has a history of resistant organisms in the setting of chronic Moran catheter with functional quadriplegia. Per Karin, the patient's Moran is not chronic; it was placed upon arrival to the hospital this admission.    REVIEW OF SYSTEMS  GENERAL: Feeling well. No fevers, chills, or night sweats.  EYES: Vision is good.  ENT: No sinusitis or pharyngitis.   HEART: No chest pain or palpitations.  LUNGS: No cough, sputum, or wheezing.  GI: No abdominal pain, nausea, vomiting, or diarrhea.  : No dysuria, urgency, or frequency.  SKIN: No lesions or rashes.  MUSCULOSKELETAL: No joint pain or myalgias.  NEURO: No headaches or neuropathy.  LYMPH: No edema or adenopathy.  PSYCH: No anxiety or depression.  ENDO: No unexpected weight change.    ALLERGIES  Review of patient's allergies indicates:  No Known Allergies    INPATIENT SCHEDULED MEDICATIONS   amantadine HCL  100 mg Per G Tube Daily    atorvastatin  40 mg Per G Tube Daily    bisacodyL  10 mg Rectal Daily    enoxparin  40 mg Subcutaneous Q24H    famotidine  20 mg Oral Daily    FLUoxetine  20 mg Per G Tube Daily    custom IVPB builder   Intravenous Q12H    piperacillin-tazobactam (ZOSYN) IVPB  3.375 g Intravenous Q8H    valproic  acid (as sodium salt)  250 mg Per G Tube QHS      sodium chloride 0.45%      NORepinephrine bitartrate-D5W 0.07 mcg/kg/min (10/13/22 0847)       MEDICAL AND SURGICAL HISTORY  Past Medical History:   Diagnosis Date    Anticoagulant long-term use     baby asa per chart    Asperger's syndrome     CVA, old, hemiparesis     Encounter for blood transfusion     Essential hypertension, malignant     Functional quadriplegia 5/28/2021    PEG (percutaneous endoscopic gastrostomy) status      Past Surgical History:   Procedure Laterality Date    COLONOSCOPY N/A 6/3/2021    Procedure: COLONOSCOPY;  Surgeon: Rosario Meeks MD;  Location: [facility] ENDO;  Service: Endoscopy;  Laterality: N/A;    ESOPHAGOGASTRODUODENOSCOPY N/A 7/10/2021    Procedure: EGD (ESOPHAGOGASTRODUODENOSCOPY);  Surgeon: Rosario Meeks MD;  Location: [facility];  Service: Endoscopy;  Laterality: N/A;    ESOPHAGOGASTRODUODENOSCOPY W/ PEG N/A 4/1/2021    Procedure: EGD, WITH PEG TUBE INSERTION;  Surgeon: En Zheng MD;  Location: [facility] ENDO;  Service: Endoscopy;  Laterality: N/A;       ALCOHOL, TOBACCO AND DRUG USE  Social History     Tobacco Use   Smoking Status Never   Smokeless Tobacco Never     Social History     Substance and Sexual Activity   Alcohol Use Not Currently     Social History     Substance and Sexual Activity   Drug Use Not Currently       FAMILY HISTORY  History reviewed. No pertinent family history.    VITAL SIGNS (MOST RECENT)  Temp: 97.6 °F (36.4 °C) (10/13/22 0730)  Pulse: 72 (10/13/22 0930)  Resp: 18 (10/13/22 0930)  BP: 94/66 (10/13/22 0930)  SpO2: 99 % (10/13/22 0930)    INTAKE AND OUTPUT (LAST 24 HOURS):  Intake/Output Summary (Last 24 hours) at 10/13/2022 1026  Last data filed at 10/13/2022 1007  Gross per 24 hour   Intake 1571.96 ml   Output 1350 ml   Net 221.96 ml       WEIGHT  Wt Readings from Last 1 Encounters:   10/13/22 72.6 kg (160 lb 0.9 oz)       PHYSICAL EXAM  GENERAL: A&O. NAD.  HEENT: Extraocular movements intact.  Pharynx moist.  NECK: Supple. No JVD or hepatojugular reflux.  HEART: Regular rate and normal rhythm. No murmur or gallop auscultated.  LUNGS: Clear to auscultation and percussion. Lung excursion symmetrical.   ABDOMEN: Soft, non-tender, non-distended, no masses palpated.  : Moran catheter in place.  EXTREMITIES: Normal muscle tone and joint movement, no cyanosis or clubbing.   LYMPHATICS: No adenopathy palpated, no edema.  SKIN: Dry, intact, no lesions.   NEURO: Bedbound, slurred speech.  PSYCH: Appropriate affect    ACUTE PHASE REACTANT (LAST 24 HOURS)  No results for input(s): FERRITIN, CRP, LDH, DDIMER in the last 24 hours.    CBC LAST (LAST 24 HOURS)  Recent Labs   Lab 10/13/22  0400   WBC 18.42*   RBC 3.65*   HGB 11.7*   HCT 38.5*   *   MCH 32.1*   MCHC 30.4*   RDW 15.2*   *   MPV 12.7   GRAN 77.2*  14.2*   LYMPH 10.3*  1.9   MONO 11.5  2.1*   BASO 0.08   NRBC 0       CHEMISTRY LAST (LAST 24 HOURS)  Recent Labs   Lab 10/13/22  0400   *   K 4.3   *   CO2 27   ANIONGAP 9   BUN 85*   CREATININE 2.8*   *   CALCIUM 8.3*   ALBUMIN 2.7*   PROT 5.9*   ALKPHOS 70   ALT 15   AST 21   BILITOT 1.3*       COAGULATION LAST (LAST 24 HOURS)  No results for input(s): LABPT, INR, APTT in the last 24 hours.    CARDIAC PROFILE (LAST 24 HOURS)  No results for input(s): BNP, CPK, CPKMB, LDH, TROPONINI in the last 168 hours.    LAST 7 DAYS MICROBIOLOGY   Microbiology Results (last 7 days)       ** No results found for the last 168 hours. **            MOST RECENT IMAGING  X-Ray Chest AP Portable  Narrative: EXAMINATION:  XR CHEST AP PORTABLE    CLINICAL HISTORY:  Encounter for adjustment and management of vascular access device    TECHNIQUE:  Single frontal view of the chest was performed.    COMPARISON:  Chest of October 12, 2022 at 13:13    FINDINGS:  A central line has been placed ending at the right neck and ending in the superior vena cava.  The mediastinal and cardiac size and contours are  normal.  No intrapulmonary mass or infiltrate is seen.  No pneumothorax or pleural effusion is noted.  Impression: Central line placed in good position without pneumothorax.    Electronically signed by: Prudencio Deutsch MD  Date:    10/12/2022  Time:    18:25  CT Renal Stone Study ABD Pelvis WO  Narrative: EXAMINATION:  CT RENAL STONE STUDY ABD PELVIS WO    CLINICAL HISTORY:  Flank pain, kidney stone suspected;yesika;    TECHNIQUE:  Low dose axial images, sagittal and coronal reformations were obtained from the lung bases to the pubic symphysis.  Contrast was not administered.    COMPARISON:  08/22/2021    FINDINGS:  lung bases clear. Ascending aorta 4.2 cm.  Normal size heart with coronary artery disease. Nonspecific gallbladder dilation.    No hydroureteronephrosis or calcified nephroureterolithiasis.  Remaining solid abdominal organs are grossly unremarkable on this noncontrast exam.    There is no enteric contrast which limits bowel assessment.  There is a PEG tube.  Under distension likely accounts for gastric wall thickening.  No dilated small bowel loops.  There is scattered colonic diverticula.  Along the descending colon segment there is focal wall thickening and adjacent fat stranding.  There is a large volume stool ball in the rectum with rectal lumen distended up to 8 cm.    Atherosclerosis.  Normal size prostate.  There is dependent debris and multiple stones in the urinary bladder.    Subtle superior endplate compression fracture of T12 which is remote in age.  Lower lumbar facet degenerative change.  Mild bilateral hip degenerative change.  Impression: 1. Multiple vesicoliths.  No kidney or ureteral stones.  No obstructive uropathy.  2. Query mild diverticulitis along the descending colon.  3. Large volume of stool in the rectum which could indicate impaction.  4. Ectasia ascending aorta.    Electronically signed by: Mike Delgadillo  Date:    10/12/2022  Time:    15:13  X-Ray Chest AP  Portable  Narrative: EXAMINATION:  XR CHEST AP PORTABLE    CLINICAL HISTORY:  Sepsis;    TECHNIQUE:  Single frontal view of the chest was performed.    COMPARISON:  Chest of August 22, 2021    FINDINGS:  The lungs are clear, with normal appearance of pulmonary vasculature and no pleural effusion or pneumothorax.    The cardiac silhouette is normal in size. The hilar and mediastinal contours are unremarkable.    Bones are intact.  Impression: No acute abnormality.    Electronically signed by: Prudencio Deutsch MD  Date:    10/12/2022  Time:    13:22      CURRENT VISIT EKG  No results found for this visit on 10/12/22.    ECHOCARDIOGRAM RESULTS  Results for orders placed during the hospital encounter of 05/28/21    Echo    Interpretation Summary  · The left ventricle is normal in size with concentric hypertrophy and normal systolic function.  · The estimated ejection fraction is 60%.  · Normal left ventricular diastolic function.  · Normal right ventricular size with normal right ventricular systolic function.  · Mild aortic regurgitation.  · Normal central venous pressure (3 mmHg).  · The estimated PA systolic pressure is 34 mmHg.  · Mild tricuspid regurgitation.        RESPIRATORY SUPPORT          LAST ARTERIAL BLOOD GAS  ABG  No results for input(s): PH, PO2, PCO2, HCO3, BE in the last 168 hours.    IMPRESSION AND PLAN  Andrea Leon Jr. is a 42 y.o. male with a PMH of Asperger's, prior stroke with hemiparesis, hypertension, ESBL UTI (July 2021), functional quadriplegia, bedbound status, and G-tube was transferred from a nursing care facility to [facility] on whom we have been consulted for septic shock.    #Septic shock  #UTI w/ Hx ESBL UTI  Prior UTI culture Hx: March 2022 grew pan-sensitive PsA and Strep agalactiae. August 2021 grew Providencia resistant to FQs and gentamicin. July 2021: multiple isolates in urine, not speciated; however, blood cultures grew ESBL Proteus  mirabilis and Providencia restistant to amox/clav, cefazolin, FQs, and aminoglycosides and intermediate to ampicillin/sulbactam.  - zosyn changed to meropenem in light of prior ESBL Proteus  - continue vanc  - follow cultures  - wean NE as tolerated  - s/p fluids  - remove Moran  - check PVR    #PRABHJOT  - fluids  - daily BMP    #Hypernatremia, unclear duration w/ AMS  #PEG tube dependence  - nutrition consult  - FWF, tube feeds  - 1/2 NS rate reduced to 75 mL/hr to avoid overly rapid correction of Na   - Na should go down no more than 12 mg/dL/day  - continue BMP q4h    Dispo: Step down to floor once shock resolved / pressors weaned off.      Lee Stuart MD  [facility]  Department of Pulmonology  Date of Service: 10/13/2022  10:26 AM

## 2022-10-13 NOTE — ASSESSMENT & PLAN NOTE
Patient with acute kidney injury likely due to IVVD/dehydration PRABHJOT is currently worsening. Labs reviewed- Renal function/electrolytes with Estimated Creatinine Clearance: 26.1 mL/min (A) (based on SCr of 3.7 mg/dL (H)). according to latest data. Monitor urine output and serial BMP and adjust therapy as needed. Avoid nephrotoxins and renally dose meds for GFR listed above.     Inpatient admission with volume replacement; repeat labs ordered to monitor his potassium and renal function; continue current antibiotic coverage; dietician consultation in AM for tube feeds; check Depakote levels; change levetiracetam from po/G-tube to iv; TSH with AM labs; continue his transfer regimen otherwise; Nephrology consultation in AM

## 2022-10-13 NOTE — PLAN OF CARE
Problem: Feeding Intolerance (Enteral Nutrition)  Goal: Feeding Tolerance  Outcome: Ongoing, Progressing  Intervention: Prevent and Manage Feeding Intolerance  Flowsheets (Taken 10/13/2022 1133)  Nutrition Support Management: tube feeding initiated

## 2022-10-13 NOTE — SUBJECTIVE & OBJECTIVE
Past Medical History:   Diagnosis Date    Anticoagulant long-term use     baby asa per chart    Asperger's syndrome     CVA, old, hemiparesis     Encounter for blood transfusion     Essential hypertension, malignant     Functional quadriplegia 5/28/2021    PEG (percutaneous endoscopic gastrostomy) status        Past Surgical History:   Procedure Laterality Date    COLONOSCOPY N/A 6/3/2021    Procedure: COLONOSCOPY;  Surgeon: Rosario Meeks MD;  Location: [facility] ENDO;  Service: Endoscopy;  Laterality: N/A;    ESOPHAGOGASTRODUODENOSCOPY N/A 7/10/2021    Procedure: EGD (ESOPHAGOGASTRODUODENOSCOPY);  Surgeon: Rosario Meeks MD;  Location: [facility] ENDO;  Service: Endoscopy;  Laterality: N/A;    ESOPHAGOGASTRODUODENOSCOPY W/ PEG N/A 4/1/2021    Procedure: EGD, WITH PEG TUBE INSERTION;  Surgeon: En Zheng MD;  Location: [facility] ENDO;  Service: Endoscopy;  Laterality: N/A;       Review of patient's allergies indicates:  No Known Allergies    Current Facility-Administered Medications on File Prior to Encounter   Medication    [COMPLETED] acetaminophen oral solution 999.0148 mg    [COMPLETED] albuterol sulfate nebulizer solution 10 mg    [COMPLETED] calcium gluconate 1 g in NS IVPB (premixed)    [COMPLETED] dextrose 5 % and 0.45 % NaCl infusion    [COMPLETED] dextrose 50% injection 25 g    [COMPLETED] insulin regular injection 10 Units 0.1 mL    [COMPLETED] lactated ringers bolus 1,000 mL    [COMPLETED] LORazepam injection 1 mg    [COMPLETED] NORepinephrine bitartrate-D5W 4 mg/250 mL (16 mcg/mL) infusion Soln    [COMPLETED] piperacillin-tazobactam 4.5 g in dextrose 5 % 100 mL IVPB (ready to mix system)    [COMPLETED] sodium chloride 0.9% bolus 1,000 mL    [COMPLETED] sodium chloride 0.9% bolus 500 mL    [COMPLETED] vancomycin (VANCOCIN) 1,750 mg in dextrose 5 % 500 mL IVPB    [DISCONTINUED] dextrose 10% bolus 125 mL    [DISCONTINUED] dextrose 10% bolus 250 mL    [DISCONTINUED] insulin regular injection 5 Units 0.05 mL     [DISCONTINUED] NORepinephrine 4 mg in dextrose 5% 250 mL infusion (premix) (titrating)    [DISCONTINUED] sodium chloride 0.9% bolus 1,500 mL     Current Outpatient Medications on File Prior to Encounter   Medication Sig    acetaminophen (TYLENOL) 325 MG tablet 650 mg by Per G Tube route every 8 (eight) hours as needed for Temperature greater than (100.1).    amantadine HCL (SYMMETREL) 100 mg capsule 1 capsule (100 mg total) by Per G Tube route once daily.    amLODIPine (NORVASC) 5 MG tablet 1 tablet (5 mg total) by Per G Tube route once daily.    arginine/glutamine/calcium bmb (ISAI ORAL) 1 packet by Per G Tube route 2 (two) times a day.    atorvastatin (LIPITOR) 40 MG tablet 1 tablet (40 mg total) by Per G Tube route once daily.    cloNIDine (CATAPRES) 0.1 MG tablet Take 1 tablet (0.1 mg total) by mouth 3 (three) times daily as needed (180).    famotidine (PEPCID) 20 MG tablet Take 20 mg by mouth 2 (two) times daily.    FLUoxetine 20 MG capsule 1 capsule (20 mg total) by Per G Tube route once daily.    ketoconazole (NIZORAL) 2 % shampoo Apply topically once daily. (Patient taking differently: Apply 1 application topically once daily. )    levETIRAcetam (KEPPRA) 750 MG Tab Take 1 tablet (750 mg total) by mouth 2 (two) times daily.    LORazepam (ATIVAN) 0.5 MG tablet 0.5 mg by Per G Tube route every 6 (six) hours as needed for Anxiety.    losartan (COZAAR) 50 MG tablet 1 tablet (50 mg total) by Per G Tube route once daily.    modafiniL (PROVIGIL) 200 MG Tab 200 mg by Per G Tube route once daily.    ondansetron (ZOFRAN-ODT) 4 MG TbDL ondansetron 4 mg disintegrating tablet    polyethylene glycol (GLYCOLAX) 17 gram PwPk Take by mouth.    valproate (DEPAKENE) 250 mg/5 mL syrup Take by mouth.    [DISCONTINUED] pantoprazole (PROTONIX) 40 MG tablet Take 1 tablet (40 mg total) by mouth 2 (two) times daily.     Family History    None       Tobacco Use    Smoking status: Never    Smokeless tobacco: Never   Substance and  Sexual Activity    Alcohol use: Not Currently    Drug use: Not Currently    Sexual activity: Not Currently     Review of Systems   Unable to perform ROS: Patient unresponsive   Objective:     Vital Signs (Most Recent):  Temp: 97.6 °F (36.4 °C) (10/12/22 1931)  Pulse: 81 (10/12/22 1958)  Resp: (!) 21 (10/12/22 1958)  BP: 107/65 (10/12/22 1958)  SpO2: 100 % (10/12/22 1958)   Vital Signs (24h Range):  Temp:  [97.6 °F (36.4 °C)-101.4 °F (38.6 °C)] 97.6 °F (36.4 °C)  Pulse:  [] 81  Resp:  [18-28] 21  SpO2:  [96 %-100 %] 100 %  BP: ()/(48-92) 107/65     Weight: 70.2 kg (154 lb 12.2 oz)  Body mass index is 22.85 kg/m².    Physical Exam  Vitals and nursing note reviewed.   Constitutional:       Appearance: He is well-developed.      Comments: Chronically ill appearing; responds with grimace to sternal rub   HENT:      Head: Normocephalic and atraumatic.      Right Ear: External ear normal.      Left Ear: External ear normal.      Nose: Nose normal.   Eyes:      Conjunctiva/sclera: Conjunctivae normal.      Pupils: Pupils are equal, round, and reactive to light.   Cardiovascular:      Rate and Rhythm: Normal rate and regular rhythm.      Heart sounds: Normal heart sounds.   Pulmonary:      Effort: Pulmonary effort is normal.      Breath sounds: Normal breath sounds.      Comments: Decreased entry bases without adventitious sounds  Abdominal:      General: Bowel sounds are normal.      Palpations: Abdomen is soft.      Comments: PEG   Musculoskeletal:      Cervical back: Normal range of motion and neck supple.      Comments: Unable to assess   Skin:     General: Skin is warm and dry.      Capillary Refill: Capillary refill takes less than 2 seconds.      Comments: Early decubiti both heels, Stage 1-2 sacrum   Neurological:      Comments: Responds only to vigorous sternal rub; contracted extremities   Psychiatric:      Comments: Unable to assess         CRANIAL NERVES     CN III, IV, VI   Pupils are equal, round,  and reactive to light.     Significant Labs: All pertinent labs within the past 24 hours have been reviewed.  CBC:   Recent Labs   Lab 10/12/22  1328   WBC 15.03*   HGB 17.4   HCT 53.6        CMP:   Recent Labs   Lab 10/12/22  1329   *   K 5.7*   *   CO2 25      *   CREATININE 3.7*   CALCIUM 9.8   PROT 8.2   ALBUMIN 3.6   BILITOT 0.6   ALKPHOS 127   AST 26   ALT 19   ANIONGAP 15     Cardiac Markers: No results for input(s): CKMB, MYOGLOBIN, BNP, TROPISTAT in the last 48 hours.  Troponin: No results for input(s): TROPONINI, TROPONINIHS in the last 48 hours.  TSH: No results for input(s): TSH in the last 4320 hours.    Significant Imaging: I have reviewed all pertinent imaging results/findings within the past 24 hours.

## 2022-10-13 NOTE — HPI
42-year-old male with a history of Asperger's, prior stroke with hemiparesis, hypertension, ESBL UTI (July 2021), functional quadriplegia, bedbound status, and G-tube was transferred from a nursing care facility to [facility] due to change in mental status.  He is normally awake and alert and smiling, but on the morning of presentation he was altered.  He had a strong smell of urine.  He was noted to have dry lips and mouth, erythematous rash near the groin, and spastic bilateral upper and lower extremities.  He was lethargic but would arouse.  He had hemiparesis on the left but was able to partially move the right side of his body.  Mild decubitus noted on the left lateral malleolus with stage 1 buttock decubitus. In the emergency department he received albuterol nebulizer, calcium, insulin and dextrose, bolus IV fluid, Zosyn, and vancomycin.  Blood pressure decreased during his stay, and he received additional volume administration.      Central line was placed and patient transferred to [facility] ICU. Levophed and volume were started for his hypotension. He responded to noxious stimuli only. Labs reviewed and noted below: minimal leukocytosis with normal H/H; hypernatremia and hyperkalemia with (new) end stage renal dysfunction; lactate elevated (receiving volume and antibiotics currently).    Discussed with his ICU RN: Inpatient admission with volume replacement; repeat labs ordered to monitor his potassium and renal function; continue current antibiotic coverage; dietician consultation in AM for tube feeds; check Depakote levels; change levetiracetam from po/G-tube to iv; TSH with AM labs; continue his transfer regimen otherwise; Nephrology consultation in AM

## 2022-10-13 NOTE — CONSULTS
INPATIENT NEPHROLOGY CONSULT   [facility] NEPHROLOGY    Andrea Leon Jr.  10/13/2022    Reason for consultation:    Acute kidney injury    Chief Complaint:   Chief Complaint   Patient presents with    Altered Mental Status          History of Present Illness:       42-year-old male with a history of Asperger's, prior stroke with hemiparesis, hypertension, ESBL UTI (July 2021), functional quadriplegia, bedbound status, and G-tube was transferred from a nursing care facility to [facility] due to change in mental status.  He is normally awake and alert and smiling, but on the morning of presentation he was altered.  He had a strong smell of urine.  He was noted to have dry lips and mouth, erythematous rash near the groin, and spastic bilateral upper and lower extremities.  He was lethargic but would arouse.  He had hemiparesis on the left but was able to partially move the right side of his body.  Mild decubitus noted on the left lateral malleolus with stage 1 buttock decubitus. In the emergency department he received albuterol nebulizer, calcium, insulin and dextrose, bolus IV fluid, Zosyn, and vancomycin.  Blood pressure decreased during his stay, and he received additional volume administration.       Central line was placed and patient transferred to [facility] ICU. Levophed and volume were started for his hypotension. He responded to noxious stimuli only. Labs reviewed and noted below: minimal leukocytosis with normal H/H; hypernatremia and hyperkalemia with (new) end stage renal dysfunction; lactate elevated (receiving volume and antibiotics currently).      10/13  No nausea, chest pain, sob, fever, urinary or bowel complaint, new neurologic symptoms, new joint pain      Plan of Care:       Assessment:     Acute kidney injury secondary to hemodynamically mediated renal insult  --volume resuscitation   --no nsaids, coxibs  --renal dose medication   --hold losartan  --keep map above  55  --don't use Vancomycin/zosyn combination --it is nephrotoxic      Hypernatremia  --switch to hypotonic iv fluids    Anemia  --trend h/h  --blood products per primary    Lactic acidosis  --continue volume resuscitation    UTI with sepsis  --renal dose abx for crcl 10-50                                      Thank you for allowing us to participate in this patient's care. We will continue to follow.    Vital Signs:  Temp Readings from Last 3 Encounters:   10/13/22 97.6 °F (36.4 °C) (Axillary)   10/12/22 99.9 °F (37.7 °C) (Rectal)   09/09/22 97 °F (36.1 °C) (Axillary)       Pulse Readings from Last 3 Encounters:   10/13/22 73   10/12/22 88   09/09/22 70       BP Readings from Last 3 Encounters:   10/13/22 130/69   10/12/22 (!) 97/56   09/09/22 90/62       Weight:  Wt Readings from Last 3 Encounters:   10/13/22 72.6 kg (160 lb 0.9 oz)   10/12/22 86.2 kg (190 lb 0.6 oz)   10/12/22 86.2 kg (190 lb 0.6 oz)       Past Medical & Surgical History:  Past Medical History:   Diagnosis Date    Anticoagulant long-term use     baby asa per chart    Asperger's syndrome     CVA, old, hemiparesis     Encounter for blood transfusion     Essential hypertension, malignant     Functional quadriplegia 5/28/2021    PEG (percutaneous endoscopic gastrostomy) status        Past Surgical History:   Procedure Laterality Date    COLONOSCOPY N/A 6/3/2021    Procedure: COLONOSCOPY;  Surgeon: Rosario Meeks MD;  Location: [facility];  Service: Endoscopy;  Laterality: N/A;    ESOPHAGOGASTRODUODENOSCOPY N/A 7/10/2021    Procedure: EGD (ESOPHAGOGASTRODUODENOSCOPY);  Surgeon: Rosario Meeks MD;  Location: [facility];  Service: Endoscopy;  Laterality: N/A;    ESOPHAGOGASTRODUODENOSCOPY W/ PEG N/A 4/1/2021    Procedure: EGD, WITH PEG TUBE INSERTION;  Surgeon: En Zheng MD;  Location: [facility];  Service: Endoscopy;  Laterality: N/A;       Past Social History:  Social History     Socioeconomic History    Marital status:     Occupational History    Occupation: unemployed   Tobacco Use    Smoking status: Never    Smokeless tobacco: Never   Substance and Sexual Activity    Alcohol use: Not Currently    Drug use: Not Currently    Sexual activity: Not Currently       Medications:  Current Facility-Administered Medications on File Prior to Encounter   Medication Dose Route Frequency Provider Last Rate Last Admin    [COMPLETED] acetaminophen oral solution 999.0148 mg  999.0148 mg Oral ED 1 Time Telly Benítez MD   999.0148 mg at 10/12/22 1704    [COMPLETED] albuterol sulfate nebulizer solution 10 mg  10 mg Nebulization ED 1 Time Telly Benítez MD   10 mg at 10/12/22 1504    [COMPLETED] calcium gluconate 1 g in NS IVPB (premixed)  1 g Intravenous ED 1 Time Telly Benítez MD   Stopped at 10/12/22 1635    [COMPLETED] dextrose 5 % and 0.45 % NaCl infusion  1,000 mL Intravenous ED 1 Time Telly Benítez  mL/hr at 10/12/22 1822 1,000 mL at 10/12/22 1822    [COMPLETED] dextrose 50% injection 25 g  25 g Intravenous ED 1 Time Telly Benítez MD   25 g at 10/12/22 1527    [COMPLETED] insulin regular injection 10 Units 0.1 mL  10 Units Intravenous ED 1 Time Telly Benítez MD   10 Units at 10/12/22 1559    [COMPLETED] lactated ringers bolus 1,000 mL  1,000 mL Intravenous ED 1 Time Telly Benítez MD   Stopped at 10/12/22 1446    [COMPLETED] LORazepam injection 1 mg  1 mg Intravenous ED 1 Time Telly Benítez MD   1 mg at 10/12/22 1745    [COMPLETED] NORepinephrine bitartrate-D5W 4 mg/250 mL (16 mcg/mL) infusion Soln        4 mg at 10/12/22 1804    [COMPLETED] piperacillin-tazobactam 4.5 g in dextrose 5 % 100 mL IVPB (ready to mix system)  4.5 g Intravenous ED 1 Time Telly Benítez MD   Stopped at 10/12/22 1600    [COMPLETED] sodium chloride 0.9% bolus 1,000 mL  1,000 mL Intravenous ED 1 Time Telly Benítez MD   Stopped at 10/12/22 1700    [COMPLETED] sodium chloride 0.9% bolus 500 mL  500 mL Intravenous ED 1 Time Telly Benítez,  MD   Stopped at 10/12/22 1740    [COMPLETED] vancomycin (VANCOCIN) 1,750 mg in dextrose 5 % 500 mL IVPB  20 mg/kg Intravenous ED 1 Time Telly Benítez MD   Stopped at 10/12/22 1751    [DISCONTINUED] dextrose 10% bolus 125 mL  12.5 g Intravenous PRN Telly Benítez MD        [DISCONTINUED] dextrose 10% bolus 250 mL  25 g Intravenous PRN Telly Benítez MD        [DISCONTINUED] insulin regular injection 5 Units 0.05 mL  5 Units Intravenous ED 1 Time Telly Benítez MD        [DISCONTINUED] NORepinephrine 4 mg in dextrose 5% 250 mL infusion (premix) (titrating)  0.05 mcg/kg/min Intravenous Continuous Telly Benítez MD 48.5 mL/hr at 10/12/22 1846 0.15 mcg/kg/min at 10/12/22 1846    [DISCONTINUED] sodium chloride 0.9% bolus 1,500 mL  1,500 mL Intravenous ED 1 Time Telly Benítez MD         Current Outpatient Medications on File Prior to Encounter   Medication Sig Dispense Refill    acetaminophen (TYLENOL) 325 MG tablet 650 mg by Per G Tube route every 8 (eight) hours as needed for Temperature greater than (100.1).      amantadine HCL (SYMMETREL) 100 mg capsule 1 capsule (100 mg total) by Per G Tube route once daily. 30 capsule 11    amLODIPine (NORVASC) 5 MG tablet 1 tablet (5 mg total) by Per G Tube route once daily. 30 tablet 11    arginine/glutamine/calcium bmb (ISAI ORAL) 1 packet by Per G Tube route 2 (two) times a day.      atorvastatin (LIPITOR) 40 MG tablet 1 tablet (40 mg total) by Per G Tube route once daily. 90 tablet 3    cloNIDine (CATAPRES) 0.1 MG tablet Take 1 tablet (0.1 mg total) by mouth 3 (three) times daily as needed (180). 90 tablet 0    famotidine (PEPCID) 20 MG tablet Take 20 mg by mouth 2 (two) times daily.      FLUoxetine 20 MG capsule 1 capsule (20 mg total) by Per G Tube route once daily. 30 capsule 11    ketoconazole (NIZORAL) 2 % shampoo Apply topically once daily. (Patient taking differently: Apply 1 application topically once daily. )      levETIRAcetam (KEPPRA) 750 MG Tab Take 1  "tablet (750 mg total) by mouth 2 (two) times daily. 60 tablet 1    LORazepam (ATIVAN) 0.5 MG tablet 0.5 mg by Per G Tube route every 6 (six) hours as needed for Anxiety.      losartan (COZAAR) 50 MG tablet 1 tablet (50 mg total) by Per G Tube route once daily. 90 tablet 3    modafiniL (PROVIGIL) 200 MG Tab 200 mg by Per G Tube route once daily.      ondansetron (ZOFRAN-ODT) 4 MG TbDL ondansetron 4 mg disintegrating tablet      polyethylene glycol (GLYCOLAX) 17 gram PwPk Take by mouth.      valproate (DEPAKENE) 250 mg/5 mL syrup Take by mouth.       Scheduled Meds:   amantadine HCL  100 mg Per G Tube Daily    atorvastatin  40 mg Per G Tube Daily    bisacodyL  10 mg Rectal Daily    enoxparin  40 mg Subcutaneous Q24H    famotidine  20 mg Oral Daily    FLUoxetine  20 mg Per G Tube Daily    custom IVPB builder   Intravenous Q12H    piperacillin-tazobactam (ZOSYN) IVPB  3.375 g Intravenous Q8H    valproic acid (as sodium salt)  250 mg Per G Tube QHS     Continuous Infusions:   lactated ringers 130 mL/hr at 10/13/22 0556    NORepinephrine bitartrate-D5W 0.07 mcg/kg/min (10/13/22 0847)     PRN Meds:.acetaminophen, cloNIDine, HYDROcodone-acetaminophen, melatonin, morphine, ondansetron, polyethylene glycol, Pharmacy to dose Vancomycin consult **AND** vancomycin - pharmacy to dose    Allergies:  Patient has no known allergies.    Past Family History:  Reviewed; refer to Hospitalist Admission Note    Review of Systems:  Review of Systems - All 14 systems reviewed and negative, except as noted in HPI    Physical Exam:    /69   Pulse 73   Temp 97.6 °F (36.4 °C) (Axillary)   Resp 20   Ht 5' 9" (1.753 m)   Wt 72.6 kg (160 lb 0.9 oz)   SpO2 98%   BMI 23.64 kg/m²     General Appearance:    Alert, cooperative, no distress, appears stated age   Head:    Normocephalic, without obvious abnormality, atraumatic   Eyes:    PER, conjunctiva/corneas clear, EOM's intact in both eyes        Throat:   Lips, mucosa, and tongue " normal; teeth and gums normal   Back:     Symmetric, no curvature, ROM normal, no CVA tenderness   Lungs:     Clear to auscultation bilaterally, respirations unlabored   Chest wall:    No tenderness or deformity   Heart:    Regular rate and rhythm, S1 and S2 normal, no murmur, rub   or gallop   Abdomen:     Soft, non-tender, bowel sounds active all four quadrants,     no masses, no organomegaly   Extremities:   Extremities normal, atraumatic, no cyanosis or edema   Pulses:   2+ and symmetric all extremities   MSK:   No joint or muscle swelling, tenderness or deformity   Skin:   Skin color, texture, turgor normal, no rashes or lesions   Neurologic:      No flap     Results:  Lab Results   Component Value Date     (H) 10/13/2022    K 4.3 10/13/2022     (H) 10/13/2022    CO2 27 10/13/2022    BUN 85 (H) 10/13/2022    CREATININE 2.8 (H) 10/13/2022    CALCIUM 8.3 (L) 10/13/2022    ANIONGAP 9 10/13/2022    ESTGFRAFRICA >60.0 08/25/2021    EGFRNONAA 57.4 (A) 08/25/2021       Lab Results   Component Value Date    CALCIUM 8.3 (L) 10/13/2022    PHOS 4.2 07/12/2021       Recent Labs   Lab 10/13/22  0400   WBC 18.42*   RBC 3.65*   HGB 11.7*   HCT 38.5*   *   *   MCH 32.1*   MCHC 30.4*            I have personally reviewed pertinent radiological imaging and reports.    Patient care was time spent personally by me on the following activities:   Obtaining a history  Examination of patient.  Providing medical care at the patients bedside.  Developing a treatment plan with patient or surrogate and bedside caregivers  Ordering and reviewing laboratory studies, radiographic studies, pulse oximetry.  Ordering and performing treatments and interventions.  Evaluation of patient's response to treatment.  Discussions with consultants while on the unit and immediately available to the patient.  Re-evaluation of the patient's condition.  Documentation in the medical record.     Noe Krishnan,  MD  Nephrology  [facility] [place]  [phone]

## 2022-10-13 NOTE — PROGRESS NOTES
VANCOMYCIN PHARMACOKINETIC NOTE:  Vancomycin Day # 1    Objective/Assessment:    Diagnosis/Indication for Vancomycin: Pneumonia     42 y.o., male; Actual Body Weight = 70.2 kg (154 lb 12.2 oz).    The patient has the following labs:  10/12/2022 Estimated Creatinine Clearance: 26.1 mL/min (A) (based on SCr of 3.7 mg/dL (H)). Lab Results   Component Value Date     (H) 10/12/2022     Lab Results   Component Value Date    WBC 15.03 (H) 10/12/2022          Plan:  Adjust vancomycin dose and/or frequency based on the patient's actual weight and renal function:  Initiate Vancomycin 1750 mg IV was given once and future doses based on levels..  Orders have been entered into patient's chart.    Vancomycin dose = 25 mg/kg actual body weight    Vancomycin random level has been ordered for 23 hours post dose on 10/13 at 1500..    Pharmacy will manage vancomycin therapy, monitor serum vancomycin levels, monitor renal function and adjust regimen as necessary.  Will follow random vancomycin levels and redose when serum vancomycin level decreases to 10-15 mcg/ml    Thank you for allowing us to participate in this patient's care.     Denise Bernal 10/12/2022 9:58 PM  Department of Pharmacy  Ext [phone]

## 2022-10-13 NOTE — PLAN OF CARE
Problem: Adult Inpatient Plan of Care  Goal: Plan of Care Review  Outcome: Ongoing, Progressing  Goal: Patient-Specific Goal (Individualized)  Outcome: Ongoing, Progressing  Goal: Absence of Hospital-Acquired Illness or Injury  Outcome: Ongoing, Progressing  Goal: Optimal Comfort and Wellbeing  Outcome: Ongoing, Progressing  Goal: Readiness for Transition of Care  Outcome: Ongoing, Progressing     Problem: Adjustment to Illness (Sepsis/Septic Shock)  Goal: Optimal Coping  Outcome: Ongoing, Progressing     Problem: Bleeding (Sepsis/Septic Shock)  Goal: Absence of Bleeding  Outcome: Ongoing, Progressing     Problem: Glycemic Control Impaired (Sepsis/Septic Shock)  Goal: Blood Glucose Level Within Desired Range  Outcome: Ongoing, Progressing     Problem: Infection Progression (Sepsis/Septic Shock)  Goal: Absence of Infection Signs and Symptoms  Outcome: Ongoing, Progressing     Problem: Nutrition Impaired (Sepsis/Septic Shock)  Goal: Optimal Nutrition Intake  Outcome: Ongoing, Progressing     Problem: Fluid and Electrolyte Imbalance (Acute Kidney Injury/Impairment)  Goal: Fluid and Electrolyte Balance  Outcome: Ongoing, Progressing     Problem: Oral Intake Inadequate (Acute Kidney Injury/Impairment)  Goal: Optimal Nutrition Intake  Outcome: Ongoing, Progressing     Problem: Renal Function Impairment (Acute Kidney Injury/Impairment)  Goal: Effective Renal Function  Outcome: Ongoing, Progressing     Problem: Infection  Goal: Absence of Infection Signs and Symptoms  Outcome: Ongoing, Progressing     Problem: Impaired Wound Healing  Goal: Optimal Wound Healing  Outcome: Ongoing, Progressing     Problem: Skin Injury Risk Increased  Goal: Skin Health and Integrity  Outcome: Ongoing, Progressing     Problem: Fall Injury Risk  Goal: Absence of Fall and Fall-Related Injury  Outcome: Ongoing, Progressing     Problem: Device-Related Complication Risk (Enteral Nutrition)  Goal: Safe, Effective Therapy Delivery  Outcome: Ongoing,  Progressing     Problem: Feeding Intolerance (Enteral Nutrition)  Goal: Feeding Tolerance  Outcome: Ongoing, Progressing

## 2022-10-13 NOTE — PROGRESS NOTES
Pharmacist Renal Dose Adjustment Note    Andrea Leon Jr. is a 42 y.o. male being treated with the medication ENOXAPARIN    Patient Data:    Vital Signs (Most Recent):  Temp: 97.6 °F (36.4 °C) (10/12/22 1931)  Pulse: 81 (10/12/22 1958)  Resp: (!) 21 (10/12/22 1958)  BP: 107/65 (10/12/22 1958)  SpO2: 100 % (10/12/22 1958)   Vital Signs (72h Range):  Temp:  [97.6 °F (36.4 °C)-101.4 °F (38.6 °C)]   Pulse:  []   Resp:  [18-28]   BP: ()/(48-92)   SpO2:  [96 %-100 %]      Recent Labs   Lab 10/12/22  1329   CREATININE 3.7*     Serum creatinine: 3.7 mg/dL (H) 10/12/22 1329  Estimated creatinine clearance: 26.1 mL/min (A)    ENOXAPARIN 40 MG EVERY 24 HR will be changed to ENOXAPARIN 30 MG EVERY 24HR    Pharmacist's Name: Denise Bernal  Pharmacist's Extension: [phone]

## 2022-10-13 NOTE — H&P
[facility]  History & Physical    Patient Name: Andrea Leon Jr.  MRN: [id number]  Patient Class: IP- Inpatient  Admission Date: 10/12/2022  Attending Physician: Thai Reyes MD  Primary Care Provider: Elly Mathew DO         Patient information was obtained from nursing home, past medical records, ER records and Transferring MD and ICU RN.     Subjective:     Principal Problem:<principal problem not specified>    Chief Complaint:   Chief Complaint   Patient presents with    Altered Mental Status        HPI: 42-year-old male with a history of Asperger's, prior stroke with hemiparesis, hypertension, ESBL UTI (July 2021), functional quadriplegia, bedbound status, and G-tube was transferred from a nursing care facility to [facility] due to change in mental status.  He is normally awake and alert and smiling, but on the morning of presentation he was altered.  He had a strong smell of urine.  He was noted to have dry lips and mouth, erythematous rash near the groin, and spastic bilateral upper and lower extremities.  He was lethargic but would arouse.  He had hemiparesis on the left but was able to partially move the right side of his body.  Mild decubitus noted on the left lateral malleolus with stage 1 buttock decubitus. In the emergency department he received albuterol nebulizer, calcium, insulin and dextrose, bolus IV fluid, Zosyn, and vancomycin.  Blood pressure decreased during his stay, and he received additional volume administration.      Central line was placed and patient transferred to [facility] ICU. Levophed and volume were started for his hypotension. He responded to noxious stimuli only. Labs reviewed and noted below: minimal leukocytosis with normal H/H; hypernatremia and hyperkalemia with (new) end stage renal dysfunction; lactate elevated (receiving volume and antibiotics currently).    Discussed with his ICU RN: Inpatient  admission with volume replacement; repeat labs ordered to monitor his potassium and renal function; continue current antibiotic coverage; dietician consultation in AM for tube feeds; check Depakote levels; change levetiracetam from po/G-tube to iv; TSH with AM labs; continue his transfer regimen otherwise; Nephrology consultation in AM      Past Medical History:   Diagnosis Date    Anticoagulant long-term use     baby asa per chart    Asperger's syndrome     CVA, old, hemiparesis     Encounter for blood transfusion     Essential hypertension, malignant     Functional quadriplegia 5/28/2021    PEG (percutaneous endoscopic gastrostomy) status        Past Surgical History:   Procedure Laterality Date    COLONOSCOPY N/A 6/3/2021    Procedure: COLONOSCOPY;  Surgeon: Rosario Meeks MD;  Location: [facility];  Service: Endoscopy;  Laterality: N/A;    ESOPHAGOGASTRODUODENOSCOPY N/A 7/10/2021    Procedure: EGD (ESOPHAGOGASTRODUODENOSCOPY);  Surgeon: Rosario Meeks MD;  Location: [facility];  Service: Endoscopy;  Laterality: N/A;    ESOPHAGOGASTRODUODENOSCOPY W/ PEG N/A 4/1/2021    Procedure: EGD, WITH PEG TUBE INSERTION;  Surgeon: En Zheng MD;  Location: [facility];  Service: Endoscopy;  Laterality: N/A;       Review of patient's allergies indicates:  No Known Allergies    Current Facility-Administered Medications on File Prior to Encounter   Medication    [COMPLETED] acetaminophen oral solution 999.0148 mg    [COMPLETED] albuterol sulfate nebulizer solution 10 mg    [COMPLETED] calcium gluconate 1 g in NS IVPB (premixed)    [COMPLETED] dextrose 5 % and 0.45 % NaCl infusion    [COMPLETED] dextrose 50% injection 25 g    [COMPLETED] insulin regular injection 10 Units 0.1 mL    [COMPLETED] lactated ringers bolus 1,000 mL    [COMPLETED] LORazepam injection 1 mg    [COMPLETED] NORepinephrine bitartrate-D5W 4 mg/250 mL (16 mcg/mL) infusion Soln    [COMPLETED] piperacillin-tazobactam 4.5 g in  dextrose 5 % 100 mL IVPB (ready to mix system)    [COMPLETED] sodium chloride 0.9% bolus 1,000 mL    [COMPLETED] sodium chloride 0.9% bolus 500 mL    [COMPLETED] vancomycin (VANCOCIN) 1,750 mg in dextrose 5 % 500 mL IVPB    [DISCONTINUED] dextrose 10% bolus 125 mL    [DISCONTINUED] dextrose 10% bolus 250 mL    [DISCONTINUED] insulin regular injection 5 Units 0.05 mL    [DISCONTINUED] NORepinephrine 4 mg in dextrose 5% 250 mL infusion (premix) (titrating)    [DISCONTINUED] sodium chloride 0.9% bolus 1,500 mL     Current Outpatient Medications on File Prior to Encounter   Medication Sig    acetaminophen (TYLENOL) 325 MG tablet 650 mg by Per G Tube route every 8 (eight) hours as needed for Temperature greater than (100.1).    amantadine HCL (SYMMETREL) 100 mg capsule 1 capsule (100 mg total) by Per G Tube route once daily.    amLODIPine (NORVASC) 5 MG tablet 1 tablet (5 mg total) by Per G Tube route once daily.    arginine/glutamine/calcium bmb (ISAI ORAL) 1 packet by Per G Tube route 2 (two) times a day.    atorvastatin (LIPITOR) 40 MG tablet 1 tablet (40 mg total) by Per G Tube route once daily.    cloNIDine (CATAPRES) 0.1 MG tablet Take 1 tablet (0.1 mg total) by mouth 3 (three) times daily as needed (180).    famotidine (PEPCID) 20 MG tablet Take 20 mg by mouth 2 (two) times daily.    FLUoxetine 20 MG capsule 1 capsule (20 mg total) by Per G Tube route once daily.    ketoconazole (NIZORAL) 2 % shampoo Apply topically once daily. (Patient taking differently: Apply 1 application topically once daily. )    levETIRAcetam (KEPPRA) 750 MG Tab Take 1 tablet (750 mg total) by mouth 2 (two) times daily.    LORazepam (ATIVAN) 0.5 MG tablet 0.5 mg by Per G Tube route every 6 (six) hours as needed for Anxiety.    losartan (COZAAR) 50 MG tablet 1 tablet (50 mg total) by Per G Tube route once daily.    modafiniL (PROVIGIL) 200 MG Tab 200 mg by Per G Tube route once daily.    ondansetron (ZOFRAN-ODT) 4 MG  TbDL ondansetron 4 mg disintegrating tablet    polyethylene glycol (GLYCOLAX) 17 gram PwPk Take by mouth.    valproate (DEPAKENE) 250 mg/5 mL syrup Take by mouth.    [DISCONTINUED] pantoprazole (PROTONIX) 40 MG tablet Take 1 tablet (40 mg total) by mouth 2 (two) times daily.     Family History    None       Tobacco Use    Smoking status: Never    Smokeless tobacco: Never   Substance and Sexual Activity    Alcohol use: Not Currently    Drug use: Not Currently    Sexual activity: Not Currently     Review of Systems   Unable to perform ROS: Patient unresponsive   Objective:     Vital Signs (Most Recent):  Temp: 97.6 °F (36.4 °C) (10/12/22 1931)  Pulse: 81 (10/12/22 1958)  Resp: (!) 21 (10/12/22 1958)  BP: 107/65 (10/12/22 1958)  SpO2: 100 % (10/12/22 1958)   Vital Signs (24h Range):  Temp:  [97.6 °F (36.4 °C)-101.4 °F (38.6 °C)] 97.6 °F (36.4 °C)  Pulse:  [] 81  Resp:  [18-28] 21  SpO2:  [96 %-100 %] 100 %  BP: ()/(48-92) 107/65     Weight: 70.2 kg (154 lb 12.2 oz)  Body mass index is 22.85 kg/m².    Physical Exam  Vitals and nursing note reviewed.   Constitutional:       Appearance: He is well-developed.      Comments: Chronically ill appearing; responds with grimace to sternal rub   HENT:      Head: Normocephalic and atraumatic.      Right Ear: External ear normal.      Left Ear: External ear normal.      Nose: Nose normal.   Eyes:      Conjunctiva/sclera: Conjunctivae normal.      Pupils: Pupils are equal, round, and reactive to light.   Cardiovascular:      Rate and Rhythm: Normal rate and regular rhythm.      Heart sounds: Normal heart sounds.   Pulmonary:      Effort: Pulmonary effort is normal.      Breath sounds: Normal breath sounds.      Comments: Decreased entry bases without adventitious sounds  Abdominal:      General: Bowel sounds are normal.      Palpations: Abdomen is soft.      Comments: PEG   Musculoskeletal:      Cervical back: Normal range of motion and neck supple.       Comments: Unable to assess   Skin:     General: Skin is warm and dry.      Capillary Refill: Capillary refill takes less than 2 seconds.      Comments: Early decubiti both heels, Stage 1-2 sacrum   Neurological:      Comments: Responds only to vigorous sternal rub; contracted extremities   Psychiatric:      Comments: Unable to assess         CRANIAL NERVES     CN III, IV, VI   Pupils are equal, round, and reactive to light.     Significant Labs: All pertinent labs within the past 24 hours have been reviewed.  CBC:   Recent Labs   Lab 10/12/22  1328   WBC 15.03*   HGB 17.4   HCT 53.6        CMP:   Recent Labs   Lab 10/12/22  1329   *   K 5.7*   *   CO2 25      *   CREATININE 3.7*   CALCIUM 9.8   PROT 8.2   ALBUMIN 3.6   BILITOT 0.6   ALKPHOS 127   AST 26   ALT 19   ANIONGAP 15     Cardiac Markers: No results for input(s): CKMB, MYOGLOBIN, BNP, TROPISTAT in the last 48 hours.  Troponin: No results for input(s): TROPONINI, TROPONINIHS in the last 48 hours.  TSH: No results for input(s): TSH in the last 4320 hours.    Significant Imaging: I have reviewed all pertinent imaging results/findings within the past 24 hours.    Assessment/Plan:     Urinary tract infection with hematuria  Inpatient admission with volume replacement; repeat labs ordered to monitor his potassium and renal function; continue current antibiotic coverage; dietician consultation in AM for tube feeds; check Depakote levels; change levetiracetam from po/G-tube to iv; TSH with AM labs; continue his transfer regimen otherwise; Nephrology consultation in AM      Altered mental status  Inpatient admission with volume replacement; repeat labs ordered to monitor his potassium and renal function; continue current antibiotic coverage; dietician consultation in AM for tube feeds; check Depakote levels; change levetiracetam from po/G-tube to iv; TSH with AM labs; continue his transfer regimen otherwise; Nephrology consultation in  AM      PRABHJOT (acute kidney injury)  Patient with acute kidney injury likely due to IVVD/dehydration PRABHJOT is currently worsening. Labs reviewed- Renal function/electrolytes with Estimated Creatinine Clearance: 26.1 mL/min (A) (based on SCr of 3.7 mg/dL (H)). according to latest data. Monitor urine output and serial BMP and adjust therapy as needed. Avoid nephrotoxins and renally dose meds for GFR listed above.     Inpatient admission with volume replacement; repeat labs ordered to monitor his potassium and renal function; continue current antibiotic coverage; dietician consultation in AM for tube feeds; check Depakote levels; change levetiracetam from po/G-tube to iv; TSH with AM labs; continue his transfer regimen otherwise; Nephrology consultation in AM        VTE Risk Mitigation (From admission, onward)         Ordered     enoxaparin injection 30 mg  Every 24 hours (non-standard times)         10/12/22 2057     IP VTE HIGH RISK PATIENT  Once         10/12/22 2057     Place MARILYN hose  Until discontinued         10/12/22 2056                   Thai Reyes MD  Department of Hospital Medicine   [facility]

## 2022-10-14 LAB
ALBUMIN SERPL BCP-MCNC: 2.5 G/DL (ref 3.5–5.2)
ALP SERPL-CCNC: 77 U/L (ref 55–135)
ALT SERPL W/O P-5'-P-CCNC: 18 U/L (ref 10–44)
ANION GAP SERPL CALC-SCNC: 2 MMOL/L (ref 8–16)
AST SERPL-CCNC: 27 U/L (ref 10–40)
BASOPHILS # BLD AUTO: 0.03 K/UL (ref 0–0.2)
BASOPHILS # BLD AUTO: 0.03 K/UL (ref 0–0.2)
BASOPHILS NFR BLD: 0.2 % (ref 0–1.9)
BASOPHILS NFR BLD: 0.2 % (ref 0–1.9)
BILIRUB SERPL-MCNC: 1.4 MG/DL (ref 0.1–1)
BUN SERPL-MCNC: 54 MG/DL (ref 6–20)
CALCIUM SERPL-MCNC: 8.2 MG/DL (ref 8.7–10.5)
CHLORIDE SERPL-SCNC: 118 MMOL/L (ref 95–110)
CO2 SERPL-SCNC: 28 MMOL/L (ref 23–29)
CREAT SERPL-MCNC: 1.8 MG/DL (ref 0.5–1.4)
DIFFERENTIAL METHOD: ABNORMAL
DIFFERENTIAL METHOD: ABNORMAL
EOSINOPHIL # BLD AUTO: 0.3 K/UL (ref 0–0.5)
EOSINOPHIL # BLD AUTO: 0.3 K/UL (ref 0–0.5)
EOSINOPHIL NFR BLD: 2.4 % (ref 0–8)
EOSINOPHIL NFR BLD: 2.4 % (ref 0–8)
ERYTHROCYTE [DISTWIDTH] IN BLOOD BY AUTOMATED COUNT: 15.2 % (ref 11.5–14.5)
ERYTHROCYTE [DISTWIDTH] IN BLOOD BY AUTOMATED COUNT: 15.2 % (ref 11.5–14.5)
EST. GFR  (NO RACE VARIABLE): 47.9 ML/MIN/1.73 M^2
GLUCOSE SERPL-MCNC: 90 MG/DL (ref 70–110)
HCT VFR BLD AUTO: 35 % (ref 40–54)
HCT VFR BLD AUTO: 35 % (ref 40–54)
HGB BLD-MCNC: 10.5 G/DL (ref 14–18)
HGB BLD-MCNC: 10.5 G/DL (ref 14–18)
IMM GRANULOCYTES # BLD AUTO: 0.04 K/UL (ref 0–0.04)
IMM GRANULOCYTES # BLD AUTO: 0.04 K/UL (ref 0–0.04)
IMM GRANULOCYTES NFR BLD AUTO: 0.3 % (ref 0–0.5)
IMM GRANULOCYTES NFR BLD AUTO: 0.3 % (ref 0–0.5)
LYMPHOCYTES # BLD AUTO: 1.5 K/UL (ref 1–4.8)
LYMPHOCYTES # BLD AUTO: 1.5 K/UL (ref 1–4.8)
LYMPHOCYTES NFR BLD: 11.7 % (ref 18–48)
LYMPHOCYTES NFR BLD: 11.7 % (ref 18–48)
MCH RBC QN AUTO: 31.8 PG (ref 27–31)
MCH RBC QN AUTO: 31.8 PG (ref 27–31)
MCHC RBC AUTO-ENTMCNC: 30 G/DL (ref 32–36)
MCHC RBC AUTO-ENTMCNC: 30 G/DL (ref 32–36)
MCV RBC AUTO: 106 FL (ref 82–98)
MCV RBC AUTO: 106 FL (ref 82–98)
MONOCYTES # BLD AUTO: 0.7 K/UL (ref 0.3–1)
MONOCYTES # BLD AUTO: 0.7 K/UL (ref 0.3–1)
MONOCYTES NFR BLD: 5.9 % (ref 4–15)
MONOCYTES NFR BLD: 5.9 % (ref 4–15)
NEUTROPHILS # BLD AUTO: 9.8 K/UL (ref 1.8–7.7)
NEUTROPHILS # BLD AUTO: 9.8 K/UL (ref 1.8–7.7)
NEUTROPHILS NFR BLD: 79.5 % (ref 38–73)
NEUTROPHILS NFR BLD: 79.5 % (ref 38–73)
NRBC BLD-RTO: 0 /100 WBC
NRBC BLD-RTO: 0 /100 WBC
PLATELET # BLD AUTO: 95 K/UL (ref 150–450)
PLATELET # BLD AUTO: 95 K/UL (ref 150–450)
PMV BLD AUTO: 12.6 FL (ref 9.2–12.9)
PMV BLD AUTO: 12.6 FL (ref 9.2–12.9)
POTASSIUM SERPL-SCNC: 4 MMOL/L (ref 3.5–5.1)
PROT SERPL-MCNC: 5.5 G/DL (ref 6–8.4)
RBC # BLD AUTO: 3.3 M/UL (ref 4.6–6.2)
RBC # BLD AUTO: 3.3 M/UL (ref 4.6–6.2)
SODIUM SERPL-SCNC: 148 MMOL/L (ref 136–145)
VANCOMYCIN SERPL-MCNC: 9.1 UG/ML
WBC # BLD AUTO: 12.35 K/UL (ref 3.9–12.7)
WBC # BLD AUTO: 12.35 K/UL (ref 3.9–12.7)

## 2022-10-14 PROCEDURE — 63600175 PHARM REV CODE 636 W HCPCS: Performed by: INTERNAL MEDICINE

## 2022-10-14 PROCEDURE — 80202 ASSAY OF VANCOMYCIN: CPT | Performed by: INTERNAL MEDICINE

## 2022-10-14 PROCEDURE — 25000003 PHARM REV CODE 250: Performed by: INTERNAL MEDICINE

## 2022-10-14 PROCEDURE — 94761 N-INVAS EAR/PLS OXIMETRY MLT: CPT

## 2022-10-14 PROCEDURE — 27000221 HC OXYGEN, UP TO 24 HOURS

## 2022-10-14 PROCEDURE — 94799 UNLISTED PULMONARY SVC/PX: CPT

## 2022-10-14 PROCEDURE — 85025 COMPLETE CBC W/AUTO DIFF WBC: CPT | Performed by: INTERNAL MEDICINE

## 2022-10-14 PROCEDURE — 21400001 HC TELEMETRY ROOM

## 2022-10-14 PROCEDURE — 99233 SBSQ HOSP IP/OBS HIGH 50: CPT | Mod: ,,, | Performed by: INTERNAL MEDICINE

## 2022-10-14 PROCEDURE — 99900035 HC TECH TIME PER 15 MIN (STAT)

## 2022-10-14 PROCEDURE — 80053 COMPREHEN METABOLIC PANEL: CPT | Performed by: INTERNAL MEDICINE

## 2022-10-14 PROCEDURE — 99900031 HC PATIENT EDUCATION (STAT)

## 2022-10-14 PROCEDURE — 99233 PR SUBSEQUENT HOSPITAL CARE,LEVL III: ICD-10-PCS | Mod: ,,, | Performed by: INTERNAL MEDICINE

## 2022-10-14 RX ADMIN — LEVETIRACETAM: 500 INJECTION, SOLUTION INTRAVENOUS at 10:10

## 2022-10-14 RX ADMIN — BISACODYL 10 MG: 10 SUPPOSITORY RECTAL at 09:10

## 2022-10-14 RX ADMIN — VALPROIC ACID 250 MG: 250 SOLUTION ORAL at 09:10

## 2022-10-14 RX ADMIN — FAMOTIDINE 20 MG: 20 TABLET ORAL at 09:10

## 2022-10-14 RX ADMIN — ATORVASTATIN CALCIUM 40 MG: 40 TABLET, FILM COATED ORAL at 09:10

## 2022-10-14 RX ADMIN — MEROPENEM AND SODIUM CHLORIDE 1 G: 1 INJECTION, SOLUTION INTRAVENOUS at 12:10

## 2022-10-14 RX ADMIN — AMANTADINE 100 MG: 100 CAPSULE ORAL at 09:10

## 2022-10-14 RX ADMIN — ENOXAPARIN SODIUM 40 MG: 40 INJECTION SUBCUTANEOUS at 06:10

## 2022-10-14 RX ADMIN — SODIUM CHLORIDE: 0.45 INJECTION, SOLUTION INTRAVENOUS at 01:10

## 2022-10-14 RX ADMIN — FLUOXETINE 20 MG: 20 CAPSULE ORAL at 09:10

## 2022-10-14 NOTE — PLAN OF CARE
Problem: Feeding Intolerance (Enteral Nutrition)  Goal: Feeding Tolerance  Outcome: Ongoing, Progressing  Intervention: Prevent and Manage Feeding Intolerance  Flowsheets (Taken 10/14/2022 4701)  Nutrition Support Management:   tube feeding continued as ordered   tube feeding rate increased     Tube feeding continued as ordered.   Vital 1.2 running at 40 ml/hr. Patient tolerating well. Recommended continued advancement to goal rate of 70 ml/hr as tolerated.

## 2022-10-14 NOTE — NURSING
L ankle wound [id number]  Cleansed with normal saline, medi honey applied to wound bed, cover with bordered foam

## 2022-10-14 NOTE — CARE UPDATE
10/13/22 2026   Patient Assessment/Suction   Level of Consciousness (AVPU) alert   Respiratory Effort Normal;Unlabored   Expansion/Accessory Muscles/Retractions no use of accessory muscles   All Lung Fields Breath Sounds Anterior:;clear   Rhythm/Pattern, Respiratory unlabored   Cough Frequency infrequent   PRE-TX-O2   O2 Device (Oxygen Therapy) room air   SpO2 97 %   Pulse Oximetry Type Continuous   $ Pulse Oximetry - Multiple Charge Pulse Oximetry - Multiple   Pulse (!) 58   Resp 11   /72   Education   $ Education DME Oxygen;15 min   Respiratory Evaluation   $ Care Plan Tech Time 15 min

## 2022-10-14 NOTE — PROGRESS NOTES
[facility]  Progress Note    Patient name: Andrea Leon Jr.  MRN: [id number]  Admit Date: 10/12/2022   LOS: 1 day     SUBJECTIVE:     Principal problem: <principal problem not specified>    Interval History:  Patient states he is better today.  Still on low dose Levophed this morning.    Scheduled Meds:   amantadine HCL  100 mg Per G Tube Daily    atorvastatin  40 mg Per G Tube Daily    bisacodyL  10 mg Rectal Daily    enoxparin  40 mg Subcutaneous Q24H    famotidine  20 mg Oral Daily    FLUoxetine  20 mg Per G Tube Daily    custom IVPB builder   Intravenous Q12H    meropenem (MERREM) IVPB  1 g Intravenous Q12H    valproic acid (as sodium salt)  250 mg Per G Tube QHS     Continuous Infusions:   sodium chloride 0.45% 75 mL/hr at 10/13/22 1112    NORepinephrine bitartrate-D5W 0.05 mcg/kg/min (10/13/22 1658)     PRN Meds:acetaminophen, cloNIDine, HYDROcodone-acetaminophen, melatonin, morphine, ondansetron, polyethylene glycol, Pharmacy to dose Vancomycin consult **AND** vancomycin - pharmacy to dose    Review of patient's allergies indicates:  No Known Allergies    Review of Systems: As per interval history    OBJECTIVE:     Vital Signs (Most Recent)  Temp: 98.1 °F (36.7 °C) (10/13/22 1530)  Pulse: 63 (10/13/22 1630)  Resp: 14 (10/13/22 1630)  BP: 115/70 (10/13/22 1630)  SpO2: 99 % (10/13/22 1630)    Vital Signs Range (Last 24H):  Temp:  [97.4 °F (36.3 °C)-98.3 °F (36.8 °C)]   Pulse:  [60-80]   Resp:  [11-27]   BP: ()/(60-91)   SpO2:  [89 %-100 %]     I & O (Last 24H):  Intake/Output Summary (Last 24 hours) at 10/13/2022 2011  Last data filed at 10/13/2022 1724  Gross per 24 hour   Intake 1970.2 ml   Output 1825 ml   Net 145.2 ml       Physical Exam:    Vitals and nursing note reviewed.   Constitutional:       Appearance: He is well-developed.      Comments: Chronically ill appearing; awake and alert this morning.  HENT:      Head: Normocephalic and atraumatic.      Right  Ear: External ear normal.      Left Ear: External ear normal.      Nose: Nose normal.   Eyes:      Conjunctiva/sclera: Conjunctivae normal.      Pupils: Pupils are equal, round, and reactive to light.   Cardiovascular:      Rate and Rhythm: Normal rate and regular rhythm.      Heart sounds: Normal heart sounds.   Pulmonary:      Effort: Pulmonary effort is normal.      Breath sounds: Normal breath sounds.      Abdominal:      General: Bowel sounds are normal.      Palpations: Abdomen is soft.      Comments: PEG   Musculoskeletal:      Cervical back: Normal range of motion and neck supple.      Comments: Unable to assess   Skin:     General: Skin is warm and dry.      Capillary Refill: Capillary refill takes less than 2 seconds.      Comments: Early decubiti both heels, Stage 1-2 sacrum   Neurological:      Comments: Responds only to vigorous sternal rub; contracted extremities   Psychiatric:      Comments: Unable to assess          Laboratory:  I have reviewed all pertinent lab results within the past 24 hours.  CBC:   Recent Labs   Lab 10/13/22  0400 10/13/22  1608   WBC 18.42*  --    RBC 3.65*  --    HGB 11.7* 10.5*   HCT 38.5* 34.5*   *  --    *  --    MCH 32.1*  --    MCHC 30.4*  --      BMP:   Recent Labs   Lab 10/13/22  1608   GLU 89   *   K 4.0   *   CO2 29   BUN 63*   CREATININE 2.3*   CALCIUM 8.5*     CMP:   Recent Labs   Lab 10/13/22  1128 10/13/22  1608     104 89   CALCIUM 8.1*  8.1* 8.5*   ALBUMIN 2.7*  --    PROT 5.6*  --    *  147* 149*   K 3.9  3.9 4.0   CO2 26  26 29   *  118* 116*   BUN 72*  72* 63*   CREATININE 2.4*  2.4* 2.3*   ALKPHOS 69  --    ALT 15  --    AST 21  --    BILITOT 1.2*  --        Diagnostic Results:  Labs: Reviewed  ECG: Reviewed  X-Ray: Reviewed    ASSESSMENT/PLAN:        Urinary tract infection with hematuria  Inpatient admission with volume replacement; repeat labs ordered to monitor his potassium and renal function;  continue current antibiotic coverage; dietician consultation in AM for tube feeds; check Depakote levels; change levetiracetam from po/G-tube to iv; T continue his transfer regimen otherwise. I discussed  with dr pederson today, input apprecitaed, zosyn siwtched to meropenem.     Altered mental status, improving  Inpatient admission with volume replacement; repeat labs ordered to monitor his potassium and renal function; continue current antibiotic coverage; dietician consultation in AM for tube feeds; check Depakote levels; change levetiracetam from po/G-tube to iv; TSH with AM labs; continue his transfer regimen otherwise;       PRABHJOT (acute kidney injury)  Patient with acute kidney injury likely due to IVVD/dehydration PRABHJOT is currently worsening. Labs reviewed- Renal function/electrolytes with Estimated Creatinine Clearance: 26.1 mL/min (A) (based on SCr of 3.7 mg/dL (H)). according to latest data. Monitor urine output and serial BMP and adjust therapy as needed. Avoid nephrotoxins and renally dose meds for GFR listed above.      Inpatient admission with volume replacement; repeat labs ordered to monitor his potassium and renal function; continue current antibiotic coverage; dietician consultation in AM for tube feeds; check Depakote levels; change levetiracetam from po/G-tube to iv; TSH with AM labs; continue his transfer regimen otherwise; I discussed case with Dr Krishnan, Nephrology, today, ivf switched to hypotonic fluids, input appreciated.               Department Hospital Medicine  [facility]  Calixto Meyer MD  Date of service: 10/13/2022

## 2022-10-14 NOTE — PROGRESS NOTES
Pulmonary/Critical Care Consult      PATIENT NAME: Andrea Leon Jr.  MRN: [id number]  TODAY'S DATE: 10/14/2022  10:26 AM  ADMIT DATE: 10/12/2022  AGE: 42 y.o. : 1980    CONSULT REQUESTED BY: Calixto Meyer MD    REASON FOR CONSULT:   Septic shock    HISTORY OF PRESENT ILLNESS   Andrea Leon Jr. is a 42 y.o. male with a PMH of Asperger's, prior stroke with hemiparesis, hypertension, ESBL UTI (2021), functional quadriplegia, bedbound status, and G-tube was transferred from a nursing care facility to [facility] on whom we have been consulted for septic shock.    The patient was found to be less interactive than normal and senrt to ED. Here, he was found to be in shock and have a dirty UA. He has a history of resistant organisms in the setting of chronic Moran catheter with functional quadriplegia. Per Karin, the patient's Moran is not chronic; it was placed upon arrival to the hospital this admission.    10/14/22: No acute events.    REVIEW OF SYSTEMS  GENERAL: Feeling well. No fevers, chills, or night sweats.  EYES: Vision is good.  ENT: No sinusitis or pharyngitis.   HEART: No chest pain or palpitations.  LUNGS: No cough, sputum, or wheezing.  GI: No abdominal pain, nausea, vomiting, or diarrhea.  : No dysuria, urgency, or frequency.  SKIN: No lesions or rashes.  MUSCULOSKELETAL: No joint pain or myalgias.  NEURO: No headaches or neuropathy.  LYMPH: No edema or adenopathy.  PSYCH: No anxiety or depression.  ENDO: No unexpected weight change.    ALLERGIES  Review of patient's allergies indicates:  No Known Allergies    INPATIENT SCHEDULED MEDICATIONS   amantadine HCL  100 mg Per G Tube Daily    atorvastatin  40 mg Per G Tube Daily    bisacodyL  10 mg Rectal Daily    enoxparin  40 mg Subcutaneous Q24H    famotidine  20 mg Oral Daily    FLUoxetine  20 mg Per G Tube Daily    custom IVPB builder   Intravenous Q12H    meropenem (MERREM) IVPB  1 g Intravenous Q12H     valproic acid (as sodium salt)  250 mg Per G Tube QHS      sodium chloride 0.45% 75 mL/hr at 10/14/22 0103    NORepinephrine bitartrate-D5W Stopped (10/14/22 0200)       MEDICAL AND SURGICAL HISTORY  Past Medical History:   Diagnosis Date    Anticoagulant long-term use     baby asa per chart    Asperger's syndrome     CVA, old, hemiparesis     Encounter for blood transfusion     Essential hypertension, malignant     Functional quadriplegia 5/28/2021    PEG (percutaneous endoscopic gastrostomy) status      Past Surgical History:   Procedure Laterality Date    COLONOSCOPY N/A 6/3/2021    Procedure: COLONOSCOPY;  Surgeon: Rosario Meeks MD;  Location: [facility] ENDO;  Service: Endoscopy;  Laterality: N/A;    ESOPHAGOGASTRODUODENOSCOPY N/A 7/10/2021    Procedure: EGD (ESOPHAGOGASTRODUODENOSCOPY);  Surgeon: Rosario Meeks MD;  Location: [facility] ENDO;  Service: Endoscopy;  Laterality: N/A;    ESOPHAGOGASTRODUODENOSCOPY W/ PEG N/A 4/1/2021    Procedure: EGD, WITH PEG TUBE INSERTION;  Surgeon: En Zheng MD;  Location: [facility] ENDO;  Service: Endoscopy;  Laterality: N/A;       ALCOHOL, TOBACCO AND DRUG USE  Social History     Tobacco Use   Smoking Status Never   Smokeless Tobacco Never     Social History     Substance and Sexual Activity   Alcohol Use Not Currently     Social History     Substance and Sexual Activity   Drug Use Not Currently       FAMILY HISTORY  History reviewed. No pertinent family history.    VITAL SIGNS (MOST RECENT)  Temp: 96.5 °F (35.8 °C) (10/14/22 0730)  Pulse: 66 (10/14/22 1000)  Resp: 15 (10/14/22 1000)  BP: 104/73 (10/14/22 1000)  SpO2: 100 % (10/14/22 1000)    INTAKE AND OUTPUT (LAST 24 HOURS):  Intake/Output Summary (Last 24 hours) at 10/14/2022 1137  Last data filed at 10/14/2022 0730  Gross per 24 hour   Intake 2161.16 ml   Output 1010 ml   Net 1151.16 ml       WEIGHT  Wt Readings from Last 1 Encounters:   10/14/22 72 kg (158 lb 11.7 oz)       PHYSICAL EXAM  GENERAL: A&O. NAD.  HEENT:  Extraocular movements intact. Pharynx moist.  NECK: Supple. No JVD or hepatojugular reflux.  HEART: Regular rate and normal rhythm. No murmur or gallop auscultated.  LUNGS: Clear to auscultation and percussion. Lung excursion symmetrical.   ABDOMEN: Soft, non-tender, non-distended, no masses palpated.  : Moran catheter in place.  EXTREMITIES: Normal muscle tone and joint movement, no cyanosis or clubbing.   LYMPHATICS: No adenopathy palpated, no edema.  SKIN: Dry, intact, no lesions.   NEURO: Bedbound, slurred speech.  PSYCH: Appropriate affect    ACUTE PHASE REACTANT (LAST 24 HOURS)  No results for input(s): FERRITIN, CRP, LDH, DDIMER in the last 24 hours.    CBC LAST (LAST 24 HOURS)  Recent Labs   Lab 10/14/22  0500   WBC 12.35  12.35   RBC 3.30*  3.30*   HGB 10.5*  10.5*   HCT 35.0*  35.0*   *  106*   MCH 31.8*  31.8*   MCHC 30.0*  30.0*   RDW 15.2*  15.2*   PLT 95*  95*   MPV 12.6  12.6   GRAN 79.5*  79.5*  9.8*  9.8*   LYMPH 11.7*  11.7*  1.5  1.5   MONO 5.9  5.9  0.7  0.7   BASO 0.03  0.03   NRBC 0  0       CHEMISTRY LAST (LAST 24 HOURS)  Recent Labs   Lab 10/14/22  0500   *   K 4.0   *   CO2 28   ANIONGAP 2*   BUN 54*   CREATININE 1.8*   GLU 90   CALCIUM 8.2*   ALBUMIN 2.5*   PROT 5.5*   ALKPHOS 77   ALT 18   AST 27   BILITOT 1.4*       COAGULATION LAST (LAST 24 HOURS)  No results for input(s): LABPT, INR, APTT in the last 24 hours.    CARDIAC PROFILE (LAST 24 HOURS)  No results for input(s): BNP, CPK, CPKMB, LDH, TROPONINI in the last 168 hours.    LAST 7 DAYS MICROBIOLOGY   Microbiology Results (last 7 days)       ** No results found for the last 168 hours. **            MOST RECENT IMAGING  X-Ray Chest AP Portable  Narrative: EXAMINATION:  XR CHEST AP PORTABLE    CLINICAL HISTORY:  Encounter for adjustment and management of vascular access device    TECHNIQUE:  Single frontal view of the chest was performed.    COMPARISON:  Chest of October 12, 2022 at  13:13    FINDINGS:  A central line has been placed ending at the right neck and ending in the superior vena cava.  The mediastinal and cardiac size and contours are normal.  No intrapulmonary mass or infiltrate is seen.  No pneumothorax or pleural effusion is noted.  Impression: Central line placed in good position without pneumothorax.    Electronically signed by: Prudencio Deutsch MD  Date:    10/12/2022  Time:    18:25  CT Renal Stone Study ABD Pelvis WO  Narrative: EXAMINATION:  CT RENAL STONE STUDY ABD PELVIS WO    CLINICAL HISTORY:  Flank pain, kidney stone suspected;yesika;    TECHNIQUE:  Low dose axial images, sagittal and coronal reformations were obtained from the lung bases to the pubic symphysis.  Contrast was not administered.    COMPARISON:  08/22/2021    FINDINGS:  lung bases clear. Ascending aorta 4.2 cm.  Normal size heart with coronary artery disease. Nonspecific gallbladder dilation.    No hydroureteronephrosis or calcified nephroureterolithiasis.  Remaining solid abdominal organs are grossly unremarkable on this noncontrast exam.    There is no enteric contrast which limits bowel assessment.  There is a PEG tube.  Under distension likely accounts for gastric wall thickening.  No dilated small bowel loops.  There is scattered colonic diverticula.  Along the descending colon segment there is focal wall thickening and adjacent fat stranding.  There is a large volume stool ball in the rectum with rectal lumen distended up to 8 cm.    Atherosclerosis.  Normal size prostate.  There is dependent debris and multiple stones in the urinary bladder.    Subtle superior endplate compression fracture of T12 which is remote in age.  Lower lumbar facet degenerative change.  Mild bilateral hip degenerative change.  Impression: 1. Multiple vesicoliths.  No kidney or ureteral stones.  No obstructive uropathy.  2. Query mild diverticulitis along the descending colon.  3. Large volume of stool in the rectum which could  indicate impaction.  4. Ectasia ascending aorta.    Electronically signed by: Mike Delgadillo  Date:    10/12/2022  Time:    15:13  X-Ray Chest AP Portable  Narrative: EXAMINATION:  XR CHEST AP PORTABLE    CLINICAL HISTORY:  Sepsis;    TECHNIQUE:  Single frontal view of the chest was performed.    COMPARISON:  Chest of August 22, 2021    FINDINGS:  The lungs are clear, with normal appearance of pulmonary vasculature and no pleural effusion or pneumothorax.    The cardiac silhouette is normal in size. The hilar and mediastinal contours are unremarkable.    Bones are intact.  Impression: No acute abnormality.    Electronically signed by: Prudencio Deutsch MD  Date:    10/12/2022  Time:    13:22      CURRENT VISIT EKG  No results found for this visit on 10/12/22.    ECHOCARDIOGRAM RESULTS  Results for orders placed during the hospital encounter of 05/28/21    Echo    Interpretation Summary  · The left ventricle is normal in size with concentric hypertrophy and normal systolic function.  · The estimated ejection fraction is 60%.  · Normal left ventricular diastolic function.  · Normal right ventricular size with normal right ventricular systolic function.  · Mild aortic regurgitation.  · Normal central venous pressure (3 mmHg).  · The estimated PA systolic pressure is 34 mmHg.  · Mild tricuspid regurgitation.        RESPIRATORY SUPPORT          LAST ARTERIAL BLOOD GAS  ABG  No results for input(s): PH, PO2, PCO2, HCO3, BE in the last 168 hours.    IMPRESSION AND PLAN  Andrea Leon Jr. is a 42 y.o. male with a PMH of Asperger's, prior stroke with hemiparesis, hypertension, ESBL UTI (July 2021), functional quadriplegia, bedbound status, and G-tube was transferred from a nursing care facility to [facility] on whom we have been consulted for septic shock.    #Septic shock  #UTI w/ Hx ESBL UTI  #HX ESBL Proteus mirabilis  Urine culture growing Proteus, susceptibilities pending.  - continue  meropenem  - vanc discontinued  - follow cultures  - removed Moran  - check PVR    #PRABHJOT  - fluids  - daily BMP    #Hypernatremia, unclear duration w/ AMS  #PEG tube dependence  - nutrition consult appreciated  - FWF increased to 100 q2h  - tube feeds  - 1/2 NS at 75 mL/hr    - Na should go down no more than 12 mg/dL/day  - continue BMP q8h    Dispo: Step down to floor     Pulmonary & Critical Care Medicine will sign off at this time.   Please call with any further questions or concerns.    Lee Stuart MD  [facility]  Department of Pulmonology  Date of Service: 10/14/2022  10:26 AM

## 2022-10-14 NOTE — CONSULTS
INPATIENT NEPHROLOGY CONSULT   [facility] NEPHROLOGY    Andrea Leon Jr.  10/14/2022    Reason for consultation:    Acute kidney injury    Chief Complaint:   Chief Complaint   Patient presents with    Altered Mental Status          History of Present Illness:       42-year-old male with a history of Asperger's, prior stroke with hemiparesis, hypertension, ESBL UTI (July 2021), functional quadriplegia, bedbound status, and G-tube was transferred from a nursing care facility to [facility] due to change in mental status.  He is normally awake and alert and smiling, but on the morning of presentation he was altered.  He had a strong smell of urine.  He was noted to have dry lips and mouth, erythematous rash near the groin, and spastic bilateral upper and lower extremities.  He was lethargic but would arouse.  He had hemiparesis on the left but was able to partially move the right side of his body.  Mild decubitus noted on the left lateral malleolus with stage 1 buttock decubitus. In the emergency department he received albuterol nebulizer, calcium, insulin and dextrose, bolus IV fluid, Zosyn, and vancomycin.  Blood pressure decreased during his stay, and he received additional volume administration.       Central line was placed and patient transferred to [facility] ICU. Levophed and volume were started for his hypotension. He responded to noxious stimuli only. Labs reviewed and noted below: minimal leukocytosis with normal H/H; hypernatremia and hyperkalemia with (new) end stage renal dysfunction; lactate elevated (receiving volume and antibiotics currently).      10/13  No nausea, chest pain, sob, fever, urinary or bowel complaint, new neurologic symptoms, new joint pain  10/14  more alert today.  No sob or chest pain.  No complaints specified.  Off Levophed.  AFVSS.  1275 cc UOP      Plan of Care:       Assessment:     Acute kidney injury secondary to hemodynamically mediated renal  insult  --volume resuscitation   --no nsaids, coxibs  --renal dose medication   --hold losartan  --keep map above 55  --don't use Vancomycin/zosyn combination --it is nephrotoxic      Hypernatremia  --switched to hypotonic iv fluids    Anemia  --trend h/h  --blood products per primary    Lactic acidosis--better  --continue volume resuscitation    UTI with sepsis  --renal dose abx for crcl 10-50    UTI with Sepsis  --wbc coming down  --renal dose antibiotics  --avoid vancomycin/zosyn combination                                          Thank you for allowing us to participate in this patient's care. We will continue to follow.    Vital Signs:  Temp Readings from Last 3 Encounters:   10/14/22 96.5 °F (35.8 °C) (Axillary)   10/12/22 99.9 °F (37.7 °C) (Rectal)   09/09/22 97 °F (36.1 °C) (Axillary)       Pulse Readings from Last 3 Encounters:   10/14/22 66   10/12/22 88   09/09/22 70       BP Readings from Last 3 Encounters:   10/14/22 104/73   10/12/22 (!) 97/56   09/09/22 90/62       Weight:  Wt Readings from Last 3 Encounters:   10/14/22 72 kg (158 lb 11.7 oz)   10/12/22 86.2 kg (190 lb 0.6 oz)   10/12/22 86.2 kg (190 lb 0.6 oz)       Past Medical & Surgical History:  Past Medical History:   Diagnosis Date    Anticoagulant long-term use     baby asa per chart    Asperger's syndrome     CVA, old, hemiparesis     Encounter for blood transfusion     Essential hypertension, malignant     Functional quadriplegia 5/28/2021    PEG (percutaneous endoscopic gastrostomy) status        Past Surgical History:   Procedure Laterality Date    COLONOSCOPY N/A 6/3/2021    Procedure: COLONOSCOPY;  Surgeon: Rosario Meeks MD;  Location: [facility];  Service: Endoscopy;  Laterality: N/A;    ESOPHAGOGASTRODUODENOSCOPY N/A 7/10/2021    Procedure: EGD (ESOPHAGOGASTRODUODENOSCOPY);  Surgeon: Rosario Meeks MD;  Location: [facility];  Service: Endoscopy;  Laterality: N/A;    ESOPHAGOGASTRODUODENOSCOPY W/ PEG N/A 4/1/2021    Procedure:  EGD, WITH PEG TUBE INSERTION;  Surgeon: En Zheng MD;  Location: [facility];  Service: Endoscopy;  Laterality: N/A;       Past Social History:  Social History     Socioeconomic History    Marital status:    Occupational History    Occupation: unemployed   Tobacco Use    Smoking status: Never    Smokeless tobacco: Never   Substance and Sexual Activity    Alcohol use: Not Currently    Drug use: Not Currently    Sexual activity: Not Currently       Medications:  No current facility-administered medications on file prior to encounter.     Current Outpatient Medications on File Prior to Encounter   Medication Sig Dispense Refill    acetaminophen (TYLENOL) 325 MG tablet 650 mg by Per G Tube route every 8 (eight) hours as needed for Temperature greater than (100.1).      amantadine HCL (SYMMETREL) 100 mg capsule 1 capsule (100 mg total) by Per G Tube route once daily. 30 capsule 11    amLODIPine (NORVASC) 5 MG tablet 1 tablet (5 mg total) by Per G Tube route once daily. 30 tablet 11    arginine/glutamine/calcium bmb (ISAI ORAL) 1 packet by Per G Tube route 2 (two) times a day.      atorvastatin (LIPITOR) 40 MG tablet 1 tablet (40 mg total) by Per G Tube route once daily. 90 tablet 3    cloNIDine (CATAPRES) 0.1 MG tablet Take 1 tablet (0.1 mg total) by mouth 3 (three) times daily as needed (180). 90 tablet 0    famotidine (PEPCID) 20 MG tablet Take 20 mg by mouth 2 (two) times daily.      FLUoxetine 20 MG capsule 1 capsule (20 mg total) by Per G Tube route once daily. 30 capsule 11    ketoconazole (NIZORAL) 2 % shampoo Apply topically once daily. (Patient taking differently: Apply 1 application topically once daily. )      levETIRAcetam (KEPPRA) 750 MG Tab Take 1 tablet (750 mg total) by mouth 2 (two) times daily. 60 tablet 1    LORazepam (ATIVAN) 0.5 MG tablet 0.5 mg by Per G Tube route every 6 (six) hours as needed for Anxiety.      losartan (COZAAR) 50 MG tablet 1 tablet (50 mg total) by Per G Tube route  "once daily. 90 tablet 3    modafiniL (PROVIGIL) 200 MG Tab 200 mg by Per G Tube route once daily.      ondansetron (ZOFRAN-ODT) 4 MG TbDL ondansetron 4 mg disintegrating tablet      polyethylene glycol (GLYCOLAX) 17 gram PwPk Take by mouth.      valproate (DEPAKENE) 250 mg/5 mL syrup Take by mouth.       Scheduled Meds:   amantadine HCL  100 mg Per G Tube Daily    atorvastatin  40 mg Per G Tube Daily    bisacodyL  10 mg Rectal Daily    enoxparin  40 mg Subcutaneous Q24H    famotidine  20 mg Oral Daily    FLUoxetine  20 mg Per G Tube Daily    custom IVPB builder   Intravenous Q12H    meropenem (MERREM) IVPB  1 g Intravenous Q12H    valproic acid (as sodium salt)  250 mg Per G Tube QHS     Continuous Infusions:   sodium chloride 0.45% 75 mL/hr at 10/14/22 0103    NORepinephrine bitartrate-D5W Stopped (10/14/22 0200)     PRN Meds:.acetaminophen, cloNIDine, HYDROcodone-acetaminophen, melatonin, morphine, ondansetron, polyethylene glycol, Pharmacy to dose Vancomycin consult **AND** vancomycin - pharmacy to dose    Allergies:  Patient has no known allergies.    Past Family History:  Reviewed; refer to Hospitalist Admission Note    Review of Systems:  Review of Systems - All 14 systems reviewed and negative, except as noted in HPI    Physical Exam:    /73   Pulse 66   Temp 96.5 °F (35.8 °C) (Axillary)   Resp 15   Ht 5' 9" (1.753 m)   Wt 72 kg (158 lb 11.7 oz)   SpO2 100%   BMI 23.44 kg/m²     General Appearance:    Alert, cooperative, no distress, appears stated age   Head:    Normocephalic, without obvious abnormality, atraumatic   Eyes:    PER, conjunctiva/corneas clear, EOM's intact in both eyes        Throat:   Lips, mucosa, and tongue normal; teeth and gums normal   Back:     Symmetric, no curvature, ROM normal, no CVA tenderness   Lungs:     Clear to auscultation bilaterally, respirations unlabored   Chest wall:    No tenderness or deformity   Heart:    Regular rate and rhythm, S1 and S2 normal, no " murmur, rub   or gallop   Abdomen:     Soft, non-tender, bowel sounds active all four quadrants,     no masses, no organomegaly   Extremities:   Extremities normal, atraumatic, no cyanosis or edema   Pulses:   2+ and symmetric all extremities   MSK:   No joint or muscle swelling, tenderness or deformity   Skin:   Skin color, texture, turgor normal, no rashes or lesions   Neurologic:      No flap     Results:  Lab Results   Component Value Date     (H) 10/14/2022    K 4.0 10/14/2022     (H) 10/14/2022    CO2 28 10/14/2022    BUN 54 (H) 10/14/2022    CREATININE 1.8 (H) 10/14/2022    CALCIUM 8.2 (L) 10/14/2022    ANIONGAP 2 (L) 10/14/2022    ESTGFRAFRICA >60.0 08/25/2021    EGFRNONAA 57.4 (A) 08/25/2021       Lab Results   Component Value Date    CALCIUM 8.2 (L) 10/14/2022    PHOS 4.2 07/12/2021       Recent Labs   Lab 10/14/22  0500   WBC 12.35  12.35   RBC 3.30*  3.30*   HGB 10.5*  10.5*   HCT 35.0*  35.0*   PLT 95*  95*   *  106*   MCH 31.8*  31.8*   MCHC 30.0*  30.0*              I have personally reviewed pertinent radiological imaging and reports.    Patient care was time spent personally by me on the following activities:   Obtaining a history  Examination of patient.  Providing medical care at the patients bedside.  Developing a treatment plan with patient or surrogate and bedside caregivers  Ordering and reviewing laboratory studies, radiographic studies, pulse oximetry.  Ordering and performing treatments and interventions.  Evaluation of patient's response to treatment.  Discussions with consultants while on the unit and immediately available to the patient.  Re-evaluation of the patient's condition.  Documentation in the medical record.     Noe Krishnan MD  Nephrology  [facility] [place]  [phone]

## 2022-10-14 NOTE — PLAN OF CARE
10/14/22 0725   Patient Assessment/Suction   Level of Consciousness (AVPU) alert   Respiratory Effort Unlabored;Normal   Expansion/Accessory Muscles/Retractions no use of accessory muscles   All Lung Fields Breath Sounds clear;diminished   Rhythm/Pattern, Respiratory pattern regular;depth regular   Cough Frequency no cough   PRE-TX-O2   O2 Device (Oxygen Therapy) room air   $ Is the patient on Low Flow Oxygen? Yes   SpO2 99 %   Pulse Oximetry Type Continuous   $ Pulse Oximetry - Multiple Charge Pulse Oximetry - Multiple   Oximetry Probe Site Assessed   Pulse 64   Resp 16   /72   Education   $ Education 15 min;DME Oxygen   Respiratory Evaluation   $ Care Plan Tech Time 15 min   $ Eval/Re-eval Charges Re-evaluation   Evaluation For Re-Eval 3 day

## 2022-10-15 LAB
ANION GAP SERPL CALC-SCNC: 2 MMOL/L (ref 8–16)
BACTERIA UR CULT: ABNORMAL
BASOPHILS # BLD AUTO: 0.02 K/UL (ref 0–0.2)
BASOPHILS NFR BLD: 0.2 % (ref 0–1.9)
BUN SERPL-MCNC: 41 MG/DL (ref 6–20)
CALCIUM SERPL-MCNC: 7.9 MG/DL (ref 8.7–10.5)
CHLORIDE SERPL-SCNC: 118 MMOL/L (ref 95–110)
CO2 SERPL-SCNC: 27 MMOL/L (ref 23–29)
CREAT SERPL-MCNC: 1.4 MG/DL (ref 0.5–1.4)
DIFFERENTIAL METHOD: ABNORMAL
EOSINOPHIL # BLD AUTO: 0.4 K/UL (ref 0–0.5)
EOSINOPHIL NFR BLD: 3.8 % (ref 0–8)
ERYTHROCYTE [DISTWIDTH] IN BLOOD BY AUTOMATED COUNT: 14.8 % (ref 11.5–14.5)
EST. GFR  (NO RACE VARIABLE): >60 ML/MIN/1.73 M^2
GLUCOSE SERPL-MCNC: 99 MG/DL (ref 70–110)
HCT VFR BLD AUTO: 33.8 % (ref 40–54)
HGB BLD-MCNC: 10.5 G/DL (ref 14–18)
IMM GRANULOCYTES # BLD AUTO: 0.04 K/UL (ref 0–0.04)
IMM GRANULOCYTES NFR BLD AUTO: 0.4 % (ref 0–0.5)
LYMPHOCYTES # BLD AUTO: 1.1 K/UL (ref 1–4.8)
LYMPHOCYTES NFR BLD: 11.1 % (ref 18–48)
MCH RBC QN AUTO: 32.5 PG (ref 27–31)
MCHC RBC AUTO-ENTMCNC: 31.1 G/DL (ref 32–36)
MCV RBC AUTO: 105 FL (ref 82–98)
MONOCYTES # BLD AUTO: 0.7 K/UL (ref 0.3–1)
MONOCYTES NFR BLD: 6.6 % (ref 4–15)
NEUTROPHILS # BLD AUTO: 8 K/UL (ref 1.8–7.7)
NEUTROPHILS NFR BLD: 77.9 % (ref 38–73)
NRBC BLD-RTO: 0 /100 WBC
PLATELET # BLD AUTO: 102 K/UL (ref 150–450)
PMV BLD AUTO: 12.9 FL (ref 9.2–12.9)
POTASSIUM SERPL-SCNC: 3.8 MMOL/L (ref 3.5–5.1)
RBC # BLD AUTO: 3.23 M/UL (ref 4.6–6.2)
SODIUM SERPL-SCNC: 147 MMOL/L (ref 136–145)
WBC # BLD AUTO: 10.24 K/UL (ref 3.9–12.7)

## 2022-10-15 PROCEDURE — 63600175 PHARM REV CODE 636 W HCPCS: Performed by: INTERNAL MEDICINE

## 2022-10-15 PROCEDURE — 27000221 HC OXYGEN, UP TO 24 HOURS

## 2022-10-15 PROCEDURE — 93010 ELECTROCARDIOGRAM REPORT: CPT | Mod: ,,, | Performed by: INTERNAL MEDICINE

## 2022-10-15 PROCEDURE — 80048 BASIC METABOLIC PNL TOTAL CA: CPT | Performed by: INTERNAL MEDICINE

## 2022-10-15 PROCEDURE — 99900035 HC TECH TIME PER 15 MIN (STAT)

## 2022-10-15 PROCEDURE — 21400001 HC TELEMETRY ROOM

## 2022-10-15 PROCEDURE — 94761 N-INVAS EAR/PLS OXIMETRY MLT: CPT

## 2022-10-15 PROCEDURE — 93010 EKG 12-LEAD: ICD-10-PCS | Mod: ,,, | Performed by: INTERNAL MEDICINE

## 2022-10-15 PROCEDURE — 25000003 PHARM REV CODE 250: Performed by: INTERNAL MEDICINE

## 2022-10-15 PROCEDURE — 99900031 HC PATIENT EDUCATION (STAT)

## 2022-10-15 PROCEDURE — 85025 COMPLETE CBC W/AUTO DIFF WBC: CPT | Performed by: INTERNAL MEDICINE

## 2022-10-15 PROCEDURE — 93005 ELECTROCARDIOGRAM TRACING: CPT | Performed by: INTERNAL MEDICINE

## 2022-10-15 RX ORDER — MUPIROCIN 20 MG/G
OINTMENT TOPICAL 2 TIMES DAILY
Status: DISCONTINUED | OUTPATIENT
Start: 2022-10-15 | End: 2022-10-17 | Stop reason: HOSPADM

## 2022-10-15 RX ORDER — CHLORHEXIDINE GLUCONATE ORAL RINSE 1.2 MG/ML
15 SOLUTION DENTAL 2 TIMES DAILY
Status: DISCONTINUED | OUTPATIENT
Start: 2022-10-15 | End: 2022-10-17 | Stop reason: HOSPADM

## 2022-10-15 RX ADMIN — MUPIROCIN 1 G: 20 OINTMENT TOPICAL at 11:10

## 2022-10-15 RX ADMIN — ENOXAPARIN SODIUM 40 MG: 40 INJECTION SUBCUTANEOUS at 04:10

## 2022-10-15 RX ADMIN — SODIUM CHLORIDE: 0.45 INJECTION, SOLUTION INTRAVENOUS at 04:10

## 2022-10-15 RX ADMIN — ATORVASTATIN CALCIUM 40 MG: 40 TABLET, FILM COATED ORAL at 10:10

## 2022-10-15 RX ADMIN — BISACODYL 10 MG: 10 SUPPOSITORY RECTAL at 08:10

## 2022-10-15 RX ADMIN — CHLORHEXIDINE GLUCONATE 15 ML: 1.2 RINSE ORAL at 10:10

## 2022-10-15 RX ADMIN — AMANTADINE 100 MG: 100 CAPSULE ORAL at 08:10

## 2022-10-15 RX ADMIN — CHLORHEXIDINE GLUCONATE 15 ML: 1.2 RINSE ORAL at 11:10

## 2022-10-15 RX ADMIN — FLUOXETINE 20 MG: 20 CAPSULE ORAL at 08:10

## 2022-10-15 RX ADMIN — FAMOTIDINE 20 MG: 20 TABLET ORAL at 08:10

## 2022-10-15 RX ADMIN — LEVETIRACETAM: 500 INJECTION, SOLUTION INTRAVENOUS at 10:10

## 2022-10-15 RX ADMIN — MEROPENEM AND SODIUM CHLORIDE 1 G: 1 INJECTION, SOLUTION INTRAVENOUS at 12:10

## 2022-10-15 RX ADMIN — MUPIROCIN 1 G: 20 OINTMENT TOPICAL at 10:10

## 2022-10-15 RX ADMIN — SODIUM CHLORIDE: 0.45 INJECTION, SOLUTION INTRAVENOUS at 10:10

## 2022-10-15 RX ADMIN — MEROPENEM AND SODIUM CHLORIDE 1 G: 1 INJECTION, SOLUTION INTRAVENOUS at 11:10

## 2022-10-15 RX ADMIN — VALPROIC ACID 250 MG: 250 SOLUTION ORAL at 10:10

## 2022-10-15 RX ADMIN — LEVETIRACETAM: 500 INJECTION, SOLUTION INTRAVENOUS at 11:10

## 2022-10-15 NOTE — PROGRESS NOTES
[facility] Medicine  Progress Note    Patient name: Andrea Leon Jr.  MRN: [id number]  Admit Date: 10/12/2022   LOS: 3 days     SUBJECTIVE:     Principal problem: <principal problem not specified>    Interval History:  No complains.    Scheduled Meds:   amantadine HCL  100 mg Per G Tube Daily    atorvastatin  40 mg Per G Tube Daily    bisacodyL  10 mg Rectal Daily    chlorhexidine  15 mL Mouth/Throat BID    enoxparin  40 mg Subcutaneous Q24H    famotidine  20 mg Oral Daily    FLUoxetine  20 mg Per G Tube Daily    custom IVPB builder   Intravenous Q12H    meropenem (MERREM) IVPB  1 g Intravenous Q12H    mupirocin   Nasal BID    valproic acid (as sodium salt)  250 mg Per G Tube QHS     Continuous Infusions:   sodium chloride 0.45% 75 mL/hr at 10/15/22 0450     PRN Meds:acetaminophen, cloNIDine, HYDROcodone-acetaminophen, melatonin, morphine, ondansetron, polyethylene glycol    Review of patient's allergies indicates:  No Known Allergies    Review of Systems: As per interval history    OBJECTIVE:     Vital Signs (Most Recent)  Temp: 97.5 °F (36.4 °C) (10/15/22 1430)  Pulse: (!) 45 (10/15/22 1400)  Resp: 15 (10/15/22 1400)  BP: 136/71 (10/15/22 1400)  SpO2: 97 % (10/15/22 1400)    Vital Signs Range (Last 24H):  Temp:  [96.9 °F (36.1 °C)-98.1 °F (36.7 °C)]   Pulse:  [45-67]   Resp:  [11-47]   BP: ()/(66-94)   SpO2:  [92 %-100 %]     I & O (Last 24H):  Intake/Output Summary (Last 24 hours) at 10/15/2022 1533  Last data filed at 10/15/2022 0600  Gross per 24 hour   Intake 3240.2 ml   Output --   Net 3240.2 ml       Physical Exam:    Vitals and nursing note reviewed.   Constitutional:       Appearance: He is well-developed.      Comments: Chronically ill appearing; awake and alert this morning.  HENT:      Head: Normocephalic and atraumatic.      Right Ear: External ear normal.      Left Ear: External ear normal.      Nose: Nose normal.   Eyes:      Conjunctiva/sclera: Conjunctivae  normal.      Pupils: Pupils are equal, round, and reactive to light.   Cardiovascular:      Rate and Rhythm: Normal rate and regular rhythm.      Heart sounds: Normal heart sounds.   Pulmonary:      Effort: Pulmonary effort is normal.      Breath sounds: Normal breath sounds.      Abdominal:      General: Bowel sounds are normal.      Palpations: Abdomen is soft.      Comments: PEG   Musculoskeletal:      Cervical back: Normal range of motion and neck supple.      Comments: Unable to assess   Skin:     General: Skin is warm and dry.      Capillary Refill: Capillary refill takes less than 2 seconds.      Comments: Early decubiti both heels, Stage 1-2 sacrum   Neuro: AA answer to questions.          Laboratory:  I have reviewed all pertinent lab results within the past 24 hours.  CBC:   Recent Labs   Lab 10/15/22  0400   WBC 10.24   RBC 3.23*   HGB 10.5*   HCT 33.8*   *   *   MCH 32.5*   MCHC 31.1*     BMP:   Recent Labs   Lab 10/15/22  0020   GLU 99   *   K 3.8   *   CO2 27   BUN 41*   CREATININE 1.4   CALCIUM 7.9*     CMP:   Recent Labs   Lab 10/14/22  0500 10/15/22  0020   GLU 90 99   CALCIUM 8.2* 7.9*   ALBUMIN 2.5*  --    PROT 5.5*  --    * 147*   K 4.0 3.8   CO2 28 27   * 118*   BUN 54* 41*   CREATININE 1.8* 1.4   ALKPHOS 77  --    ALT 18  --    AST 27  --    BILITOT 1.4*  --        Diagnostic Results:  Labs: Reviewed    ASSESSMENT/PLAN:           Urinary tract infection with hematuria  continue current antibiotic coverage; dietician consultation in AM for tube feeds; levetiracetam from po/G-tube to iv; T continue his transfer regimen otherwise. Zosyn siwtched to meropenem.     Altered mental status, improving  Inpatient admission with volume replacement; repeat labs ordered to monitor his potassium and renal function; continue current antibiotic coverage; dietician consultation in AM for tube feeds; check Depakote levels;  levetiracetam from  iv; continue his transfer  regimen otherwise;       PRABHJOT (acute kidney injury)  Patient with acute kidney injury likely due to IVVD/dehydration PRABHJOT is currently worsening. Labs reviewed. Monitor urine output and serial BMP and adjust therapy as needed. Avoid nephrotoxins and renally dose meds for GFR listed above. Follow Nephrology and Intensivist recs, input appreciated.             Department Hospital Medicine  [facility]  Calixto Meyer MD  Date of service: 10/15/2022

## 2022-10-15 NOTE — CONSULTS
INPATIENT NEPHROLOGY CONSULT   [facility] NEPHROLOGY    Andrea Leon Jr.  10/15/2022    Reason for consultation:    Acute kidney injury    Chief Complaint:   Chief Complaint   Patient presents with    Altered Mental Status          History of Present Illness:       42-year-old male with a history of Asperger's, prior stroke with hemiparesis, hypertension, ESBL UTI (July 2021), functional quadriplegia, bedbound status, and G-tube was transferred from a nursing care facility to [facility] due to change in mental status.  He is normally awake and alert and smiling, but on the morning of presentation he was altered.  He had a strong smell of urine.  He was noted to have dry lips and mouth, erythematous rash near the groin, and spastic bilateral upper and lower extremities.  He was lethargic but would arouse.  He had hemiparesis on the left but was able to partially move the right side of his body.  Mild decubitus noted on the left lateral malleolus with stage 1 buttock decubitus. In the emergency department he received albuterol nebulizer, calcium, insulin and dextrose, bolus IV fluid, Zosyn, and vancomycin.  Blood pressure decreased during his stay, and he received additional volume administration.       Central line was placed and patient transferred to [facility] ICU. Levophed and volume were started for his hypotension. He responded to noxious stimuli only. Labs reviewed and noted below: minimal leukocytosis with normal H/H; hypernatremia and hyperkalemia with (new) end stage renal dysfunction; lactate elevated (receiving volume and antibiotics currently).      10/13  No nausea, chest pain, sob, fever, urinary or bowel complaint, new neurologic symptoms, new joint pain  10/14  more alert today.  No sob or chest pain.  No complaints specified.  Off Levophed.  AFVSS.  1275 cc UOP  10/15  no distress.  No chest pain    Plan of Care:       Assessment:     Acute kidney injury secondary to  hemodynamically mediated renal insult  --volume resuscitation   --no nsaids, coxibs  --renal dose medication   --hold losartan  --keep map above 55  --don't use Vancomycin/zosyn combination --it is nephrotoxic      Hypernatremia  --switched to hypotonic iv fluids    Anemia  --trend h/h  --blood products per primary    Lactic acidosis--better  --continue volume resuscitation    UTI with sepsis  --renal dose abx for crcl 10-50    UTI with Sepsis  --wbc coming down  --renal dose antibiotics  --avoid vancomycin/zosyn combination                                          Thank you for allowing us to participate in this patient's care. We will continue to follow.    Vital Signs:  Temp Readings from Last 3 Encounters:   10/15/22 98.1 °F (36.7 °C) (Axillary)   10/12/22 99.9 °F (37.7 °C) (Rectal)   09/09/22 97 °F (36.1 °C) (Axillary)       Pulse Readings from Last 3 Encounters:   10/15/22 (!) 54   10/12/22 88   09/09/22 70       BP Readings from Last 3 Encounters:   10/15/22 (!) 147/91   10/12/22 (!) 97/56   09/09/22 90/62       Weight:  Wt Readings from Last 3 Encounters:   10/15/22 72.1 kg (158 lb 15.2 oz)   10/12/22 86.2 kg (190 lb 0.6 oz)   10/12/22 86.2 kg (190 lb 0.6 oz)       Past Medical & Surgical History:  Past Medical History:   Diagnosis Date    Anticoagulant long-term use     baby asa per chart    Asperger's syndrome     CVA, old, hemiparesis     Encounter for blood transfusion     Essential hypertension, malignant     Functional quadriplegia 5/28/2021    PEG (percutaneous endoscopic gastrostomy) status        Past Surgical History:   Procedure Laterality Date    COLONOSCOPY N/A 6/3/2021    Procedure: COLONOSCOPY;  Surgeon: Rosario Meeks MD;  Location: [facility];  Service: Endoscopy;  Laterality: N/A;    ESOPHAGOGASTRODUODENOSCOPY N/A 7/10/2021    Procedure: EGD (ESOPHAGOGASTRODUODENOSCOPY);  Surgeon: Rosario Meeks MD;  Location: [facility];  Service: Endoscopy;  Laterality: N/A;     ESOPHAGOGASTRODUODENOSCOPY W/ PEG N/A 4/1/2021    Procedure: EGD, WITH PEG TUBE INSERTION;  Surgeon: En Zheng MD;  Location: [facility];  Service: Endoscopy;  Laterality: N/A;       Past Social History:  Social History     Socioeconomic History    Marital status:    Occupational History    Occupation: unemployed   Tobacco Use    Smoking status: Never    Smokeless tobacco: Never   Substance and Sexual Activity    Alcohol use: Not Currently    Drug use: Not Currently    Sexual activity: Not Currently       Medications:  No current facility-administered medications on file prior to encounter.     Current Outpatient Medications on File Prior to Encounter   Medication Sig Dispense Refill    acetaminophen (TYLENOL) 325 MG tablet 650 mg by Per G Tube route every 8 (eight) hours as needed for Temperature greater than (100.1).      amantadine HCL (SYMMETREL) 100 mg capsule 1 capsule (100 mg total) by Per G Tube route once daily. 30 capsule 11    amLODIPine (NORVASC) 5 MG tablet 1 tablet (5 mg total) by Per G Tube route once daily. 30 tablet 11    arginine/glutamine/calcium bmb (ISAI ORAL) 1 packet by Per G Tube route 2 (two) times a day.      atorvastatin (LIPITOR) 40 MG tablet 1 tablet (40 mg total) by Per G Tube route once daily. 90 tablet 3    cloNIDine (CATAPRES) 0.1 MG tablet Take 1 tablet (0.1 mg total) by mouth 3 (three) times daily as needed (180). 90 tablet 0    famotidine (PEPCID) 20 MG tablet Take 20 mg by mouth 2 (two) times daily.      FLUoxetine 20 MG capsule 1 capsule (20 mg total) by Per G Tube route once daily. 30 capsule 11    ketoconazole (NIZORAL) 2 % shampoo Apply topically once daily. (Patient taking differently: Apply 1 application topically once daily. )      levETIRAcetam (KEPPRA) 750 MG Tab Take 1 tablet (750 mg total) by mouth 2 (two) times daily. 60 tablet 1    LORazepam (ATIVAN) 0.5 MG tablet 0.5 mg by Per G Tube route every 6 (six) hours as needed for Anxiety.      losartan  "(COZAAR) 50 MG tablet 1 tablet (50 mg total) by Per G Tube route once daily. 90 tablet 3    modafiniL (PROVIGIL) 200 MG Tab 200 mg by Per G Tube route once daily.      ondansetron (ZOFRAN-ODT) 4 MG TbDL ondansetron 4 mg disintegrating tablet      polyethylene glycol (GLYCOLAX) 17 gram PwPk Take by mouth.      valproate (DEPAKENE) 250 mg/5 mL syrup Take by mouth.       Scheduled Meds:   amantadine HCL  100 mg Per G Tube Daily    atorvastatin  40 mg Per G Tube Daily    bisacodyL  10 mg Rectal Daily    enoxparin  40 mg Subcutaneous Q24H    famotidine  20 mg Oral Daily    FLUoxetine  20 mg Per G Tube Daily    custom IVPB builder   Intravenous Q12H    meropenem (MERREM) IVPB  1 g Intravenous Q12H    valproic acid (as sodium salt)  250 mg Per G Tube QHS     Continuous Infusions:   sodium chloride 0.45% 75 mL/hr at 10/15/22 0450     PRN Meds:.acetaminophen, cloNIDine, HYDROcodone-acetaminophen, melatonin, morphine, ondansetron, polyethylene glycol    Allergies:  Patient has no known allergies.    Past Family History:  Reviewed; refer to Hospitalist Admission Note    Review of Systems:  Review of Systems - All 14 systems reviewed and negative, except as noted in HPI    Physical Exam:    BP (!) 147/91   Pulse (!) 54   Temp 98.1 °F (36.7 °C) (Axillary)   Resp 12   Ht 5' 9" (1.753 m)   Wt 72.1 kg (158 lb 15.2 oz)   SpO2 100%   BMI 23.47 kg/m²     General Appearance:    Alert, cooperative, no distress, appears stated age   Head:    Normocephalic, without obvious abnormality, atraumatic   Eyes:    PER, conjunctiva/corneas clear, EOM's intact in both eyes        Throat:   Lips, mucosa, and tongue normal; teeth and gums normal   Back:     Symmetric, no curvature, ROM normal, no CVA tenderness   Lungs:     Clear to auscultation bilaterally, respirations unlabored   Chest wall:    No tenderness or deformity   Heart:    Regular rate and rhythm, S1 and S2 normal, no murmur, rub   or gallop   Abdomen:     Soft, non-tender, bowel " sounds active all four quadrants,     no masses, no organomegaly   Extremities:   Extremities normal, atraumatic, no cyanosis or edema   Pulses:   2+ and symmetric all extremities   MSK:   No joint or muscle swelling, tenderness or deformity   Skin:   Skin color, texture, turgor normal, no rashes or lesions   Neurologic:      No flap     Results:  Lab Results   Component Value Date     (H) 10/15/2022    K 3.8 10/15/2022     (H) 10/15/2022    CO2 27 10/15/2022    BUN 41 (H) 10/15/2022    CREATININE 1.4 10/15/2022    CALCIUM 7.9 (L) 10/15/2022    ANIONGAP 2 (L) 10/15/2022    ESTGFRAFRICA >60.0 08/25/2021    EGFRNONAA 57.4 (A) 08/25/2021       Lab Results   Component Value Date    CALCIUM 7.9 (L) 10/15/2022    PHOS 4.2 07/12/2021       Recent Labs   Lab 10/15/22  0400   WBC 10.24   RBC 3.23*   HGB 10.5*   HCT 33.8*   *   *   MCH 32.5*   MCHC 31.1*              I have personally reviewed pertinent radiological imaging and reports.    Patient care was time spent personally by me on the following activities:   Obtaining a history  Examination of patient.  Providing medical care at the patients bedside.  Developing a treatment plan with patient or surrogate and bedside caregivers  Ordering and reviewing laboratory studies, radiographic studies, pulse oximetry.  Ordering and performing treatments and interventions.  Evaluation of patient's response to treatment.  Discussions with consultants while on the unit and immediately available to the patient.  Re-evaluation of the patient's condition.  Documentation in the medical record.     Noe Krishnan MD  Nephrology  [facility]  [phone]

## 2022-10-15 NOTE — PROGRESS NOTES
[facility] Medicine  Progress Note    Patient name: Andrea Leon Jr.  MRN: [id number]  Admit Date: 10/12/2022   LOS: 2 days     SUBJECTIVE:     Principal problem: <principal problem not specified>    Interval History: Off Levophed this morning.  States he is feeling well.    Scheduled Meds:   amantadine HCL  100 mg Per G Tube Daily    atorvastatin  40 mg Per G Tube Daily    bisacodyL  10 mg Rectal Daily    enoxparin  40 mg Subcutaneous Q24H    famotidine  20 mg Oral Daily    FLUoxetine  20 mg Per G Tube Daily    custom IVPB builder   Intravenous Q12H    meropenem (MERREM) IVPB  1 g Intravenous Q12H    valproic acid (as sodium salt)  250 mg Per G Tube QHS     Continuous Infusions:   sodium chloride 0.45% 75 mL/hr at 10/14/22 0103     PRN Meds:acetaminophen, cloNIDine, HYDROcodone-acetaminophen, melatonin, morphine, ondansetron, polyethylene glycol    Review of patient's allergies indicates:  No Known Allergies    Review of Systems: As per interval history    OBJECTIVE:     Vital Signs (Most Recent)  Temp: 96.9 °F (36.1 °C) (10/14/22 1600)  Pulse: (!) 53 (10/14/22 2101)  Resp: 12 (10/14/22 2101)  BP: 112/76 (10/14/22 2101)  SpO2: 99 % (10/14/22 2101)    Vital Signs Range (Last 24H):  Temp:  [96.5 °F (35.8 °C)-98 °F (36.7 °C)]   Pulse:  [53-71]   Resp:  [11-20]   BP: ()/(61-90)   SpO2:  [93 %-100 %]     I & O (Last 24H):  Intake/Output Summary (Last 24 hours) at 10/14/2022 2111  Last data filed at 10/14/2022 1901  Gross per 24 hour   Intake 1962.92 ml   Output 750 ml   Net 1212.92 ml       Physical Exam:    Vitals and nursing note reviewed.   Constitutional:       Appearance: He is well-developed.      Comments: Chronically ill appearing; awake and alert this morning.  HENT:      Head: Normocephalic and atraumatic.      Right Ear: External ear normal.      Left Ear: External ear normal.      Nose: Nose normal.   Eyes:      Conjunctiva/sclera: Conjunctivae normal.      Pupils: Pupils  are equal, round, and reactive to light.   Cardiovascular:      Rate and Rhythm: Normal rate and regular rhythm.      Heart sounds: Normal heart sounds.   Pulmonary:      Effort: Pulmonary effort is normal.      Breath sounds: Normal breath sounds.      Abdominal:      General: Bowel sounds are normal.      Palpations: Abdomen is soft.      Comments: PEG   Musculoskeletal:      Cervical back: Normal range of motion and neck supple.      Comments: Unable to assess   Skin:     General: Skin is warm and dry.      Capillary Refill: Capillary refill takes less than 2 seconds.      Comments: Early decubiti both heels, Stage 1-2 sacrum   Neuro: AA answer to questions.           Laboratory:  I have reviewed all pertinent lab results within the past 24 hours.  CBC:   Recent Labs   Lab 10/14/22  0500   WBC 12.35  12.35   RBC 3.30*  3.30*   HGB 10.5*  10.5*   HCT 35.0*  35.0*   PLT 95*  95*   *  106*   MCH 31.8*  31.8*   MCHC 30.0*  30.0*     BMP:   Recent Labs   Lab 10/14/22  0500   GLU 90   *   K 4.0   *   CO2 28   BUN 54*   CREATININE 1.8*   CALCIUM 8.2*     CMP:   Recent Labs   Lab 10/14/22  0500   GLU 90   CALCIUM 8.2*   ALBUMIN 2.5*   PROT 5.5*   *   K 4.0   CO2 28   *   BUN 54*   CREATININE 1.8*   ALKPHOS 77   ALT 18   AST 27   BILITOT 1.4*       Diagnostic Results:  Labs: Reviewed  ECG: Reviewed  X-Ray: Reviewed    ASSESSMENT/PLAN:        Urinary tract infection with hematuria  Inpatient admission with volume replacement; repeat labs ordered to monitor his potassium and renal function; continue current antibiotic coverage; dietician consultation in AM for tube feeds; levetiracetam from po/G-tube to iv; T continue his transfer regimen otherwise. I discussed  with dr pederson today, input apprecitaed, zosyn siwtched to meropenem, ok to transfer out of ICU to stepdown.     Altered mental status, improving  Inpatient admission with volume replacement; repeat labs ordered to monitor  his potassium and renal function; continue current antibiotic coverage; dietician consultation in AM for tube feeds; check Depakote levels; change levetiracetam from po/G-tube to iv; TSH with AM labs; continue his transfer regimen otherwise;       PRABHJOT (acute kidney injury)  Patient with acute kidney injury likely due to IVVD/dehydration PRABHJOT is currently worsening. Labs reviewed- Renal function/electrolytes with Estimated Creatinine Clearance: 26.1 mL/min (A) (based on SCr of 3.7 mg/dL (H)). according to latest data. Monitor urine output and serial BMP and adjust therapy as needed. Avoid nephrotoxins and renally dose meds for GFR listed above.      Inpatient admission with volume replacement; repeat labs ordered to monitor his potassium and renal function; continue current antibiotic coverage; dietician consultation in AM for tube feeds; check Depakote levels; change levetiracetam from po/G-tube to iv; TSH with AM labs; continue his transfer regimen otherwise; Follow Nephrology recs, input appreciated.                 Department Hospital Medicine  [facility]  Calixto Meyer MD  Date of service: 10/14/2022

## 2022-10-15 NOTE — PLAN OF CARE
Problem: Adult Inpatient Plan of Care  Goal: Plan of Care Review  Outcome: Ongoing, Progressing  Goal: Patient-Specific Goal (Individualized)  Outcome: Ongoing, Progressing  Goal: Absence of Hospital-Acquired Illness or Injury  Outcome: Ongoing, Progressing  Intervention: Prevent Infection  Flowsheets (Taken 10/15/2022 1814)  Infection Prevention:   cohorting utilized   equipment surfaces disinfected   hand hygiene promoted   single patient room provided  Goal: Optimal Comfort and Wellbeing  Outcome: Ongoing, Progressing  Intervention: Provide Person-Centered Care  Flowsheets (Taken 10/15/2022 1814)  Trust Relationship/Rapport: care explained  Goal: Readiness for Transition of Care  Outcome: Ongoing, Progressing

## 2022-10-15 NOTE — PLAN OF CARE
MICU DAILY GOALS       A: Awake    RASS: Goal - RASS Goal: 0-->alert and calm  Actual - RASS (Iblly Agitation-Sedation Scale): 0-->alert and calm   Restraint necessity: n/a   B: Breathe   SBT: Not intubated.  Currently on room air.  C: Coordinate A & B, analgesics/sedatives   Pain: managed    SAT: Not intubated  D: Delirium   CAM-ICU: Overall CAM-ICU: Negative  E: Early(intubated/ Progressive (non-intubated) Mobility   MOVE Screen: Pass   Activity: Activity Management: Rolling - L1  FAS: Feeding/Nutrition   Diet order: Diet/Nutrition Received: NPO, tube feeding,    T: Thrombus   DVT prophylaxis: VTE Required Core Measure: (SCDs) Sequential compression device initiated/maintained  H: HOB Elevation   Head of Bed (HOB) Positioning: HOB at 30 degrees  U: Ulcer Prophylaxis   GI: yes  G: Glucose control   managed    S: Skin   Bathing/Skin Care: bath, partial, incontinence care  Device Skin Pressure Protection: absorbent pad utilized/changed, adhesive use limited, pressure points protected     Pressure Reduction Techniques: heels elevated off bed, positioned off wounds, pressure points protected, weight shift assistance provided  Skin Protection: adhesive use limited, incontinence pads utilized, silicone foam dressing in place, transparent dressing maintained, tubing/devices free from skin contact  B: Bowel Function   diarrhea  I: Indwelling Catheters   Moran necessity: [REMOVED]      Urethral Catheter 10/12/22 1945 Straight-tip 16 Fr.-Reason for Continuing Urinary Catheterization: Critically ill in ICU and requiring hourly monitoring of intake/output   CVC necessity: No  D: De-escalation Antibiotics   Not currently on antibiotic regimen.    Family/Goals of care/Code Status   Code Status: Full Code    Shift Events   No acute events throughout shift.    VS and assessment per flow sheet, patient progressing towards goals as tolerated, plan of care reviewed with  Mr. Mendez , all concerns addressed, will continue to  monitor.    Ally Pelaez

## 2022-10-15 NOTE — RESPIRATORY THERAPY
10/14/22 1940   Patient Assessment/Suction   Level of Consciousness (AVPU) alert   Respiratory Effort Unlabored   Expansion/Accessory Muscles/Retractions no use of accessory muscles;no retractions   All Lung Fields Breath Sounds clear;diminished   Cough Frequency no cough   PRE-TX-O2   O2 Device (Oxygen Therapy) room air   SpO2 100 %   Pulse Oximetry Type Continuous   $ Pulse Oximetry - Multiple Charge Pulse Oximetry - Multiple   Pulse 64   Resp 14   Education   $ Education 15 min   Respiratory Evaluation   $ Care Plan Tech Time 15 min

## 2022-10-16 LAB
ANION GAP SERPL CALC-SCNC: 2 MMOL/L (ref 8–16)
ANION GAP SERPL CALC-SCNC: 5 MMOL/L (ref 8–16)
BASOPHILS # BLD AUTO: 0.02 K/UL (ref 0–0.2)
BASOPHILS NFR BLD: 0.2 % (ref 0–1.9)
BUN SERPL-MCNC: 29 MG/DL (ref 6–20)
BUN SERPL-MCNC: 32 MG/DL (ref 6–20)
CALCIUM SERPL-MCNC: 7.5 MG/DL (ref 8.7–10.5)
CALCIUM SERPL-MCNC: 8.2 MG/DL (ref 8.7–10.5)
CHLORIDE SERPL-SCNC: 108 MMOL/L (ref 95–110)
CHLORIDE SERPL-SCNC: 110 MMOL/L (ref 95–110)
CO2 SERPL-SCNC: 25 MMOL/L (ref 23–29)
CO2 SERPL-SCNC: 26 MMOL/L (ref 23–29)
CREAT SERPL-MCNC: 1.1 MG/DL (ref 0.5–1.4)
CREAT SERPL-MCNC: 1.2 MG/DL (ref 0.5–1.4)
DIFFERENTIAL METHOD: ABNORMAL
EOSINOPHIL # BLD AUTO: 0.3 K/UL (ref 0–0.5)
EOSINOPHIL NFR BLD: 4.2 % (ref 0–8)
ERYTHROCYTE [DISTWIDTH] IN BLOOD BY AUTOMATED COUNT: 14.5 % (ref 11.5–14.5)
EST. GFR  (NO RACE VARIABLE): >60 ML/MIN/1.73 M^2
EST. GFR  (NO RACE VARIABLE): >60 ML/MIN/1.73 M^2
GLUCOSE SERPL-MCNC: 111 MG/DL (ref 70–110)
GLUCOSE SERPL-MCNC: 86 MG/DL (ref 70–110)
HCT VFR BLD AUTO: 33.2 % (ref 40–54)
HGB BLD-MCNC: 10.3 G/DL (ref 14–18)
IMM GRANULOCYTES # BLD AUTO: 0.02 K/UL (ref 0–0.04)
IMM GRANULOCYTES NFR BLD AUTO: 0.2 % (ref 0–0.5)
LYMPHOCYTES # BLD AUTO: 1.7 K/UL (ref 1–4.8)
LYMPHOCYTES NFR BLD: 21.1 % (ref 18–48)
MCH RBC QN AUTO: 31.9 PG (ref 27–31)
MCHC RBC AUTO-ENTMCNC: 31 G/DL (ref 32–36)
MCV RBC AUTO: 103 FL (ref 82–98)
MONOCYTES # BLD AUTO: 0.7 K/UL (ref 0.3–1)
MONOCYTES NFR BLD: 9.2 % (ref 4–15)
NEUTROPHILS # BLD AUTO: 5.2 K/UL (ref 1.8–7.7)
NEUTROPHILS NFR BLD: 65.1 % (ref 38–73)
NRBC BLD-RTO: 0 /100 WBC
PLATELET # BLD AUTO: 107 K/UL (ref 150–450)
PMV BLD AUTO: 13.1 FL (ref 9.2–12.9)
POTASSIUM SERPL-SCNC: 4.1 MMOL/L (ref 3.5–5.1)
POTASSIUM SERPL-SCNC: 4.4 MMOL/L (ref 3.5–5.1)
RBC # BLD AUTO: 3.23 M/UL (ref 4.6–6.2)
SODIUM SERPL-SCNC: 135 MMOL/L (ref 136–145)
SODIUM SERPL-SCNC: 141 MMOL/L (ref 136–145)
WBC # BLD AUTO: 8.06 K/UL (ref 3.9–12.7)

## 2022-10-16 PROCEDURE — 94761 N-INVAS EAR/PLS OXIMETRY MLT: CPT

## 2022-10-16 PROCEDURE — 21400001 HC TELEMETRY ROOM

## 2022-10-16 PROCEDURE — 99900035 HC TECH TIME PER 15 MIN (STAT)

## 2022-10-16 PROCEDURE — 63600175 PHARM REV CODE 636 W HCPCS: Performed by: INTERNAL MEDICINE

## 2022-10-16 PROCEDURE — 99900031 HC PATIENT EDUCATION (STAT)

## 2022-10-16 PROCEDURE — 85025 COMPLETE CBC W/AUTO DIFF WBC: CPT | Performed by: INTERNAL MEDICINE

## 2022-10-16 PROCEDURE — 25000003 PHARM REV CODE 250: Performed by: INTERNAL MEDICINE

## 2022-10-16 PROCEDURE — 80048 BASIC METABOLIC PNL TOTAL CA: CPT | Performed by: INTERNAL MEDICINE

## 2022-10-16 PROCEDURE — 80048 BASIC METABOLIC PNL TOTAL CA: CPT | Mod: 91 | Performed by: INTERNAL MEDICINE

## 2022-10-16 RX ADMIN — LEVETIRACETAM: 500 INJECTION, SOLUTION INTRAVENOUS at 10:10

## 2022-10-16 RX ADMIN — LEVETIRACETAM: 500 INJECTION, SOLUTION INTRAVENOUS at 09:10

## 2022-10-16 RX ADMIN — ENOXAPARIN SODIUM 40 MG: 40 INJECTION SUBCUTANEOUS at 04:10

## 2022-10-16 RX ADMIN — CHLORHEXIDINE GLUCONATE 15 ML: 1.2 RINSE ORAL at 08:10

## 2022-10-16 RX ADMIN — ATORVASTATIN CALCIUM 40 MG: 40 TABLET, FILM COATED ORAL at 08:10

## 2022-10-16 RX ADMIN — MUPIROCIN 1 G: 20 OINTMENT TOPICAL at 09:10

## 2022-10-16 RX ADMIN — MEROPENEM AND SODIUM CHLORIDE 1 G: 1 INJECTION, SOLUTION INTRAVENOUS at 12:10

## 2022-10-16 RX ADMIN — MUPIROCIN 1 G: 20 OINTMENT TOPICAL at 08:10

## 2022-10-16 RX ADMIN — MEROPENEM AND SODIUM CHLORIDE 1 G: 1 INJECTION, SOLUTION INTRAVENOUS at 11:10

## 2022-10-16 RX ADMIN — FLUOXETINE 20 MG: 20 CAPSULE ORAL at 09:10

## 2022-10-16 RX ADMIN — FAMOTIDINE 20 MG: 20 TABLET ORAL at 09:10

## 2022-10-16 RX ADMIN — VALPROIC ACID 250 MG: 250 SOLUTION ORAL at 08:10

## 2022-10-16 RX ADMIN — AMANTADINE 100 MG: 100 CAPSULE ORAL at 09:10

## 2022-10-16 RX ADMIN — CHLORHEXIDINE GLUCONATE 15 ML: 1.2 RINSE ORAL at 09:10

## 2022-10-16 NOTE — CONSULTS
INPATIENT NEPHROLOGY CONSULT   [facility] NEPHROLOGY    Andrea Leon Jr.  10/16/2022    Reason for consultation:    Acute kidney injury    Chief Complaint:   Chief Complaint   Patient presents with    Altered Mental Status          History of Present Illness:       42-year-old male with a history of Asperger's, prior stroke with hemiparesis, hypertension, ESBL UTI (July 2021), functional quadriplegia, bedbound status, and G-tube was transferred from a nursing care facility to [facility] due to change in mental status.  He is normally awake and alert and smiling, but on the morning of presentation he was altered.  He had a strong smell of urine.  He was noted to have dry lips and mouth, erythematous rash near the groin, and spastic bilateral upper and lower extremities.  He was lethargic but would arouse.  He had hemiparesis on the left but was able to partially move the right side of his body.  Mild decubitus noted on the left lateral malleolus with stage 1 buttock decubitus. In the emergency department he received albuterol nebulizer, calcium, insulin and dextrose, bolus IV fluid, Zosyn, and vancomycin.  Blood pressure decreased during his stay, and he received additional volume administration.       Central line was placed and patient transferred to [facility] ICU. Levophed and volume were started for his hypotension. He responded to noxious stimuli only. Labs reviewed and noted below: minimal leukocytosis with normal H/H; hypernatremia and hyperkalemia with (new) end stage renal dysfunction; lactate elevated (receiving volume and antibiotics currently).      10/13  No nausea, chest pain, sob, fever, urinary or bowel complaint, new neurologic symptoms, new joint pain  10/14  more alert today.  No sob or chest pain.  No complaints specified.  Off Levophed.  AFVSS.  1275 cc UOP  10/15  no distress.  No chest pain  10/16  Afebrile.  Intermittent bradycardia.  Output not recorded    Plan  of Care:       Assessment:     Acute kidney injury secondary to hemodynamically mediated renal insult  --volume resuscitation   --no nsaids, coxibs  --renal dose medication   --hold losartan  --keep map above 55  --don't use Vancomycin/zosyn combination --it is nephrotoxic      Hypernatremia--now hyponatremia  --stop hyponatremia  --decrease volume of free water flushes    Anemia  --trend h/h  --blood products per primary    Lactic acidosis--better  --continue volume resuscitation    UTI with sepsis  --renal dose abx for crcl 10-50    UTI with Sepsis  --wbc coming down  --renal dose antibiotics  --avoid vancomycin/zosyn combination                                          Thank you for allowing us to participate in this patient's care. We will continue to follow.    Vital Signs:  Temp Readings from Last 3 Encounters:   10/16/22 97.6 °F (36.4 °C) (Oral)   10/12/22 99.9 °F (37.7 °C) (Rectal)   09/09/22 97 °F (36.1 °C) (Axillary)       Pulse Readings from Last 3 Encounters:   10/16/22 (!) 48   10/12/22 88   09/09/22 70       BP Readings from Last 3 Encounters:   10/16/22 129/76   10/12/22 (!) 97/56   09/09/22 90/62       Weight:  Wt Readings from Last 3 Encounters:   10/16/22 73 kg (160 lb 15 oz)   10/12/22 86.2 kg (190 lb 0.6 oz)   10/12/22 86.2 kg (190 lb 0.6 oz)       Past Medical & Surgical History:  Past Medical History:   Diagnosis Date    Anticoagulant long-term use     baby asa per chart    Asperger's syndrome     CVA, old, hemiparesis     Encounter for blood transfusion     Essential hypertension, malignant     Functional quadriplegia 5/28/2021    PEG (percutaneous endoscopic gastrostomy) status        Past Surgical History:   Procedure Laterality Date    COLONOSCOPY N/A 6/3/2021    Procedure: COLONOSCOPY;  Surgeon: Rosario Meeks MD;  Location: [facility];  Service: Endoscopy;  Laterality: N/A;    ESOPHAGOGASTRODUODENOSCOPY N/A 7/10/2021    Procedure: EGD (ESOPHAGOGASTRODUODENOSCOPY);  Surgeon: Rosario RIVAS  MD Jeanna;  Location: [facility] ENDO;  Service: Endoscopy;  Laterality: N/A;    ESOPHAGOGASTRODUODENOSCOPY W/ PEG N/A 4/1/2021    Procedure: EGD, WITH PEG TUBE INSERTION;  Surgeon: En Zheng MD;  Location: [facility] ENDO;  Service: Endoscopy;  Laterality: N/A;       Past Social History:  Social History     Socioeconomic History    Marital status:    Occupational History    Occupation: unemployed   Tobacco Use    Smoking status: Never    Smokeless tobacco: Never   Substance and Sexual Activity    Alcohol use: Not Currently    Drug use: Not Currently    Sexual activity: Not Currently       Medications:  No current facility-administered medications on file prior to encounter.     Current Outpatient Medications on File Prior to Encounter   Medication Sig Dispense Refill    acetaminophen (TYLENOL) 325 MG tablet 650 mg by Per G Tube route every 8 (eight) hours as needed for Temperature greater than (100.1).      amantadine HCL (SYMMETREL) 100 mg capsule 1 capsule (100 mg total) by Per G Tube route once daily. 30 capsule 11    amLODIPine (NORVASC) 5 MG tablet 1 tablet (5 mg total) by Per G Tube route once daily. 30 tablet 11    arginine/glutamine/calcium bmb (ISAI ORAL) 1 packet by Per G Tube route 2 (two) times a day.      atorvastatin (LIPITOR) 40 MG tablet 1 tablet (40 mg total) by Per G Tube route once daily. 90 tablet 3    cloNIDine (CATAPRES) 0.1 MG tablet Take 1 tablet (0.1 mg total) by mouth 3 (three) times daily as needed (180). 90 tablet 0    famotidine (PEPCID) 20 MG tablet Take 20 mg by mouth 2 (two) times daily.      FLUoxetine 20 MG capsule 1 capsule (20 mg total) by Per G Tube route once daily. 30 capsule 11    ketoconazole (NIZORAL) 2 % shampoo Apply topically once daily. (Patient taking differently: Apply 1 application topically once daily. )      levETIRAcetam (KEPPRA) 750 MG Tab Take 1 tablet (750 mg total) by mouth 2 (two) times daily. 60 tablet 1    LORazepam (ATIVAN) 0.5 MG tablet 0.5 mg by Per G  "Tube route every 6 (six) hours as needed for Anxiety.      losartan (COZAAR) 50 MG tablet 1 tablet (50 mg total) by Per G Tube route once daily. 90 tablet 3    modafiniL (PROVIGIL) 200 MG Tab 200 mg by Per G Tube route once daily.      ondansetron (ZOFRAN-ODT) 4 MG TbDL ondansetron 4 mg disintegrating tablet      polyethylene glycol (GLYCOLAX) 17 gram PwPk Take by mouth.      valproate (DEPAKENE) 250 mg/5 mL syrup Take by mouth.       Scheduled Meds:   amantadine HCL  100 mg Per G Tube Daily    atorvastatin  40 mg Per G Tube Daily    bisacodyL  10 mg Rectal Daily    chlorhexidine  15 mL Mouth/Throat BID    enoxparin  40 mg Subcutaneous Q24H    famotidine  20 mg Oral Daily    FLUoxetine  20 mg Per G Tube Daily    custom IVPB builder   Intravenous Q12H    meropenem (MERREM) IVPB  1 g Intravenous Q12H    mupirocin   Nasal BID    valproic acid (as sodium salt)  250 mg Per G Tube QHS     Continuous Infusions:   sodium chloride 0.45% 75 mL/hr at 10/15/22 2206     PRN Meds:.acetaminophen, cloNIDine, HYDROcodone-acetaminophen, melatonin, morphine, ondansetron, polyethylene glycol    Allergies:  Patient has no known allergies.    Past Family History:  Reviewed; refer to Hospitalist Admission Note    Review of Systems:  Review of Systems - All 14 systems reviewed and negative, except as noted in HPI    Physical Exam:    /76   Pulse (!) 48   Temp 97.6 °F (36.4 °C) (Oral)   Resp 12   Ht 5' 9" (1.753 m)   Wt 73 kg (160 lb 15 oz)   SpO2 100%   BMI 23.77 kg/m²     General Appearance:    Alert, cooperative, no distress, appears stated age   Head:    Normocephalic, without obvious abnormality, atraumatic   Eyes:    PER, conjunctiva/corneas clear, EOM's intact in both eyes        Throat:   Lips, mucosa, and tongue normal; teeth and gums normal   Back:     Symmetric, no curvature, ROM normal, no CVA tenderness   Lungs:     Clear to auscultation bilaterally, respirations unlabored   Chest wall:    No tenderness or " deformity   Heart:    Regular rate and rhythm, S1 and S2 normal, no murmur, rub   or gallop   Abdomen:     Soft, non-tender, bowel sounds active all four quadrants,     no masses, no organomegaly   Extremities:   Extremities normal, atraumatic, no cyanosis or edema   Pulses:   2+ and symmetric all extremities   MSK:   No joint or muscle swelling, tenderness or deformity   Skin:   Skin color, texture, turgor normal, no rashes or lesions   Neurologic:      No flap     Results:  Lab Results   Component Value Date     (L) 10/16/2022    K 4.1 10/16/2022     10/16/2022    CO2 25 10/16/2022    BUN 32 (H) 10/16/2022    CREATININE 1.2 10/16/2022    CALCIUM 7.5 (L) 10/16/2022    ANIONGAP 2 (L) 10/16/2022    ESTGFRAFRICA >60.0 08/25/2021    EGFRNONAA 57.4 (A) 08/25/2021       Lab Results   Component Value Date    CALCIUM 7.5 (L) 10/16/2022    PHOS 4.2 07/12/2021       Recent Labs   Lab 10/16/22  0300   WBC 8.06   RBC 3.23*   HGB 10.3*   HCT 33.2*   *   *   MCH 31.9*   MCHC 31.0*              I have personally reviewed pertinent radiological imaging and reports.    Patient care was time spent personally by me on the following activities:   Obtaining a history  Examination of patient.  Providing medical care at the patients bedside.  Developing a treatment plan with patient or surrogate and bedside caregivers  Ordering and reviewing laboratory studies, radiographic studies, pulse oximetry.  Ordering and performing treatments and interventions.  Evaluation of patient's response to treatment.  Discussions with consultants while on the unit and immediately available to the patient.  Re-evaluation of the patient's condition.  Documentation in the medical record.     Noe Krishnan MD  Nephrology  [facility] [place]  [phone]

## 2022-10-16 NOTE — RESPIRATORY THERAPY
10/15/22 2045   Patient Assessment/Suction   Level of Consciousness (AVPU) alert   Respiratory Effort Unlabored   Expansion/Accessory Muscles/Retractions no use of accessory muscles;no retractions   All Lung Fields Breath Sounds clear;diminished   Cough Frequency no cough   PRE-TX-O2   O2 Device (Oxygen Therapy) room air   $ Is the patient on Low Flow Oxygen? Yes   SpO2 100 %   Pulse Oximetry Type Continuous   $ Pulse Oximetry - Multiple Charge Pulse Oximetry - Multiple   Pulse (!) 55   Resp 18   Education   $ Education 15 min   Respiratory Evaluation   $ Care Plan Tech Time 15 min

## 2022-10-16 NOTE — PLAN OF CARE
Problem: Adult Inpatient Plan of Care  Goal: Plan of Care Review  Outcome: Ongoing, Progressing  Goal: Patient-Specific Goal (Individualized)  Outcome: Ongoing, Progressing  Goal: Absence of Hospital-Acquired Illness or Injury  Outcome: Ongoing, Progressing  Goal: Optimal Comfort and Wellbeing  Outcome: Ongoing, Progressing  Goal: Readiness for Transition of Care  Outcome: Ongoing, Progressing     Problem: Adjustment to Illness (Sepsis/Septic Shock)  Goal: Optimal Coping  Outcome: Ongoing, Progressing     Problem: Fluid and Electrolyte Imbalance (Acute Kidney Injury/Impairment)  Goal: Fluid and Electrolyte Balance  Outcome: Ongoing, Progressing     Problem: Oral Intake Inadequate (Acute Kidney Injury/Impairment)  Goal: Optimal Nutrition Intake  Outcome: Ongoing, Progressing     Problem: Renal Function Impairment (Acute Kidney Injury/Impairment)  Goal: Effective Renal Function  Outcome: Ongoing, Progressing     Problem: Infection  Goal: Absence of Infection Signs and Symptoms  Outcome: Ongoing, Progressing     Problem: Impaired Wound Healing  Goal: Optimal Wound Healing  Outcome: Ongoing, Progressing     Problem: Feeding Intolerance (Enteral Nutrition)  Goal: Feeding Tolerance  Outcome: Ongoing, Progressing     Problem: Aspiration (Enteral Nutrition)  Goal: Absence of Aspiration Signs and Symptoms  Outcome: Ongoing, Progressing

## 2022-10-17 VITALS
WEIGHT: 160.94 LBS | RESPIRATION RATE: 14 BRPM | SYSTOLIC BLOOD PRESSURE: 130 MMHG | HEART RATE: 53 BPM | TEMPERATURE: 98 F | HEIGHT: 69 IN | BODY MASS INDEX: 23.84 KG/M2 | OXYGEN SATURATION: 97 % | DIASTOLIC BLOOD PRESSURE: 80 MMHG

## 2022-10-17 PROCEDURE — 25000003 PHARM REV CODE 250: Performed by: INTERNAL MEDICINE

## 2022-10-17 PROCEDURE — 63600175 PHARM REV CODE 636 W HCPCS: Performed by: INTERNAL MEDICINE

## 2022-10-17 RX ORDER — MEROPENEM AND SODIUM CHLORIDE 1 G/50ML
1 INJECTION, SOLUTION INTRAVENOUS
Status: DISCONTINUED | OUTPATIENT
Start: 2022-10-17 | End: 2022-10-17 | Stop reason: HOSPADM

## 2022-10-17 RX ORDER — FAMOTIDINE 20 MG/1
20 TABLET, FILM COATED ORAL 2 TIMES DAILY
Status: DISCONTINUED | OUTPATIENT
Start: 2022-10-17 | End: 2022-10-17 | Stop reason: HOSPADM

## 2022-10-17 RX ORDER — MEROPENEM AND SODIUM CHLORIDE 1 G/50ML
1 INJECTION, SOLUTION INTRAVENOUS EVERY 8 HOURS
Qty: 450 ML | Refills: 0 | Status: SHIPPED | OUTPATIENT
Start: 2022-10-17 | End: 2022-10-20

## 2022-10-17 RX ADMIN — CHLORHEXIDINE GLUCONATE 15 ML: 1.2 RINSE ORAL at 09:10

## 2022-10-17 RX ADMIN — AMANTADINE 100 MG: 100 CAPSULE ORAL at 09:10

## 2022-10-17 RX ADMIN — MUPIROCIN 1 G: 20 OINTMENT TOPICAL at 09:10

## 2022-10-17 RX ADMIN — FAMOTIDINE 20 MG: 20 TABLET ORAL at 09:10

## 2022-10-17 RX ADMIN — MORPHINE SULFATE 2 MG: 2 INJECTION, SOLUTION INTRAMUSCULAR; INTRAVENOUS at 01:10

## 2022-10-17 RX ADMIN — LEVETIRACETAM: 500 INJECTION, SOLUTION INTRAVENOUS at 10:10

## 2022-10-17 RX ADMIN — MEROPENEM AND SODIUM CHLORIDE 1 G: 1 INJECTION, SOLUTION INTRAVENOUS at 12:10

## 2022-10-17 NOTE — PLAN OF CARE
Patient discharged to [facility] (formerly [facility]) where patient is a [facility] resident. Leatha /  at facility approved discharge orders at 1450.  scheduled patient transfer center to transport patient via [facility] due to postular instability. Patient is cleared for discharge by case management.        10/17/22 1508   Final Note   Assessment Type Final Discharge Note   Anticipated Discharge Disposition DC to [facility]   What phone number can be called within the next 1-3 days to see how you are doing after discharge? [phone]   Post-Acute Status   Post-Acute Authorization Placement   Post-Acute Placement Status Set-up Complete/Auth obtained  (Patient returning to [facility] where he is [facility] resident)   Discharge Delays (!) Ambulance Transport/Facility Transport

## 2022-10-17 NOTE — PROGRESS NOTES
Pharmacist Renal Dose Adjustment Note    Andrea Leon Jr. is a 42 y.o. male being treated with the medication famotidine     Patient Data:    Vital Signs (Most Recent):  Temp: 98.1 °F (36.7 °C) (10/17/22 0730)  Pulse: (!) 52 (10/17/22 0730)  Resp: 13 (10/17/22 0730)  BP: 136/83 (10/17/22 0600)  SpO2: 100 % (10/17/22 0730)   Vital Signs (72h Range):  Temp:  [96.9 °F (36.1 °C)-98.1 °F (36.7 °C)]   Pulse:  [45-71]   Resp:  [10-47]   BP: ()/()   SpO2:  [92 %-100 %]      Recent Labs   Lab 10/15/22  0020 10/16/22  0300 10/16/22  2000   CREATININE 1.4 1.2 1.1     Serum creatinine: 1.1 mg/dL 10/16/22 2000  Estimated creatinine clearance: 88.4 mL/min    Medication:famotidine 20 mg PO daily will be changed to 20 mg PO BID for increased crcl > 60 ml/min per [facility] renal dosing protocol.     Pharmacist's Name: Noe Aldana  Pharmacist's Extension: [phone]

## 2022-10-17 NOTE — DISCHARGE INSTRUCTIONS
Full report called to [facility] to Nurse named Oliverio, who states she and staff are familiar with this pt, as he usually resides at [facility].   Tlcath removed prior, and pt tolerated  well.  New PIV inserted.  [facility] facility aware of pt will be needing IVPB Antibiotics.      Pt's mother aware of returning to [facility].      Ambulance arrived for transport

## 2022-10-17 NOTE — PLAN OF CARE
Problem: Adult Inpatient Plan of Care  Goal: Plan of Care Review  Outcome: Ongoing, Progressing  Goal: Patient-Specific Goal (Individualized)  Outcome: Ongoing, Progressing  Goal: Absence of Hospital-Acquired Illness or Injury  Outcome: Ongoing, Progressing  Goal: Optimal Comfort and Wellbeing  Outcome: Ongoing, Progressing  Goal: Readiness for Transition of Care  Outcome: Met  Intervention: Mutually Develop Transition Plan  Flowsheets (Taken 10/17/2022 1640)  Communicated AMEYA with patient/caregiver: Yes     Problem: Adjustment to Illness (Sepsis/Septic Shock)  Goal: Optimal Coping  Outcome: Met

## 2022-10-17 NOTE — PROGRESS NOTES
[facility] Medicine  Progress Note    Patient name: Andrea Leon Jr.  MRN: [id number]  Admit Date: 10/12/2022   LOS: 5 days     SUBJECTIVE:       Late note for 10/16/2022 round.        Principal problem: <principal problem not specified>    Interval History:  Patient states he is well today.    Scheduled Meds:   amantadine HCL  100 mg Per G Tube Daily    atorvastatin  40 mg Per G Tube Daily    bisacodyL  10 mg Rectal Daily    chlorhexidine  15 mL Mouth/Throat BID    enoxparin  40 mg Subcutaneous Q24H    famotidine  20 mg Oral Daily    custom IVPB builder   Intravenous Q12H    meropenem (MERREM) IVPB  1 g Intravenous Q12H    mupirocin   Nasal BID    valproic acid (as sodium salt)  250 mg Per G Tube QHS     Continuous Infusions:  PRN Meds:acetaminophen, cloNIDine, HYDROcodone-acetaminophen, melatonin, morphine, ondansetron, polyethylene glycol    Review of patient's allergies indicates:  No Known Allergies    Review of Systems: As per interval history    OBJECTIVE:     Vital Signs (Most Recent)  Temp: 97.9 °F (36.6 °C) (10/17/22 0315)  Pulse: (!) 59 (10/17/22 0600)  Resp: 19 (10/17/22 0600)  BP: 136/83 (10/17/22 0600)  SpO2: 100 % (10/17/22 0600)    Vital Signs Range (Last 24H):  Temp:  [97.1 °F (36.2 °C)-98.1 °F (36.7 °C)]   Pulse:  [45-63]   Resp:  [11-20]   BP: ()/()   SpO2:  [99 %-100 %]     I & O (Last 24H):  Intake/Output Summary (Last 24 hours) at 10/17/2022 0735  Last data filed at 10/17/2022 0709  Gross per 24 hour   Intake 2479.4 ml   Output --   Net 2479.4 ml       Physical Exam:       Vitals and nursing note reviewed.   Constitutional:       Appearance: He is well-developed.      Comments: Chronically ill appearing; awake and alert this morning.  HENT:      Head: Normocephalic and atraumatic.      Right Ear: External ear normal.      Left Ear: External ear normal.      Nose: Nose normal.   Eyes:      Conjunctiva/sclera: Conjunctivae normal.      Pupils: Pupils are  equal, round, and reactive to light.   Cardiovascular:      Rate and Rhythm: Normal rate and regular rhythm.      Heart sounds: Normal heart sounds.   Pulmonary:      Effort: Pulmonary effort is normal.      Breath sounds: Normal breath sounds.      Abdominal:      General: Bowel sounds are normal.      Palpations: Abdomen is soft.      Comments: PEG   Musculoskeletal:      Cervical back: Normal range of motion and neck supple.      Comments: Unable to assess   Skin:     General: Skin is warm and dry.      Capillary Refill: Capillary refill takes less than 2 seconds.      Comments: Early decubiti both heels, Stage 1-2 sacrum   Neuro: AA answer to questions.        Laboratory:  I have reviewed all pertinent lab results within the past 24 hours.  CBC:   Recent Labs   Lab 10/16/22  0300   WBC 8.06   RBC 3.23*   HGB 10.3*   HCT 33.2*   *   *   MCH 31.9*   MCHC 31.0*     BMP:   Recent Labs   Lab 10/16/22  2000   *      K 4.4      CO2 26   BUN 29*   CREATININE 1.1   CALCIUM 8.2*     CMP:   Recent Labs   Lab 10/14/22  0500 10/15/22  0020 10/16/22  2000   GLU 90   < > 111*   CALCIUM 8.2*   < > 8.2*   ALBUMIN 2.5*  --   --    PROT 5.5*  --   --    *   < > 141   K 4.0   < > 4.4   CO2 28   < > 26   *   < > 110   BUN 54*   < > 29*   CREATININE 1.8*   < > 1.1   ALKPHOS 77  --   --    ALT 18  --   --    AST 27  --   --    BILITOT 1.4*  --   --     < > = values in this interval not displayed.       Diagnostic Results:  Labs: Reviewed    ASSESSMENT/PLAN:          Urinary tract infection with hematuria due to ESBL  continue current antibiotic coverage; dietician consultation in AM for tube feeds; levetiracetam from po/G-tube to iv; T continue his transfer regimen otherwise. On meropenem. Case discussed with pulmonology and Nephrology today, patient ok to discharge from their standpoint, case management to arrange IV antibiotic as outpatient, patient lives at [facility], they may be able to  do it over there.     Altered mental status, improving  Inpatient admission with volume replacement; repeat labs ordered to monitor his potassium and renal function; continue current antibiotic coverage; dietician consultation in AM for tube feeds; check Depakote levels;  levetiracetam from  iv; continue his transfer regimen otherwise;       PRABHJOT (acute kidney injury)  Patient with acute kidney injury likely due to IVVD/dehydration PRABHJOT is currently worsening. Labs reviewed. Monitor urine output and serial BMP and adjust therapy as needed. Avoid nephrotoxins and renally dose meds for GFR listed above. Follow Nephrology and Intensivist recs, input appreciated.          Department Hospital Medicine  Critical access hospital  Calixto Meyer MD  Date of service: 10/17/2022

## 2022-10-17 NOTE — PLAN OF CARE
No acute events overnight. Patient remained afebrile, bradycardic in the 50's while sleeping. Sodium was 141 and BUN was 29. See flowsheets.

## 2022-10-17 NOTE — PLAN OF CARE
contacted Leatha /  at [facility] to check in on status of discharge orders being reviewed. He informed  that he had a meeting and would review shortly.

## 2022-10-17 NOTE — PROGRESS NOTES
Pharmacist Renal Dose Adjustment Note    Andrea Leon Jr. is a 42 y.o. male being treated with the medication meropenem    Patient Data:    Vital Signs (Most Recent):  Temp: 98.1 °F (36.7 °C) (10/17/22 0730)  Pulse: (!) 52 (10/17/22 0730)  Resp: 13 (10/17/22 0730)  BP: 136/83 (10/17/22 0600)  SpO2: 100 % (10/17/22 0730)   Vital Signs (72h Range):  Temp:  [96.9 °F (36.1 °C)-98.1 °F (36.7 °C)]   Pulse:  [45-71]   Resp:  [10-47]   BP: ()/()   SpO2:  [92 %-100 %]      Recent Labs   Lab 10/15/22  0020 10/16/22  0300 10/16/22  2000   CREATININE 1.4 1.2 1.1     Serum creatinine: 1.1 mg/dL 10/16/22 2000  Estimated creatinine clearance: 88.4 mL/min    Medication:meropenem 1 g Q 12 hours will be changed to Q 8 hours for increased crcl > 50 ml/min per [facility] renal dosing protocol.      Pharmacist's Name: Noe Aldana  Pharmacist's Extension: [phone]

## 2022-10-17 NOTE — RESPIRATORY THERAPY
10/16/22 2040   Patient Assessment/Suction   Level of Consciousness (AVPU) alert   Respiratory Effort Normal;Unlabored   Expansion/Accessory Muscles/Retractions no use of accessory muscles;no retractions   All Lung Fields Breath Sounds clear;diminished   Cough Frequency no cough   PRE-TX-O2   O2 Device (Oxygen Therapy) room air   SpO2 100 %   Pulse Oximetry Type Continuous   $ Pulse Oximetry - Multiple Charge Pulse Oximetry - Multiple   Pulse (!) 57   Resp 11   Education   $ Education 15 min   Respiratory Evaluation   $ Care Plan Tech Time 15 min

## 2022-10-17 NOTE — PROGRESS NOTES
INPATIENT NEPHROLOGY CONSULT   [facility] NEPHROLOGY    Andrea Leon Jr.  10/17/2022    Reason for consultation:    Acute kidney injury    Chief Complaint:   Chief Complaint   Patient presents with    Altered Mental Status     History of Present Illness:    42-year-old male with a history of Asperger's, prior stroke with hemiparesis, hypertension, ESBL UTI (July 2021), functional quadriplegia, bedbound status, and G-tube was transferred from a nursing care facility to [facility] due to change in mental status.  He is normally awake and alert and smiling, but on the morning of presentation he was altered.  He had a strong smell of urine.  He was noted to have dry lips and mouth, erythematous rash near the groin, and spastic bilateral upper and lower extremities.  He was lethargic but would arouse.  He had hemiparesis on the left but was able to partially move the right side of his body.  Mild decubitus noted on the left lateral malleolus with stage 1 buttock decubitus. In the emergency department he received albuterol nebulizer, calcium, insulin and dextrose, bolus IV fluid, Zosyn, and vancomycin.  Blood pressure decreased during his stay, and he received additional volume administration.       Central line was placed and patient transferred to [facility] ICU. Levophed and volume were started for his hypotension. He responded to noxious stimuli only. Labs reviewed and noted below: minimal leukocytosis with normal H/H; hypernatremia and hyperkalemia with (new) end stage renal dysfunction; lactate elevated (receiving volume and antibiotics currently).    10/13  No nausea, chest pain, sob, fever, urinary or bowel complaint, new neurologic symptoms, new joint pain  10/14  more alert today.  No sob or chest pain.  No complaints specified.  Off Levophed.  AFVSS.  1275 cc UOP  10/15  no distress.  No chest pain  10/16  Afebrile.  Intermittent bradycardia.  Output not recorded  10/17 VSS, decent  UO.    Plan of Care:    Non-oliguric PRABHJOT 2/2 hemodynamically mediated renal insult and mild infectious ATN  CKD stage 2, baseline eGFR > 60 ml/min  --keep map above 55  --resume BP meds as needed    Anemia  --trend h/h  --blood products per primary    UTI  --WBC coming down  --on Meropenem, avoid Vanco/Zosyn if possible    Seizures  --on Keppra and valproic acid  --monitor levels during PRABHJOT    HTN  --BP acceptable  --resume BP meds only as needed    Thank you for allowing us to participate in this patient's care. We will continue to follow.    Vital Signs:  Temp Readings from Last 3 Encounters:   10/17/22 98.1 °F (36.7 °C) (Oral)   10/12/22 99.9 °F (37.7 °C) (Rectal)   09/09/22 97 °F (36.1 °C) (Axillary)       Pulse Readings from Last 3 Encounters:   10/17/22 (!) 52   10/12/22 88   09/09/22 70       BP Readings from Last 3 Encounters:   10/17/22 136/83   10/12/22 (!) 97/56   09/09/22 90/62       Weight:  Wt Readings from Last 3 Encounters:   10/16/22 73 kg (160 lb 15 oz)   10/12/22 86.2 kg (190 lb 0.6 oz)   10/12/22 86.2 kg (190 lb 0.6 oz)       Past Medical & Surgical History:  Past Medical History:   Diagnosis Date    Anticoagulant long-term use     baby asa per chart    Asperger's syndrome     CVA, old, hemiparesis     Encounter for blood transfusion     Essential hypertension, malignant     Functional quadriplegia 5/28/2021    PEG (percutaneous endoscopic gastrostomy) status        Past Surgical History:   Procedure Laterality Date    COLONOSCOPY N/A 6/3/2021    Procedure: COLONOSCOPY;  Surgeon: Rosario Meeks MD;  Location: [facility];  Service: Endoscopy;  Laterality: N/A;    ESOPHAGOGASTRODUODENOSCOPY N/A 7/10/2021    Procedure: EGD (ESOPHAGOGASTRODUODENOSCOPY);  Surgeon: Rosario Meeks MD;  Location: [facility];  Service: Endoscopy;  Laterality: N/A;    ESOPHAGOGASTRODUODENOSCOPY W/ PEG N/A 4/1/2021    Procedure: EGD, WITH PEG TUBE INSERTION;  Surgeon: En Zheng MD;  Location: [facility];   Service: Endoscopy;  Laterality: N/A;       Past Social History:  Social History     Socioeconomic History    Marital status:    Occupational History    Occupation: unemployed   Tobacco Use    Smoking status: Never    Smokeless tobacco: Never   Substance and Sexual Activity    Alcohol use: Not Currently    Drug use: Not Currently    Sexual activity: Not Currently       Medications:  No current facility-administered medications on file prior to encounter.     Current Outpatient Medications on File Prior to Encounter   Medication Sig Dispense Refill    acetaminophen (TYLENOL) 325 MG tablet 650 mg by Per G Tube route every 8 (eight) hours as needed for Temperature greater than (100.1).      amantadine HCL (SYMMETREL) 100 mg capsule 1 capsule (100 mg total) by Per G Tube route once daily. 30 capsule 11    amLODIPine (NORVASC) 5 MG tablet 1 tablet (5 mg total) by Per G Tube route once daily. 30 tablet 11    arginine/glutamine/calcium bmb (ISAI ORAL) 1 packet by Per G Tube route 2 (two) times a day.      atorvastatin (LIPITOR) 40 MG tablet 1 tablet (40 mg total) by Per G Tube route once daily. 90 tablet 3    cloNIDine (CATAPRES) 0.1 MG tablet Take 1 tablet (0.1 mg total) by mouth 3 (three) times daily as needed (180). 90 tablet 0    famotidine (PEPCID) 20 MG tablet Take 20 mg by mouth 2 (two) times daily.      FLUoxetine 20 MG capsule 1 capsule (20 mg total) by Per G Tube route once daily. 30 capsule 11    ketoconazole (NIZORAL) 2 % shampoo Apply topically once daily. (Patient taking differently: Apply 1 application topically once daily. )      levETIRAcetam (KEPPRA) 750 MG Tab Take 1 tablet (750 mg total) by mouth 2 (two) times daily. 60 tablet 1    LORazepam (ATIVAN) 0.5 MG tablet 0.5 mg by Per G Tube route every 6 (six) hours as needed for Anxiety.      losartan (COZAAR) 50 MG tablet 1 tablet (50 mg total) by Per G Tube route once daily. 90 tablet 3    modafiniL (PROVIGIL) 200 MG Tab 200 mg by Per G Tube route  "once daily.      ondansetron (ZOFRAN-ODT) 4 MG TbDL ondansetron 4 mg disintegrating tablet      polyethylene glycol (GLYCOLAX) 17 gram PwPk Take by mouth.      valproate (DEPAKENE) 250 mg/5 mL syrup Take by mouth.       Scheduled Meds:   amantadine HCL  100 mg Per G Tube Daily    atorvastatin  40 mg Per G Tube Daily    bisacodyL  10 mg Rectal Daily    chlorhexidine  15 mL Mouth/Throat BID    enoxparin  40 mg Subcutaneous Q24H    famotidine  20 mg Oral Daily    custom IVPB builder   Intravenous Q12H    meropenem (MERREM) IVPB  1 g Intravenous Q12H    mupirocin   Nasal BID    valproic acid (as sodium salt)  250 mg Per G Tube QHS     Continuous Infusions:      PRN Meds:.acetaminophen, cloNIDine, HYDROcodone-acetaminophen, melatonin, morphine, ondansetron, polyethylene glycol    Allergies:  Patient has no known allergies.    Past Family History:  Reviewed; refer to Hospitalist Admission Note    Review of Systems:  Review of Systems - All 14 systems reviewed and negative, except as noted in HPI    Physical Exam:    /83   Pulse (!) 52   Temp 98.1 °F (36.7 °C) (Oral)   Resp 13   Ht 5' 9" (1.753 m)   Wt 73 kg (160 lb 15 oz)   SpO2 100%   BMI 23.77 kg/m²     General Appearance:    Alert, cooperative, no distress, appears stated age   Head:    Normocephalic, without obvious abnormality, atraumatic   Eyes:    PER, conjunctiva/corneas clear, EOM's intact in both eyes        Throat:   Lips, mucosa, and tongue normal; teeth and gums normal   Back:     Symmetric, no curvature, ROM normal, no CVA tenderness   Lungs:     Clear to auscultation bilaterally, respirations unlabored   Chest wall:    No tenderness or deformity   Heart:    Regular rate and rhythm, S1 and S2 normal, no murmur, rub   or gallop   Abdomen:     Soft, non-tender, bowel sounds active all four quadrants,     no masses, no organomegaly   Extremities:   Extremities normal, atraumatic, no cyanosis or edema   Pulses:   2+ and symmetric all extremities "   MSK:   No joint or muscle swelling, tenderness or deformity   Skin:   Skin color, texture, turgor normal, no rashes or lesions   Neurologic:      No flap     Results:  Recent Labs   Lab 10/15/22  0020 10/16/22  0300 10/16/22  2000   * 135* 141   K 3.8 4.1 4.4   * 108 110   CO2 27 25 26   BUN 41* 32* 29*   CREATININE 1.4 1.2 1.1   GLU 99 86 111*       Recent Labs   Lab 10/13/22  0400 10/13/22  1128 10/13/22  1608 10/14/22  0500 10/15/22  0020 10/16/22  0300 10/16/22  2000   CALCIUM 8.3* 8.1*  8.1*   < > 8.2* 7.9* 7.5* 8.2*   ALBUMIN 2.7* 2.7*  --  2.5*  --   --   --     < > = values in this interval not displayed.             Recent Labs   Lab 10/12/22  1441 10/12/22  1653 10/12/22  1825   POCTGLUCOSE 111* 168* 120*       Recent Labs   Lab 03/20/21  1433   Hemoglobin A1C 5.7       Recent Labs   Lab 10/14/22  0500 10/15/22  0400 10/16/22  0300   WBC 12.35  12.35 10.24 8.06   HGB 10.5*  10.5* 10.5* 10.3*   HCT 35.0*  35.0* 33.8* 33.2*   PLT 95*  95* 102* 107*   *  106* 105* 103*   MCHC 30.0*  30.0* 31.1* 31.0*   MONO 5.9  5.9  0.7  0.7 6.6  0.7 9.2  0.7       Recent Labs   Lab 10/13/22  0400 10/13/22  1128 10/14/22  0500   BILITOT 1.3* 1.2* 1.4*   PROT 5.9* 5.6* 5.5*   ALBUMIN 2.7* 2.7* 2.5*   ALKPHOS 70 69 77   ALT 15 15 18   AST 21 21 27       Recent Labs   Lab 08/22/21  1911 01/17/22  1600 02/12/22  1145 03/20/22  2300 10/12/22  1341   Color, UA Yellow   < > Yellow Yellow Yellow   Appearance, UA Hazy A   < > Hazy A Cloudy A Clear   pH, UA 8.0   < > 7.0 >8.0 A 6.0   Specific Gravity, UA 1.015   < > 1.010 1.010 1.015   Protein, UA 1+ A   < > 1+ A 2+ A Negative   Glucose, UA Negative   < > Negative Negative Negative   Ketones, UA Negative   < > Negative Negative Negative   Urobilinogen, UA Negative   < > Negative 1.0 Negative   Bilirubin (UA) Negative   < > Negative Negative Negative   Occult Blood UA Negative   < > 1+ A 2+ A Trace A   Nitrite, UA Positive A   < > Positive A Positive A  Positive A   RBC, UA 1   < > 10 H 2 7 H   WBC, UA >100 H   < > >100 H 40 H >100 H   Bacteria Many A   < > Many A Many A Few A   Hyaline Casts, UA 12 A  --  0 0  --     < > = values in this interval not displayed.       Recent Labs   Lab 03/20/21  1222   Sample VENOUS       Microbiology Results (last 7 days)       ** No results found for the last 168 hours. **          Patient care was time spent personally by me on the following activities:   Obtaining a history  Examination of patient.  Providing medical care at the patients bedside.  Developing a treatment plan with patient or surrogate and bedside caregivers  Ordering and reviewing laboratory studies, radiographic studies, pulse oximetry.  Ordering and performing treatments and interventions.  Evaluation of patient's response to treatment.  Discussions with consultants while on the unit and immediately available to the patient.  Re-evaluation of the patient's condition.  Documentation in the medical record.     Ton Rodriguez MD  Nephrology  [facility]  [phone]

## 2022-10-17 NOTE — PLAN OF CARE
Faxed discharge orders to [facility] after  / [facility] confirmed they can meet patients IV antibiotic needs for next 7 days. Awaiting clearance from facility for [facility] nurse to be able to call report.

## 2022-10-18 LAB
BACTERIA BLD CULT: NORMAL
BACTERIA BLD CULT: NORMAL

## 2022-10-18 NOTE — DISCHARGE SUMMARY
[facility]  Discharge Summary  Patient Name: Andrea Leon Jr. MRN: [id number]   Patient Class: IP- Inpatient  Length of Stay: 5   Admission Date: 10/12/2022  7:23 PM Attending Physician: Calixto Meyer MD   Primary Care Provider: Elly Mathew DO Face-to-Face encounter date: 10/17/2022   Chief Complaint: Altered Mental Status    Date of Discharge: 10/17/2022  Discharge Disposition: Skilled Nursing Facility  Condition: Stable         Hospital Course By Problem with Pertinent Findings     42-year-old male with a history of Asperger's, prior stroke with hemiparesis, hypertension, ESBL UTI (July 2021), functional quadriplegia, bedbound status, and G-tube was transferred from a nursing care facility to [facility] due to change in mental status.  He is normally awake and alert and smiling, but on the morning of presentation he was altered.  He had a strong smell of urine.  He was noted to have dry lips and mouth, erythematous rash near the groin, and spastic bilateral upper and lower extremities.  He was lethargic but would arouse.  He had hemiparesis on the left but was able to partially move the right side of his body.  Mild decubitus noted on the left lateral malleolus with stage 1 buttock decubitus. In the emergency department he received albuterol nebulizer, calcium, insulin and dextrose, bolus IV fluid, Zosyn, and vancomycin.  Blood pressure decreased during his stay, and he received additional volume administration.       Central line was placed and patient transferred to [facility] ICU. Levophed and volume were started for his hypotension. He responded to noxious stimuli only. Labs reviewed and noted below: minimal leukocytosis with normal H/H; hypernatremia and hyperkalemia with (new) end stage renal dysfunction; lactate elevated (receiving volume and antibiotics currently).     Discussed with his ICU RN: Inpatient admission with volume replacement; repeat labs  "ordered to monitor his potassium and renal function; continue current antibiotic coverage; dietician consultation in AM for tube feeds; check Depakote levels; change levetiracetam from po/G-tube to iv; TSH with AM labs; continue his transfer regimen otherwise; Nephrology consultation was done due to Samson, Intensivist was also consulted due to patien initially being on Levophed, Zosyn was switch to Meropenme du e to SAMSON, cultures later grew ESBL UTI, patient progressivelly got back to his [facility], at the time of diachage, he is stbale, ding well, has been cleared for discharge by Neprhology and Intensivist, Meropenem IV at [facility] to finish course of treamtnet was arranged by , so he was discahrged back to SNF.      Patient was seen and examined on the date of discharge and determined to be suitable for discharge.    Physical Exam  /80   Pulse (!) 53   Temp 98.4 °F (36.9 °C) (Oral)   Resp 14   Ht 5' 9" (1.753 m)   Wt 73 kg (160 lb 15 oz)   SpO2 97%   BMI 23.77 kg/m²   Vitals reviewed.         Vitals and nursing note reviewed.   Constitutional:       Appearance: He is well-developed.      Comments: Chronically ill appearing; awake and alert this morning.  HENT:      Head: Normocephalic and atraumatic.      Right Ear: External ear normal.      Left Ear: External ear normal.      Nose: Nose normal.   Eyes:      Conjunctiva/sclera: Conjunctivae normal.      Pupils: Pupils are equal, round, and reactive to light.   Cardiovascular:      Rate and Rhythm: Normal rate and regular rhythm.      Heart sounds: Normal heart sounds.   Pulmonary:      Effort: Pulmonary effort is normal.      Breath sounds: Normal breath sounds.      Abdominal:      General: Bowel sounds are normal.      Palpations: Abdomen is soft.      Comments: PEG   Musculoskeletal:      Cervical back: Normal range of motion and neck supple.      Comments: Unable to assess   Skin:     General: Skin is warm and dry.      Capillary Refill: " Capillary refill takes less than 2 seconds.      Comments: Early decubiti both heels, Stage 1-2 sacrum   Neuro: AA answer to questions.     Following labs were Reviewed   Recent Labs   Lab 10/16/22  2000   CALCIUM 8.2*      K 4.4   CO2 26      BUN 29*   CREATININE 1.1     No results found for: POCTGLUCOSE     BMP:   Recent Labs   Lab 10/16/22  0300 10/16/22  2000   GLU 86 111*   * 141   K 4.1 4.4    110   CO2 25 26   BUN 32* 29*   CREATININE 1.2 1.1   CALCIUM 7.5* 8.2*   , CMP   Recent Labs   Lab 10/16/22  0300 10/16/22  2000   * 141   K 4.1 4.4    110   CO2 25 26   GLU 86 111*   BUN 32* 29*   CREATININE 1.2 1.1   CALCIUM 7.5* 8.2*   ANIONGAP 2* 5*   , and CBC   Recent Labs   Lab 10/16/22  0300   WBC 8.06   HGB 10.3*   HCT 33.2*   *       Microbiology Results (last 7 days)       ** No results found for the last 168 hours. **          No orders to display       No results found for this or any previous visit.      Consultants and Procedures   Consultants:  Dr Stuart, ICU.  Dr. Krishnan, Nephrology.    Discharge Information:   Diet:  Resume regular diet/Cardiac diet/Diabetic Diet    Physical Activity:  Activity as tolerated    Instructions:  1. Take all medications as prescribed  2. Keep all follow-up appointments  3. Return to the hospital or call your primary care physicians if any worsening symptoms occur.      Follow-Up Appointments:  Please call your primary care physician to schedule an appointment in 1 week time.      Pending laboratory work/Tests to be performed/followed by the Primary care Physician:    The patient was discharged in the care of her parents//wife/family/caregiver, with discharge instructions were reviewed in written and verbal form. All pertinent questions were discussed and prescriptions were provided. The importance of making follow up appointments and compliance of medications has been stressed repeatedly. The patient will follow up in 1 week or  sooner as needed with the PCP, and the patient is on board with the plan. Upon discharge, patient needs to be on following medications.    Discharge Medications:     Medication List        START taking these medications      meropenem-0.9% sodium chloride 1 gram/50 mL Pgbk  Inject 50 mLs (1 g total) into the vein every 8 (eight) hours. for 3 days            CONTINUE taking these medications      acetaminophen 325 MG tablet  Commonly known as: TYLENOL     amantadine  mg capsule  Commonly known as: SYMMETREL  1 capsule (100 mg total) by Per G Tube route once daily.     amLODIPine 5 MG tablet  Commonly known as: NORVASC  1 tablet (5 mg total) by Per G Tube route once daily.     atorvastatin 40 MG tablet  Commonly known as: LIPITOR  1 tablet (40 mg total) by Per G Tube route once daily.     cloNIDine 0.1 MG tablet  Commonly known as: CATAPRES  Take 1 tablet (0.1 mg total) by mouth 3 (three) times daily as needed (180).     famotidine 20 MG tablet  Commonly known as: PEPCID     FLUoxetine 20 MG capsule  1 capsule (20 mg total) by Per G Tube route once daily.     ISAI ORAL     levETIRAcetam 750 MG Tab  Commonly known as: KEPPRA  Take 1 tablet (750 mg total) by mouth 2 (two) times daily.     LORazepam 0.5 MG tablet  Commonly known as: ATIVAN     losartan 50 MG tablet  Commonly known as: COZAAR  1 tablet (50 mg total) by Per G Tube route once daily.     modafiniL 200 MG Tab  Commonly known as: PROVIGIL     ondansetron 4 MG Tbdl  Commonly known as: ZOFRAN-ODT     polyethylene glycol 17 gram Pwpk  Commonly known as: GLYCOLAX     valproate 250 mg/5 mL syrup  Commonly known as: DEPAKENE            ASK your doctor about these medications      ketoconazole 2 % shampoo  Commonly known as: NIZORAL  Apply topically once daily.               Where to Get Your Medications        These medications were sent to [id number] - [place], [place] - [address] AT [facility] [address]  [address],  [place] [address]      Phone: [phone]   meropenem-0.9% sodium chloride 1 gram/50 mL Pgbk           I spent 32 minutes preparing the discharge including reviewing records from previous encounters, preparation of discharge summary, assessing and final examination of the patient, discharge medicine reconciliation, discussing plan of care, follow up and education and prescriptions.       Calixto Meyer  [facility] Hospitalist  10/17/2022

## 2022-10-18 NOTE — PLAN OF CARE
10/18/22 1803   Final Note   Assessment Type Final Discharge Note   Anticipated Discharge Disposition Int Care Fac

## 2022-10-20 ENCOUNTER — HOSPITAL ENCOUNTER (EMERGENCY)
Facility: HOSPITAL | Age: 42
Discharge: HOME | End: 2022-10-20
Attending: EMERGENCY MEDICINE
Payer: MEDICAID

## 2022-10-20 VITALS
RESPIRATION RATE: 18 BRPM | DIASTOLIC BLOOD PRESSURE: 77 MMHG | BODY MASS INDEX: 23.7 KG/M2 | OXYGEN SATURATION: 98 % | HEIGHT: 69 IN | HEART RATE: 70 BPM | SYSTOLIC BLOOD PRESSURE: 120 MMHG | WEIGHT: 160 LBS

## 2022-10-20 DIAGNOSIS — Z78.9 PROBLEM WITH VASCULAR ACCESS: Primary | ICD-10-CM

## 2022-10-20 DIAGNOSIS — Z95.828 S/P PICC CENTRAL LINE PLACEMENT: ICD-10-CM

## 2022-10-20 PROCEDURE — C1751 CATH, INF, PER/CENT/MIDLINE: HCPCS

## 2022-10-20 PROCEDURE — 99283 EMERGENCY DEPT VISIT LOW MDM: CPT

## 2022-10-20 PROCEDURE — 36410 VNPNXR 3YR/> PHY/QHP DX/THER: CPT

## 2022-10-20 PROCEDURE — 76937 US GUIDE VASCULAR ACCESS: CPT

## 2022-10-20 NOTE — ED NOTES
Report called to Zuleyka RAVI at [facility]. She advised they will set up return transport to their facility.

## 2022-10-20 NOTE — ED PROVIDER NOTES
Encounter Date: 10/20/2022    SCRIBE #1 NOTE: I, Lesa Pelletier, am scribing for, and in the presence of,  Urbano Damon MD.     History     Chief Complaint   Patient presents with    Vascular Access Problem     Pt comes in via ems with a need for picc or mid line placement. Pt recently dx with UTI and needed a picc line      Time seen by provider: 3:40 PM on 10/20/2022    Andrea Leon Jr. is a 42 y.o. male who presents to the ED via EMS for PICC or midline placement. Patient was diagnosed with UTI and requires line placement for IV abx treatment. However, staff at [facility] were unable to access. PMHx of CVA, functional quadriplegia, HTN, Asperger's syndrome. No pertinent PSHx.    The history is provided by the EMS personnel. The history is limited by the condition of the patient.   Review of patient's allergies indicates:  No Known Allergies  Past Medical History:   Diagnosis Date    Anticoagulant long-term use     baby asa per chart    Asperger's syndrome     CVA, old, hemiparesis     Encounter for blood transfusion     Essential hypertension, malignant     Functional quadriplegia 5/28/2021    PEG (percutaneous endoscopic gastrostomy) status      Past Surgical History:   Procedure Laterality Date    COLONOSCOPY N/A 6/3/2021    Procedure: COLONOSCOPY;  Surgeon: Rosario Meeks MD;  Location: [facility];  Service: Endoscopy;  Laterality: N/A;    ESOPHAGOGASTRODUODENOSCOPY N/A 7/10/2021    Procedure: EGD (ESOPHAGOGASTRODUODENOSCOPY);  Surgeon: Rosario Meeks MD;  Location: [facility];  Service: Endoscopy;  Laterality: N/A;    ESOPHAGOGASTRODUODENOSCOPY W/ PEG N/A 4/1/2021    Procedure: EGD, WITH PEG TUBE INSERTION;  Surgeon: En Zheng MD;  Location: [facility];  Service: Endoscopy;  Laterality: N/A;     No family history on file.  Social History     Tobacco Use    Smoking status: Never    Smokeless tobacco: Never   Substance Use Topics    Alcohol use: Not Currently    Drug use:  Not Currently     Review of Systems   Unable to perform ROS: Patient nonverbal     Physical Exam     Initial Vitals [10/20/22 1519]   BP Pulse Resp Temp SpO2   107/68 65 20 -- 97 %      MAP       --         Physical Exam    Nursing note and vitals reviewed.  Constitutional: He appears well-developed. He appears cachectic. He appears ill. No distress.   Chronically ill appearing.   HENT:   Head: Normocephalic and atraumatic.   Nose: Nose normal.   Eyes: EOM are normal.   Neck: Neck supple. No tracheal deviation present. No JVD present.   Cardiovascular:  Normal rate, regular rhythm, normal heart sounds and intact distal pulses.     Exam reveals no gallop and no friction rub.       No murmur heard.  Pulmonary/Chest: Breath sounds normal. No respiratory distress. He has no wheezes. He has no rhonchi. He has no rales.   Abdominal: Abdomen is soft. Bowel sounds are normal. He exhibits no distension. There is no abdominal tenderness. There is no rebound and no guarding.   Musculoskeletal:      Cervical back: Neck supple.      Comments: Contracted.      Neurological: He is alert.   Nonverbal at baseline with left-sided paralysis at baseline. 4/5 strength to RUE and RLE.    Skin: Skin is warm and dry. Capillary refill takes less than 2 seconds. No rash noted.   Psychiatric: He has a normal mood and affect.       ED Course   Procedures  Labs Reviewed - No data to display       Imaging Results    None          Medications - No data to display  Medical Decision Making:   History:   Old Medical Records: I decided to obtain old medical records.  ED Management:  42-year-old male with a history of Asperger's, prior stroke with hemiparesis, hypertension, ESBL UTI (July 2021), functional quadriplegia, bedbound status, and G-tube recently admitted for sepsis and discharged with IV merrem presents for midline placement. Reportedly his prior midline was accidentally pulled out and unable to get IV access. Pt is at baseline per nursing  facility. Midline placed without any complications. Will discharge back for further outpatient management.         Scribe Attestation:   Scribe #1: I performed the above scribed service and the documentation accurately describes the services I performed. I attest to the accuracy of the note.      ED Course as of 10/20/22 2153   Thu Oct 20, 2022   1524  [BD]      ED Course User Index  [BD] Urbano Damon MD            Attending Attestation:     Physician Attestation for Scribe:    I, Dr. Urbano Damon, personally performed the services described in this documentation.   All medical record entries made by the scribe were at my direction and in my presence.   I have reviewed the chart and agree that the record is accurate and complete.   Urbano Damon MD  9:53 PM 10/20/2022     DISCLAIMER: This note was prepared with [employer] voice recognition transcription software. Garbled syntax, mangled pronouns, and other bizarre constructions may be attributed to that software system.         Clinical Impression:   Final diagnoses:  [Z78.9] Problem with vascular access (Primary)  [Z95.828] S/P PICC central line placement      ED Disposition Condition    Discharge Stable          ED Prescriptions    None       Follow-up Information       Follow up With Specialties Details Why Contact Info    Elly Mathew,  Family Medicine Go in 1 day  [address]  [facility] [address]  [phone]      [facility] Emergency Dept Emergency Medicine Go to  As needed, If symptoms worsen [address] [place] [id number] [phone]             Urbano Damon MD  10/20/22 2159

## 2022-10-24 ENCOUNTER — HOSPITAL ENCOUNTER (EMERGENCY)
Facility: HOSPITAL | Age: 42
Discharge: ANOTHER HOSPITAL | End: 2022-10-25
Attending: EMERGENCY MEDICINE
Payer: MEDICAID

## 2022-10-24 DIAGNOSIS — R56.9 SEIZURE: Primary | ICD-10-CM

## 2022-10-24 DIAGNOSIS — N39.0 URINARY TRACT INFECTION WITHOUT HEMATURIA, SITE UNSPECIFIED: ICD-10-CM

## 2022-10-24 DIAGNOSIS — R11.10 VOMITING: ICD-10-CM

## 2022-10-24 LAB
ALBUMIN SERPL BCP-MCNC: 3.2 G/DL (ref 3.5–5.2)
ALP SERPL-CCNC: 170 U/L (ref 55–135)
ALT SERPL W/O P-5'-P-CCNC: 27 U/L (ref 10–44)
ANION GAP SERPL CALC-SCNC: 10 MMOL/L (ref 8–16)
AST SERPL-CCNC: 21 U/L (ref 10–40)
BASOPHILS # BLD AUTO: 0.03 K/UL (ref 0–0.2)
BASOPHILS NFR BLD: 0.2 % (ref 0–1.9)
BILIRUB SERPL-MCNC: 0.8 MG/DL (ref 0.1–1)
BUN SERPL-MCNC: 33 MG/DL (ref 6–20)
CALCIUM SERPL-MCNC: 9.5 MG/DL (ref 8.7–10.5)
CHLORIDE SERPL-SCNC: 98 MMOL/L (ref 95–110)
CO2 SERPL-SCNC: 28 MMOL/L (ref 23–29)
CREAT SERPL-MCNC: 1.4 MG/DL (ref 0.5–1.4)
DIFFERENTIAL METHOD: ABNORMAL
EOSINOPHIL # BLD AUTO: 0.1 K/UL (ref 0–0.5)
EOSINOPHIL NFR BLD: 0.4 % (ref 0–8)
ERYTHROCYTE [DISTWIDTH] IN BLOOD BY AUTOMATED COUNT: 14.2 % (ref 11.5–14.5)
EST. GFR  (NO RACE VARIABLE): >60 ML/MIN/1.73 M^2
GLUCOSE SERPL-MCNC: 126 MG/DL (ref 70–110)
HCT VFR BLD AUTO: 36.9 % (ref 40–54)
HGB BLD-MCNC: 12.1 G/DL (ref 14–18)
IMM GRANULOCYTES # BLD AUTO: 0.07 K/UL (ref 0–0.04)
IMM GRANULOCYTES NFR BLD AUTO: 0.4 % (ref 0–0.5)
INFLUENZA A, MOLECULAR: NEGATIVE
INFLUENZA B, MOLECULAR: NEGATIVE
LIPASE SERPL-CCNC: 115 U/L (ref 4–60)
LYMPHOCYTES # BLD AUTO: 0.9 K/UL (ref 1–4.8)
LYMPHOCYTES NFR BLD: 5 % (ref 18–48)
MAGNESIUM SERPL-MCNC: 1.8 MG/DL (ref 1.6–2.6)
MCH RBC QN AUTO: 31.8 PG (ref 27–31)
MCHC RBC AUTO-ENTMCNC: 32.8 G/DL (ref 32–36)
MCV RBC AUTO: 97 FL (ref 82–98)
MONOCYTES # BLD AUTO: 1 K/UL (ref 0.3–1)
MONOCYTES NFR BLD: 5.1 % (ref 4–15)
NEUTROPHILS # BLD AUTO: 16.5 K/UL (ref 1.8–7.7)
NEUTROPHILS NFR BLD: 88.9 % (ref 38–73)
NRBC BLD-RTO: 0 /100 WBC
PLATELET # BLD AUTO: 250 K/UL (ref 150–450)
PMV BLD AUTO: 10.6 FL (ref 9.2–12.9)
POTASSIUM SERPL-SCNC: 4.8 MMOL/L (ref 3.5–5.1)
PROT SERPL-MCNC: 6.9 G/DL (ref 6–8.4)
RBC # BLD AUTO: 3.8 M/UL (ref 4.6–6.2)
SARS-COV-2 RDRP RESP QL NAA+PROBE: NEGATIVE
SODIUM SERPL-SCNC: 136 MMOL/L (ref 136–145)
SPECIMEN SOURCE: NORMAL
TROPONIN I SERPL DL<=0.01 NG/ML-MCNC: 0.01 NG/ML (ref 0–0.03)
WBC # BLD AUTO: 18.56 K/UL (ref 3.9–12.7)

## 2022-10-24 PROCEDURE — 84484 ASSAY OF TROPONIN QUANT: CPT | Performed by: EMERGENCY MEDICINE

## 2022-10-24 PROCEDURE — 63600175 PHARM REV CODE 636 W HCPCS: Performed by: EMERGENCY MEDICINE

## 2022-10-24 PROCEDURE — 83690 ASSAY OF LIPASE: CPT | Performed by: EMERGENCY MEDICINE

## 2022-10-24 PROCEDURE — 83735 ASSAY OF MAGNESIUM: CPT | Performed by: EMERGENCY MEDICINE

## 2022-10-24 PROCEDURE — 99285 EMERGENCY DEPT VISIT HI MDM: CPT | Mod: 25

## 2022-10-24 PROCEDURE — 36415 COLL VENOUS BLD VENIPUNCTURE: CPT | Performed by: EMERGENCY MEDICINE

## 2022-10-24 PROCEDURE — 87502 INFLUENZA DNA AMP PROBE: CPT | Performed by: EMERGENCY MEDICINE

## 2022-10-24 PROCEDURE — 96361 HYDRATE IV INFUSION ADD-ON: CPT

## 2022-10-24 PROCEDURE — 96375 TX/PRO/DX INJ NEW DRUG ADDON: CPT

## 2022-10-24 PROCEDURE — 80053 COMPREHEN METABOLIC PANEL: CPT | Performed by: EMERGENCY MEDICINE

## 2022-10-24 PROCEDURE — U0002 COVID-19 LAB TEST NON-CDC: HCPCS | Performed by: EMERGENCY MEDICINE

## 2022-10-24 PROCEDURE — 85025 COMPLETE CBC W/AUTO DIFF WBC: CPT | Performed by: EMERGENCY MEDICINE

## 2022-10-24 RX ORDER — MEROPENEM AND SODIUM CHLORIDE 1 G/50ML
1 INJECTION, SOLUTION INTRAVENOUS
Status: COMPLETED | OUTPATIENT
Start: 2022-10-25 | End: 2022-10-25

## 2022-10-24 RX ORDER — LEVETIRACETAM 500 MG/5ML
2000 INJECTION, SOLUTION, CONCENTRATE INTRAVENOUS ONCE
Status: COMPLETED | OUTPATIENT
Start: 2022-10-24 | End: 2022-10-25

## 2022-10-24 RX ORDER — LEVETIRACETAM 500 MG/5ML
2000 INJECTION, SOLUTION, CONCENTRATE INTRAVENOUS ONCE
Status: DISCONTINUED | OUTPATIENT
Start: 2022-10-25 | End: 2022-10-24

## 2022-10-24 RX ORDER — ONDANSETRON 2 MG/ML
4 INJECTION INTRAMUSCULAR; INTRAVENOUS
Status: COMPLETED | OUTPATIENT
Start: 2022-10-24 | End: 2022-10-24

## 2022-10-24 RX ADMIN — SODIUM CHLORIDE, SODIUM LACTATE, POTASSIUM CHLORIDE, AND CALCIUM CHLORIDE 1000 ML: .6; .31; .03; .02 INJECTION, SOLUTION INTRAVENOUS at 10:10

## 2022-10-24 RX ADMIN — ONDANSETRON 4 MG: 2 INJECTION INTRAMUSCULAR; INTRAVENOUS at 10:10

## 2022-10-25 ENCOUNTER — HOSPITAL ENCOUNTER (INPATIENT)
Facility: HOSPITAL | Age: 42
LOS: 2 days | DRG: 100 | End: 2022-10-27
Attending: STUDENT IN AN ORGANIZED HEALTH CARE EDUCATION/TRAINING PROGRAM | Admitting: STUDENT IN AN ORGANIZED HEALTH CARE EDUCATION/TRAINING PROGRAM
Payer: MEDICAID

## 2022-10-25 VITALS
BODY MASS INDEX: 21.48 KG/M2 | TEMPERATURE: 98 F | HEIGHT: 69 IN | OXYGEN SATURATION: 98 % | HEART RATE: 95 BPM | RESPIRATION RATE: 16 BRPM | SYSTOLIC BLOOD PRESSURE: 139 MMHG | DIASTOLIC BLOOD PRESSURE: 87 MMHG | WEIGHT: 145 LBS

## 2022-10-25 DIAGNOSIS — R00.1 SINUS BRADYCARDIA: ICD-10-CM

## 2022-10-25 DIAGNOSIS — R41.82 AMS (ALTERED MENTAL STATUS): ICD-10-CM

## 2022-10-25 PROBLEM — R56.9 SEIZURE: Status: ACTIVE | Noted: 2022-10-25

## 2022-10-25 LAB
ALBUMIN SERPL BCP-MCNC: 3.1 G/DL (ref 3.5–5.2)
ALP SERPL-CCNC: 121 U/L (ref 55–135)
ALT SERPL W/O P-5'-P-CCNC: 24 U/L (ref 10–44)
AMMONIA PLAS-SCNC: 10 UMOL/L (ref 10–50)
ANION GAP SERPL CALC-SCNC: 6 MMOL/L (ref 8–16)
AST SERPL-CCNC: 24 U/L (ref 10–40)
BACTERIA #/AREA URNS HPF: ABNORMAL /HPF
BILIRUB SERPL-MCNC: 1.2 MG/DL (ref 0.1–1)
BILIRUB UR QL STRIP: NEGATIVE
BUN SERPL-MCNC: 28 MG/DL (ref 6–20)
CALCIUM SERPL-MCNC: 9 MG/DL (ref 8.7–10.5)
CHLORIDE SERPL-SCNC: 97 MMOL/L (ref 95–110)
CK SERPL-CCNC: 63 U/L (ref 20–200)
CLARITY UR: CLEAR
CO2 SERPL-SCNC: 30 MMOL/L (ref 23–29)
COLOR UR: YELLOW
CREAT SERPL-MCNC: 1.4 MG/DL (ref 0.5–1.4)
ERYTHROCYTE [DISTWIDTH] IN BLOOD BY AUTOMATED COUNT: 14.6 % (ref 11.5–14.5)
EST. GFR  (NO RACE VARIABLE): >60 ML/MIN/1.73 M^2
GLUCOSE SERPL-MCNC: 102 MG/DL (ref 70–110)
GLUCOSE UR QL STRIP: NEGATIVE
HCT VFR BLD AUTO: 33.6 % (ref 40–54)
HGB BLD-MCNC: 10.7 G/DL (ref 14–18)
HGB UR QL STRIP: ABNORMAL
HYALINE CASTS #/AREA URNS LPF: 3 /LPF
KETONES UR QL STRIP: NEGATIVE
LEUKOCYTE ESTERASE UR QL STRIP: NEGATIVE
MCH RBC QN AUTO: 31.1 PG (ref 27–31)
MCHC RBC AUTO-ENTMCNC: 31.8 G/DL (ref 32–36)
MCV RBC AUTO: 98 FL (ref 82–98)
MICROSCOPIC COMMENT: ABNORMAL
NITRITE UR QL STRIP: POSITIVE
PH UR STRIP: 7 [PH] (ref 5–8)
PLATELET # BLD AUTO: 230 K/UL (ref 150–450)
PMV BLD AUTO: 10.8 FL (ref 9.2–12.9)
POTASSIUM SERPL-SCNC: 4.5 MMOL/L (ref 3.5–5.1)
PROT SERPL-MCNC: 6.5 G/DL (ref 6–8.4)
PROT UR QL STRIP: ABNORMAL
RBC # BLD AUTO: 3.44 M/UL (ref 4.6–6.2)
RBC #/AREA URNS HPF: 1 /HPF (ref 0–4)
SODIUM SERPL-SCNC: 133 MMOL/L (ref 136–145)
SP GR UR STRIP: 1.01 (ref 1–1.03)
URN SPEC COLLECT METH UR: ABNORMAL
UROBILINOGEN UR STRIP-ACNC: 1 EU/DL
VALPROATE SERPL-MCNC: 11.4 UG/ML (ref 50–100)
VIT B12 SERPL-MCNC: >1500 PG/ML (ref 210–950)
WBC # BLD AUTO: 13.9 K/UL (ref 3.9–12.7)
WBC #/AREA URNS HPF: 3 /HPF (ref 0–5)

## 2022-10-25 PROCEDURE — 99900035 HC TECH TIME PER 15 MIN (STAT)

## 2022-10-25 PROCEDURE — 85027 COMPLETE CBC AUTOMATED: CPT | Performed by: INTERNAL MEDICINE

## 2022-10-25 PROCEDURE — 63600175 PHARM REV CODE 636 W HCPCS: Performed by: EMERGENCY MEDICINE

## 2022-10-25 PROCEDURE — 82140 ASSAY OF AMMONIA: CPT | Performed by: INTERNAL MEDICINE

## 2022-10-25 PROCEDURE — 25000003 PHARM REV CODE 250: Performed by: STUDENT IN AN ORGANIZED HEALTH CARE EDUCATION/TRAINING PROGRAM

## 2022-10-25 PROCEDURE — 80177 DRUG SCRN QUAN LEVETIRACETAM: CPT | Performed by: INTERNAL MEDICINE

## 2022-10-25 PROCEDURE — 99222 PR INITIAL HOSPITAL CARE,LEVL II: ICD-10-PCS | Mod: ,,, | Performed by: INTERNAL MEDICINE

## 2022-10-25 PROCEDURE — 87040 BLOOD CULTURE FOR BACTERIA: CPT | Mod: 59 | Performed by: EMERGENCY MEDICINE

## 2022-10-25 PROCEDURE — 87086 URINE CULTURE/COLONY COUNT: CPT | Performed by: EMERGENCY MEDICINE

## 2022-10-25 PROCEDURE — 82550 ASSAY OF CK (CPK): CPT | Performed by: STUDENT IN AN ORGANIZED HEALTH CARE EDUCATION/TRAINING PROGRAM

## 2022-10-25 PROCEDURE — 96365 THER/PROPH/DIAG IV INF INIT: CPT

## 2022-10-25 PROCEDURE — 63600175 PHARM REV CODE 636 W HCPCS: Performed by: INTERNAL MEDICINE

## 2022-10-25 PROCEDURE — 20000000 HC ICU ROOM

## 2022-10-25 PROCEDURE — 80053 COMPREHEN METABOLIC PANEL: CPT | Performed by: INTERNAL MEDICINE

## 2022-10-25 PROCEDURE — 63600175 PHARM REV CODE 636 W HCPCS: Performed by: STUDENT IN AN ORGANIZED HEALTH CARE EDUCATION/TRAINING PROGRAM

## 2022-10-25 PROCEDURE — 95819 EEG AWAKE AND ASLEEP: CPT

## 2022-10-25 PROCEDURE — 81000 URINALYSIS NONAUTO W/SCOPE: CPT | Performed by: EMERGENCY MEDICINE

## 2022-10-25 PROCEDURE — 82607 VITAMIN B-12: CPT | Performed by: INTERNAL MEDICINE

## 2022-10-25 PROCEDURE — 94761 N-INVAS EAR/PLS OXIMETRY MLT: CPT

## 2022-10-25 PROCEDURE — 99900031 HC PATIENT EDUCATION (STAT)

## 2022-10-25 PROCEDURE — 36415 COLL VENOUS BLD VENIPUNCTURE: CPT | Performed by: EMERGENCY MEDICINE

## 2022-10-25 PROCEDURE — 99222 1ST HOSP IP/OBS MODERATE 55: CPT | Mod: ,,, | Performed by: INTERNAL MEDICINE

## 2022-10-25 PROCEDURE — 80164 ASSAY DIPROPYLACETIC ACD TOT: CPT | Performed by: INTERNAL MEDICINE

## 2022-10-25 PROCEDURE — A4216 STERILE WATER/SALINE, 10 ML: HCPCS | Performed by: STUDENT IN AN ORGANIZED HEALTH CARE EDUCATION/TRAINING PROGRAM

## 2022-10-25 PROCEDURE — 25000003 PHARM REV CODE 250: Performed by: INTERNAL MEDICINE

## 2022-10-25 RX ORDER — MUPIROCIN 20 MG/G
OINTMENT TOPICAL 2 TIMES DAILY
Status: DISCONTINUED | OUTPATIENT
Start: 2022-10-25 | End: 2022-10-27 | Stop reason: HOSPADM

## 2022-10-25 RX ORDER — LORAZEPAM 2 MG/ML
1 INJECTION INTRAMUSCULAR
Status: DISCONTINUED | OUTPATIENT
Start: 2022-10-25 | End: 2022-10-27 | Stop reason: HOSPADM

## 2022-10-25 RX ORDER — VALPROIC ACID 250 MG/5ML
250 SOLUTION ORAL EVERY 12 HOURS
Status: DISCONTINUED | OUTPATIENT
Start: 2022-10-25 | End: 2022-10-26

## 2022-10-25 RX ORDER — ONDANSETRON 2 MG/ML
4 INJECTION INTRAMUSCULAR; INTRAVENOUS EVERY 8 HOURS PRN
Status: DISCONTINUED | OUTPATIENT
Start: 2022-10-25 | End: 2022-10-27 | Stop reason: HOSPADM

## 2022-10-25 RX ORDER — SODIUM CHLORIDE, SODIUM LACTATE, POTASSIUM CHLORIDE, CALCIUM CHLORIDE 600; 310; 30; 20 MG/100ML; MG/100ML; MG/100ML; MG/100ML
INJECTION, SOLUTION INTRAVENOUS CONTINUOUS
Status: DISCONTINUED | OUTPATIENT
Start: 2022-10-25 | End: 2022-10-27 | Stop reason: HOSPADM

## 2022-10-25 RX ORDER — MEROPENEM AND SODIUM CHLORIDE 1 G/50ML
1 INJECTION, SOLUTION INTRAVENOUS
Status: DISCONTINUED | OUTPATIENT
Start: 2022-10-25 | End: 2022-10-26

## 2022-10-25 RX ORDER — ENOXAPARIN SODIUM 100 MG/ML
40 INJECTION SUBCUTANEOUS EVERY 24 HOURS
Status: DISCONTINUED | OUTPATIENT
Start: 2022-10-25 | End: 2022-10-27 | Stop reason: HOSPADM

## 2022-10-25 RX ORDER — ACETAMINOPHEN 325 MG/1
650 TABLET ORAL EVERY 8 HOURS PRN
Status: DISCONTINUED | OUTPATIENT
Start: 2022-10-25 | End: 2022-10-25

## 2022-10-25 RX ORDER — AMLODIPINE BESYLATE 5 MG/1
5 TABLET ORAL DAILY
Status: DISCONTINUED | OUTPATIENT
Start: 2022-10-25 | End: 2022-10-27 | Stop reason: HOSPADM

## 2022-10-25 RX ORDER — CHLORHEXIDINE GLUCONATE ORAL RINSE 1.2 MG/ML
15 SOLUTION DENTAL 2 TIMES DAILY
Status: DISCONTINUED | OUTPATIENT
Start: 2022-10-25 | End: 2022-10-27 | Stop reason: HOSPADM

## 2022-10-25 RX ORDER — FLUOXETINE HYDROCHLORIDE 20 MG/1
20 CAPSULE ORAL DAILY
Status: DISCONTINUED | OUTPATIENT
Start: 2022-10-25 | End: 2022-10-27 | Stop reason: HOSPADM

## 2022-10-25 RX ORDER — ACETAMINOPHEN 325 MG/1
650 TABLET ORAL EVERY 8 HOURS PRN
Status: DISCONTINUED | OUTPATIENT
Start: 2022-10-25 | End: 2022-10-27 | Stop reason: HOSPADM

## 2022-10-25 RX ORDER — LEVETIRACETAM 10 MG/ML
1000 INJECTION INTRAVASCULAR EVERY 12 HOURS
Status: DISCONTINUED | OUTPATIENT
Start: 2022-10-25 | End: 2022-10-27 | Stop reason: HOSPADM

## 2022-10-25 RX ORDER — SODIUM CHLORIDE 0.9 % (FLUSH) 0.9 %
10 SYRINGE (ML) INJECTION EVERY 12 HOURS
Status: DISCONTINUED | OUTPATIENT
Start: 2022-10-25 | End: 2022-10-27 | Stop reason: HOSPADM

## 2022-10-25 RX ADMIN — MEROPENEM AND SODIUM CHLORIDE 1 G: 1 INJECTION, SOLUTION INTRAVENOUS at 11:10

## 2022-10-25 RX ADMIN — CHLORHEXIDINE GLUCONATE 15 ML: 1.2 RINSE ORAL at 09:10

## 2022-10-25 RX ADMIN — ACETAMINOPHEN 650 MG: 325 TABLET ORAL at 04:10

## 2022-10-25 RX ADMIN — MUPIROCIN 1 G: 20 OINTMENT TOPICAL at 09:10

## 2022-10-25 RX ADMIN — LEVETIRACETAM 1000 MG: 10 INJECTION INTRAVENOUS at 08:10

## 2022-10-25 RX ADMIN — SODIUM CHLORIDE, PRESERVATIVE FREE 10 ML: 5 INJECTION INTRAVENOUS at 09:10

## 2022-10-25 RX ADMIN — ENOXAPARIN SODIUM 40 MG: 100 INJECTION SUBCUTANEOUS at 08:10

## 2022-10-25 RX ADMIN — MEROPENEM AND SODIUM CHLORIDE 1 G: 1 INJECTION, SOLUTION INTRAVENOUS at 08:10

## 2022-10-25 RX ADMIN — CHLORHEXIDINE GLUCONATE 15 ML: 1.2 RINSE ORAL at 08:10

## 2022-10-25 RX ADMIN — FLUOXETINE 20 MG: 20 CAPSULE ORAL at 08:10

## 2022-10-25 RX ADMIN — SODIUM CHLORIDE, SODIUM LACTATE, POTASSIUM CHLORIDE, AND CALCIUM CHLORIDE 1000 ML: .6; .31; .03; .02 INJECTION, SOLUTION INTRAVENOUS at 03:10

## 2022-10-25 RX ADMIN — LEVETIRACETAM 2000 MG: 500 INJECTION, SOLUTION INTRAVENOUS at 12:10

## 2022-10-25 RX ADMIN — MEROPENEM AND SODIUM CHLORIDE 1 G: 1 INJECTION, SOLUTION INTRAVENOUS at 12:10

## 2022-10-25 RX ADMIN — VALPROIC ACID 250 MG: 500 SOLUTION ORAL at 08:10

## 2022-10-25 RX ADMIN — AMLODIPINE BESYLATE 5 MG: 5 TABLET ORAL at 08:10

## 2022-10-25 RX ADMIN — ONDANSETRON 4 MG: 2 INJECTION INTRAMUSCULAR; INTRAVENOUS at 09:10

## 2022-10-25 RX ADMIN — SODIUM CHLORIDE, SODIUM LACTATE, POTASSIUM CHLORIDE, AND CALCIUM CHLORIDE: .6; .31; .03; .02 INJECTION, SOLUTION INTRAVENOUS at 08:10

## 2022-10-25 RX ADMIN — AMANTADINE 100 MG: 100 CAPSULE ORAL at 08:10

## 2022-10-25 RX ADMIN — MUPIROCIN 1 G: 20 OINTMENT TOPICAL at 08:10

## 2022-10-25 RX ADMIN — VALPROIC ACID 250 MG: 500 SOLUTION ORAL at 09:10

## 2022-10-25 RX ADMIN — MEROPENEM AND SODIUM CHLORIDE 1 G: 1 INJECTION, SOLUTION INTRAVENOUS at 04:10

## 2022-10-25 NOTE — CONSULTS
"[facility] Medicine  Consult Note    Patient Name: Andrea Leon Jr.  MRN: [id number]  Admission Date: 10/25/2022  Hospital Length of Stay: 0 days  Attending Physician: Lamin Treviño MD   Primary Care Provider: Elly Mathew DO           Patient information was obtained from ER records.     Consults  Subjective:     Principal Problem:<principal problem not specified>    Chief Complaint: No chief complaint on file.       HPI: Andrea Leon Jr. is a 42 y.o. with history of previous CVA, hemiparesis, HTN, bedbound, ESBL and has PEG tube.  He was brought to ONS from a nursing home after seizure like activity followed by AMS.  Unable to obtain further history as he is currently non-verbal and no family available.  In Er at OSH he was given keppra and transferred to [facility] for neurology consult.    History was obtained from transferring physician Sign-out and EMR  Unable to obtain ROS  .  Neurology Consult: Pt examined by Dr. Boone Munson and myself. He is awake and alert in the bed. He is nonverbal with no family at bedside. His eyes are opened and he is able to somewhat track us with his eyes. He is able to follow the command and gave us a "thumbs up" .    Past Medical History:   Diagnosis Date    Anticoagulant long-term use     baby asa per chart    Asperger's syndrome     CVA, old, hemiparesis     Encounter for blood transfusion     Essential hypertension, malignant     Functional quadriplegia 5/28/2021    PEG (percutaneous endoscopic gastrostomy) status        Past Surgical History:   Procedure Laterality Date    COLONOSCOPY N/A 6/3/2021    Procedure: COLONOSCOPY;  Surgeon: Rosario Meeks MD;  Location: [facility];  Service: Endoscopy;  Laterality: N/A;    ESOPHAGOGASTRODUODENOSCOPY N/A 7/10/2021    Procedure: EGD (ESOPHAGOGASTRODUODENOSCOPY);  Surgeon: Rosario Meeks MD;  Location: [facility];  Service: Endoscopy;  Laterality: N/A;    ESOPHAGOGASTRODUODENOSCOPY W/ PEG " N/A 4/1/2021    Procedure: EGD, WITH PEG TUBE INSERTION;  Surgeon: En Zheng MD;  Location: [facility];  Service: Endoscopy;  Laterality: N/A;       Review of patient's allergies indicates:  No Known Allergies    Current Facility-Administered Medications on File Prior to Encounter   Medication    [COMPLETED] lactated ringers bolus 1,000 mL    [COMPLETED] lactated ringers bolus 1,000 mL    [COMPLETED] levETIRAcetam injection 2,000 mg    [COMPLETED] meropenem-0.9% sodium chloride 1 g/50 mL IVPB    [COMPLETED] ondansetron injection 4 mg    [DISCONTINUED] levETIRAcetam injection 2,000 mg     Current Outpatient Medications on File Prior to Encounter   Medication Sig    acetaminophen (TYLENOL) 325 MG tablet 650 mg by Per G Tube route every 8 (eight) hours as needed for Temperature greater than (100.1).    amantadine HCL (SYMMETREL) 100 mg capsule 1 capsule (100 mg total) by Per G Tube route once daily.    amLODIPine (NORVASC) 5 MG tablet 1 tablet (5 mg total) by Per G Tube route once daily.    arginine/glutamine/calcium bmb (ISAI ORAL) 1 packet by Per G Tube route 2 (two) times a day.    atorvastatin (LIPITOR) 40 MG tablet 1 tablet (40 mg total) by Per G Tube route once daily.    cloNIDine (CATAPRES) 0.1 MG tablet Take 1 tablet (0.1 mg total) by mouth 3 (three) times daily as needed (180).    famotidine (PEPCID) 20 MG tablet Take 20 mg by mouth 2 (two) times daily.    FLUoxetine 20 MG capsule 1 capsule (20 mg total) by Per G Tube route once daily.    ketoconazole (NIZORAL) 2 % shampoo Apply topically once daily. (Patient taking differently: Apply 1 application topically once daily. )    levETIRAcetam (KEPPRA) 750 MG Tab Take 1 tablet (750 mg total) by mouth 2 (two) times daily.    LORazepam (ATIVAN) 0.5 MG tablet 0.5 mg by Per G Tube route every 6 (six) hours as needed for Anxiety.    losartan (COZAAR) 50 MG tablet 1 tablet (50 mg total) by Per G Tube route once daily.    modafiniL (PROVIGIL) 200 MG Tab 200 mg by  Per G Tube route once daily.    ondansetron (ZOFRAN-ODT) 4 MG TbDL ondansetron 4 mg disintegrating tablet    polyethylene glycol (GLYCOLAX) 17 gram PwPk Take by mouth.    valproate (DEPAKENE) 250 mg/5 mL syrup Take by mouth.     Family History    None       Tobacco Use    Smoking status: Never    Smokeless tobacco: Never   Substance and Sexual Activity    Alcohol use: Not Currently    Drug use: Not Currently    Sexual activity: Not Currently     Review of SystemsUTA  Objective:     Vital Signs (Most Recent):  Temp: 98.4 °F (36.9 °C) (10/25/22 1200)  Pulse: 68 (10/25/22 1300)  Resp: 18 (10/25/22 1300)  BP: 115/79 (10/25/22 1300)  SpO2: 99 % (10/25/22 1300) Vital Signs (24h Range):  Temp:  [97.8 °F (36.6 °C)-98.4 °F (36.9 °C)] 98.4 °F (36.9 °C)  Pulse:  [] 68  Resp:  [11-26] 18  SpO2:  [97 %-100 %] 99 %  BP: (115-180)/() 115/79     Weight: 68.8 kg (151 lb 10.8 oz)  Body mass index is 22.4 kg/m².    Physical Exam  HENT:      Head: Normocephalic.   Pulmonary:      Effort: Pulmonary effort is normal.   Neurological:      Mental Status: He is alert.      Motor: Atrophy present.      Comments: BUE contracted, BLE no spontaneous movement.          Significant Labs: All pertinent labs within the past 24 hours have been reviewed.    Significant Imaging: I have reviewed all pertinent imaging results/findings within the past 24 hours.    Assessment/Plan:  MR. Leon has a history of CVA hemiparesis, nonverbal. HTN, Bedbound, ESBL and PEG tube. He currenlty resides in a nursing home and was brought after a seizure followed by AMS.   He is alert in the bed and able to follow very simple commands. Baseline unknown.     Seizures  Continue Keppra 1000mg BID   Keppra level   Ativan 2 mg IV prn seizure activity  EEG Pending  Head CT pending.   DVT Prophylaxis Lovenox.     Thank you for your consult, we will continue to follow     Critical Care:  Greater than 35 minutes of critical care time has been spent evaluating with  this patient. Time includes chart review not limited to diagnostic imaging, labs, and vitals, pt assessment, discussion with family and nursing, current order evals, and new order entries.       There are no hospital problems to display for this patient.    VTE Risk Mitigation (From admission, onward)           Ordered     IP VTE LOW RISK PATIENT  Once         10/25/22 0527                    [facility] POC IP DISCHARGE SUMMARY    Thank you for your consult. I will follow-up with patient. Please contact us if you have any additional questions.    Mckenna Lombardi NP  Department of Hospital Medicine   [facility]

## 2022-10-25 NOTE — ED PROVIDER NOTES
Encounter Date: 10/24/2022       History     Chief Complaint   Patient presents with    Seizures     Having seizures at least 40 minutes at [place], hx of seizures. Versed 10mg IM given at 2142 by EMS. Postictal at this time. Nonverbal at baseline. Bloody emesis noted on sheet     42-year-old male with past medical history CVA with hemiparesis, bedbound, G-tube in place, seizure disorder, recent admission for complicated UTI at [facility] with ICU stay presenting with altered mental status after a seizure.  Patient was discharged home on meropenem, per EMS, had a seizure at the SNF followed by a failure to return to baseline mental status.  At patient's baseline, patient will have purposeful movements, but is nonverbal.  On arrival, patient having episodes of vomiting, not fully alert, tachycardic.    Review of patient's allergies indicates:  No Known Allergies  Past Medical History:   Diagnosis Date    Anticoagulant long-term use     baby asa per chart    Asperger's syndrome     CVA, old, hemiparesis     Encounter for blood transfusion     Essential hypertension, malignant     Functional quadriplegia 5/28/2021    PEG (percutaneous endoscopic gastrostomy) status      Past Surgical History:   Procedure Laterality Date    COLONOSCOPY N/A 6/3/2021    Procedure: COLONOSCOPY;  Surgeon: Rosario Meeks MD;  Location: [facility];  Service: Endoscopy;  Laterality: N/A;    ESOPHAGOGASTRODUODENOSCOPY N/A 7/10/2021    Procedure: EGD (ESOPHAGOGASTRODUODENOSCOPY);  Surgeon: Rosario Meeks MD;  Location: [facility];  Service: Endoscopy;  Laterality: N/A;    ESOPHAGOGASTRODUODENOSCOPY W/ PEG N/A 4/1/2021    Procedure: EGD, WITH PEG TUBE INSERTION;  Surgeon: En Zheng MD;  Location: [facility];  Service: Endoscopy;  Laterality: N/A;     No family history on file.  Social History     Tobacco Use    Smoking status: Never    Smokeless tobacco: Never   Substance Use Topics    Alcohol use: Not Currently    Drug use: Not  Currently     Review of Systems   Unable to perform ROS: Acuity of condition     Physical Exam     Initial Vitals [10/24/22 2227]   BP Pulse Resp Temp SpO2   (!) 130/92 (!) 111 16 97.8 °F (36.6 °C) 100 %      MAP       --         Physical Exam    Constitutional:   Chronically ill-appearing, no purposeful movements, vomit on hospital gown, maintaining airway   HENT:   Head: Normocephalic.   Nose: Nose normal.   Cardiovascular:  Normal rate.           Pulmonary/Chest: No stridor.   Mild tachypnea, clear to auscultation   Abdominal: He exhibits no distension.     Neurological:   No purposeful movements, not moving extremities, otherwise unable to assess neuro exam   Skin: Skin is warm and dry. There is pallor.       ED Course   Procedures  Labs Reviewed   CBC W/ AUTO DIFFERENTIAL - Abnormal; Notable for the following components:       Result Value    WBC 18.56 (*)     RBC 3.80 (*)     Hemoglobin 12.1 (*)     Hematocrit 36.9 (*)     MCH 31.8 (*)     Gran # (ANC) 16.5 (*)     Immature Grans (Abs) 0.07 (*)     Lymph # 0.9 (*)     Gran % 88.9 (*)     Lymph % 5.0 (*)     All other components within normal limits   COMPREHENSIVE METABOLIC PANEL - Abnormal; Notable for the following components:    Glucose 126 (*)     BUN 33 (*)     Albumin 3.2 (*)     Alkaline Phosphatase 170 (*)     All other components within normal limits   LIPASE - Abnormal; Notable for the following components:    Lipase 115 (*)     All other components within normal limits   URINALYSIS - Abnormal; Notable for the following components:    Protein, UA Trace (*)     Occult Blood UA Trace (*)     Nitrite, UA Positive (*)     All other components within normal limits   URINALYSIS MICROSCOPIC - Abnormal; Notable for the following components:    Hyaline Casts, UA 3 (*)     All other components within normal limits   INFLUENZA A & B BY MOLECULAR   CULTURE, BLOOD   CULTURE, BLOOD   CULTURE, URINE   MAGNESIUM   TROPONIN I   SARS-COV-2 RNA AMPLIFICATION, QUAL           Imaging Results              X-Ray Chest AP Portable (Final result)  Result time 10/24/22 23:37:01      Final result by Alex La DO (10/24/22 23:37:01)                   Impression:      No acute abnormality.      Electronically signed by: Alex La  Date:    10/24/2022  Time:    23:37               Narrative:    EXAMINATION:  XR CHEST AP PORTABLE    CLINICAL HISTORY:  Vomiting, unspecified    TECHNIQUE:  Single frontal view of the chest was performed.    COMPARISON:  10/12/2022.    FINDINGS:  The lungs are well expanded and clear. No focal opacities are seen. The pleural spaces are clear.    The cardiac silhouette is unremarkable.    The visualized osseous structures are unremarkable.                                       Medications   lactated ringers bolus 1,000 mL (0 mLs Intravenous Stopped 10/24/22 2351)   ondansetron injection 4 mg (4 mg Intravenous Given 10/24/22 2251)   levETIRAcetam injection 2,000 mg (2,000 mg Intravenous Given 10/25/22 0002)   meropenem-0.9% sodium chloride 1 g/50 mL IVPB (0 g Intravenous Stopped 10/25/22 0129)   lactated ringers bolus 1,000 mL (1,000 mLs Intravenous New Bag 10/25/22 0315)     Medical Decision Making:   Initial Assessment:   A/P:  Symptoms likely secondary to postictal., possible breakthrough seizure activity in setting of continued infection.  Patient with tachycardia, elevated white blood cell count, nitrate positive urine, concern for continued infection.  Given empiric dose of meropenem, after discussion with Dr. Boone Taylor, he believes patient would be best served at a facility with an ICU so that patient could have better monitoring as well as an EEG.  There was no room at [facility] neuro ICU.  Intubation was deferred on arrival as well as after repeat observation as patient appeared to be maintaining his airway.  One other episode of vomiting in the emergency department, the patient with somewhat improving mental status, intubation again  deferred.  Will transfer to [facility] for further management and ICU placement.                        Clinical Impression:   Final diagnoses:  [R11.10] Vomiting  [R56.9] Seizure (Primary)  [N39.0] Urinary tract infection without hematuria, site unspecified        ED Disposition Condition    Transfer to Another Facility Stable                Jose D Vaz MD  10/25/22 5315

## 2022-10-25 NOTE — CONSULTS
Pulmonary/Critical Care Consult      Patient name: Andrea Leon Jr.  MRN: [id number]  Date: 10/25/2022    Admit Date: 10/25/2022  Consult Requested By: Lamin Treviño MD    Reason for Consult: Seizure    HPI:    10/25/2022 - Pt presented to ER at [facility] with seizures and was post ictal and I was called for transfer to ICU early this AM.  Pt transferred and is nonverbal.  He is not able to provide any history.  No further seizures reported.  He also had epsiodes of emesis but no flip aspiration and respiratory status is stable this AM.  Chart has been reviewed.    Review of Systems    Review of Systems   Unable to perform ROS: Mental acuity     Past Medical History    Past Medical History:   Diagnosis Date    Anticoagulant long-term use     baby asa per chart    Asperger's syndrome     CVA, old, hemiparesis     Encounter for blood transfusion     Essential hypertension, malignant     Functional quadriplegia 5/28/2021    PEG (percutaneous endoscopic gastrostomy) status        Past Surgical History    Past Surgical History:   Procedure Laterality Date    COLONOSCOPY N/A 6/3/2021    Procedure: COLONOSCOPY;  Surgeon: Rosario Meeks MD;  Location: [facility];  Service: Endoscopy;  Laterality: N/A;    ESOPHAGOGASTRODUODENOSCOPY N/A 7/10/2021    Procedure: EGD (ESOPHAGOGASTRODUODENOSCOPY);  Surgeon: Rosario Meeks MD;  Location: [facility];  Service: Endoscopy;  Laterality: N/A;    ESOPHAGOGASTRODUODENOSCOPY W/ PEG N/A 4/1/2021    Procedure: EGD, WITH PEG TUBE INSERTION;  Surgeon: En Zheng MD;  Location: [facility];  Service: Endoscopy;  Laterality: N/A;       Medications (scheduled):      amantadine HCL  100 mg Per G Tube Daily    amLODIPine  5 mg Per G Tube Daily    chlorhexidine  15 mL Mouth/Throat BID    FLUoxetine  20 mg Per G Tube Daily    levetiracetam IV  1,000 mg Intravenous Q12H    meropenem (MERREM) IVPB  1 g Intravenous Q8H    mupirocin   Nasal BID    sodium chloride 0.9%  10 mL Intravenous  "Q12H    valproic acid (as sodium salt)  250 mg Oral Q12H       Medications (infusions):      lactated ringers 100 mL/hr at 10/25/22 0821       Medications (prn):     acetaminophen, lorazepam, ondansetron    Family History: No family history on file.    Social History: Tobacco:   Social History     Tobacco Use   Smoking Status Never   Smokeless Tobacco Never                                EtOH:   Social History     Substance and Sexual Activity   Alcohol Use Not Currently                                Drugs:   Social History     Substance and Sexual Activity   Drug Use Not Currently       Physical Exam    Vital signs:  Temp:  [97.8 °F (36.6 °C)-98.4 °F (36.9 °C)]   Pulse:  []   Resp:  [9-26]   BP: (112-180)/()   SpO2:  [97 %-100 %]     Intake/Output:   Intake/Output Summary (Last 24 hours) at 10/25/2022 1449  Last data filed at 10/25/2022 1400  Gross per 24 hour   Intake 775.1 ml   Output 625 ml   Net 150.1 ml        BMI: Estimated body mass index is 22.4 kg/m² as calculated from the following:    Height as of this encounter: 5' 9" (1.753 m).    Weight as of this encounter: 68.8 kg (151 lb 10.8 oz).    Physical Exam  Vitals and nursing note reviewed.   Constitutional:       General: He is not in acute distress.     Appearance: He is obese. He is not ill-appearing, toxic-appearing or diaphoretic.      Comments: Eyes open but does not look at me  No response to me this AM  Chronically ill   HENT:      Head: Normocephalic and atraumatic.      Right Ear: External ear normal.      Left Ear: External ear normal.      Nose: Nose normal.      Mouth/Throat:      Mouth: Mucous membranes are moist.      Pharynx: Oropharynx is clear. No oropharyngeal exudate.   Eyes:      General: No scleral icterus.        Right eye: No discharge.         Left eye: No discharge.      Extraocular Movements: Extraocular movements intact.      Conjunctiva/sclera: Conjunctivae normal.      Pupils: Pupils are equal, round, and reactive " to light.   Neck:      Vascular: No carotid bruit.   Cardiovascular:      Rate and Rhythm: Normal rate and regular rhythm.      Pulses: Normal pulses.      Heart sounds: Normal heart sounds. No murmur heard.    No friction rub. No gallop.   Pulmonary:      Effort: Pulmonary effort is normal. No respiratory distress.      Breath sounds: No stridor. No wheezing, rhonchi or rales.   Chest:      Chest wall: No tenderness.   Abdominal:      General: Bowel sounds are normal. There is no distension.      Tenderness: There is no abdominal tenderness. There is no guarding.   Musculoskeletal:         General: No swelling. Normal range of motion.      Cervical back: Normal range of motion and neck supple. No rigidity or tenderness.      Right lower leg: No edema.      Left lower leg: No edema.      Comments: Contractions in UE B   Lymphadenopathy:      Cervical: No cervical adenopathy.   Skin:     General: Skin is warm and dry.      Capillary Refill: Capillary refill takes less than 2 seconds.      Coloration: Skin is not jaundiced.      Findings: No bruising.   Neurological:      Mental Status: He is alert.      Cranial Nerves: No cranial nerve deficit.      Sensory: No sensory deficit.      Motor: No weakness.      Comments: nonverbal   Psychiatric:      Comments: Calm        Laboratory    Recent Labs   Lab 10/25/22  0953   WBC 13.90*   RBC 3.44*   HGB 10.7*   HCT 33.6*      MCV 98   MCH 31.1*   MCHC 31.8*       Recent Labs   Lab 10/25/22  0953   CALCIUM 9.0   PROT 6.5   *   K 4.5   CO2 30*   CL 97   BUN 28*   CREATININE 1.4   ALKPHOS 121   ALT 24   AST 24   BILITOT 1.2*       No results for input(s): PT, INR, APTT in the last 24 hours.    Recent Labs   Lab 10/24/22  4986 10/25/22  0659   CPK  --  63   TROPONINI 0.009  --        Additional labs: reviewd    Microbiology:       Microbiology Results (last 7 days)       ** No results found for the last 168 hours. **            Radiology    CT Head Without  Contrast  Narrative: CMS MANDATED QUALITY DATA - CT RADIATION - 436    All CT scans at this facility utilize dose modulation, iterative reconstruction, and/or weight based dosing when appropriate to reduce radiation dose to as low as reasonably achievable.    EXAMINATION:  CT HEAD WITHOUT CONTRAST    CLINICAL HISTORY:  Seizure, new-onset, no history of trauma;    TECHNIQUE:  Head CT without IV contrast. All CT scans at this facility use dose modulation, iterative reconstruction, and/or weight based dosing when appropriate to reduce radiation dose to as low as reasonably achievable.    COMPARISON:  MRI of the brain dated 08/09/2021    FINDINGS:  No acute intracranial hemorrhage, midline shift, or mass effect.    Diffuse cerebral and cerebellar atrophy is evident. Mild periventricular and subcortical white matter low-attenuation suggests chronic small vessel ischemic changes.    The ventricles and cisterns are maintained.    Calvarium is intact, and visualized sinuses are clear.  Impression: 1. No acute intracranial abnormality.    2. Chronic small vessel ischemic changes.    Electronically signed by: Monse Christy MD  Date:    10/25/2022  Time:    14:11        Additional Studies    reviewed    Ventilator Information              No results for input(s): PH, PCO2, PO2, HCO3, POCSATURATED, BE in the last 72 hours.      Impression    Active Hospital Problems    Diagnosis  POA    Seizure [R56.9]  Unknown    Altered mental status [R41.82]  Yes    Functional quadriplegia [R53.2]  Yes    CVA, old, hemiparesis [I69.359]  Not Applicable      Resolved Hospital Problems   No resolved problems to display.       Plan    Respiratory status stable  No further seizure activity reported  Would check procalcitonin and if low would stop/de-escalate antibiotics quickly  ? Try to get back to NH when OK with neurology    Respiratory status stable, I will sign off.  Please reconsult if I can be of further assistance.  Thank you for this  consult.        Mike Keller MD  [facility] Pulmonary/Critical Care  10/25/2022

## 2022-10-25 NOTE — PLAN OF CARE
[facility]  Initial Discharge Assessment       Primary Care Provider: Elly Mathew DO    Admission Diagnosis: AMS (altered mental status) [R41.82]    Admission Date: 10/25/2022  Expected Discharge Date: 10/27/2022    Discharge Barriers Identified: None    Initial assessment completed with motherAlda, via telephone. Patient lives at [facility] in [facility] since March of 2021. [facility] will [facility] when medically clear.    Payor: MEDICAID / Plan: MEDICAID [place] / Product Type: Government /     Extended Emergency Contact Information  Primary Emergency Contact: Alda Leon  Home Phone: [phone]  Mobile Phone: [phone]  Relation: Mother  Preferred language: English   needed? No  Secondary Emergency Contact: luma walker  Mobile Phone: [phone]  Relation: Step parent  Preferred language: English   needed? No    Discharge Plan A: Return to nursing home  Discharge Plan B: Return to Nursing Home      [id number] - [place] - [address] AT [facility] [address]  [address] [place] [place] [address]  Phone: [phone] Fax: [phone]      Initial Assessment (most recent)       Adult Discharge Assessment - 10/25/22 1625          Discharge Assessment    Assessment Type Discharge Planning Assessment     Confirmed/corrected address, phone number and insurance Yes     Confirmed Demographics Correct on Facesheet     Source of Information patient     When was your last doctors appointment? --   At [facility]    Reason For Admission AMS     Lives With facility resident     Facility Arrived From: [facility] ([facility])     Do you expect to return to your current living situation? Yes     Do you have help at home or someone to help you manage your care at home? Yes     Who are your caregiver(s) and their phone number(s)? facility     Walking or Climbing Stairs Difficulty ambulation  difficulty, dependent     Mobility Management bedbound     Dressing/Bathing Difficulty bathing difficulty, dependent     Equipment Currently Used at Home hospital bed     Readmission within 30 days? No     Patient currently being followed by outpatient case management? No     Do you currently have service(s) that help you manage your care at home? No     Do you take prescription medications? Yes     Do you have prescription coverage? Yes     Coverage Medicaid     Do you have any problems affording any of your prescribed medications? No     Is the patient taking medications as prescribed? yes     Who is going to help you get home at discharge? Satish Ramey     How do you get to doctors appointments? other (see comments)   Sees PCP at facility    Are you on dialysis? No     Do you take coumadin? No     Discharge Plan A Return to nursing home     Discharge Plan B Return to Nursing Home     DME Needed Upon Discharge  none     Discharge Plan discussed with: Parent(s)     Name(s) and Number(s) Alda (mother) [phone]     Discharge Barriers Identified None        Physical Activity    On average, how many days per week do you engage in moderate to strenuous exercise (like a brisk walk)? 0 days     On average, how many minutes do you engage in exercise at this level? 0 min        Financial Resource Strain    How hard is it for you to pay for the very basics like food, housing, medical care, and heating? Patient refused        Housing Stability    In the last 12 months, was there a time when you were not able to pay the mortgage or rent on time? Patient refused     In the last 12 months, how many places have you lived? 1     In the last 12 months, was there a time when you did not have a steady place to sleep or slept in a shelter (including now)? Patient refused        Transportation Needs    In the past 12 months, has lack of transportation kept you from medical appointments or from getting medications? Patient  refused     In the past 12 months, has lack of transportation kept you from meetings, work, or from getting things needed for daily living? Patient refused        Food Insecurity    Within the past 12 months, you worried that your food would run out before you got the money to buy more. Patient refused     Within the past 12 months, the food you bought just didn't last and you didn't have money to get more. Patient refused        Stress    Do you feel stress - tense, restless, nervous, or anxious, or unable to sleep at night because your mind is troubled all the time - these days? Patient refused        Social Connections    In a typical week, how many times do you talk on the phone with family, friends, or neighbors? Patient refused     How often do you get together with friends or relatives? Patient refused     How often do you attend Sikh or Bahai services? Patient refused     Do you belong to any clubs or organizations such as Sikh groups, unions, fraternal or athletic groups, or school groups? Patient refused     How often do you attend meetings of the clubs or organizations you belong to? Patient refused     Are you , , , , never , or living with a partner? Patient refused        Alcohol Use    Q1: How often do you have a drink containing alcohol? Patient refused     Q2: How many drinks containing alcohol do you have on a typical day when you are drinking? Patient refused     Q3: How often do you have six or more drinks on one occasion? Patient refused        Relationship/Environment    Name(s) of Who Lives With Patient Satish Ramey

## 2022-10-25 NOTE — CONSULTS
"[facility]  Adult Nutrition   Consult Note (Nutrition Support Management)    SUMMARY     Recommendations  Recommendation/Intervention:   1) Recommend Jevity 1.5 with a goal rate @ 50 mL/hr to provide 1800 kcal, 77 gm protein, 912 ml free H2O. Meets 100% of EEN + EPN.   2) RD to montior TF initiation + tolerance, labs, and plan of care.    Goals: Pt to tolerate TF to meet at least 75% of estimated needs.  Nutrition Goal Status: new    Dietitian Rounds Brief  Pt is a 41 y/o M admitted for AMS. Pt was brought to [facility] from nursing home after seizure like activity followed by AMS. Pt is currently non-verbal.  Pt is w/ G-tube in place. TF recommendations are above.     Diet order:   Current Diet Order: NPO     Evaluation of Received Nutrient/Fluid Intake  Energy Calories Required: not meeting needs  Protein Required: not meeting needs  Fluid Required: not meeting needs  Tolerance: other (see comments) (NPO)     % Intake of Estimated Energy Needs: 0%  % Meal Intake: NPO      Intake/Output Summary (Last 24 hours) at 10/25/2022 0913  Last data filed at 10/25/2022 0800  Gross per 24 hour   Intake 0 ml   Output 475 ml   Net -475 ml        Anthropometrics  Temp: 98.4 °F (36.9 °C)  Height: 5' 9" (175.3 cm)  Height (inches): 69 in  Weight Method: Bed Scale  Weight: 68.8 kg (151 lb 10.8 oz)  Weight (lb): 151.68 lb  Ideal Body Weight (IBW), Male: 160 lb  % Ideal Body Weight, Male (lb): 94.8 %  BMI (Calculated): 22.4  BMI Grade: 18.5-24.9 - normal       Estimated/Assessed Needs  Weight Used For Calorie Calculations: 68.8 kg (151 lb 10.8 oz)  Energy Calorie Requirements (kcal): 1954-6097 (25-30)  Energy Need Method: Kcal/kg  Protein Requirements: 55-69  Weight Used For Protein Calculations: 68.8 kg (151 lb 10.8 oz)  Fluid Requirements (mL): 1 ml/kcal     RDA Method (mL): 1720       Reason for Assessment  Reason For Assessment: consult  Diagnosis: other (see comments) (AMS)  Relevant Medical History: CVA, bedbound, " G-tube in place, HTN    Nutrition/Diet History  Spiritual, Cultural Beliefs, Latter-day Practices, Values that Affect Care: no  Food Allergies: NKFA  Factors Affecting Nutritional Intake: NPO, other (see comments)    Nutrition Risk Screen  Nutrition Risk Screen: no indicators present     MST Score: 2  Have you recently lost weight without trying?: Unsure  Weight loss score: 2  Have you been eating poorly because of a decreased appetite?: No  Appetite score: 0       Weight History:  Wt Readings from Last 5 Encounters:   10/25/22 68.8 kg (151 lb 10.8 oz)   10/24/22 65.8 kg (145 lb)   10/20/22 72.6 kg (160 lb)   10/16/22 73 kg (160 lb 15 oz)   10/12/22 86.2 kg (190 lb 0.6 oz)        Medications:Pertinent Medications Reviewed  Scheduled Meds:   amantadine HCL  100 mg Per G Tube Daily    amLODIPine  5 mg Per G Tube Daily    FLUoxetine  20 mg Per G Tube Daily    levetiracetam IV  1,000 mg Intravenous Q12H    meropenem (MERREM) IVPB  1 g Intravenous Q8H    sodium chloride 0.9%  10 mL Intravenous Q12H    valproic acid (as sodium salt)  250 mg Oral Q12H     Continuous Infusions:   lactated ringers 100 mL/hr at 10/25/22 0821     PRN Meds:.acetaminophen, lorazepam, ondansetron    Labs: Pertinent Labs Reviewed  Clinical Chemistry:  Recent Labs   Lab 10/24/22  2259      K 4.8   CL 98   CO2 28   *   BUN 33*   CREATININE 1.4   CALCIUM 9.5   PROT 6.9   ALBUMIN 3.2*   BILITOT 0.8   ALKPHOS 170*   AST 21   ALT 27   ANIONGAP 10   MG 1.8   LIPASE 115*     CBC:   Recent Labs   Lab 10/24/22  2259   WBC 18.56*   RBC 3.80*   HGB 12.1*   HCT 36.9*      MCV 97   MCH 31.8*   MCHC 32.8     Cardiac Profile:  Recent Labs   Lab 10/24/22  2259 10/25/22  0659   CPK  --  63   TROPONINI 0.009  --        Monitor and Evaluation  Food and Nutrient Intake: energy intake, enteral nutrition intake  Food and Nutrient Adminstration: enteral and parenteral nutrition administration  Knowledge/Beliefs/Attitudes: food and nutrition  knowledge/skill  Physical Activity and Function: nutrition-related ADLs and IADLs  Anthropometric Measurements: weight, weight change, body mass index  Biochemical Data, Medical Tests and Procedures: electrolyte and renal panel, gastrointestinal profile, glucose/endocrine profile  Nutrition-Focused Physical Findings: overall appearance     Nutrition Risk  Level of Risk/Frequency of Follow-up: high     Nutrition Follow-Up  RD Follow-up?: Yes      Nilda Rueda RD 10/25/2022 9:13 AM

## 2022-10-25 NOTE — PROVIDER TRANSFER
(Physician in Lead of Transfers)   Outside Transfer Acceptance Note / Regional Referral Center    Referring facility: [facility]   Referring provider: OLMAN LOPEZ, SYBIL PROCTOR, SYBIL SALDAÑA, BUTCH MCGRATH, LASHAE NEFF ORLANDO MICKMAN, CARL  Accepting facility: OTHER  [facility]  OTHER  [facility]  Accepting provider: MERLENE CARBONE JAMES E.  Reason for transfer:  Higher level of care  Transfer diagnosis: AMS  Transfer specialty requested: Hospital Medicine  Intensive Care  Transfer specialty notified: Yes  Transfer level: 2  Isolation status: No active isolations   Admission class or status: IP- Inpatient  IP- Inpatient      Narrative     Patient is a 42 y.o. male who has a past medical history of Asperger's, prior stroke with hemiparesis, hypertension, ESBL UTI (July 2021), functional quadriplegia, bedbound status, and G-tube presented to [facility] ED with AMS. Patient was found with seizure like activity and prolonged post ictal state. He arrived to ED lethargic, poorly responsive, and confused. No seizure like activity while in ED. See below labs and imaging results. Patient is currently maintaining his airway but there is concern for aspiration as patient is continuously vomiting. Patient treated with IV Keppra and IV meropenem due to concern for UTI. He is hemodynamically stable satting 99% on 4L NC. ED MD consulted neurology who recommended transfer to a facility with ICU level of care and have his antiepileptic medications adjusted.     Objective     Vitals: Temp: 97.8 °F (36.6 °C) (10/24/22 2227)  Pulse: 100 (10/24/22 2332)  Resp: 16 (10/24/22 2227)  BP: (!) 159/103  Screening Questionnaire for Adult Immunization    Are you sick today?   No   Do you have allergies to medications, food, a vaccine component or latex?   Yes   Have you ever had a serious reaction after receiving a vaccination?   No   Do you have a long-term health problem with heart disease, lung disease, asthma, kidney disease, metabolic disease (e.g. diabetes), anemia, or other blood disorder?   No   Do you have cancer, leukemia, HIV/AIDS, or any other immune system problem?   No   In the past 3 months, have you taken medications that affect  your immune system, such as prednisone, other steroids, or anticancer drugs; drugs for the treatment of rheumatoid arthritis, Crohn s disease, or psoriasis; or have you had radiation treatments?   Yes   Have you had a seizure, or a brain or other nervous system problem?   No   During the past year, have you received a transfusion of blood or blood     products, or been given immune (gamma) globulin or antiviral drug?   No   For women: Are you pregnant or is there a chance you could become        pregnant during the next month?   No   Have you received any vaccinations in the past 4 weeks?   No     Immunization questionnaire was positive for at least one answer.  Notified provider.        Per orders of Dr. Velez, injection of Tdap given by Princess CHADD Lomeli. Patient instructed to remain in clinic for 15 minutes afterwards, and to report any adverse reaction to me immediately.       Screening performed by Princess CHADD Lomeli on 5/10/2018 at 2:31 PM.     (10/24/22 2332)  SpO2: 99 % (10/24/22 2332)    Recent Labs: see EPIC  CBC:  Recent Labs   Lab 10/24/22  2259   WBC 18.56*   HGB 12.1*   HCT 36.9*        CMP:  Recent Labs   Lab 10/24/22  2259   CALCIUM 9.5   ALBUMIN 3.2*   PROT 6.9      K 4.8   CO2 28   CL 98   BUN 33*   CREATININE 1.4   ALKPHOS 170*   ALT 27   AST 21   BILITOT 0.8     No results for input(s): LACTATE in the last 72 hours.  Recent Labs   Lab 10/24/22  2259   TROPONINI 0.009     BNP  No results for input(s): BNP, BNPTRIAGEBLO in the last 168 hours.  ABG  No results for input(s): PH, PO2, PCO2, HCO3, BE in the last 168 hours.   Lab Results   Component Value Date    INR 0.9 08/22/2021    INR 1.1 07/10/2021    INR 1.0 06/02/2021       Recent imaging:   X-Ray Chest AP Portable  Narrative: EXAMINATION:  XR CHEST AP PORTABLE    CLINICAL HISTORY:  Vomiting, unspecified    TECHNIQUE:  Single frontal view of the chest was performed.    COMPARISON:  10/12/2022.    FINDINGS:  The lungs are well expanded and clear. No focal opacities are seen. The pleural spaces are clear.    The cardiac silhouette is unremarkable.    The visualized osseous structures are unremarkable.  Impression: No acute abnormality.    Electronically signed by: Alex La  Date:    10/24/2022  Time:    23:37      Airway:     Vent settings:     IV access:        Peripheral IV - Single Lumen 10/24/22 2224 22 G Anterior;Distal;Left Forearm (Active)   Site Assessment Clean;Dry;Intact;No redness;No swelling 10/24/22 2234   Line Status Flushed 10/24/22 2234   Dressing Status Clean;Dry;Intact 10/24/22 2234            Midline Catheter Insertion/Assessment  - Single Lumen 10/20/22 1616 Right basilic vein (medial side of arm) 18g x 10cm (Active)     Allergies: Review of patient's allergies indicates:  No Known Allergies   NPO: No    Anticoagulation:   Anticoagulants       None             Instructions      [facility]  Admit to Hospital Medicine  Upon patient arrival to floor,  please contact Hospital Medicine on call.

## 2022-10-25 NOTE — H&P
[facility] Medicine History & Physical Examination   Patient Name: Andrea Leon Jr.  MRN: [id number]  Patient Class: IP- Inpatient   Admission Date: 10/25/2022  5:32 AM  Length of Stay: 0  Attending Physician: Tadeo Ji MD  Primary Care Provider: Elly Mathew DO  Face-to-Face encounter date: 10/25/2022  Code Status: Full Code  MPOA:  Chief Complaint: seizure/AMS      HISTORY OF PRESENT ILLNESS:   Andrea Leon Jr. is a 42 y.o. with history of previous CVA, hemiparesis, HTN, bedbound, ESBL and has PEG tube.  He was brought to [facility] from a nursing home after seizure like activity followed by AMS.  Unable to obtain further history as he is currently non-verbal and no family available.  In Er at OSH he was given keppra and transferred to [facility] for neurology consult.    History was obtained from transferring physician Sign-out and EMR  Unable to obtain ROS    REVIEW OF SYSTEMS:   10 Point Review of System was performed and was found to be negative except for that mentioned already in the HPI above.     PAST MEDICAL HISTORY:     Past Medical History:   Diagnosis Date    Anticoagulant long-term use     baby asa per chart    Asperger's syndrome     CVA, old, hemiparesis     Encounter for blood transfusion     Essential hypertension, malignant     Functional quadriplegia 5/28/2021    PEG (percutaneous endoscopic gastrostomy) status        PAST SURGICAL HISTORY:     Past Surgical History:   Procedure Laterality Date    COLONOSCOPY N/A 6/3/2021    Procedure: COLONOSCOPY;  Surgeon: Rosario Meeks MD;  Location: [facility];  Service: Endoscopy;  Laterality: N/A;    ESOPHAGOGASTRODUODENOSCOPY N/A 7/10/2021    Procedure: EGD (ESOPHAGOGASTRODUODENOSCOPY);  Surgeon: Rosario Meeks MD;  Location: [facility];  Service: Endoscopy;  Laterality: N/A;    ESOPHAGOGASTRODUODENOSCOPY W/ PEG N/A 4/1/2021    Procedure: EGD, WITH PEG TUBE INSERTION;  Surgeon: En Zheng MD;  Location: [facility]  ENDO;  Service: Endoscopy;  Laterality: N/A;       ALLERGIES:   Patient has no known allergies.    FAMILY HISTORY:   No family history on file.    SOCIAL HISTORY:     Social History     Tobacco Use    Smoking status: Never    Smokeless tobacco: Never   Substance Use Topics    Alcohol use: Not Currently        Social History     Substance and Sexual Activity   Sexual Activity Not Currently        HOME MEDICATIONS:     Prior to Admission medications    Medication Sig Start Date End Date Taking? Authorizing Provider   acetaminophen (TYLENOL) 325 MG tablet 650 mg by Per G Tube route every 8 (eight) hours as needed for Temperature greater than (100.1).    Historical Provider   amantadine HCL (SYMMETREL) 100 mg capsule 1 capsule (100 mg total) by Per G Tube route once daily. 6/2/21 6/2/22  Kwadwo Bates MD   amLODIPine (NORVASC) 5 MG tablet 1 tablet (5 mg total) by Per G Tube route once daily. 8/2/21 8/2/22  Yosi Sanchez MD   arginine/glutamine/calcium bmb (ISAI ORAL) 1 packet by Per G Tube route 2 (two) times a day.    Historical Provider   atorvastatin (LIPITOR) 40 MG tablet 1 tablet (40 mg total) by Per G Tube route once daily. 6/2/21 6/2/22  Kwadwo Bates MD   cloNIDine (CATAPRES) 0.1 MG tablet Take 1 tablet (0.1 mg total) by mouth 3 (three) times daily as needed (180). 8/2/21 8/2/22  Yosi Sanchez MD   famotidine (PEPCID) 20 MG tablet Take 20 mg by mouth 2 (two) times daily. 9/12/22   Historical Provider   FLUoxetine 20 MG capsule 1 capsule (20 mg total) by Per G Tube route once daily. 6/2/21 6/2/22  Kwadwo Bates MD   ketoconazole (NIZORAL) 2 % shampoo Apply topically once daily.  Patient taking differently: Apply 1 application topically once daily.  6/2/21   Kwadwo Bates MD   levETIRAcetam (KEPPRA) 750 MG Tab Take 1 tablet (750 mg total) by mouth 2 (two) times daily. 8/11/21 8/11/22  Zunilda Espinoza MD   LORazepam (ATIVAN) 0.5 MG tablet 0.5 mg by Per G Tube route every 6 (six) hours as needed for  Anxiety.    Historical Provider   losartan (COZAAR) 50 MG tablet 1 tablet (50 mg total) by Per G Tube route once daily. 8/2/21 8/2/22  Yosi Sanchez MD   modafiniL (PROVIGIL) 200 MG Tab 200 mg by Per G Tube route once daily.    Historical Provider   ondansetron (ZOFRAN-ODT) 4 MG TbDL ondansetron 4 mg disintegrating tablet    Historical Provider   polyethylene glycol (GLYCOLAX) 17 gram PwPk Take by mouth.    Historical Provider   valproate (DEPAKENE) 250 mg/5 mL syrup Take by mouth. 9/24/22   Historical Provider         PHYSICAL EXAM:   BP (!) 137/98 (BP Location: Left arm)   Pulse 108   Temp 98.4 °F (36.9 °C) (Oral)   Resp 16   SpO2 100%   Vitals Reviewed  General appearance: ill appearing, doesn't open eyes  Neuro: obtunded, UE contracted, LE no spontaneous movement  HENT: Atraumatic head. Moist mucous membranes of oral cavity..   Lungs: diminished, shallow resp   Heart: Regular rate and rhythm. S1 and S2 present with no murmurs/gallop/rub.   Abdomen: Soft, non-distended   Extremities: No cyanosis, clubbing.        EMERGENCY DEPARTMENT LABS AND IMAGING:     Labs Reviewed - No data to display    No orders to display       ASSESSMENT & PLAN:   Andrea Leon Jr. is a 42 y.o. male admitted for seizure-like activity        Plan    Seizure-like activity  Hx of CVA  concern for UTI w/recent ESBL on ucx  UA+ nitrate, occasional bacteria.    Meropenem IV 1g q8  LR @100   Keppra 1 g BID  Ativan 2 mg IV prn for seizure activity  EEG  Cpk  Head CT  Consider MRI  Consult neurology    Diet: NPO, nutrition consulted    DVT Prophylaxis:  lovenox.     Discharge Planning and Disposition:  Nursing Home    Expected LOS: 2-3 nights    ________________________________________________________________________________    Face-to-Face encounter date: 10/25/2022  Encounter included review of the medical records, interviewing and examining the patient face-to-face, discussion with family and other health care providers including  emergency medicine physician, admission orders, interpreting lab/test results and formulating a plan of care.     ________________________________________________________________________________    INPATIENT LIST OF MEDICATIONS     Current Facility-Administered Medications:     acetaminophen tablet 650 mg, 650 mg, Per G Tube, Q8H PRN, Tang Ji MD    amantadine HCL capsule/tablet 100 mg, 100 mg, Per G Tube, Daily, Tang Ji MD    amLODIPine tablet 5 mg, 5 mg, Per G Tube, Daily, Tang Ji MD    FLUoxetine capsule 20 mg, 20 mg, Per G Tube, Daily, Tang Ji MD    levETIRAcetam in NaCl (iso-os) IVPB 1,000 mg, 1,000 mg, Intravenous, Q12H, Tang Ji MD    LORazepam injection 1 mg, 1 mg, Intravenous, Q15 Min PRN, Tang Ji MD    ondansetron injection 4 mg, 4 mg, Intravenous, Q8H PRN, Tang Ji MD    sodium chloride 0.9% flush 10 mL, 10 mL, Intravenous, Q12H, Tang Ji MD    valproate 250 mg/5 mL syrup 250 mg, 250 mg, Oral, Q12H, Tang Ji MD      Scheduled Meds:   amantadine HCL  100 mg Per G Tube Daily    amLODIPine  5 mg Per G Tube Daily    FLUoxetine  20 mg Per G Tube Daily    levetiracetam IV  1,000 mg Intravenous Q12H    sodium chloride 0.9%  10 mL Intravenous Q12H    valproate  250 mg Oral Q12H     Continuous Infusions:  PRN Meds:.acetaminophen, lorazepam, ondansetron      Tang Ji  [facility] Hospitalist  10/25/2022

## 2022-10-25 NOTE — PLAN OF CARE
Problem: Feeding Intolerance (Enteral Nutrition)  Goal: Feeding Tolerance  Outcome: Ongoing, Progressing  Intervention: Prevent and Manage Feeding Intolerance  Flowsheets (Taken 10/25/2022 8604)  Nutrition Support Management: tube feeding initiated

## 2022-10-25 NOTE — PLAN OF CARE
10/25/22 1406   GRAHAM Message   Medicare Outpatient and Observation Notification regarding financial responsibility Given to patient/caregiver;Explained to patient/caregiver;Signed/date by patient/caregiver   Date GRAHAM was signed 10/25/22   Time GRAHAM was signed 0015

## 2022-10-25 NOTE — PLAN OF CARE
Problem: Adult Inpatient Plan of Care  Goal: Plan of Care Review  Outcome: Ongoing, Progressing  Goal: Patient-Specific Goal (Individualized)  Outcome: Ongoing, Progressing  Goal: Absence of Hospital-Acquired Illness or Injury  Outcome: Ongoing, Progressing  Goal: Optimal Comfort and Wellbeing  Outcome: Ongoing, Progressing  Goal: Readiness for Transition of Care  Outcome: Ongoing, Progressing     Problem: Adjustment to Illness (Sepsis/Septic Shock)  Goal: Optimal Coping  Outcome: Ongoing, Progressing     Problem: Bleeding (Sepsis/Septic Shock)  Goal: Absence of Bleeding  Outcome: Ongoing, Progressing     Problem: Glycemic Control Impaired (Sepsis/Septic Shock)  Goal: Blood Glucose Level Within Desired Range  Outcome: Ongoing, Progressing     Problem: Infection Progression (Sepsis/Septic Shock)  Goal: Absence of Infection Signs and Symptoms  Outcome: Ongoing, Progressing     Problem: Nutrition Impaired (Sepsis/Septic Shock)  Goal: Optimal Nutrition Intake  Outcome: Ongoing, Progressing     Problem: Fluid and Electrolyte Imbalance (Acute Kidney Injury/Impairment)  Goal: Fluid and Electrolyte Balance  Outcome: Ongoing, Progressing     Problem: Oral Intake Inadequate (Acute Kidney Injury/Impairment)  Goal: Optimal Nutrition Intake  Outcome: Ongoing, Progressing     Problem: Renal Function Impairment (Acute Kidney Injury/Impairment)  Goal: Effective Renal Function  Outcome: Ongoing, Progressing     Problem: Infection  Goal: Absence of Infection Signs and Symptoms  Outcome: Ongoing, Progressing     Problem: Impaired Wound Healing  Goal: Optimal Wound Healing  Outcome: Ongoing, Progressing

## 2022-10-25 NOTE — NURSING
Pt presents to [facility] ICU in no acute distress. See adult admission assessment and charted vital signs in epic. See telemetry strip in chart. Pt is slightly contracted of bilateral upper and lower extremities. Open eyes spontaneously. Does not follow commands. Pt does not speak at baseline. Pt has PEG. Dressing is clean, dry and intact. PERRLA. Tracks with eyes. Lifts and resists right upper extremity. Lifts and falls back slightly left upper extremity. Lifts and falls back bilateral lower extremities. Placed external condom catheter. Repositioned pt and bathed. Bed alarm on. Will continue to monitor.

## 2022-10-26 LAB
ANION GAP SERPL CALC-SCNC: 7 MMOL/L (ref 8–16)
BACTERIA UR CULT: NO GROWTH
BASOPHILS # BLD AUTO: 0.04 K/UL (ref 0–0.2)
BASOPHILS NFR BLD: 0.5 % (ref 0–1.9)
BUN SERPL-MCNC: 26 MG/DL (ref 6–20)
CALCIUM SERPL-MCNC: 9.2 MG/DL (ref 8.7–10.5)
CHLORIDE SERPL-SCNC: 103 MMOL/L (ref 95–110)
CO2 SERPL-SCNC: 29 MMOL/L (ref 23–29)
CREAT SERPL-MCNC: 1.2 MG/DL (ref 0.5–1.4)
DIFFERENTIAL METHOD: ABNORMAL
EOSINOPHIL # BLD AUTO: 0.4 K/UL (ref 0–0.5)
EOSINOPHIL NFR BLD: 4.7 % (ref 0–8)
ERYTHROCYTE [DISTWIDTH] IN BLOOD BY AUTOMATED COUNT: 14.6 % (ref 11.5–14.5)
EST. GFR  (NO RACE VARIABLE): >60 ML/MIN/1.73 M^2
GLUCOSE SERPL-MCNC: 102 MG/DL (ref 70–110)
HCT VFR BLD AUTO: 32.7 % (ref 40–54)
HGB BLD-MCNC: 10.4 G/DL (ref 14–18)
IMM GRANULOCYTES # BLD AUTO: 0.02 K/UL (ref 0–0.04)
IMM GRANULOCYTES NFR BLD AUTO: 0.2 % (ref 0–0.5)
LYMPHOCYTES # BLD AUTO: 2 K/UL (ref 1–4.8)
LYMPHOCYTES NFR BLD: 24.5 % (ref 18–48)
MAGNESIUM SERPL-MCNC: 1.9 MG/DL (ref 1.6–2.6)
MCH RBC QN AUTO: 31.4 PG (ref 27–31)
MCHC RBC AUTO-ENTMCNC: 31.8 G/DL (ref 32–36)
MCV RBC AUTO: 99 FL (ref 82–98)
MONOCYTES # BLD AUTO: 0.7 K/UL (ref 0.3–1)
MONOCYTES NFR BLD: 8.1 % (ref 4–15)
NEUTROPHILS # BLD AUTO: 5.1 K/UL (ref 1.8–7.7)
NEUTROPHILS NFR BLD: 62 % (ref 38–73)
NRBC BLD-RTO: 0 /100 WBC
PHOSPHATE SERPL-MCNC: 3.1 MG/DL (ref 2.7–4.5)
PLATELET # BLD AUTO: 212 K/UL (ref 150–450)
PMV BLD AUTO: 10.7 FL (ref 9.2–12.9)
POTASSIUM SERPL-SCNC: 4.1 MMOL/L (ref 3.5–5.1)
RBC # BLD AUTO: 3.31 M/UL (ref 4.6–6.2)
SODIUM SERPL-SCNC: 139 MMOL/L (ref 136–145)
TSH SERPL DL<=0.005 MIU/L-ACNC: 0.5 UIU/ML (ref 0.34–5.6)
WBC # BLD AUTO: 8.16 K/UL (ref 3.9–12.7)

## 2022-10-26 PROCEDURE — 25000003 PHARM REV CODE 250: Performed by: INTERNAL MEDICINE

## 2022-10-26 PROCEDURE — 63600175 PHARM REV CODE 636 W HCPCS: Performed by: INTERNAL MEDICINE

## 2022-10-26 PROCEDURE — 93010 ELECTROCARDIOGRAM REPORT: CPT | Mod: ,,, | Performed by: SPECIALIST

## 2022-10-26 PROCEDURE — 84100 ASSAY OF PHOSPHORUS: CPT | Performed by: INTERNAL MEDICINE

## 2022-10-26 PROCEDURE — 63600175 PHARM REV CODE 636 W HCPCS: Performed by: STUDENT IN AN ORGANIZED HEALTH CARE EDUCATION/TRAINING PROGRAM

## 2022-10-26 PROCEDURE — 84443 ASSAY THYROID STIM HORMONE: CPT | Performed by: STUDENT IN AN ORGANIZED HEALTH CARE EDUCATION/TRAINING PROGRAM

## 2022-10-26 PROCEDURE — 93010 EKG 12-LEAD: ICD-10-PCS | Mod: ,,, | Performed by: SPECIALIST

## 2022-10-26 PROCEDURE — A4216 STERILE WATER/SALINE, 10 ML: HCPCS | Performed by: STUDENT IN AN ORGANIZED HEALTH CARE EDUCATION/TRAINING PROGRAM

## 2022-10-26 PROCEDURE — 85025 COMPLETE CBC W/AUTO DIFF WBC: CPT | Performed by: STUDENT IN AN ORGANIZED HEALTH CARE EDUCATION/TRAINING PROGRAM

## 2022-10-26 PROCEDURE — 94761 N-INVAS EAR/PLS OXIMETRY MLT: CPT

## 2022-10-26 PROCEDURE — 20000000 HC ICU ROOM

## 2022-10-26 PROCEDURE — 80048 BASIC METABOLIC PNL TOTAL CA: CPT | Performed by: STUDENT IN AN ORGANIZED HEALTH CARE EDUCATION/TRAINING PROGRAM

## 2022-10-26 PROCEDURE — 99223 PR INITIAL HOSPITAL CARE,LEVL III: ICD-10-PCS | Mod: ,,, | Performed by: INTERNAL MEDICINE

## 2022-10-26 PROCEDURE — 93005 ELECTROCARDIOGRAM TRACING: CPT | Performed by: SPECIALIST

## 2022-10-26 PROCEDURE — 25000003 PHARM REV CODE 250: Performed by: STUDENT IN AN ORGANIZED HEALTH CARE EDUCATION/TRAINING PROGRAM

## 2022-10-26 PROCEDURE — 36415 COLL VENOUS BLD VENIPUNCTURE: CPT | Performed by: STUDENT IN AN ORGANIZED HEALTH CARE EDUCATION/TRAINING PROGRAM

## 2022-10-26 PROCEDURE — 99223 1ST HOSP IP/OBS HIGH 75: CPT | Mod: ,,, | Performed by: INTERNAL MEDICINE

## 2022-10-26 PROCEDURE — 83735 ASSAY OF MAGNESIUM: CPT | Performed by: INTERNAL MEDICINE

## 2022-10-26 RX ORDER — VALPROIC ACID 250 MG/5ML
250 SOLUTION ORAL ONCE
Status: COMPLETED | OUTPATIENT
Start: 2022-10-26 | End: 2022-10-26

## 2022-10-26 RX ORDER — VALPROIC ACID 250 MG/5ML
500 SOLUTION ORAL EVERY 12 HOURS
Status: DISCONTINUED | OUTPATIENT
Start: 2022-10-26 | End: 2022-10-27 | Stop reason: HOSPADM

## 2022-10-26 RX ADMIN — VALPROIC ACID 250 MG: 500 SOLUTION ORAL at 10:10

## 2022-10-26 RX ADMIN — MEROPENEM AND SODIUM CHLORIDE 1 G: 1 INJECTION, SOLUTION INTRAVENOUS at 07:10

## 2022-10-26 RX ADMIN — SODIUM CHLORIDE, PRESERVATIVE FREE 10 ML: 5 INJECTION INTRAVENOUS at 09:10

## 2022-10-26 RX ADMIN — CHLORHEXIDINE GLUCONATE 15 ML: 1.2 RINSE ORAL at 10:10

## 2022-10-26 RX ADMIN — VALPROIC ACID 250 MG: 500 SOLUTION ORAL at 11:10

## 2022-10-26 RX ADMIN — SODIUM CHLORIDE, PRESERVATIVE FREE 10 ML: 5 INJECTION INTRAVENOUS at 10:10

## 2022-10-26 RX ADMIN — AMLODIPINE BESYLATE 5 MG: 5 TABLET ORAL at 10:10

## 2022-10-26 RX ADMIN — MEROPENEM AND SODIUM CHLORIDE 1 G: 1 INJECTION, SOLUTION INTRAVENOUS at 04:10

## 2022-10-26 RX ADMIN — ENOXAPARIN SODIUM 40 MG: 100 INJECTION SUBCUTANEOUS at 04:10

## 2022-10-26 RX ADMIN — AMANTADINE 100 MG: 100 CAPSULE ORAL at 10:10

## 2022-10-26 RX ADMIN — MUPIROCIN 1 G: 20 OINTMENT TOPICAL at 10:10

## 2022-10-26 RX ADMIN — LEVETIRACETAM 1000 MG: 10 INJECTION INTRAVENOUS at 10:10

## 2022-10-26 RX ADMIN — MUPIROCIN 1 G: 20 OINTMENT TOPICAL at 08:10

## 2022-10-26 RX ADMIN — CHLORHEXIDINE GLUCONATE 15 ML: 1.2 RINSE ORAL at 08:10

## 2022-10-26 RX ADMIN — VALPROIC ACID 500 MG: 500 SOLUTION ORAL at 08:10

## 2022-10-26 RX ADMIN — LEVETIRACETAM 1000 MG: 10 INJECTION INTRAVENOUS at 08:10

## 2022-10-26 RX ADMIN — FLUOXETINE 20 MG: 20 CAPSULE ORAL at 10:10

## 2022-10-26 RX ADMIN — SODIUM CHLORIDE, SODIUM LACTATE, POTASSIUM CHLORIDE, AND CALCIUM CHLORIDE: .6; .31; .03; .02 INJECTION, SOLUTION INTRAVENOUS at 11:10

## 2022-10-26 NOTE — CONSULTS
[facility]  Department of Cardiology  Consult Note      PATIENT NAME: Andrea Leon Jr.  MRN: [id number]  TODAY'S DATE: 10/26/2022  ADMIT DATE: 10/25/2022                          CONSULT REQUESTED BY: Lamin Treviño MD    SUBJECTIVE     PRINCIPAL PROBLEM: <principal problem not specified>      REASON FOR CONSULT:  BRADYCARDIA      HPI:  Pt is 42 year old male with multiple chronic conditions. He was brought to [facility] for altered mental status and seizure activity. Patient was started on Keppra and Merrem for UTI. He transferred to [facility] for higher level of care and antiepileptic medication adjustment. Pt had episode of emesis with concerns for aspiration on admission to ER.   Pt's nurse reports early this morning when patient was asleep his heart rate decreased to 46 on telemetry. EKG obtained and showed Sinus sari , HR 55.     PMH: Seizure disorder,Asperger's, CVA, HTN, Quadriplegia  PSH: PEG     Resides in nursing home; nonverbal    Review of patient's allergies indicates:  No Known Allergies    Past Medical History:   Diagnosis Date    Anticoagulant long-term use     baby asa per chart    Asperger's syndrome     CVA, old, hemiparesis     Encounter for blood transfusion     Essential hypertension, malignant     Functional quadriplegia 5/28/2021    PEG (percutaneous endoscopic gastrostomy) status      Past Surgical History:   Procedure Laterality Date    COLONOSCOPY N/A 6/3/2021    Procedure: COLONOSCOPY;  Surgeon: Rosario Meeks MD;  Location: [facility];  Service: Endoscopy;  Laterality: N/A;    ESOPHAGOGASTRODUODENOSCOPY N/A 7/10/2021    Procedure: EGD (ESOPHAGOGASTRODUODENOSCOPY);  Surgeon: Rosario Meeks MD;  Location: [facility];  Service: Endoscopy;  Laterality: N/A;    ESOPHAGOGASTRODUODENOSCOPY W/ PEG N/A 4/1/2021    Procedure: EGD, WITH PEG TUBE INSERTION;  Surgeon: En Zheng MD;  Location: [facility];  Service: Endoscopy;  Laterality: N/A;     Social  History     Tobacco Use    Smoking status: Never    Smokeless tobacco: Never   Substance Use Topics    Alcohol use: Not Currently    Drug use: Not Currently        REVIEW OF SYSTEMS  Unable to obtain, pt is nonverbal    OBJECTIVE     VITAL SIGNS (Most Recent)  Temp: 97.8 °F (36.6 °C) (10/26/22 1501)  Pulse: (!) 57 (10/26/22 1600)  Resp: 12 (10/26/22 1600)  BP: 117/80 (10/26/22 1600)  SpO2: 100 % (10/26/22 1600)    VENTILATION STATUS  Resp: 12 (10/26/22 1600)  SpO2: 100 % (10/26/22 1600)       I & O (Last 24H):  Intake/Output Summary (Last 24 hours) at 10/26/2022 1628  Last data filed at 10/26/2022 0611  Gross per 24 hour   Intake 683.33 ml   Output 550 ml   Net 133.33 ml       WEIGHTS  Wt Readings from Last 3 Encounters:   10/25/22 0648 68.8 kg (151 lb 10.8 oz)   10/24/22 2227 65.8 kg (145 lb)   10/20/22 1519 72.6 kg (160 lb)       PHYSICAL EXAM  GENERAL: chronically ill young male resting in bed w/o agitation; non verbal  HEENT: Normocephalic. Pupils normal and conjunctivae normal.    CARDIAC: Regular rate and rhythm. S1 is normal.S2 is normal.No gallops, clicks or murmurs noted at this time.  CHEST ANATOMY: normal.   LUNGS: Clear to auscultation. No wheezing or rhonchi..   ABDOMEN: Soft no masses or organomegaly.  PEG tube in place for nutrition.   URINARY: +mckeon catheter   EXTREMITIES: upper extremities contracted; pt does not ambulate; atrophy to legs;     HOME MEDICATIONS:  No current facility-administered medications on file prior to encounter.     Current Outpatient Medications on File Prior to Encounter   Medication Sig Dispense Refill    acetaminophen (TYLENOL) 325 MG tablet 650 mg by Per G Tube route every 8 (eight) hours as needed for Temperature greater than (100.1).      amantadine HCL (SYMMETREL) 100 mg capsule 1 capsule (100 mg total) by Per G Tube route once daily. 30 capsule 11    amLODIPine (NORVASC) 5 MG tablet 1 tablet (5 mg total) by Per G Tube route once daily. 30 tablet 11     arginine/glutamine/calcium bmb (ISAI ORAL) 1 packet by Per G Tube route 2 (two) times a day.      atorvastatin (LIPITOR) 40 MG tablet 1 tablet (40 mg total) by Per G Tube route once daily. 90 tablet 3    cloNIDine (CATAPRES) 0.1 MG tablet Take 1 tablet (0.1 mg total) by mouth 3 (three) times daily as needed (180). 90 tablet 0    famotidine (PEPCID) 20 MG tablet Take 20 mg by mouth 2 (two) times daily.      FLUoxetine 20 MG capsule 1 capsule (20 mg total) by Per G Tube route once daily. 30 capsule 11    ketoconazole (NIZORAL) 2 % shampoo Apply topically once daily. (Patient taking differently: Apply 1 application topically once daily. )      levETIRAcetam (KEPPRA) 750 MG Tab Take 1 tablet (750 mg total) by mouth 2 (two) times daily. 60 tablet 1    LORazepam (ATIVAN) 0.5 MG tablet 0.5 mg by Per G Tube route every 6 (six) hours as needed for Anxiety.      losartan (COZAAR) 50 MG tablet 1 tablet (50 mg total) by Per G Tube route once daily. 90 tablet 3    modafiniL (PROVIGIL) 200 MG Tab 200 mg by Per G Tube route once daily.      ondansetron (ZOFRAN-ODT) 4 MG TbDL ondansetron 4 mg disintegrating tablet      polyethylene glycol (GLYCOLAX) 17 gram PwPk Take by mouth.      valproate (DEPAKENE) 250 mg/5 mL syrup Take by mouth.         SCHEDULED MEDS:   amantadine HCL  100 mg Per G Tube Daily    amLODIPine  5 mg Per G Tube Daily    chlorhexidine  15 mL Mouth/Throat BID    enoxparin  40 mg Subcutaneous Daily    FLUoxetine  20 mg Per G Tube Daily    levetiracetam IV  1,000 mg Intravenous Q12H    meropenem (MERREM) IVPB  1 g Intravenous Q8H    mupirocin   Nasal BID    sodium chloride 0.9%  10 mL Intravenous Q12H    valproic acid (as sodium salt)  500 mg Oral Q12H       CONTINUOUS INFUSIONS:   lactated ringers 100 mL/hr at 10/26/22 1151       PRN MEDS:acetaminophen, lorazepam, ondansetron    LABS AND DIAGNOSTICS     CBC LAST 3 DAYS  Recent Labs   Lab 10/24/22  1000 10/25/22  0953 10/26/22  0420   WBC 18.56* 13.90* 8.16   RBC  3.80* 3.44* 3.31*   HGB 12.1* 10.7* 10.4*   HCT 36.9* 33.6* 32.7*   MCV 97 98 99*   MCH 31.8* 31.1* 31.4*   MCHC 32.8 31.8* 31.8*   RDW 14.2 14.6* 14.6*    230 212   MPV 10.6 10.8 10.7   GRAN 88.9*  16.5*  --  62.0  5.1   LYMPH 5.0*  0.9*  --  24.5  2.0   MONO 5.1  1.0  --  8.1  0.7   BASO 0.03  --  0.04   NRBC 0  --  0       COAGULATION LAST 3 DAYS  No results for input(s): LABPT, INR, APTT in the last 168 hours.    CHEMISTRY LAST 3 DAYS  Recent Labs   Lab 10/24/22  2259 10/25/22  0953 10/26/22  0420    133* 139   K 4.8 4.5 4.1   CL 98 97 103   CO2 28 30* 29   ANIONGAP 10 6* 7*   BUN 33* 28* 26*   CREATININE 1.4 1.4 1.2   * 102 102   CALCIUM 9.5 9.0 9.2   MG 1.8  --  1.9   ALBUMIN 3.2* 3.1*  --    PROT 6.9 6.5  --    ALKPHOS 170* 121  --    ALT 27 24  --    AST 21 24  --    BILITOT 0.8 1.2*  --        CARDIAC PROFILE LAST 3 DAYS  Recent Labs   Lab 10/24/22  2259 10/25/22  0659   CPK  --  63   TROPONINI 0.009  --        ENDOCRINE LAST 3 DAYS  Recent Labs   Lab 10/26/22  0420   TSH 0.500       LAST ARTERIAL BLOOD GAS  ABG  No results for input(s): PH, PO2, PCO2, HCO3, BE in the last 168 hours.    LAST 7 DAYS MICROBIOLOGY   Microbiology Results (last 7 days)       ** No results found for the last 168 hours. **            MOST RECENT IMAGING  CT Head Without Contrast  Narrative: CMS MANDATED QUALITY DATA - CT RADIATION - 436    All CT scans at this facility utilize dose modulation, iterative reconstruction, and/or weight based dosing when appropriate to reduce radiation dose to as low as reasonably achievable.    EXAMINATION:  CT HEAD WITHOUT CONTRAST    CLINICAL HISTORY:  Seizure, new-onset, no history of trauma;    TECHNIQUE:  Head CT without IV contrast. All CT scans at this facility use dose modulation, iterative reconstruction, and/or weight based dosing when appropriate to reduce radiation dose to as low as reasonably achievable.    COMPARISON:  MRI of the brain dated  08/09/2021    FINDINGS:  No acute intracranial hemorrhage, midline shift, or mass effect.    Diffuse cerebral and cerebellar atrophy is evident. Mild periventricular and subcortical white matter low-attenuation suggests chronic small vessel ischemic changes.    The ventricles and cisterns are maintained.    Calvarium is intact, and visualized sinuses are clear.  Impression: 1. No acute intracranial abnormality.    2. Chronic small vessel ischemic changes.    Electronically signed by: Monse Christy MD  Date:    10/25/2022  Time:    14:11      ECHOCARDIOGRAM RESULTS (last 5)  Results for orders placed during the hospital encounter of 05/28/21    Echo    Interpretation Summary  · The left ventricle is normal in size with concentric hypertrophy and normal systolic function.  · The estimated ejection fraction is 60%.  · Normal left ventricular diastolic function.  · Normal right ventricular size with normal right ventricular systolic function.  · Mild aortic regurgitation.  · Normal central venous pressure (3 mmHg).  · The estimated PA systolic pressure is 34 mmHg.  · Mild tricuspid regurgitation.      Results for orders placed during the hospital encounter of 03/20/21    Echo Color Flow Doppler? Yes    Interpretation Summary  · There is no evidence of intracardiac shunting.  · The left ventricle is normal in size with moderate concentric hypertrophy and normal systolic function. The estimated ejection fraction is 60%  · Normal left ventricular diastolic function.  · Normal right ventricular size with normal right ventricular systolic function.  · Mild-to-moderate aortic regurgitation.  · Mild to moderate tricuspid regurgitation.      Echo Color Flow Doppler? Yes    Interpretation Summary  · The estimated PA systolic pressure is 27 mmHg.  · The left ventricle is normal in size with moderate concentric hypertrophy and normal systolic function. The estimated ejection fraction is 65%  · Normal left ventricular diastolic  function.  · Normal right ventricular size with normal right ventricular systolic function.  · Mild pulmonic regurgitation.  · Mild aortic regurgitation.      CURRENT/PREVIOUS VISIT EKG  Results for orders placed or performed during the hospital encounter of 10/25/22   EKG 12-lead    Collection Time: 10/26/22 10:03 AM    Narrative    Test Reason : R00.1,    Vent. Rate : 055 BPM     Atrial Rate : 055 BPM     P-R Int : 174 ms          QRS Dur : 084 ms      QT Int : 456 ms       P-R-T Axes : 041 014 099 degrees     QTc Int : 436 ms    Sinus bradycardia  Abnormal QRS-T angle, consider primary T wave abnormality  Abnormal ECG  When compared with ECG of 15-OCT-2022 10:30,  T wave inversion no longer evident in Lateral leads  QT has shortened    Referred By: RAMIRO COBB           Confirmed By:            ASSESSMENT/PLAN:     Active Hospital Problems    Diagnosis    Seizure    Altered mental status    Functional quadriplegia    CVA, old, hemiparesis       ASSESSMENT & PLAN:   Bradycardia- resolved  HTN - controlled  Seizure  Altered mental status  Quadriplegia  UTI      RECOMMENDATIONS:  Bradycardia was transient and has resolved. No heart block seen on EKG. Patient is not on any beta blockers. EKG earlier this month showed Sinus bradycardia rate 57.   Blood pressure is controlled on amlodipine 5 mg per PEG tube Daily.   Cardiology will sign off. Please consult as needed.       Guerline Howard NP  [facility]  Department of Cardiology  Date of Service: 10/26/2022

## 2022-10-26 NOTE — RESPIRATORY THERAPY
10/25/22 1955   Patient Assessment/Suction   Level of Consciousness (AVPU) alert   Respiratory Effort Unlabored   Expansion/Accessory Muscles/Retractions no use of accessory muscles   All Lung Fields Breath Sounds clear;diminished   PRE-TX-O2   O2 Device (Oxygen Therapy) room air   SpO2 98 %   Pulse Oximetry Type Continuous   $ Pulse Oximetry - Multiple Charge Pulse Oximetry - Multiple   Pulse 60   Resp 14   /81   Education   $ Education 15 min   Respiratory Evaluation   $ Care Plan Tech Time 15 min

## 2022-10-26 NOTE — PROGRESS NOTES
"[facility]  Critical Care - Medicine  Progress Note    Patient Name: Andrea Leon Jr.  MRN: [id number]  Admission Date: 10/25/2022  Hospital Length of Stay: 1 days  Code Status: Full Code  Attending Provider: Lamin Treviño MD  Primary Care Provider: Elly Mathew DO   Principal Problem: <principal problem not specified>    Subjective:     HPI: Chief Complaint: Seizure disorder, AMS.        HPI: Andrea Leon Jr. is a 42 y.o. with history of previous CVA, hemiparesis, HTN, bedbound, ESBL and has PEG tube.  He was brought to ONS from a nursing home after seizure like activity followed by AMS.  Unable to obtain further history as he is currently non-verbal and no family available.  In Er at OSH he was given keppra and transferred to [facility] for neurology consult.    History was obtained from transferring physician Sign-out and EMR  Unable to obtain ROS  .  Neurology Consult: Pt examined by Dr. Boone Munson and myself. He is awake and alert in the bed. He is nonverbal with no family at bedside. His eyes are opened and he is able to somewhat track us with his eyes. He is able to follow the command and gave us a "thumbs up" .     10-26-22: pt seen today. Resting comfortably. He is awake and alert. He attempts to track with eyes. Baseline unknown.           Review of SystemsUTA  Objective:     Vital Signs (Most Recent):  Temp: 98.9 °F (37.2 °C) (10/26/22 0000)  Pulse: (!) 54 (10/26/22 0900)  Resp: 14 (10/26/22 0900)  BP: 137/86 (10/26/22 0900)  SpO2: 96 % (10/26/22 0900)   Vital Signs (24h Range):  Temp:  [97.6 °F (36.4 °C)-98.9 °F (37.2 °C)] 98.9 °F (37.2 °C)  Pulse:  [54-74] 54  Resp:  [9-18] 14  SpO2:  [96 %-100 %] 96 %  BP: (110-139)/(78-87) 137/86     Weight: 68.8 kg (151 lb 10.8 oz)  Body mass index is 22.4 kg/m².      Intake/Output Summary (Last 24 hours) at 10/26/2022 1121  Last data filed at 10/26/2022 0611  Gross per 24 hour   Intake 1393.33 ml   Output 775 ml   Net 618.33 ml "       Physical Exam  HENT:      Head: Normocephalic.      Mouth/Throat:      Mouth: Mucous membranes are moist.   Eyes:      Pupils: Pupils are equal, round, and reactive to light.   Neurological:      Mental Status: He is alert.              Lines/Drains/Airways       Drain  Duration                  Gastrostomy/Enterostomy 02/25/22 0100 Gastrostomy tube w/ balloon midline 243 days    Male External Urinary Catheter 10/25/22 0415 Small 1 day              Peripheral Intravenous Line  Duration                  Midline Catheter Insertion/Assessment  - Single Lumen 10/20/22 1616 Right basilic vein (medial side of arm) 18g x 10cm 5 days         Peripheral IV - Single Lumen 10/25/22 0100 Distal;Left;Posterior Forearm 1 day                    Significant Labs:    CBC/Anemia Profile:  Recent Labs   Lab 10/24/22  2259 10/25/22  0953 10/26/22  0420   WBC 18.56* 13.90* 8.16   HGB 12.1* 10.7* 10.4*   HCT 36.9* 33.6* 32.7*    230 212   MCV 97 98 99*   RDW 14.2 14.6* 14.6*   [id number]  --  >1500*  --         Chemistries:  Recent Labs   Lab 10/24/22  2259 10/25/22  0953 10/26/22  0420    133* 139   K 4.8 4.5 4.1   CL 98 97 103   CO2 28 30* 29   BUN 33* 28* 26*   CREATININE 1.4 1.4 1.2   CALCIUM 9.5 9.0 9.2   ALBUMIN 3.2* 3.1*  --    PROT 6.9 6.5  --    BILITOT 0.8 1.2*  --    ALKPHOS 170* 121  --    ALT 27 24  --    AST 21 24  --    MG 1.8  --  1.9   PHOS  --   --  3.1       All pertinent labs within the past 24 hours have been reviewed.    Significant Imaging:  I have reviewed all pertinent imaging results/findings within the past 24 hours.    Assessment/Plan:  MR. Leon has a history of CVA hemiparesis, nonverbal. HTN, Bedbound, ESBL and PEG tube. He currenlty resides in a nursing home and was brought after a seizure followed by AMS.   He is alert in the bed and able to follow very simple commands. Baseline unknown.      Seizures/AMS  Continue Keppra 1000mg BID   Continue Dpeakene 250 mg BID   Keppra level   pending  Valproic acid level low at 11.4  Ativan 2 mg IV prn seizure activity  EEG: no seizures  Head CT non acure   DVT Prophylaxis Lovenox.      Thank you for your consult, we will continue to follow           Active Diagnoses:    Diagnosis Date Noted POA    Seizure [R56.9] 10/25/2022 Unknown    Altered mental status [R41.82] 08/08/2021 Yes    Functional quadriplegia [R53.2] 05/28/2021 Yes    CVA, old, hemiparesis [I69.359]  Not Applicable      Problems Resolved During this Admission:            Critical Care Daily Checklist:    A: Awake: RASS Goal/Actual Goal:    Actual: Billy Agitation Sedation Scale (RASS): Alert and calm   B: Spontaneous Breathing Trial Performed?     C: SAT & SBT Coordinated?  \                      D: Delirium: CAM-ICU Overall CAM-ICU: Positive   E: Early Mobility Performed?    F: Feeding Goal: Goals: Pt to tolerate TF to meet at least 75% of estimated needs.  Status: Nutrition Goal Status: new   Current Diet Order   Procedures    Diet NPO      AS: Analgesia/Sedation    T: Thromboembolic Prophylaxis    H: HOB > 300    U: Stress Ulcer Prophylaxis (if needed)    G: Glucose Control    B: Bowel Function     I: Indwelling Catheter (Lines & Moran) Necessity    D: De-escalation of Antimicrobials/Pharmacotherapies                Critical Care Time: 32 minutes         Critical care was time spent personally by me on the following activities: development of treatment plan with patient or surrogate and bedside caregivers, discussions with consultants, evaluation of patient's response to treatment, examination of patient, ordering and performing treatments and interventions, ordering and review of laboratory studies, ordering and review of radiographic studies, pulse oximetry, re-evaluation of patient's condition.  This critical care time did not overlap with that of any other provider or involve time for any procedures.     Mckenna Lombardi, NP  Critical Care - Neurology   [facility]  [facility]

## 2022-10-27 VITALS
HEART RATE: 59 BPM | DIASTOLIC BLOOD PRESSURE: 90 MMHG | RESPIRATION RATE: 13 BRPM | SYSTOLIC BLOOD PRESSURE: 132 MMHG | HEIGHT: 69 IN | WEIGHT: 151.69 LBS | BODY MASS INDEX: 22.47 KG/M2 | OXYGEN SATURATION: 94 % | TEMPERATURE: 98 F

## 2022-10-27 LAB
ANION GAP SERPL CALC-SCNC: 6 MMOL/L (ref 8–16)
BASOPHILS # BLD AUTO: 0.04 K/UL (ref 0–0.2)
BASOPHILS NFR BLD: 0.4 % (ref 0–1.9)
BUN SERPL-MCNC: 23 MG/DL (ref 6–20)
CALCIUM SERPL-MCNC: 8.9 MG/DL (ref 8.7–10.5)
CHLORIDE SERPL-SCNC: 103 MMOL/L (ref 95–110)
CO2 SERPL-SCNC: 31 MMOL/L (ref 23–29)
CREAT SERPL-MCNC: 1.3 MG/DL (ref 0.5–1.4)
DIFFERENTIAL METHOD: ABNORMAL
EOSINOPHIL # BLD AUTO: 0.4 K/UL (ref 0–0.5)
EOSINOPHIL NFR BLD: 3.3 % (ref 0–8)
ERYTHROCYTE [DISTWIDTH] IN BLOOD BY AUTOMATED COUNT: 14.4 % (ref 11.5–14.5)
EST. GFR  (NO RACE VARIABLE): >60 ML/MIN/1.73 M^2
GLUCOSE SERPL-MCNC: 98 MG/DL (ref 70–110)
HCT VFR BLD AUTO: 33 % (ref 40–54)
HGB BLD-MCNC: 10.6 G/DL (ref 14–18)
IMM GRANULOCYTES # BLD AUTO: 0.03 K/UL (ref 0–0.04)
IMM GRANULOCYTES NFR BLD AUTO: 0.3 % (ref 0–0.5)
LYMPHOCYTES # BLD AUTO: 2.6 K/UL (ref 1–4.8)
LYMPHOCYTES NFR BLD: 24.6 % (ref 18–48)
MAGNESIUM SERPL-MCNC: 1.9 MG/DL (ref 1.6–2.6)
MCH RBC QN AUTO: 31.7 PG (ref 27–31)
MCHC RBC AUTO-ENTMCNC: 32.1 G/DL (ref 32–36)
MCV RBC AUTO: 99 FL (ref 82–98)
MONOCYTES # BLD AUTO: 0.8 K/UL (ref 0.3–1)
MONOCYTES NFR BLD: 7.4 % (ref 4–15)
NEUTROPHILS # BLD AUTO: 6.9 K/UL (ref 1.8–7.7)
NEUTROPHILS NFR BLD: 64 % (ref 38–73)
NRBC BLD-RTO: 0 /100 WBC
PHOSPHATE SERPL-MCNC: 2.6 MG/DL (ref 2.7–4.5)
PLATELET # BLD AUTO: 197 K/UL (ref 150–450)
PMV BLD AUTO: 10.8 FL (ref 9.2–12.9)
POTASSIUM SERPL-SCNC: 4.2 MMOL/L (ref 3.5–5.1)
RBC # BLD AUTO: 3.34 M/UL (ref 4.6–6.2)
SODIUM SERPL-SCNC: 140 MMOL/L (ref 136–145)
T4 FREE SERPL-MCNC: 0.97 NG/DL (ref 0.71–1.51)
WBC # BLD AUTO: 10.69 K/UL (ref 3.9–12.7)

## 2022-10-27 PROCEDURE — 36415 COLL VENOUS BLD VENIPUNCTURE: CPT | Performed by: STUDENT IN AN ORGANIZED HEALTH CARE EDUCATION/TRAINING PROGRAM

## 2022-10-27 PROCEDURE — 25000003 PHARM REV CODE 250: Performed by: STUDENT IN AN ORGANIZED HEALTH CARE EDUCATION/TRAINING PROGRAM

## 2022-10-27 PROCEDURE — 63600175 PHARM REV CODE 636 W HCPCS: Performed by: STUDENT IN AN ORGANIZED HEALTH CARE EDUCATION/TRAINING PROGRAM

## 2022-10-27 PROCEDURE — 85025 COMPLETE CBC W/AUTO DIFF WBC: CPT | Performed by: STUDENT IN AN ORGANIZED HEALTH CARE EDUCATION/TRAINING PROGRAM

## 2022-10-27 PROCEDURE — 25000003 PHARM REV CODE 250: Performed by: INTERNAL MEDICINE

## 2022-10-27 PROCEDURE — 94761 N-INVAS EAR/PLS OXIMETRY MLT: CPT

## 2022-10-27 PROCEDURE — A4216 STERILE WATER/SALINE, 10 ML: HCPCS | Performed by: STUDENT IN AN ORGANIZED HEALTH CARE EDUCATION/TRAINING PROGRAM

## 2022-10-27 PROCEDURE — 84100 ASSAY OF PHOSPHORUS: CPT | Performed by: INTERNAL MEDICINE

## 2022-10-27 PROCEDURE — 80048 BASIC METABOLIC PNL TOTAL CA: CPT | Performed by: STUDENT IN AN ORGANIZED HEALTH CARE EDUCATION/TRAINING PROGRAM

## 2022-10-27 PROCEDURE — 84439 ASSAY OF FREE THYROXINE: CPT | Performed by: STUDENT IN AN ORGANIZED HEALTH CARE EDUCATION/TRAINING PROGRAM

## 2022-10-27 PROCEDURE — 99900035 HC TECH TIME PER 15 MIN (STAT)

## 2022-10-27 PROCEDURE — 83735 ASSAY OF MAGNESIUM: CPT | Performed by: INTERNAL MEDICINE

## 2022-10-27 RX ORDER — LANOLIN ALCOHOL/MO/W.PET/CERES
800 CREAM (GRAM) TOPICAL
Status: DISCONTINUED | OUTPATIENT
Start: 2022-10-27 | End: 2022-10-27 | Stop reason: HOSPADM

## 2022-10-27 RX ORDER — VALPROIC ACID 250 MG/5ML
500 SOLUTION ORAL EVERY 12 HOURS
Qty: 480 ML | Refills: 9 | Status: ON HOLD | OUTPATIENT
Start: 2022-10-27 | End: 2023-01-06 | Stop reason: SDUPTHER

## 2022-10-27 RX ORDER — SODIUM,POTASSIUM PHOSPHATES 280-250MG
2 POWDER IN PACKET (EA) ORAL
Status: DISCONTINUED | OUTPATIENT
Start: 2022-10-27 | End: 2022-10-27 | Stop reason: HOSPADM

## 2022-10-27 RX ORDER — ENOXAPARIN SODIUM 100 MG/ML
40 INJECTION SUBCUTANEOUS DAILY
Qty: 1 ML | Refills: 10 | Status: SHIPPED | OUTPATIENT
Start: 2022-10-27 | End: 2023-11-23 | Stop reason: ALTCHOICE

## 2022-10-27 RX ORDER — LEVETIRACETAM 750 MG/1
1000 TABLET ORAL 2 TIMES DAILY
Qty: 60 TABLET | Refills: 1 | Status: ON HOLD | OUTPATIENT
Start: 2022-10-27 | End: 2023-01-06 | Stop reason: SDUPTHER

## 2022-10-27 RX ADMIN — MUPIROCIN 1 G: 20 OINTMENT TOPICAL at 08:10

## 2022-10-27 RX ADMIN — LEVETIRACETAM 1000 MG: 10 INJECTION INTRAVENOUS at 08:10

## 2022-10-27 RX ADMIN — SODIUM CHLORIDE, PRESERVATIVE FREE 10 ML: 5 INJECTION INTRAVENOUS at 08:10

## 2022-10-27 RX ADMIN — FLUOXETINE 20 MG: 20 CAPSULE ORAL at 09:10

## 2022-10-27 RX ADMIN — AMLODIPINE BESYLATE 5 MG: 5 TABLET ORAL at 08:10

## 2022-10-27 RX ADMIN — AMANTADINE 100 MG: 100 CAPSULE ORAL at 08:10

## 2022-10-27 RX ADMIN — VALPROIC ACID 500 MG: 500 SOLUTION ORAL at 08:10

## 2022-10-27 RX ADMIN — CHLORHEXIDINE GLUCONATE 15 ML: 1.2 RINSE ORAL at 08:10

## 2022-10-27 NOTE — PROCEDURES
REASON FOR STUDY: Seizures.  DATE OF STUDY: 10/25/2022  PHYSICIAN REQUESTING/INSTITUTION:  Dr. Treviño.         CLINICAL HISTORY:    Antiepileptic drugs: Keppra and valproic acid.  Patient is awake during the study.       Methods: This 25 minute EEG was performed with electrodes placement according to the 10/20 international system. Video recording was added to the study.        EEG FINDINGS:  Background activity: The background activity consisted of synchronous and symmetric, medium amplitude, posterior dominant rhythm of  5 Hz.    Sleep: None.  Activation procedures:   Not performed.    EKG: No clear rhythm irregularities were seen (recording limited to one channel).          IMPRESSION:   Abnormal EEG indicating moderate encephalopathy.There were no interictal epileptiform or ictal discharges throughout the recording.     Get Munson MD. FAAN.  [facility]  [phone]

## 2022-10-27 NOTE — CARE UPDATE
10/27/22 0739   PRE-TX-O2   O2 Device (Oxygen Therapy) room air   SpO2 99 %   Pulse Oximetry Type Continuous   $ Pulse Oximetry - Multiple Charge Pulse Oximetry - Multiple   Pulse (!) 56   Resp 14   Respiratory Evaluation   $ Care Plan Tech Time 15 min

## 2022-10-27 NOTE — PLAN OF CARE
Patient cleared for discharge from case management standpoint.    Discharge orders faxed to [facility] at [facility] per facility request to fax, not [facility]. Patient is bedbound due to postural instability. Order placed for [facility]. Report called to facility nurse per Nurse Dalila.     Chart and discharge orders reviewed.  Patient discharged to [facility] nursing home care with no further case management needs.       10/27/22 1611   Final Note   Assessment Type Final Discharge Note   Anticipated Discharge Disposition [facility]   Hospital Resources/Appts/Education Provided Provided patient/caregiver with written discharge plan information   Post-Acute Status   Post-Acute Authorization Placement   Post-Acute Placement Status Set-up Complete/Auth obtained   Discharge Delays (!) Ambulance Transport/Facility Transport

## 2022-10-27 NOTE — PROGRESS NOTES
"[facility]  Critical Care - Medicine  Progress Note    Patient Name: Andrea Leon Jr.  MRN: [id number]  Admission Date: 10/25/2022  Hospital Length of Stay: 2 days  Code Status: Full Code  Attending Provider: Lamin Treviño MD  Primary Care Provider: Elly Mathew DO   Principal Problem: Seizure    Subjective:     HPI: Chief Complaint: Seizure disorder, AMS.        HPI: Andrea Leon Jr. is a 42 y.o. with history of previous CVA, hemiparesis, HTN, bedbound, ESBL and has PEG tube.  He was brought to ONS from a nursing home after seizure like activity followed by AMS.  Unable to obtain further history as he is currently non-verbal and no family available.  In Er at OSH he was given keppra and transferred to [facility] for neurology consult.    History was obtained from transferring physician Sign-out and EMR  Unable to obtain ROS  .  Neurology Consult: Pt examined by Dr. Boone Munson and myself. He is awake and alert in the bed. He is nonverbal with no family at bedside. His eyes are opened and he is able to somewhat track us with his eyes. He is able to follow the command and gave us a "thumbs up" .     10-26-22: pt seen today. Resting comfortably. He is awake and alert. He attempts to track with eyes. Baseline unknown.     10/27: Pt seen and examined. POC discussed with Dr BOONE Munson. Pt nonverbal. He is awake and alert. He followed 1/3 simple commands. EEG negative for seizures. Keppra level pending. Pt had a subtherapeutic valproic acid.           Review of SystemsUTA  Objective:     Vital Signs (Most Recent):  Temp: 97.6 °F (36.4 °C) (10/27/22 1101)  Pulse: (!) 55 (10/27/22 1101)  Resp: 10 (10/27/22 1101)  BP: (!) 138/97 (10/27/22 1101)  SpO2: 100 % (10/27/22 1101)   Vital Signs (24h Range):  Temp:  [97.4 °F (36.3 °C)-97.8 °F (36.6 °C)] 97.6 °F (36.4 °C)  Pulse:  [49-60] 55  Resp:  [10-16] 10  SpO2:  [98 %-100 %] 100 %  BP: (116-154)/(74-97) 138/97     Weight: 68.8 kg (151 lb 10.8 " oz)  Body mass index is 22.4 kg/m².    No intake or output data in the 24 hours ending 10/27/22 1426      Physical Exam  HENT:      Head: Normocephalic.      Mouth/Throat:      Mouth: Mucous membranes are moist.   Eyes:      Pupils: Pupils are equal, round, and reactive to light.   Neurological:      Mental Status: He is alert.              Lines/Drains/Airways       Drain  Duration                  Gastrostomy/Enterostomy 02/25/22 0100 Gastrostomy tube w/ balloon midline 244 days    Male External Urinary Catheter 10/25/22 0415 Small 2 days              Peripheral Intravenous Line  Duration                  Midline Catheter Insertion/Assessment  - Single Lumen 10/20/22 1616 Right basilic vein (medial side of arm) 18g x 10cm 6 days         Peripheral IV - Single Lumen 10/25/22 0100 Distal;Left;Posterior Forearm 2 days                    Significant Labs:    CBC/Anemia Profile:  Recent Labs   Lab 10/26/22  0420 10/27/22  0410   WBC 8.16 10.69   HGB 10.4* 10.6*   HCT 32.7* 33.0*    197   MCV 99* 99*   RDW 14.6* 14.4          Chemistries:  Recent Labs   Lab 10/26/22  0420 10/27/22  0410    140   K 4.1 4.2    103   CO2 29 31*   BUN 26* 23*   CREATININE 1.2 1.3   CALCIUM 9.2 8.9   MG 1.9 1.9   PHOS 3.1 2.6*         All pertinent labs within the past 24 hours have been reviewed.    Significant Imaging:  I have reviewed all pertinent imaging results/findings within the past 24 hours.        NEUROLOGICAL EXAMINATION:     CRANIAL NERVES     CN III, IV, VI   Pupils are equal, round, and reactive to light.    Assessment/Plan:  MR. Leon has a history of CVA hemiparesis, nonverbal. HTN, Bedbound, ESBL and PEG tube. He currenlty resides in a nursing home and was brought after a seizure followed by AMS.   He is alert in the bed and able to follow very simple commands. Baseline unknown.      Seizures/AMS  Continue Keppra 1000mg BID   Continue Dpeakene 250 mg BID   Keppra level  pending  Valproic acid level low  at 11.4 (ref range )  Ativan 2 mg IV prn seizure activity  EEG: no seizures  Head CT non acure   DVT Prophylaxis Lovenox.      Pt at baseline. No further inpatient workup at this time. Pt to follow up outpatient.      [phone] days from discharge.                                 Active Diagnoses:    Diagnosis Date Noted POA    PRINCIPAL PROBLEM:  Seizure [R56.9] 10/25/2022 Yes    Altered mental status [R41.82] 08/08/2021 Yes    Functional quadriplegia [R53.2] 05/28/2021 Yes    CVA, old, hemiparesis [I69.359]  Not Applicable      Problems Resolved During this Admission:            Critical Care Daily Checklist:    A: Awake: RASS Goal/Actual Goal:    Actual: Billy Agitation Sedation Scale (RASS): Alert and calm   B: Spontaneous Breathing Trial Performed?     C: SAT & SBT Coordinated?  \                      D: Delirium: CAM-ICU Overall CAM-ICU: Positive   E: Early Mobility Performed?    F: Feeding Goal: Goals: Pt to tolerate TF to meet at least 75% of estimated needs.  Status: Nutrition Goal Status: new   Current Diet Order   Procedures    Diet NPO      AS: Analgesia/Sedation    T: Thromboembolic Prophylaxis    H: HOB > 300    U: Stress Ulcer Prophylaxis (if needed)    G: Glucose Control    B: Bowel Function     I: Indwelling Catheter (Lines & Moran) Necessity    D: De-escalation of Antimicrobials/Pharmacotherapies                   Abigail Montano DNP  Critical Care - Neurology   [facility]

## 2022-10-27 NOTE — PROGRESS NOTES
[facility] Medicine  Progress Note    Patient Name: Andrea Leon Jr.  MRN: [id number]  Patient Class: IP- Inpatient   Admission Date: 10/25/2022  Length of Stay: 1 days  Attending Physician: Lamin Treviño MD  Primary Care Provider: Elly Mathew DO        Subjective:     Principal Problem:<principal problem not specified>    Interval History: no seizure episodes, baseline MS.  Neurology recommends increasing Depakote based on subtherapeutic level.    Review of Systems  Objective:     Vital Signs (Most Recent):  Temp: 97.8 °F (36.6 °C) (10/26/22 1501)  Pulse: (!) 57 (10/26/22 1800)  Resp: 12 (10/26/22 1800)  BP: 130/88 (10/26/22 1800)  SpO2: 100 % (10/26/22 1800)   Vital Signs (24h Range):  Temp:  [97.6 °F (36.4 °C)-98.9 °F (37.2 °C)] 97.8 °F (36.6 °C)  Pulse:  [53-62] 57  Resp:  [10-16] 12  SpO2:  [96 %-100 %] 100 %  BP: (115-144)/() 130/88     Weight: 68.8 kg (151 lb 10.8 oz)  Body mass index is 22.4 kg/m².    Intake/Output Summary (Last 24 hours) at 10/26/2022 2043  Last data filed at 10/26/2022 0611  Gross per 24 hour   Intake 445 ml   Output 350 ml   Net 95 ml        Physical Exam  Vitals Reviewed  General appearance: ill appearing, doesn't open eyes  Neuro: obtunded, UE contracted, LE no spontaneous movement  HENT: Atraumatic head. Moist mucous membranes of oral cavity..   Lungs: diminished, shallow resp   Heart: Regular rate and rhythm. S1 and S2 present with no murmurs/gallop/rub.   Abdomen: Soft, non-distended   Extremities: No cyanosis, clubbing.           Significant Labs: All pertinent labs within the past 24 hours have been reviewed.    Significant Imaging: I have reviewed all pertinent imaging results/findings within the past 24 hours.    Assessment/Plan:      Active Diagnoses:    Diagnosis Date Noted POA    Seizure [R56.9] 10/25/2022 Yes    Altered mental status [R41.82] 08/08/2021 Yes    Functional quadriplegia [R53.2] 05/28/2021 Yes    CVA, old,  hemiparesis [I69.359]  Not Applicable      Problems Resolved During this Admission:     VTE Risk Mitigation (From admission, onward)           Ordered     enoxaparin injection 40 mg  Daily         10/25/22 1925     IP VTE LOW RISK PATIENT  Once         10/25/22 0527                   Hx of CVA  concern for UTI w/recent ESBL; cultures no growth today.  Discontinue IV antibiotics  LR @100   Keppra 1 g BID, depakote increased to 500 mg q.12 hours from 250 mg q.12 hours.  Ativan 2 mg IV prn for seizure activity  EEG  Check keppra and depakote level  Head CT  Consider MRI  lovenox    Lamin Treviño MD  Department of Hospital Medicine   Replaced by [facility]

## 2022-10-27 NOTE — NURSING
Pt discharged out via EMS to [place]. Report called to Inés. No complaints or changes noted at this time. Patient returned to the facility with midline that was in on arrival.

## 2022-10-29 LAB — LEVETIRACETAM SERPL-MCNC: 61.5 UG/ML (ref 10–40)

## 2022-10-30 LAB
BACTERIA BLD CULT: NORMAL
BACTERIA BLD CULT: NORMAL

## 2022-11-05 NOTE — DISCHARGE SUMMARY
[facility] Medicine  Discharge Summary      Patient Name: Andrea Leon Jr.  MRN: [id number]  Admission Date: 10/25/2022  Hospital Length of Stay: 2 days  Discharge Date and Time: 10/27/2022  5:10 PM  Attending Physician: No att. providers found   Discharging Provider: Lamin Owusu MD  Primary Care Provider: Elly Mathew DO        HPI: Andrea Leon Jr. is a 42 y.o. with history of previous CVA, hemiparesis, HTN, bedbound, ESBL and has PEG tube.  He was brought to ONS from a nursing home after seizure like activity followed by AMS.  Unable to obtain further history as he is currently non-verbal and no family available.  In Er at OSH he was given keppra and transferred to [facility] for neurology consult.    History was obtained from transferring physician Sign-out and EMR  Unable to obtain ROS         Physical Exam  Vitals Reviewed  General appearance: ill appearing, doesn't open eyes  Neuro: obtunded, UE contracted, LE no spontaneous movement  HENT: Atraumatic head. Moist mucous membranes of oral cavity..   Lungs: diminished, shallow resp   Heart: Regular rate and rhythm. S1 and S2 present with no murmurs/gallop/rub.   Abdomen: Soft, non-distended   Extremities: No cyanosis, clubbing.    * No surgery found *      Hospital Course: Pt admitted for seizure like activity, concern for UTI. UTI was r/o by urine cx. He had subtherapeutic depakote levels and depakote was adjusted per neurology recommendations. Pt was seemed safe for return back to SNF after OK with neurology. UTI r/o by negative Ucx.     Consults:   Consults (From admission, onward)          Status Ordering Provider     Inpatient consult to Cardiology  Once        Provider:  Brian Tobin MD    Completed LAMIN OWUSU     Inpatient consult to Registered Dietitian/Nutritionist  Once        Provider:  (Not yet assigned)    LEOPOLDO Cage     Inpatient consult to Registered  Dietitian/Nutritionist  Once        Provider:  (Not yet assigned)    LEOPOLDO Cage            Final Active Diagnoses:    Diagnosis Date Noted POA    PRINCIPAL PROBLEM:  Seizure [R56.9] 10/25/2022 Yes    Altered mental status [R41.82] 08/08/2021 Yes    Functional quadriplegia [R53.2] 05/28/2021 Yes    CVA, old, hemiparesis [I69.359]  Not Applicable      Problems Resolved During this Admission:      Discharged Condition: good    Disposition: Skilled Nursing Facility    Follow Up:   Contact information for follow-up providers       Get Munson MD Follow up in 1 week(s).    Specialties: Vascular Neurology, Neurology  Why: for seizure  Contact information:  [address] [address]  [phone]                       Contact information for after-discharge care       Destination       [place] [facility] [facility]    Service: Nursing Home  Contact information:  [address] [facility] [address]  [phone]                                 Patient Instructions:   No discharge procedures on file.  Medications:  Reconciled Home Medications:      Medication List        START taking these medications      enoxaparin 40 mg/0.4 mL Syrg  Commonly known as: LOVENOX  Inject 0.4 mLs (40 mg total) into the skin once daily.            CHANGE how you take these medications      levETIRAcetam 750 MG Tab  Commonly known as: KEPPRA  Take 1.5 tablets (1,125 mg total) by mouth 2 (two) times daily.  What changed: how much to take     valproic acid (as sodium salt) 500 mg/10 mL (10 mL) Soln  Take 10 mLs (500 mg total) by mouth every 12 (twelve) hours.  What changed:   medication strength  how much to take  when to take this            CONTINUE taking these medications      acetaminophen 325 MG tablet  Commonly known as: TYLENOL  650 mg by Per G Tube route every 8 (eight) hours as needed for Temperature greater than (100.1).     amantadine  mg capsule  Commonly known as: SYMMETREL  1  capsule (100 mg total) by Per G Tube route once daily.     amLODIPine 5 MG tablet  Commonly known as: NORVASC  1 tablet (5 mg total) by Per G Tube route once daily.     atorvastatin 40 MG tablet  Commonly known as: LIPITOR  1 tablet (40 mg total) by Per G Tube route once daily.     cloNIDine 0.1 MG tablet  Commonly known as: CATAPRES  Take 1 tablet (0.1 mg total) by mouth 3 (three) times daily as needed (180).     famotidine 20 MG tablet  Commonly known as: PEPCID  Take 20 mg by mouth 2 (two) times daily.     FLUoxetine 20 MG capsule  1 capsule (20 mg total) by Per G Tube route once daily.     ISAI ORAL  1 packet by Per G Tube route 2 (two) times a day.     LORazepam 0.5 MG tablet  Commonly known as: ATIVAN  0.5 mg by Per G Tube route every 6 (six) hours as needed for Anxiety.     modafiniL 200 MG Tab  Commonly known as: PROVIGIL  200 mg by Per G Tube route once daily.     ondansetron 4 MG Tbdl  Commonly known as: ZOFRAN-ODT  ondansetron 4 mg disintegrating tablet     polyethylene glycol 17 gram Pwpk  Commonly known as: GLYCOLAX  Take by mouth.            STOP taking these medications      losartan 50 MG tablet  Commonly known as: COZAAR            ASK your doctor about these medications      ketoconazole 2 % shampoo  Commonly known as: NIZORAL  Apply topically once daily.                  Pending Diagnostic Studies:       None          Indwelling Lines/Drains at time of discharge:   Lines/Drains/Airways       Drain  Duration                  Gastrostomy/Enterostomy 02/25/22 0100 Gastrostomy tube w/ balloon midline 253 days                    Time spent on the discharge of patient: 40 minutes         Lamin Treviño MD  Department of Hospital Medicine  [facility]

## 2023-01-03 ENCOUNTER — HOSPITAL ENCOUNTER (INPATIENT)
Facility: HOSPITAL | Age: 43
LOS: 3 days | DRG: 100 | End: 2023-01-06
Attending: EMERGENCY MEDICINE | Admitting: INTERNAL MEDICINE
Payer: MEDICAID

## 2023-01-03 DIAGNOSIS — N17.9 AKI (ACUTE KIDNEY INJURY): Primary | ICD-10-CM

## 2023-01-03 DIAGNOSIS — R56.9 SEIZURE: ICD-10-CM

## 2023-01-03 DIAGNOSIS — Z79.899 SEIZURE SECONDARY TO SUBTHERAPEUTIC ANTICONVULSANT MEDICATION: ICD-10-CM

## 2023-01-03 DIAGNOSIS — R61 DIAPHORESIS: ICD-10-CM

## 2023-01-03 DIAGNOSIS — R56.9 SEIZURE SECONDARY TO SUBTHERAPEUTIC ANTICONVULSANT MEDICATION: ICD-10-CM

## 2023-01-03 LAB
ALBUMIN SERPL BCP-MCNC: 3.8 G/DL (ref 3.5–5.2)
ALP SERPL-CCNC: 121 U/L (ref 55–135)
ALT SERPL W/O P-5'-P-CCNC: 14 U/L (ref 10–44)
ANION GAP SERPL CALC-SCNC: 9 MMOL/L (ref 8–16)
AST SERPL-CCNC: 19 U/L (ref 10–40)
BACTERIA #/AREA URNS HPF: NEGATIVE /HPF
BASOPHILS # BLD AUTO: 0.03 K/UL (ref 0–0.2)
BASOPHILS NFR BLD: 0.2 % (ref 0–1.9)
BILIRUB SERPL-MCNC: 1.1 MG/DL (ref 0.1–1)
BILIRUB UR QL STRIP: NEGATIVE
BUN SERPL-MCNC: 36 MG/DL (ref 6–20)
CALCIUM SERPL-MCNC: 9.8 MG/DL (ref 8.7–10.5)
CHLORIDE SERPL-SCNC: 102 MMOL/L (ref 95–110)
CK SERPL-CCNC: 80 U/L (ref 20–200)
CLARITY UR: ABNORMAL
CO2 SERPL-SCNC: 27 MMOL/L (ref 23–29)
COLOR UR: YELLOW
CREAT SERPL-MCNC: 1.5 MG/DL (ref 0.5–1.4)
DIFFERENTIAL METHOD: ABNORMAL
EOSINOPHIL # BLD AUTO: 0.1 K/UL (ref 0–0.5)
EOSINOPHIL NFR BLD: 0.9 % (ref 0–8)
ERYTHROCYTE [DISTWIDTH] IN BLOOD BY AUTOMATED COUNT: 14.6 % (ref 11.5–14.5)
EST. GFR  (NO RACE VARIABLE): 59.6 ML/MIN/1.73 M^2
GLUCOSE SERPL-MCNC: 94 MG/DL (ref 70–110)
GLUCOSE UR QL STRIP: NEGATIVE
HCT VFR BLD AUTO: 39 % (ref 40–54)
HGB BLD-MCNC: 12.6 G/DL (ref 14–18)
HGB UR QL STRIP: ABNORMAL
HYALINE CASTS #/AREA URNS LPF: 4 /LPF
IMM GRANULOCYTES # BLD AUTO: 0.03 K/UL (ref 0–0.04)
IMM GRANULOCYTES NFR BLD AUTO: 0.2 % (ref 0–0.5)
KETONES UR QL STRIP: NEGATIVE
LACTATE SERPL-SCNC: 0.7 MMOL/L (ref 0.5–1.9)
LEUKOCYTE ESTERASE UR QL STRIP: ABNORMAL
LYMPHOCYTES # BLD AUTO: 1.1 K/UL (ref 1–4.8)
LYMPHOCYTES NFR BLD: 8.1 % (ref 18–48)
MAGNESIUM SERPL-MCNC: 2 MG/DL (ref 1.6–2.6)
MCH RBC QN AUTO: 31.2 PG (ref 27–31)
MCHC RBC AUTO-ENTMCNC: 32.3 G/DL (ref 32–36)
MCV RBC AUTO: 97 FL (ref 82–98)
MICROSCOPIC COMMENT: ABNORMAL
MONOCYTES # BLD AUTO: 1 K/UL (ref 0.3–1)
MONOCYTES NFR BLD: 7.8 % (ref 4–15)
NEUTROPHILS # BLD AUTO: 10.9 K/UL (ref 1.8–7.7)
NEUTROPHILS NFR BLD: 82.8 % (ref 38–73)
NITRITE UR QL STRIP: NEGATIVE
NRBC BLD-RTO: 0 /100 WBC
PH UR STRIP: 8 [PH] (ref 5–8)
PLATELET # BLD AUTO: 119 K/UL (ref 150–450)
PMV BLD AUTO: 11.8 FL (ref 9.2–12.9)
POTASSIUM SERPL-SCNC: 4.2 MMOL/L (ref 3.5–5.1)
PROT SERPL-MCNC: 7.3 G/DL (ref 6–8.4)
PROT UR QL STRIP: ABNORMAL
RBC # BLD AUTO: 4.04 M/UL (ref 4.6–6.2)
RBC #/AREA URNS HPF: 56 /HPF (ref 0–4)
SODIUM SERPL-SCNC: 138 MMOL/L (ref 136–145)
SP GR UR STRIP: 1.01 (ref 1–1.03)
SQUAMOUS #/AREA URNS HPF: 1 /HPF
TROPONIN I SERPL HS-MCNC: 10.9 PG/ML (ref 0–14.9)
URN SPEC COLLECT METH UR: ABNORMAL
UROBILINOGEN UR STRIP-ACNC: NEGATIVE EU/DL
VALPROATE SERPL-MCNC: 17 UG/ML (ref 50–100)
WBC # BLD AUTO: 13.13 K/UL (ref 3.9–12.7)
WBC #/AREA URNS HPF: >100 /HPF (ref 0–5)

## 2023-01-03 PROCEDURE — 96375 TX/PRO/DX INJ NEW DRUG ADDON: CPT

## 2023-01-03 PROCEDURE — 63600175 PHARM REV CODE 636 W HCPCS: Performed by: EMERGENCY MEDICINE

## 2023-01-03 PROCEDURE — 84484 ASSAY OF TROPONIN QUANT: CPT | Performed by: NURSE PRACTITIONER

## 2023-01-03 PROCEDURE — 25000003 PHARM REV CODE 250: Performed by: EMERGENCY MEDICINE

## 2023-01-03 PROCEDURE — 93010 ELECTROCARDIOGRAM REPORT: CPT | Mod: 59,,, | Performed by: INTERNAL MEDICINE

## 2023-01-03 PROCEDURE — 85025 COMPLETE CBC W/AUTO DIFF WBC: CPT | Performed by: NURSE PRACTITIONER

## 2023-01-03 PROCEDURE — 83735 ASSAY OF MAGNESIUM: CPT | Performed by: NURSE PRACTITIONER

## 2023-01-03 PROCEDURE — 93010 EKG 12-LEAD: ICD-10-PCS | Mod: 59,,, | Performed by: INTERNAL MEDICINE

## 2023-01-03 PROCEDURE — 80177 DRUG SCRN QUAN LEVETIRACETAM: CPT | Performed by: NURSE PRACTITIONER

## 2023-01-03 PROCEDURE — 93005 ELECTROCARDIOGRAM TRACING: CPT | Performed by: INTERNAL MEDICINE

## 2023-01-03 PROCEDURE — 87040 BLOOD CULTURE FOR BACTERIA: CPT | Performed by: NURSE PRACTITIONER

## 2023-01-03 PROCEDURE — 96361 HYDRATE IV INFUSION ADD-ON: CPT

## 2023-01-03 PROCEDURE — 93010 ELECTROCARDIOGRAM REPORT: CPT | Mod: ,,, | Performed by: INTERNAL MEDICINE

## 2023-01-03 PROCEDURE — 21400001 HC TELEMETRY ROOM

## 2023-01-03 PROCEDURE — 99285 EMERGENCY DEPT VISIT HI MDM: CPT | Mod: 25

## 2023-01-03 PROCEDURE — 25000003 PHARM REV CODE 250: Performed by: NURSE PRACTITIONER

## 2023-01-03 PROCEDURE — 80053 COMPREHEN METABOLIC PANEL: CPT | Performed by: NURSE PRACTITIONER

## 2023-01-03 PROCEDURE — 51701 INSERT BLADDER CATHETER: CPT

## 2023-01-03 PROCEDURE — 80164 ASSAY DIPROPYLACETIC ACD TOT: CPT | Performed by: NURSE PRACTITIONER

## 2023-01-03 PROCEDURE — 82550 ASSAY OF CK (CPK): CPT | Performed by: NURSE PRACTITIONER

## 2023-01-03 PROCEDURE — 36415 COLL VENOUS BLD VENIPUNCTURE: CPT | Performed by: NURSE PRACTITIONER

## 2023-01-03 PROCEDURE — 83605 ASSAY OF LACTIC ACID: CPT | Performed by: NURSE PRACTITIONER

## 2023-01-03 PROCEDURE — 96365 THER/PROPH/DIAG IV INF INIT: CPT

## 2023-01-03 PROCEDURE — 87086 URINE CULTURE/COLONY COUNT: CPT | Performed by: NURSE PRACTITIONER

## 2023-01-03 PROCEDURE — 81001 URINALYSIS AUTO W/SCOPE: CPT | Performed by: NURSE PRACTITIONER

## 2023-01-03 RX ORDER — LORAZEPAM 2 MG/ML
1 INJECTION INTRAMUSCULAR
Status: COMPLETED | OUTPATIENT
Start: 2023-01-03 | End: 2023-01-03

## 2023-01-03 RX ORDER — LOSARTAN POTASSIUM 50 MG/1
50 TABLET ORAL DAILY
COMMUNITY
End: 2023-01-04

## 2023-01-03 RX ORDER — PANTOPRAZOLE SODIUM 40 MG/1
1 TABLET, DELAYED RELEASE ORAL
COMMUNITY
End: 2023-01-04

## 2023-01-03 RX ORDER — LEVETIRACETAM 10 MG/ML
1000 INJECTION INTRAVASCULAR
Status: COMPLETED | OUTPATIENT
Start: 2023-01-03 | End: 2023-01-04

## 2023-01-03 RX ORDER — SODIUM CHLORIDE 9 MG/ML
1000 INJECTION, SOLUTION INTRAVENOUS
Status: COMPLETED | OUTPATIENT
Start: 2023-01-03 | End: 2023-01-04

## 2023-01-03 RX ORDER — VALPROIC ACID 250 MG/5ML
1000 SOLUTION ORAL ONCE
Status: COMPLETED | OUTPATIENT
Start: 2023-01-03 | End: 2023-01-03

## 2023-01-03 RX ORDER — ONDANSETRON 4 MG/1
4 TABLET, FILM COATED ORAL EVERY 6 HOURS PRN
COMMUNITY
End: 2024-02-29 | Stop reason: CLARIF

## 2023-01-03 RX ORDER — LACTOSE-REDUCED FOOD/FIBER 0.07 G-1.5
58 LIQUID (ML) ORAL
Status: ON HOLD | COMMUNITY
End: 2023-01-06 | Stop reason: HOSPADM

## 2023-01-03 RX ORDER — AMINO ACIDS/PROTEIN HYDROLYS 15G-100/30
30 LIQUID (ML) ORAL EVERY MORNING
COMMUNITY
End: 2024-02-29 | Stop reason: CLARIF

## 2023-01-03 RX ADMIN — VALPROIC ACID 1000 MG: 500 SOLUTION ORAL at 11:01

## 2023-01-03 RX ADMIN — SODIUM CHLORIDE 1000 ML: 0.9 INJECTION, SOLUTION INTRAVENOUS at 07:01

## 2023-01-03 RX ADMIN — SODIUM CHLORIDE 1000 ML: 900 INJECTION, SOLUTION INTRAVENOUS at 10:01

## 2023-01-03 RX ADMIN — LORAZEPAM 1 MG: 2 INJECTION INTRAMUSCULAR; INTRAVENOUS at 10:01

## 2023-01-03 RX ADMIN — LEVETIRACETAM 1000 MG: 10 INJECTION INTRAVENOUS at 10:01

## 2023-01-03 NOTE — ED PROVIDER NOTES
Source of History:  EMS, chart    Chief complaint:  Seizures      HPI:  Andrea Leon Jr. is a 42 y.o. male with medical history of hypertension, CVA, quadriplegia, seizures, peg placement presenting with altered mental status and seizure activity.  As per EMS patient resides at [facility] and they are contacted due to seizure-like activity.  As per nursing home staff patient has been receiving his seizure medications as prescribed.  Patient nonverbal at baseline.    This is the extent to the patients complaints today here in the emergency department.    ROS: As per HPI and below:  Limited due to mental status and patient being nonverbal    Constitutional: No fever.  No chills.  Eyes: No visual changes.  ENT: No sore throat. No ear pain    Cardiovascular: No chest pain.  Respiratory: No shortness of breath.  GI: No abdominal pain.  No nausea or vomiting.  Genitourinary: No abnormal urination.  Neurologic: No headache. No focal weakness.  No numbness.  Positive for seizure-like activity.  Positive for mental status change.  MSK: no back pain.  Integument: No rashes or lesions.  Hematologic: No easy bruising.  Endocrine: No excessive thirst or urination.    Review of patient's allergies indicates:  No Known Allergies    PMH:  As per HPI and below:  Past Medical History:   Diagnosis Date    Anticoagulant long-term use     baby asa per chart    Asperger's syndrome     CVA, old, hemiparesis     Encounter for blood transfusion     Essential hypertension, malignant     Functional quadriplegia 5/28/2021    PEG (percutaneous endoscopic gastrostomy) status      Past Surgical History:   Procedure Laterality Date    COLONOSCOPY N/A 6/3/2021    Procedure: COLONOSCOPY;  Surgeon: Rosario Meeks MD;  Location: [facility];  Service: Endoscopy;  Laterality: N/A;    ESOPHAGOGASTRODUODENOSCOPY N/A 7/10/2021    Procedure: EGD (ESOPHAGOGASTRODUODENOSCOPY);  Surgeon: Rosario Meeks MD;  Location: [facility];  Service:  "Endoscopy;  Laterality: N/A;    ESOPHAGOGASTRODUODENOSCOPY W/ PEG N/A 4/1/2021    Procedure: EGD, WITH PEG TUBE INSERTION;  Surgeon: En Zheng MD;  Location: [facility];  Service: Endoscopy;  Laterality: N/A;       Social History     Tobacco Use    Smoking status: Never    Smokeless tobacco: Never   Substance Use Topics    Alcohol use: Not Currently    Drug use: Not Currently       Physical Exam:    BP (!) 163/95   Pulse 81   Temp 98 °F (36.7 °C) (Oral)   Resp 15   Ht 5' 9" (1.753 m)   Wt 68.5 kg (151 lb)   SpO2 96%   BMI 22.30 kg/m²     Nursing note and vital signs reviewed.    Constitutional: No acute distress.  Nontoxic.  Sickly appearing.  Initially diaphoretic and pale.  Eyes: No conjunctival injection.Extraocular muscles are intact.  Pupils are unequal and sluggish.  Left pupil 4-3 mm.  Right pupil 3-2 mm.  ENT: Oropharynx clear.  Normal phonation.   Cardiovascular: Regular rate and rhythm.  No murmurs. No gallops. No rubs  Respiratory:  Diminished to auscultation bilaterally.  Good air movement.  No wheezes.  No rhonchi. No rales. No accessory muscle use.  Abdomen: Soft.  Not distended.  Nontender.  No guarding.  No rebound. Non-peritoneal.  PEG tube in place with no surrounding redness or erythema.  Musculoskeletal: Good range of motion all joints.  No deformities.  Neck supple.  No meningismus.  At baseline for patient with history of quadriplegia  Skin: No rashes seen.  Poor skin turgor.  Pale.  No abrasions.  No ecchymoses.  Neurologic:  Lethargic.  Psych: Appropriate    Labs/Imaging that has been ordered has been reviewed by myself.    Labs Reviewed   CBC W/ AUTO DIFFERENTIAL - Abnormal; Notable for the following components:       Result Value    WBC 13.13 (*)     RBC 4.04 (*)     Hemoglobin 12.6 (*)     Hematocrit 39.0 (*)     MCH 31.2 (*)     RDW 14.6 (*)     Platelets 119 (*)     Gran # (ANC) 10.9 (*)     Gran % 82.8 (*)     Lymph % 8.1 (*)     All other components within normal limits "   COMPREHENSIVE METABOLIC PANEL - Abnormal; Notable for the following components:    BUN 36 (*)     Creatinine 1.5 (*)     Total Bilirubin 1.1 (*)     eGFR 59.6 (*)     All other components within normal limits   VALPROIC ACID - Abnormal; Notable for the following components:    Valproic Acid Level 17.0 (*)     All other components within normal limits   CULTURE, BLOOD   CULTURE, BLOOD   TROPONIN I HIGH SENSITIVITY   MAGNESIUM   LACTIC ACID, PLASMA   CK   CK   URINALYSIS, REFLEX TO URINE CULTURE   LEVETIRACETAM  (KEPPRA) LEVEL   POCT GLUCOSE MONITORING CONTINUOUS     Imaging Results              CT Cervical Spine Without Contrast (Final result)  Result time 01/03/23 19:58:06      Final result by Monse Christy MD (01/03/23 19:58:06)                   Narrative:    All CT scans at this facility used dose modulation, iterative reconstruction and/or weight-based dosing when appropriate to reduce radiation doses  as low as reasonably achievable.    CT scan of the cervical spine    Clinical history is Neck trauma, uncomplicated (NEXUS/CCR neg) (Age 16-64y)    Axial images the cervical spine were obtained with sagittal and coronal reconstructed images. There is leftward curvature of the cervical spine.    The vertebral bodies are of normal height. There is mild disc space narrowing at C5-6 and C6-7.    There is no fracture or subluxation. The odontoid process is intact the cranial cervical junction is normal. The facet joints are aligned. There is no epidural fluid collection. The paraspinous soft tissues are normal. The lung apices are clear.    IMPRESSION: Mild disc space narrowing at C5-6 and C6-7 without evidence of fracture or subluxation    Electronically signed by:  Monse Christy MD  1/3/2023 7:58 PM [facility] Workstation: [id number]                                     CT Head Without Contrast (Final result)  Result time 01/03/23 19:53:44      Final result by Monse Christy MD (01/03/23 19:53:44)                    Narrative:    CMS MANDATED QUALITY DATA - CT RADIATION  436    All CT scans at this facility utilize dose modulation, iterative reconstruction, and/or weight based dosing when appropriate to reduce radiation dose to as low as reasonably achievable.  CT head without contrast    Clinical data: Altered mental status    COMPARISON: 10/25/2022    FINDINGS: Noninfusion images were obtained from the skull base to the vertex. There is no intracranial mass, hemorrhage, or midline shift. Ventricles and sulci are mildly prominent. There are no pathologic extra-axial fluid collections. There is no evidence of cortical ischemic change. Changes of mild chronic microvascular white matter disease are noted. Cerebellum and brainstem are normal. The calvarium is intact.    IMPRESSION:    1. No acute intracranial abnormalities.    Periventricular small vessel disease    Electronically signed by:  Monse Christy MD  1/3/2023 7:53 PM [facility] Workstation: [id number]                                  I decided to obtain the patient's medical records.  Summary of Medical Records:  Patient seen in the past for UTI and sepsis.    MDM/ Differential Dx:    Emergent evaluation of a 41 yo male presenting for seizure-like activity and altered mental status.  As per nursing home staff patient was altered today and had multiple episodes of seizure-like activity.  Patient was given Ativan at facility and EN route by EMS.  On exam pt is pale and diaphoretic.  Pupils are equal and sluggish.  Left pupil 4-3 mm, right pupil 3-2 mm. VSS.  Patient minimally responsive to verbal commands.  Maintaining airway.  Nonfebrile and nontoxic appearing.  Patient is sickly appearing.  Mucous membranes pink but dry.  Breath sounds diminished bilaterally.  No wheezing, rhonchi or rales appreciated.  Abdomen soft and nontender. No rebound or guarding appreciated on exam.   BS WNL.  PEG tube in place with no surrounding redness, erythema or drainage.  Cap  refill > 3 seconds.      Differential diagnoses include but are not limited to  sepsis, UTI, bacteremia, dehydration, non-therapeutic on medications, non-compliance, needs medication adjustment, intra-cranial bleed, intra-cranial tumor, intra-cranial infection, metabolic (gross electrolyte abnormalities), others.        I will get labs, imaging and reassess.  I discussed this case with my supervising physician.    ED Course as of 01/03/23 2221 Tue Jan 03, 2023   1633 EKG shows normal sinus rhythm rate of 77.  No STEMI.   [RZ]   1755 Pt signed out to Dr. Sarmiento pending workup.  [RZ]      ED Course User Index  [RZ] Nia Mendez NP                  Attending Attestation:     Physician Attestation Statement for NP/PA:   I have conducted a face to face encounter with this patient in addition to the NP/PA, due to Medical Complexity    Other NP/PA Attestation Additions:    History of Present Illness: History as per NP note.   Physical Exam: Patient initially ill appearing, multiple seizures prior to ED arrival, ill-appearing originally, nonverbal at baseline, pallor and diaphoresis has improved          Attending ED Notes:   I assumed care of the patient from the nurse practitioner.  Patient appeared after several episodes of seizure activity, has been given seizure medicines on a regular basis.  Does have a history of status in the past, initially appeared ill, pale and diaphoretic.  Appearance has improved.  Mental status at baseline.  Valproic acid level low, Keppra level has been ordered.  Labs with evidence of dehydration.  CT with no evidence of acute intracranial abnormality.  The patient is not currently demonstrating evidence of shock or hypoperfusion.  Will discuss with hospitalist for admission.She Diagnostic Impression:    1. Diaphoresis    2. Seizure    3. PRABHJOT                Nia Mendez NP  01/03/23 1757       Reyna Sarmiento MD  01/03/23 2221

## 2023-01-04 LAB
ANION GAP SERPL CALC-SCNC: 8 MMOL/L (ref 8–16)
BASOPHILS # BLD AUTO: 0.03 K/UL (ref 0–0.2)
BASOPHILS NFR BLD: 0.4 % (ref 0–1.9)
BUN SERPL-MCNC: 32 MG/DL (ref 6–20)
CALCIUM SERPL-MCNC: 9.7 MG/DL (ref 8.7–10.5)
CHLORIDE SERPL-SCNC: 107 MMOL/L (ref 95–110)
CO2 SERPL-SCNC: 26 MMOL/L (ref 23–29)
CREAT SERPL-MCNC: 1.4 MG/DL (ref 0.5–1.4)
DIFFERENTIAL METHOD: ABNORMAL
EOSINOPHIL # BLD AUTO: 0.2 K/UL (ref 0–0.5)
EOSINOPHIL NFR BLD: 2.7 % (ref 0–8)
ERYTHROCYTE [DISTWIDTH] IN BLOOD BY AUTOMATED COUNT: 14.7 % (ref 11.5–14.5)
EST. GFR  (NO RACE VARIABLE): >60 ML/MIN/1.73 M^2
GLUCOSE SERPL-MCNC: 84 MG/DL (ref 70–110)
GLUCOSE SERPL-MCNC: 87 MG/DL (ref 70–110)
HCT VFR BLD AUTO: 41.7 % (ref 40–54)
HGB BLD-MCNC: 13 G/DL (ref 14–18)
IMM GRANULOCYTES # BLD AUTO: 0.02 K/UL (ref 0–0.04)
IMM GRANULOCYTES NFR BLD AUTO: 0.3 % (ref 0–0.5)
LYMPHOCYTES # BLD AUTO: 2.1 K/UL (ref 1–4.8)
LYMPHOCYTES NFR BLD: 27.5 % (ref 18–48)
MCH RBC QN AUTO: 30.8 PG (ref 27–31)
MCHC RBC AUTO-ENTMCNC: 31.2 G/DL (ref 32–36)
MCV RBC AUTO: 99 FL (ref 82–98)
MONOCYTES # BLD AUTO: 0.5 K/UL (ref 0.3–1)
MONOCYTES NFR BLD: 7 % (ref 4–15)
NEUTROPHILS # BLD AUTO: 4.8 K/UL (ref 1.8–7.7)
NEUTROPHILS NFR BLD: 62.1 % (ref 38–73)
NRBC BLD-RTO: 0 /100 WBC
PLATELET # BLD AUTO: 121 K/UL (ref 150–450)
PMV BLD AUTO: 11.5 FL (ref 9.2–12.9)
POTASSIUM SERPL-SCNC: 4.6 MMOL/L (ref 3.5–5.1)
RBC # BLD AUTO: 4.22 M/UL (ref 4.6–6.2)
SODIUM SERPL-SCNC: 141 MMOL/L (ref 136–145)
WBC # BLD AUTO: 7.68 K/UL (ref 3.9–12.7)

## 2023-01-04 PROCEDURE — 85025 COMPLETE CBC W/AUTO DIFF WBC: CPT | Performed by: INTERNAL MEDICINE

## 2023-01-04 PROCEDURE — 21400001 HC TELEMETRY ROOM

## 2023-01-04 PROCEDURE — 25000003 PHARM REV CODE 250: Performed by: INTERNAL MEDICINE

## 2023-01-04 PROCEDURE — 25000003 PHARM REV CODE 250: Performed by: STUDENT IN AN ORGANIZED HEALTH CARE EDUCATION/TRAINING PROGRAM

## 2023-01-04 PROCEDURE — 63600175 PHARM REV CODE 636 W HCPCS: Performed by: INTERNAL MEDICINE

## 2023-01-04 PROCEDURE — G0378 HOSPITAL OBSERVATION PER HR: HCPCS

## 2023-01-04 PROCEDURE — 96361 HYDRATE IV INFUSION ADD-ON: CPT

## 2023-01-04 PROCEDURE — 96366 THER/PROPH/DIAG IV INF ADDON: CPT

## 2023-01-04 PROCEDURE — 82962 GLUCOSE BLOOD TEST: CPT

## 2023-01-04 PROCEDURE — 80048 BASIC METABOLIC PNL TOTAL CA: CPT | Performed by: INTERNAL MEDICINE

## 2023-01-04 PROCEDURE — 95819 EEG AWAKE AND ASLEEP: CPT

## 2023-01-04 RX ORDER — SODIUM CHLORIDE, SODIUM LACTATE, POTASSIUM CHLORIDE, CALCIUM CHLORIDE 600; 310; 30; 20 MG/100ML; MG/100ML; MG/100ML; MG/100ML
INJECTION, SOLUTION INTRAVENOUS CONTINUOUS
Status: DISCONTINUED | OUTPATIENT
Start: 2023-01-04 | End: 2023-01-04

## 2023-01-04 RX ORDER — FAMOTIDINE 20 MG/1
20 TABLET, FILM COATED ORAL EVERY MORNING
Status: DISCONTINUED | OUTPATIENT
Start: 2023-01-05 | End: 2023-01-06 | Stop reason: HOSPADM

## 2023-01-04 RX ORDER — VALPROIC ACID 250 MG/5ML
500 SOLUTION ORAL EVERY 8 HOURS
Status: DISCONTINUED | OUTPATIENT
Start: 2023-01-04 | End: 2023-01-06 | Stop reason: HOSPADM

## 2023-01-04 RX ORDER — AMLODIPINE BESYLATE 5 MG/1
5 TABLET ORAL DAILY
Status: DISCONTINUED | OUTPATIENT
Start: 2023-01-05 | End: 2023-01-06 | Stop reason: HOSPADM

## 2023-01-04 RX ORDER — ONDANSETRON 4 MG/1
4 TABLET, ORALLY DISINTEGRATING ORAL EVERY 6 HOURS PRN
Status: DISCONTINUED | OUTPATIENT
Start: 2023-01-04 | End: 2023-01-06 | Stop reason: HOSPADM

## 2023-01-04 RX ORDER — FLUOXETINE HYDROCHLORIDE 20 MG/1
20 CAPSULE ORAL DAILY
Status: DISCONTINUED | OUTPATIENT
Start: 2023-01-05 | End: 2023-01-06 | Stop reason: HOSPADM

## 2023-01-04 RX ORDER — POLYETHYLENE GLYCOL 3350 17 G/17G
17 POWDER, FOR SOLUTION ORAL DAILY
Status: DISCONTINUED | OUTPATIENT
Start: 2023-01-05 | End: 2023-01-06 | Stop reason: HOSPADM

## 2023-01-04 RX ORDER — CLONIDINE HYDROCHLORIDE 0.1 MG/1
0.1 TABLET ORAL 3 TIMES DAILY PRN
Status: DISCONTINUED | OUTPATIENT
Start: 2023-01-04 | End: 2023-01-06 | Stop reason: HOSPADM

## 2023-01-04 RX ORDER — LEVETIRACETAM 500 MG/1
1000 TABLET ORAL 2 TIMES DAILY
Status: DISCONTINUED | OUTPATIENT
Start: 2023-01-04 | End: 2023-01-05

## 2023-01-04 RX ORDER — LORAZEPAM 0.5 MG/1
0.5 TABLET ORAL EVERY 6 HOURS PRN
Status: DISCONTINUED | OUTPATIENT
Start: 2023-01-04 | End: 2023-01-06 | Stop reason: HOSPADM

## 2023-01-04 RX ORDER — VALPROIC ACID 250 MG/5ML
500 SOLUTION ORAL EVERY 12 HOURS
Status: DISCONTINUED | OUTPATIENT
Start: 2023-01-04 | End: 2023-01-04

## 2023-01-04 RX ORDER — TALC
6 POWDER (GRAM) TOPICAL NIGHTLY PRN
Status: DISCONTINUED | OUTPATIENT
Start: 2023-01-04 | End: 2023-01-06 | Stop reason: HOSPADM

## 2023-01-04 RX ORDER — MODAFINIL 100 MG/1
200 TABLET ORAL DAILY
Status: DISCONTINUED | OUTPATIENT
Start: 2023-01-05 | End: 2023-01-06 | Stop reason: HOSPADM

## 2023-01-04 RX ORDER — ATORVASTATIN CALCIUM 40 MG/1
40 TABLET, FILM COATED ORAL NIGHTLY
Status: DISCONTINUED | OUTPATIENT
Start: 2023-01-04 | End: 2023-01-06 | Stop reason: HOSPADM

## 2023-01-04 RX ORDER — ENOXAPARIN SODIUM 100 MG/ML
40 INJECTION SUBCUTANEOUS DAILY
Status: DISCONTINUED | OUTPATIENT
Start: 2023-01-05 | End: 2023-01-06 | Stop reason: HOSPADM

## 2023-01-04 RX ORDER — DEXTROSE, SODIUM CHLORIDE, SODIUM LACTATE, POTASSIUM CHLORIDE, AND CALCIUM CHLORIDE 5; .6; .31; .03; .02 G/100ML; G/100ML; G/100ML; G/100ML; G/100ML
INJECTION, SOLUTION INTRAVENOUS CONTINUOUS
Status: DISCONTINUED | OUTPATIENT
Start: 2023-01-04 | End: 2023-01-06 | Stop reason: HOSPADM

## 2023-01-04 RX ORDER — LORAZEPAM 2 MG/ML
2 INJECTION INTRAMUSCULAR
Status: DISCONTINUED | OUTPATIENT
Start: 2023-01-04 | End: 2023-01-06 | Stop reason: HOSPADM

## 2023-01-04 RX ADMIN — DEXTROSE, SODIUM CHLORIDE, SODIUM LACTATE, POTASSIUM CHLORIDE, AND CALCIUM CHLORIDE: 5; .6; .31; .03; .02 INJECTION, SOLUTION INTRAVENOUS at 03:01

## 2023-01-04 RX ADMIN — ATORVASTATIN CALCIUM 40 MG: 40 TABLET, FILM COATED ORAL at 03:01

## 2023-01-04 RX ADMIN — ATORVASTATIN CALCIUM 40 MG: 40 TABLET, FILM COATED ORAL at 09:01

## 2023-01-04 RX ADMIN — VALPROIC ACID 500 MG: 500 SOLUTION ORAL at 06:01

## 2023-01-04 RX ADMIN — LEVETIRACETAM 1000 MG: 500 TABLET, FILM COATED ORAL at 09:01

## 2023-01-04 NOTE — ED NOTES
REPORTED SZ PTA. MONITORING INITIATED. ALERT.  TRIES TO ANSWER SIMPLE QUESTIONS BUT SPEECH UNINTELLIGIBLE. NON LABORED RESPIRATIONS. NON DISTENDED ABDOMEN WITH LUQ PEG TUBE. DRESSING IN PLACE AT SITE. BILATERAL LE SPLAYED. WILL NOT STRAIGHTEN LEGS.  SR AT MONITOR. ++ PULSES X 4 EXTREMITIES. PADS ON SR AND SUCTION AT BS.  FALLS PRECAUTIONS. DECREASED ROM AT UE ESPECIALLY LEFT.  CANNOT STRAIGHTEN ARM.

## 2023-01-04 NOTE — SUBJECTIVE & OBJECTIVE
Past Medical History:   Diagnosis Date    Anticoagulant long-term use     baby asa per chart    Asperger's syndrome     CVA, old, hemiparesis     Encounter for blood transfusion     Essential hypertension, malignant     Functional quadriplegia 5/28/2021    PEG (percutaneous endoscopic gastrostomy) status        Past Surgical History:   Procedure Laterality Date    COLONOSCOPY N/A 6/3/2021    Procedure: COLONOSCOPY;  Surgeon: Rosario Meeks MD;  Location: [facility] ENDO;  Service: Endoscopy;  Laterality: N/A;    ESOPHAGOGASTRODUODENOSCOPY N/A 7/10/2021    Procedure: EGD (ESOPHAGOGASTRODUODENOSCOPY);  Surgeon: Rosario Meeks MD;  Location: [facility] ENDO;  Service: Endoscopy;  Laterality: N/A;    ESOPHAGOGASTRODUODENOSCOPY W/ PEG N/A 4/1/2021    Procedure: EGD, WITH PEG TUBE INSERTION;  Surgeon: En Zheng MD;  Location: [facility] ENDO;  Service: Endoscopy;  Laterality: N/A;       Review of patient's allergies indicates:  No Known Allergies    No current facility-administered medications on file prior to encounter.     Current Outpatient Medications on File Prior to Encounter   Medication Sig    acetaminophen (TYLENOL) 325 MG tablet 650 mg by Per G Tube route every 8 (eight) hours as needed for Temperature greater than (100.1).    amantadine HCL (SYMMETREL) 100 mg capsule 1 capsule (100 mg total) by Per G Tube route once daily.    amino acids-protein hydrolys (PRO-STAT AWC)  gram-kcal/30 mL Liqd 30 mLs by PEG Tube route every morning. 30 mls in 30 mls of water    amLODIPine (NORVASC) 5 MG tablet 1 tablet (5 mg total) by Per G Tube route once daily.    atorvastatin (LIPITOR) 40 MG tablet 1 tablet (40 mg total) by Per G Tube route once daily. (Patient taking differently: 40 mg by Per G Tube route every evening.)    cloNIDine (CATAPRES) 0.1 MG tablet Take 1 tablet (0.1 mg total) by mouth 3 (three) times daily as needed (180).    enoxaparin (LOVENOX) 40 mg/0.4 mL Syrg Inject 0.4 mLs (40 mg total) into the skin once  daily.    famotidine (PEPCID) 20 MG tablet Take 20 mg by mouth every morning.    FLUoxetine 20 MG capsule 1 capsule (20 mg total) by Per G Tube route once daily.    lactose-reduced food with fibr (ISOSOURCE 1.5 ALEC) 0.07 gram-1.5 kcal/mL Liqd 58 mLs by FEEDING TUBE route every hour.    levETIRAcetam (KEPPRA) 750 MG Tab Take 1.5 tablets (1,125 mg total) by mouth 2 (two) times daily.    LORazepam (ATIVAN) 0.5 MG tablet 0.5 mg by Per G Tube route every 6 (six) hours as needed for Anxiety.    modafiniL (PROVIGIL) 200 MG Tab 200 mg by Per G Tube route once daily.    ondansetron (ZOFRAN) 4 MG tablet 4 mg by Per G Tube route every 6 (six) hours as needed for Nausea.    polyethylene glycol (GLYCOLAX) 17 gram PwPk 17 g by Per G Tube route once daily.    valproic acid, as sodium salt, 500 mg/10 mL (10 mL) Soln Take 10 mLs (500 mg total) by mouth every 12 (twelve) hours.    ketoconazole (NIZORAL) 2 % shampoo Apply topically once daily. (Patient taking differently: Apply 1 application topically once daily.)    [DISCONTINUED] arginine/glutamine/calcium bmb (ISAI ORAL) 1 packet by Per G Tube route 2 (two) times a day.    [DISCONTINUED] losartan (COZAAR) 50 MG tablet Take 50 mg by mouth once daily.    [DISCONTINUED] pantoprazole (PROTONIX) 40 MG tablet 1 tablet.     Family History    None       Tobacco Use    Smoking status: Never    Smokeless tobacco: Never   Substance and Sexual Activity    Alcohol use: Not Currently    Drug use: Not Currently    Sexual activity: Not Currently     Review of Systems   Unable to perform ROS: Patient nonverbal   Objective:     Vital Signs (Most Recent):  Temp: 97.1 °F (36.2 °C) (01/03/23 2332)  Pulse: 63 (01/04/23 0200)  Resp: 12 (01/04/23 0200)  BP: (!) 151/87 (01/04/23 0200)  SpO2: 97 % (01/04/23 0200)   Vital Signs (24h Range):  Temp:  [97.1 °F (36.2 °C)-98 °F (36.7 °C)] 97.1 °F (36.2 °C)  Pulse:  [58-93] 63  Resp:  [10-22] 12  SpO2:  [95 %-100 %] 97 %  BP: (121-163)/(77-98) 151/87     Weight:  68.5 kg (151 lb)  Body mass index is 22.3 kg/m².    Physical Exam  Vitals and nursing note reviewed.   Constitutional:       Appearance: He is well-developed.      Comments: Grimaces to sternal rub   HENT:      Head: Normocephalic and atraumatic.      Right Ear: External ear normal.      Left Ear: External ear normal.      Nose: Nose normal.   Eyes:      Conjunctiva/sclera: Conjunctivae normal.      Pupils: Pupils are equal, round, and reactive to light.   Cardiovascular:      Rate and Rhythm: Normal rate and regular rhythm.      Heart sounds: Normal heart sounds.   Pulmonary:      Effort: Pulmonary effort is normal.      Breath sounds: Normal breath sounds.      Comments: Decreased entry without adventitious sounds  Abdominal:      General: Bowel sounds are normal.      Palpations: Abdomen is soft.      Comments: PEG   Musculoskeletal:      Cervical back: Normal range of motion and neck supple.      Comments: Contracted upper extremities; no movement lower   Skin:     General: Skin is warm and dry.      Capillary Refill: Capillary refill takes less than 2 seconds.   Neurological:      Comments: Grimaces to brisk sternal rub; otherwise no  response   Psychiatric:      Comments: Unable to assess         CRANIAL NERVES     CN III, IV, VI   Pupils are equal, round, and reactive to light.     Significant Labs: All pertinent labs within the past 24 hours have been reviewed.  CBC:   Recent Labs   Lab 01/03/23  1804   WBC 13.13*   HGB 12.6*   HCT 39.0*   *     CMP:   Recent Labs   Lab 01/03/23  1801      K 4.2      CO2 27   GLU 94   BUN 36*   CREATININE 1.5*   CALCIUM 9.8   PROT 7.3   ALBUMIN 3.8   BILITOT 1.1*   ALKPHOS 121   AST 19   ALT 14   ANIONGAP 9     Cardiac Markers: No results for input(s): CKMB, MYOGLOBIN, BNP, TROPISTAT in the last 48 hours.    Significant Imaging: I have reviewed all pertinent imaging results/findings within the past 24 hours.

## 2023-01-04 NOTE — ASSESSMENT & PLAN NOTE
Place in Observation; hydration for PRABHJOT; EEG and Neurology consultation before increasing dosages of AED(s); TSH with AM labs for review; continue current pre-admission regimen

## 2023-01-04 NOTE — ASSESSMENT & PLAN NOTE
Place in Observation; hydration for PRABHJOT; EEG and Neurology consultation before increasing dosages of AED(s); TSH with AM labs for review; seizure precautions

## 2023-01-04 NOTE — PLAN OF CARE
[facility] - Emergency Dept  Initial Discharge Assessment       Primary Care Provider: Elly Mathew DO    Admission Diagnosis: PRABHJOT (acute kidney injury) [N17.9]    Admission Date: 1/3/2023  Expected Discharge Date:     Patient is a prison resident of [facility]. Patient to discharge back to nursing home once medically clear. Case Management following for any discharge needs. Patient will require EMS transport on discharge.     Discharge Barriers Identified: None    Payor: MEDICAID / Plan: MEDICAID [place] / Product Type: Government /     Extended Emergency Contact Information  Primary Emergency Contact: Alda Leon  Home Phone: [phone]  Mobile Phone: [phone]  Relation: Mother  Preferred language: English   needed? No  Secondary Emergency Contact: luma walker  Mobile Phone: [phone]  Relation: Step parent  Preferred language: English   needed? No    Discharge Plan A: Return to nursing home  Discharge Plan B: Return to Nursing Home      [id number] - [place] - [address] AT [place] [address]  [address] [place] [address]  Phone: [phone] Fax: [phone]      Initial Assessment (most recent)       Adult Discharge Assessment - 01/04/23 0856          Discharge Assessment    Assessment Type Discharge Planning Assessment     Confirmed/corrected address, phone number and insurance Yes     Source of Information health record     Communicated AMEYA with patient/caregiver Yes     Reason For Admission seizure like activity     People in Home facility resident     Facility Arrived From: [facility]     Do you expect to return to your current living situation? Yes     Do you have help at home or someone to help you manage your care at home? Yes     Who are your caregiver(s) and their phone number(s)? facility staff     Walking or Climbing Stairs transferring difficulty, dependent     Dressing/Bathing  bathing difficulty, dependent;dressing difficulty, dependent     Readmission within 30 days? No     Patient currently being followed by outpatient case management? No     Do you currently have service(s) that help you manage your care at home? No     Do you take prescription medications? Yes     Do you have prescription coverage? Yes     Coverage [place] medicaid     Do you have any problems affording any of your prescribed medications? No     Is the patient taking medications as prescribed? yes     Who is going to help you get home at discharge? will need EMS transport     How do you get to doctors appointments? health plan transportation     Are you on dialysis? No     Do you take coumadin? No     Discharge Plan A Return to nursing home     Discharge Plan B Return to Nursing Home     DME Needed Upon Discharge  none     Discharge Barriers Identified None

## 2023-01-04 NOTE — H&P
"Formerly [facility] - Emergency Dept  Hospital Medicine  History & Physical    Patient Name: Andrea Leon Jr.  MRN: [id number]  Patient Class: OP- Observation  Admission Date: 1/3/2023  Attending Physician: Thai Reyes MD  Primary Care Provider: Elly Mathew DO         Patient information was obtained from past medical records, ER records and ED Provider.     Subjective:     Principal Problem:Seizure    Chief Complaint:   Chief Complaint   Patient presents with    Seizures        HPI:  42-year-old male with a history of Asperger's, CVA with left hemiparesis, HTN, ESBL UTI (July 2021), Functional Quadriplegia, was transferred from his nursing care facility after reportedly having "Multiple episodes of seizure-like activity". Patient is non-verbal and currently non-communicative. He has had previous admissions for similar reasons. Valproic acid level was found to be sub-therapeutic    In ED: Labs reviewed and noted below: minimal leukocytosis and normocytic anemia; normal electrolytes with stage 3a renal dysfunction; transaminases are normal; CPK and HS troponin are normal; lactate is normal; valproic acid level is sub-therapeutic. Cultured in ED. CT Head reviewed: no acute intracranial pathology; C-spine: "Mild disc space narrowing at C5-6 and C6-7 without evidence of fracture or subluxation". EKG reviewed: sinus without acute segments.    Discussed with ED provider; Place in Observation; hydration for PRABHJOT; EEG and Neurology consultation before increasing dosages of AED(s); TSH with AM labs for review         Past Medical History:   Diagnosis Date    Anticoagulant long-term use     baby asa per chart    Asperger's syndrome     CVA, old, hemiparesis     Encounter for blood transfusion     Essential hypertension, malignant     Functional quadriplegia 5/28/2021    PEG (percutaneous endoscopic gastrostomy) status        Past Surgical History:   Procedure Laterality Date    COLONOSCOPY N/A " 6/3/2021    Procedure: COLONOSCOPY;  Surgeon: Rosario Meeks MD;  Location: [facility] ENDO;  Service: Endoscopy;  Laterality: N/A;    ESOPHAGOGASTRODUODENOSCOPY N/A 7/10/2021    Procedure: EGD (ESOPHAGOGASTRODUODENOSCOPY);  Surgeon: Rosario Meeks MD;  Location: [facility] ENDO;  Service: Endoscopy;  Laterality: N/A;    ESOPHAGOGASTRODUODENOSCOPY W/ PEG N/A 4/1/2021    Procedure: EGD, WITH PEG TUBE INSERTION;  Surgeon: En Zheng MD;  Location: [facility] ENDO;  Service: Endoscopy;  Laterality: N/A;       Review of patient's allergies indicates:  No Known Allergies    No current facility-administered medications on file prior to encounter.     Current Outpatient Medications on File Prior to Encounter   Medication Sig    acetaminophen (TYLENOL) 325 MG tablet 650 mg by Per G Tube route every 8 (eight) hours as needed for Temperature greater than (100.1).    amantadine HCL (SYMMETREL) 100 mg capsule 1 capsule (100 mg total) by Per G Tube route once daily.    amino acids-protein hydrolys (PRO-STAT AWC)  gram-kcal/30 mL Liqd 30 mLs by PEG Tube route every morning. 30 mls in 30 mls of water    amLODIPine (NORVASC) 5 MG tablet 1 tablet (5 mg total) by Per G Tube route once daily.    atorvastatin (LIPITOR) 40 MG tablet 1 tablet (40 mg total) by Per G Tube route once daily. (Patient taking differently: 40 mg by Per G Tube route every evening.)    cloNIDine (CATAPRES) 0.1 MG tablet Take 1 tablet (0.1 mg total) by mouth 3 (three) times daily as needed (180).    enoxaparin (LOVENOX) 40 mg/0.4 mL Syrg Inject 0.4 mLs (40 mg total) into the skin once daily.    famotidine (PEPCID) 20 MG tablet Take 20 mg by mouth every morning.    FLUoxetine 20 MG capsule 1 capsule (20 mg total) by Per G Tube route once daily.    lactose-reduced food with fibr (ISOSOURCE 1.5 ALEC) 0.07 gram-1.5 kcal/mL Liqd 58 mLs by FEEDING TUBE route every hour.    levETIRAcetam (KEPPRA) 750 MG Tab Take 1.5 tablets (1,125 mg total) by mouth 2 (two)  times daily.    LORazepam (ATIVAN) 0.5 MG tablet 0.5 mg by Per G Tube route every 6 (six) hours as needed for Anxiety.    modafiniL (PROVIGIL) 200 MG Tab 200 mg by Per G Tube route once daily.    ondansetron (ZOFRAN) 4 MG tablet 4 mg by Per G Tube route every 6 (six) hours as needed for Nausea.    polyethylene glycol (GLYCOLAX) 17 gram PwPk 17 g by Per G Tube route once daily.    valproic acid, as sodium salt, 500 mg/10 mL (10 mL) Soln Take 10 mLs (500 mg total) by mouth every 12 (twelve) hours.    ketoconazole (NIZORAL) 2 % shampoo Apply topically once daily. (Patient taking differently: Apply 1 application topically once daily.)    [DISCONTINUED] arginine/glutamine/calcium bmb (ISAI ORAL) 1 packet by Per G Tube route 2 (two) times a day.    [DISCONTINUED] losartan (COZAAR) 50 MG tablet Take 50 mg by mouth once daily.    [DISCONTINUED] pantoprazole (PROTONIX) 40 MG tablet 1 tablet.     Family History    None       Tobacco Use    Smoking status: Never    Smokeless tobacco: Never   Substance and Sexual Activity    Alcohol use: Not Currently    Drug use: Not Currently    Sexual activity: Not Currently     Review of Systems   Unable to perform ROS: Patient nonverbal   Objective:     Vital Signs (Most Recent):  Temp: 97.1 °F (36.2 °C) (01/03/23 2332)  Pulse: 63 (01/04/23 0200)  Resp: 12 (01/04/23 0200)  BP: (!) 151/87 (01/04/23 0200)  SpO2: 97 % (01/04/23 0200)   Vital Signs (24h Range):  Temp:  [97.1 °F (36.2 °C)-98 °F (36.7 °C)] 97.1 °F (36.2 °C)  Pulse:  [58-93] 63  Resp:  [10-22] 12  SpO2:  [95 %-100 %] 97 %  BP: (121-163)/(77-98) 151/87     Weight: 68.5 kg (151 lb)  Body mass index is 22.3 kg/m².    Physical Exam  Vitals and nursing note reviewed.   Constitutional:       Appearance: He is well-developed.      Comments: Grimaces to sternal rub   HENT:      Head: Normocephalic and atraumatic.      Right Ear: External ear normal.      Left Ear: External ear normal.      Nose: Nose normal.   Eyes:       Conjunctiva/sclera: Conjunctivae normal.      Pupils: Pupils are equal, round, and reactive to light.   Cardiovascular:      Rate and Rhythm: Normal rate and regular rhythm.      Heart sounds: Normal heart sounds.   Pulmonary:      Effort: Pulmonary effort is normal.      Breath sounds: Normal breath sounds.      Comments: Decreased entry without adventitious sounds  Abdominal:      General: Bowel sounds are normal.      Palpations: Abdomen is soft.      Comments: PEG   Musculoskeletal:      Cervical back: Normal range of motion and neck supple.      Comments: Contracted upper extremities; no movement lower   Skin:     General: Skin is warm and dry.      Capillary Refill: Capillary refill takes less than 2 seconds.   Neurological:      Comments: Grimaces to brisk sternal rub; otherwise no  response   Psychiatric:      Comments: Unable to assess         CRANIAL NERVES     CN III, IV, VI   Pupils are equal, round, and reactive to light.     Significant Labs: All pertinent labs within the past 24 hours have been reviewed.  CBC:   Recent Labs   Lab 01/03/23  1804   WBC 13.13*   HGB 12.6*   HCT 39.0*   *     CMP:   Recent Labs   Lab 01/03/23  1801      K 4.2      CO2 27   GLU 94   BUN 36*   CREATININE 1.5*   CALCIUM 9.8   PROT 7.3   ALBUMIN 3.8   BILITOT 1.1*   ALKPHOS 121   AST 19   ALT 14   ANIONGAP 9     Cardiac Markers: No results for input(s): CKMB, MYOGLOBIN, BNP, TROPISTAT in the last 48 hours.    Significant Imaging: I have reviewed all pertinent imaging results/findings within the past 24 hours.    Assessment/Plan:     * Seizure  Place in Observation; hydration for PRABHJOT; EEG and Neurology consultation before increasing dosages of AED(s); TSH with AM labs for review; seizure precautions         Functional quadriplegia  Place in Observation; hydration for PRABHJOT; EEG and Neurology consultation before increasing dosages of AED(s); TSH with AM labs for review         CVA, old, hemiparesis  Place in  Observation; hydration for PRABHJOT; EEG and Neurology consultation before increasing dosages of AED(s); TSH with AM labs for review         Essential hypertension, benign  Place in Observation; hydration for PRABHJOT; EEG and Neurology consultation before increasing dosages of AED(s); TSH with AM labs for review; continue current pre-admission regimen         PRABHJOT (acute kidney injury)  Patient with acute kidney injury likely due to pre-renal azotemia PRABHJOT is currently improving. Labs reviewed- Renal function/electrolytes with Estimated Creatinine Clearance: 62.8 mL/min (A) (based on SCr of 1.5 mg/dL (H)). according to latest data. Monitor urine output and serial BMP and adjust therapy as needed. Avoid nephrotoxins and renally dose meds for GFR listed above.     Place in Observation; hydration for PRABHJOT; EEG and Neurology consultation before increasing dosages of AED(s); TSH with AM labs for review           VTE Risk Mitigation (From admission, onward)    None             Thai Reyes MD  Department of Hospital Medicine   [facility] - Emergency Dept

## 2023-01-04 NOTE — HPI
" 42-year-old male with a history of Asperger's, CVA with left hemiparesis, HTN, ESBL UTI (July 2021), Functional Quadriplegia, was transferred from his nursing care facility after reportedly having "Multiple episodes of seizure-like activity". Patient is non-verbal and currently non-communicative. He has had previous admissions for similar reasons. Valproic acid level was found to be sub-therapeutic    In ED: Labs reviewed and noted below: minimal leukocytosis and normocytic anemia; normal electrolytes with stage 3a renal dysfunction; transaminases are normal; CPK and HS troponin are normal; lactate is normal; valproic acid level is sub-therapeutic. Cultured in ED. CT Head reviewed: no acute intracranial pathology; C-spine: "Mild disc space narrowing at C5-6 and C6-7 without evidence of fracture or subluxation". EKG reviewed: sinus without acute segments.    Discussed with ED provider; Place in Observation; hydration for PRABHJOT; EEG and Neurology consultation before increasing dosages of AED(s); TSH with AM labs for review     "

## 2023-01-04 NOTE — ASSESSMENT & PLAN NOTE
Patient with acute kidney injury likely due to pre-renal azotemia PRABHJOT is currently improving. Labs reviewed- Renal function/electrolytes with Estimated Creatinine Clearance: 62.8 mL/min (A) (based on SCr of 1.5 mg/dL (H)). according to latest data. Monitor urine output and serial BMP and adjust therapy as needed. Avoid nephrotoxins and renally dose meds for GFR listed above.     Place in Observation; hydration for PRABHJOT; EEG and Neurology consultation before increasing dosages of AED(s); TSH with AM labs for review

## 2023-01-04 NOTE — CONSULTS
"[facility]  Neurology Consult    Patient Name: Andrea Leon Jr.  MRN: [id number]  : 1980  TODAY'S DATE: 2023  ADMIT DATE: 1/3/2023  3:35 PM                                          CONSULTED PROVIDER: Dinah Bell MD, Neurologist. Cellphone: [phone]  CONSULT REQUESTED BY: Latoya Seals MD     Chief Complaint   Patient presents with    Seizures     HPI per EMR:  42-year-old male with a history of Asperger's, CVA with left hemiparesis, HTN, ESBL UTI (2021), Functional Quadriplegia, was transferred from his nursing care facility after reportedly having "Multiple episodes of seizure-like activity". Patient is non-verbal and currently non-communicative. He has had previous admissions for similar reasons. Valproic acid level was found to be sub-therapeutic     In ED: Labs reviewed and noted below: minimal leukocytosis and normocytic anemia; normal electrolytes with stage 3a renal dysfunction; transaminases are normal; CPK and HS troponin are normal; lactate is normal; valproic acid level is sub-therapeutic. Cultured in ED. CT Head reviewed: no acute intracranial pathology; C-spine: "Mild disc space narrowing at C5-6 and C6-7 without evidence of fracture or subluxation". EKG reviewed: sinus without acute segments.     Discussed with ED provider; Place in Observation; hydration for PRABHJOT; EEG and Neurology consultation before increasing dosages of AED(s); TSH with AM labs for review    Neurology Consult:  Patient was seen and examined by me today.  Is a 42-year-old man with history of Asperger's, stroke with left-sided hemiparesis, functional quadriplegia, hypertension, ESBL UTI who was transferred from nursing home after multiple episodes of seizure-like activity reported by nursing.  He has had similar admissions in the past.  He is on valproic acid, and level was subtherapeutic.  Was also found to be on PRABHJOT, likely dehydration related.  Neurology consulted for " evaluation and management of seizures.    Review of Systems:    Unable to obtain.    Past Medical History:  has a past medical history of Anticoagulant long-term use, Asperger's syndrome, CVA, old, hemiparesis, Encounter for blood transfusion, Essential hypertension, malignant, Functional quadriplegia (5/28/2021), and PEG (percutaneous endoscopic gastrostomy) status.    Past Surgical History:  has a past surgical history that includes Esophagogastroduodenoscopy w/ PEG (N/A, 4/1/2021); Colonoscopy (N/A, 6/3/2021); and Esophagogastroduodenoscopy (N/A, 7/10/2021).    Family Medical History: family history is not on file.    Social History:  reports that he has never smoked. He has never used smokeless tobacco. He reports that he does not currently use alcohol. He reports that he does not currently use drugs.    PCP: Elly Mathew DO    Allergies: Review of patient's allergies indicates:  No Known Allergies    Medications:   No current facility-administered medications on file prior to encounter.     Current Outpatient Medications on File Prior to Encounter   Medication Sig Dispense Refill    acetaminophen (TYLENOL) 325 MG tablet 650 mg by Per G Tube route every 8 (eight) hours as needed for Temperature greater than (100.1).      amantadine HCL (SYMMETREL) 100 mg capsule 1 capsule (100 mg total) by Per G Tube route once daily. 30 capsule 11    amino acids-protein hydrolys (PRO-STAT AWC)  gram-kcal/30 mL Liqd 30 mLs by PEG Tube route every morning. 30 mls in 30 mls of water      amLODIPine (NORVASC) 5 MG tablet 1 tablet (5 mg total) by Per G Tube route once daily. 30 tablet 11    atorvastatin (LIPITOR) 40 MG tablet 1 tablet (40 mg total) by Per G Tube route once daily. (Patient taking differently: 40 mg by Per G Tube route every evening.) 90 tablet 3    cloNIDine (CATAPRES) 0.1 MG tablet Take 1 tablet (0.1 mg total) by mouth 3 (three) times daily as needed (180). 90 tablet 0    enoxaparin (LOVENOX) 40 mg/0.4  mL Syrg Inject 0.4 mLs (40 mg total) into the skin once daily. 1 mL 10    famotidine (PEPCID) 20 MG tablet Take 20 mg by mouth every morning.      FLUoxetine 20 MG capsule 1 capsule (20 mg total) by Per G Tube route once daily. 30 capsule 11    lactose-reduced food with fibr (ISOSOURCE 1.5 ALEC) 0.07 gram-1.5 kcal/mL Liqd 58 mLs by FEEDING TUBE route every hour.      levETIRAcetam (KEPPRA) 750 MG Tab Take 1.5 tablets (1,125 mg total) by mouth 2 (two) times daily. 60 tablet 1    LORazepam (ATIVAN) 0.5 MG tablet 0.5 mg by Per G Tube route every 6 (six) hours as needed for Anxiety.      modafiniL (PROVIGIL) 200 MG Tab 200 mg by Per G Tube route once daily.      ondansetron (ZOFRAN) 4 MG tablet 4 mg by Per G Tube route every 6 (six) hours as needed for Nausea.      polyethylene glycol (GLYCOLAX) 17 gram PwPk 17 g by Per G Tube route once daily.      valproic acid, as sodium salt, 500 mg/10 mL (10 mL) Soln Take 10 mLs (500 mg total) by mouth every 12 (twelve) hours. 480 mL 9    ketoconazole (NIZORAL) 2 % shampoo Apply topically once daily. (Patient taking differently: Apply 1 application topically once daily.)      [DISCONTINUED] arginine/glutamine/calcium bmb (ISAI ORAL) 1 packet by Per G Tube route 2 (two) times a day.      [DISCONTINUED] losartan (COZAAR) 50 MG tablet Take 50 mg by mouth once daily.      [DISCONTINUED] pantoprazole (PROTONIX) 40 MG tablet 1 tablet.       Scheduled Meds:   [START ON 1/5/2023] amantadine HCL  100 mg Per G Tube Daily    [START ON 1/5/2023] amLODIPine  5 mg Per G Tube Daily    atorvastatin  40 mg Per G Tube QHS    [START ON 1/5/2023] enoxaparin  40 mg Subcutaneous Daily    [START ON 1/5/2023] famotidine  20 mg Oral QAM    [START ON 1/5/2023] FLUoxetine  20 mg Per G Tube Daily    levETIRAcetam  1,000 mg Oral BID    [START ON 1/5/2023] modafiniL  200 mg Per G Tube Daily    [START ON 1/5/2023] polyethylene glycol  17 g Per G Tube Daily    valproic acid (as sodium salt)  500 mg Oral Q12H      Continuous Infusions:   dextrose 5% lactated ringers 100 mL/hr at 01/04/23 0355     PRN Meds:.cloNIDine, lorazepam, LORazepam, melatonin, ondansetron      Physical Exam  Current Vitals:  Vitals:    01/04/23 0840   BP:    Pulse: (!) 56   Resp: 12   Temp:        Physical Exam:  General:  Awake, alert, oriented to self  HEENT: PERRL, EOMI  CV: RRR  Lungs:  No respiratory distress  Abdomen:  Soft    Neurological Exam    MENTAL STATUS EXAM:  Patient is awake, alert, oriented to self but not to time, place or situation.  He is able to tell me his name, and follows simple commands consistently.  Reportedly, he is minimally verbal.  Labile affect. His speech is slurred but intelligible. Naming, repetition and comprehension are impaired.    CRANIAL NERVE EXAM:  II/III: fundoscopic exam deferred, PERRL; visual fields full to threat  III/IV/VI: EOMI with some strabismus noted   V: no deficits appreciated to light touch  VII: no facial asymmetry noted  VIII: no deficits in hearing bilaterally  IX/X: unable to assess  XI: unable to assess  XII: unable to assess    MOTOR EXAM:  Bulk and Tone: decreased bulk, increased tone that is worse on left side  Patient is antigravity in both arms, better on right side. Antigravity in both legs.    REFLEXES:  [place] (CN V) Not Tested  3+ left in bilateral upper and lower extremities    SENSORY EXAM  Withdraws to pain equally in all extremities.    COORDINATION/CEREBELLAR EXAM:  FTN: mild dysmetria  HTS: unable to perform    GAIT:  Deferred for safety.    Laboratory Data & Studies    Recent Labs   Lab 01/03/23  1804 01/04/23  0408   WBC 13.13* 7.68   HGB 12.6* 13.0*   * 121*   MCV 97 99*       Recent Labs   Lab 01/03/23  1801 01/04/23  0408    141   K 4.2 4.6    107   CO2 27 26   BUN 36* 32*   GLU 94 87   CALCIUM 9.8 9.7   MG 2.0  --        Recent Labs   Lab 01/03/23  1801   PROT 7.3   ALBUMIN 3.8   BILITOT 1.1*   AST 19   ALT 14   ALKPHOS 121       No results for  input(s): LABPT, INR, APTT in the last 168 hours.    No results for input(s): HGBA1C, CHOL, TRIG, LDLCALC, HDL, TSH in the last 168 hours.      Microbiology:  Microbiology Results (last 7 days)       Procedure Component Value Units Date/Time    Blood culture #1 **CANNOT BE ORDERED STAT** [[id number]] Collected: 01/03/23 1916    Order Status: Completed Specimen: Blood Updated: 01/04/23 0358     Blood Culture, Routine No Growth to date    Blood culture #2 **CANNOT BE ORDERED STAT** [[id number]] Collected: 01/03/23 1758    Order Status: Completed Specimen: Blood from Peripheral, Upper Arm, Right Updated: 01/04/23 0117     Blood Culture, Routine No Growth to date    Urine culture [[id number]] Collected: 01/03/23 2256    Order Status: No result Specimen: Urine Updated: 01/03/23 2337              Imaging:  CT Head Without Contrast    Result Date: 1/3/2023  CMS MANDATED QUALITY DATA - CT RADIATION  436 All CT scans at this facility utilize dose modulation, iterative reconstruction, and/or weight based dosing when appropriate to reduce radiation dose to as low as reasonably achievable. CT head without contrast Clinical data: Altered mental status COMPARISON: 10/25/2022 FINDINGS: Noninfusion images were obtained from the skull base to the vertex. There is no intracranial mass, hemorrhage, or midline shift. Ventricles and sulci are mildly prominent. There are no pathologic extra-axial fluid collections. There is no evidence of cortical ischemic change. Changes of mild chronic microvascular white matter disease are noted. Cerebellum and brainstem are normal. The calvarium is intact. IMPRESSION:    1. No acute intracranial abnormalities. Periventricular small vessel disease Electronically signed by:  Monse Christy MD  1/3/2023 7:53 PM CST Workstation: [id number]    CT Cervical Spine Without Contrast    Result Date: 1/3/2023  All CT scans at this facility used dose modulation, iterative reconstruction and/or weight-based  dosing when appropriate to reduce radiation doses  as low as reasonably achievable. CT scan of the cervical spine Clinical history is Neck trauma, uncomplicated (NEXUS/CCR neg) (Age 16-64y) Axial images the cervical spine were obtained with sagittal and coronal reconstructed images. There is leftward curvature of the cervical spine. The vertebral bodies are of normal height. There is mild disc space narrowing at C5-6 and C6-7. There is no fracture or subluxation. The odontoid process is intact the cranial cervical junction is normal. The facet joints are aligned. There is no epidural fluid collection. The paraspinous soft tissues are normal. The lung apices are clear. IMPRESSION: Mild disc space narrowing at C5-6 and C6-7 without evidence of fracture or subluxation Electronically signed by:  Monse Christy MD  1/3/2023 7:58 PM [facility] Workstation: [id number]        Assessment and Plan:    Seizures  History of stroke with functional quadriplegia  42-year-old man with history of Asperger's, stroke with left-sided hemiparesis, functional quadriplegia, hypertension, ESBL UTI who was transferred from nursing home after multiple episodes of seizure-like activity reported by nursing.  He has had similar admissions in the past.  He is on valproic acid, and level was subtherapeutic.  He also has Keppra 1125 mg b.i.d. as home medication, so it remains unclear if he has been diagnosed with seizures.  - admitted to the hospital on the floor, q.4 hours neuro checks, continuous telemetry monitoring  - seizure precautions while inpatient  - increase valproic acid to 500 mg q.8 hours and repeat level tomorrow  - agree with decreasing Keppra to a 1000 mg b.i.d.  - Secondary stroke prevention with statin, not on antiplatelets likely in the setting of cerebral microhemorrhages  - avoid seizure threshold lowering medications such as Wellbutrin, tramadol, Benadryl  - EEG was performed and showed no evidence of seizure activity  - CT  brain showed no acute abnormality or bleeding  - management of infectious and metabolic derangements as per primary team  - will continue to follow along    DVT prophylaxis with chemo/SCD prophylaxis  Extensively discussed lifestyle modifications as prophylactic measures for stroke prevention including smoking cessation, adequate blood pressure management, healthy diet and regular exercise.    Patient to follow up with [facility] at [phone] within 3 days from discharge.     Stroke education was provided including stroke risk factors modification and any acute neurological changes including weakness, confusion, visual changes to come straight to the ER.     All questions were answered.                              Thank you kindly for including us in the care of this patient. Please do not hesitate to contact us with any questions.       Critical Care:  55 minutes of critical care time has been spent evaluating with the patient. Time includes chart review not limited to diagnostic imaging, labs, and vitals, patient assessment, discussion with family and nursing, current order evaluations, and new order entries.      Dinah Bell MD  Neurology

## 2023-01-04 NOTE — ASSESSMENT & PLAN NOTE
Place in Observation; hydration for PRABHJOT; EEG and Neurology consultation before increasing dosages of AED(s); TSH with AM labs for review

## 2023-01-05 LAB
ANION GAP SERPL CALC-SCNC: 7 MMOL/L (ref 8–16)
BACTERIA UR CULT: NORMAL
BACTERIA UR CULT: NORMAL
BASOPHILS # BLD AUTO: 0.03 K/UL (ref 0–0.2)
BASOPHILS NFR BLD: 0.4 % (ref 0–1.9)
BUN SERPL-MCNC: 23 MG/DL (ref 6–20)
CALCIUM SERPL-MCNC: 9.6 MG/DL (ref 8.7–10.5)
CHLORIDE SERPL-SCNC: 109 MMOL/L (ref 95–110)
CO2 SERPL-SCNC: 25 MMOL/L (ref 23–29)
CREAT SERPL-MCNC: 1.2 MG/DL (ref 0.5–1.4)
DIFFERENTIAL METHOD: ABNORMAL
EOSINOPHIL # BLD AUTO: 0.4 K/UL (ref 0–0.5)
EOSINOPHIL NFR BLD: 4.7 % (ref 0–8)
ERYTHROCYTE [DISTWIDTH] IN BLOOD BY AUTOMATED COUNT: 14.7 % (ref 11.5–14.5)
EST. GFR  (NO RACE VARIABLE): >60 ML/MIN/1.73 M^2
GLUCOSE SERPL-MCNC: 83 MG/DL (ref 70–110)
HCT VFR BLD AUTO: 38.5 % (ref 40–54)
HGB BLD-MCNC: 12.2 G/DL (ref 14–18)
IMM GRANULOCYTES # BLD AUTO: 0.02 K/UL (ref 0–0.04)
IMM GRANULOCYTES NFR BLD AUTO: 0.3 % (ref 0–0.5)
LYMPHOCYTES # BLD AUTO: 1.6 K/UL (ref 1–4.8)
LYMPHOCYTES NFR BLD: 21 % (ref 18–48)
MCH RBC QN AUTO: 30.8 PG (ref 27–31)
MCHC RBC AUTO-ENTMCNC: 31.7 G/DL (ref 32–36)
MCV RBC AUTO: 97 FL (ref 82–98)
MONOCYTES # BLD AUTO: 0.7 K/UL (ref 0.3–1)
MONOCYTES NFR BLD: 9.1 % (ref 4–15)
NEUTROPHILS # BLD AUTO: 4.9 K/UL (ref 1.8–7.7)
NEUTROPHILS NFR BLD: 64.5 % (ref 38–73)
NRBC BLD-RTO: 0 /100 WBC
PLATELET # BLD AUTO: 123 K/UL (ref 150–450)
PMV BLD AUTO: 12.3 FL (ref 9.2–12.9)
POTASSIUM SERPL-SCNC: 3.7 MMOL/L (ref 3.5–5.1)
RBC # BLD AUTO: 3.96 M/UL (ref 4.6–6.2)
SODIUM SERPL-SCNC: 141 MMOL/L (ref 136–145)
VALPROATE SERPL-MCNC: 65.2 UG/ML (ref 50–100)
WBC # BLD AUTO: 7.61 K/UL (ref 3.9–12.7)

## 2023-01-05 PROCEDURE — 25000003 PHARM REV CODE 250: Performed by: INTERNAL MEDICINE

## 2023-01-05 PROCEDURE — 36415 COLL VENOUS BLD VENIPUNCTURE: CPT | Performed by: STUDENT IN AN ORGANIZED HEALTH CARE EDUCATION/TRAINING PROGRAM

## 2023-01-05 PROCEDURE — 85025 COMPLETE CBC W/AUTO DIFF WBC: CPT | Performed by: INTERNAL MEDICINE

## 2023-01-05 PROCEDURE — 80048 BASIC METABOLIC PNL TOTAL CA: CPT | Performed by: INTERNAL MEDICINE

## 2023-01-05 PROCEDURE — 96372 THER/PROPH/DIAG INJ SC/IM: CPT | Performed by: INTERNAL MEDICINE

## 2023-01-05 PROCEDURE — 80164 ASSAY DIPROPYLACETIC ACD TOT: CPT | Performed by: STUDENT IN AN ORGANIZED HEALTH CARE EDUCATION/TRAINING PROGRAM

## 2023-01-05 PROCEDURE — 25000003 PHARM REV CODE 250: Performed by: STUDENT IN AN ORGANIZED HEALTH CARE EDUCATION/TRAINING PROGRAM

## 2023-01-05 PROCEDURE — 36415 COLL VENOUS BLD VENIPUNCTURE: CPT | Performed by: INTERNAL MEDICINE

## 2023-01-05 PROCEDURE — 63600175 PHARM REV CODE 636 W HCPCS: Performed by: INTERNAL MEDICINE

## 2023-01-05 PROCEDURE — 96361 HYDRATE IV INFUSION ADD-ON: CPT

## 2023-01-05 PROCEDURE — 12000002 HC ACUTE/MED SURGE SEMI-PRIVATE ROOM

## 2023-01-05 PROCEDURE — 21400001 HC TELEMETRY ROOM

## 2023-01-05 RX ORDER — LEVETIRACETAM 100 MG/ML
1000 SOLUTION ORAL 2 TIMES DAILY
Status: DISCONTINUED | OUTPATIENT
Start: 2023-01-05 | End: 2023-01-06 | Stop reason: HOSPADM

## 2023-01-05 RX ORDER — LEVETIRACETAM 100 MG/ML
1000 SOLUTION ORAL 2 TIMES DAILY
Status: DISCONTINUED | OUTPATIENT
Start: 2023-01-05 | End: 2023-01-05

## 2023-01-05 RX ADMIN — DEXTROSE, SODIUM CHLORIDE, SODIUM LACTATE, POTASSIUM CHLORIDE, AND CALCIUM CHLORIDE: 5; .6; .31; .03; .02 INJECTION, SOLUTION INTRAVENOUS at 07:01

## 2023-01-05 RX ADMIN — VALPROIC ACID 500 MG: 500 SOLUTION ORAL at 07:01

## 2023-01-05 RX ADMIN — POLYETHYLENE GLYCOL 3350 17 G: 17 POWDER, FOR SOLUTION ORAL at 08:01

## 2023-01-05 RX ADMIN — LEVETIRACETAM 1000 MG: 100 SOLUTION ORAL at 09:01

## 2023-01-05 RX ADMIN — ENOXAPARIN SODIUM 40 MG: 100 INJECTION SUBCUTANEOUS at 08:01

## 2023-01-05 RX ADMIN — LEVETIRACETAM 1000 MG: 500 TABLET, FILM COATED ORAL at 08:01

## 2023-01-05 RX ADMIN — AMLODIPINE BESYLATE 5 MG: 5 TABLET ORAL at 08:01

## 2023-01-05 RX ADMIN — VALPROIC ACID 500 MG: 500 SOLUTION ORAL at 09:01

## 2023-01-05 RX ADMIN — VALPROIC ACID 500 MG: 500 SOLUTION ORAL at 02:01

## 2023-01-05 RX ADMIN — AMANTADINE 100 MG: 100 CAPSULE ORAL at 08:01

## 2023-01-05 RX ADMIN — DEXTROSE, SODIUM CHLORIDE, SODIUM LACTATE, POTASSIUM CHLORIDE, AND CALCIUM CHLORIDE: 5; .6; .31; .03; .02 INJECTION, SOLUTION INTRAVENOUS at 06:01

## 2023-01-05 RX ADMIN — FLUOXETINE 20 MG: 20 CAPSULE ORAL at 08:01

## 2023-01-05 RX ADMIN — FAMOTIDINE 20 MG: 20 TABLET ORAL at 08:01

## 2023-01-05 RX ADMIN — ATORVASTATIN CALCIUM 40 MG: 40 TABLET, FILM COATED ORAL at 09:01

## 2023-01-05 NOTE — PROGRESS NOTES
"[facility]  Neurology Progress Note    Patient Name: Andrea Leon Jr.  MRN: [id number]  : 1980  TODAY'S DATE: 2023  ADMIT DATE: 1/3/2023  3:35 PM                                          CONSULTED PROVIDER: Dinah Bell MD, Neurologist. Cellphone: [phone]  CONSULT REQUESTED BY: Latoya Seals MD     Chief Complaint   Patient presents with    Seizures     HPI per EMR:  42-year-old male with a history of Asperger's, CVA with left hemiparesis, HTN, ESBL UTI (2021), Functional Quadriplegia, was transferred from his nursing care facility after reportedly having "Multiple episodes of seizure-like activity". Patient is non-verbal and currently non-communicative. He has had previous admissions for similar reasons. Valproic acid level was found to be sub-therapeutic     In ED: Labs reviewed and noted below: minimal leukocytosis and normocytic anemia; normal electrolytes with stage 3a renal dysfunction; transaminases are normal; CPK and HS troponin are normal; lactate is normal; valproic acid level is sub-therapeutic. Cultured in ED. CT Head reviewed: no acute intracranial pathology; C-spine: "Mild disc space narrowing at C5-6 and C6-7 without evidence of fracture or subluxation". EKG reviewed: sinus without acute segments.     Discussed with ED provider; Place in Observation; hydration for PRABHJOT; EEG and Neurology consultation before increasing dosages of AED(s); TSH with AM labs for review    Neurology Consult:  Patient was seen and examined by me today.  Is a 42-year-old man with history of Asperger's, stroke with left-sided hemiparesis, functional quadriplegia, hypertension, ESBL UTI who was transferred from nursing home after multiple episodes of seizure-like activity reported by nursing.  He has had similar admissions in the past.  He is on valproic acid, and level was subtherapeutic.  Was also found to be on PRABHJOT, likely dehydration related.  Neurology consulted for " evaluation and management of seizures.    1/5/23:  Patient was seen and examined by me today.  He is awake, alert, oriented to self and place (hospital), mood seems improved from yesterday.  No further seizures reported as inpatient.  Will obtain valproic acid level prior to discharge.    Scheduled Meds:   amantadine HCL  100 mg Per G Tube Daily    amLODIPine  5 mg Per G Tube Daily    atorvastatin  40 mg Per G Tube QHS    enoxaparin  40 mg Subcutaneous Daily    famotidine  20 mg Oral QAM    FLUoxetine  20 mg Per G Tube Daily    levETIRAcetam  1,000 mg Oral BID    modafiniL  200 mg Per G Tube Daily    polyethylene glycol  17 g Per G Tube Daily    valproic acid (as sodium salt)  500 mg Oral Q8H     Continuous Infusions:   dextrose 5% lactated ringers 100 mL/hr at 01/05/23 0709     PRN Meds:.cloNIDine, lorazepam, LORazepam, melatonin, ondansetron      Physical Exam  Current Vitals:  Vitals:    01/05/23 0801   BP: (!) 178/100   Pulse: (!) 54   Resp: 17   Temp: 97.7 °F (36.5 °C)       Physical Exam:  General:  Awake, alert, oriented to self  HEENT: PERRL, EOMI  CV: RRR  Lungs:  No respiratory distress  Abdomen:  Soft    Neurological Exam    MENTAL STATUS EXAM:  Patient is awake, alert, oriented to self and place, but not to time or situation.  He is able to tell me his name, and follows simple commands consistently.  Reportedly, he is minimally verbal.  Labile affect. His speech is slurred but intelligible. Naming and comprehension are impaired, repetition is mostly intact.    CRANIAL NERVE EXAM:  II/III: fundoscopic exam deferred, PERRL; visual fields full to threat  III/IV/VI: EOMI with some strabismus noted   V: no deficits appreciated to light touch  VII: no facial asymmetry noted  VIII: no deficits in hearing bilaterally  IX/X: unable to assess  XI: unable to assess  XII: unable to assess    MOTOR EXAM:  Bulk and Tone: decreased bulk, increased tone that is worse on left side  Patient is antigravity in both arms,  better on right side. Antigravity in both legs.    REFLEXES:  Masseter (CN V) Not Tested  3+ left in bilateral upper and lower extremities    SENSORY EXAM  Withdraws to pain equally in all extremities.    COORDINATION/CEREBELLAR EXAM:  FTN: mild dysmetria  HTS: unable to perform    GAIT:  Deferred for safety.    Laboratory Data & Studies    Recent Labs   Lab 01/03/23  1804 01/04/23  0408 01/05/23  0346   WBC 13.13* 7.68 7.61   HGB 12.6* 13.0* 12.2*   * 121* 123*   MCV 97 99* 97         Recent Labs   Lab 01/03/23  1801 01/04/23  0408 01/05/23  0345    141 141   K 4.2 4.6 3.7    107 109   CO2 27 26 25   BUN 36* 32* 23*   GLU 94 87 83   CALCIUM 9.8 9.7 9.6   MG 2.0  --   --          Recent Labs   Lab 01/03/23 1801   PROT 7.3   ALBUMIN 3.8   BILITOT 1.1*   AST 19   ALT 14   ALKPHOS 121         No results for input(s): LABPT, INR, APTT in the last 168 hours.    No results for input(s): HGBA1C, CHOL, TRIG, LDLCALC, HDL, TSH in the last 168 hours.      Microbiology:  Microbiology Results (last 7 days)       Procedure Component Value Units Date/Time    Urine culture [[id number]] Collected: 01/03/23 2256    Order Status: Completed Specimen: Urine Updated: 01/05/23 0856     Urine Culture, Routine Multiple organisms isolated. None in predominance.  Repeat if      clinically necessary.    Narrative:      Specimen Source->Urine    Blood culture #1 **CANNOT BE ORDERED STAT** [[id number]] Collected: 01/03/23 1916    Order Status: Completed Specimen: Blood Updated: 01/04/23 2232     Blood Culture, Routine No Growth to date      No Growth to date    Blood culture #2 **CANNOT BE ORDERED STAT** [[id number]] Collected: 01/03/23 1758    Order Status: Completed Specimen: Blood from Peripheral, Upper Arm, Right Updated: 01/04/23 2032     Blood Culture, Routine No Growth to date      No Growth to date              Imaging:  CT Head Without Contrast    Result Date: 1/3/2023  CMS MANDATED QUALITY DATA - CT RADIATION  436 All  CT scans at this facility utilize dose modulation, iterative reconstruction, and/or weight based dosing when appropriate to reduce radiation dose to as low as reasonably achievable. CT head without contrast Clinical data: Altered mental status COMPARISON: 10/25/2022 FINDINGS: Noninfusion images were obtained from the skull base to the vertex. There is no intracranial mass, hemorrhage, or midline shift. Ventricles and sulci are mildly prominent. There are no pathologic extra-axial fluid collections. There is no evidence of cortical ischemic change. Changes of mild chronic microvascular white matter disease are noted. Cerebellum and brainstem are normal. The calvarium is intact. IMPRESSION:    1. No acute intracranial abnormalities. Periventricular small vessel disease Electronically signed by:  Monse Christy MD  1/3/2023 7:53 PM CST Workstation: [id number]    CT Cervical Spine Without Contrast    Result Date: 1/3/2023  All CT scans at this facility used dose modulation, iterative reconstruction and/or weight-based dosing when appropriate to reduce radiation doses  as low as reasonably achievable. CT scan of the cervical spine Clinical history is Neck trauma, uncomplicated (NEXUS/CCR neg) (Age 16-64y) Axial images the cervical spine were obtained with sagittal and coronal reconstructed images. There is leftward curvature of the cervical spine. The vertebral bodies are of normal height. There is mild disc space narrowing at C5-6 and C6-7. There is no fracture or subluxation. The odontoid process is intact the cranial cervical junction is normal. The facet joints are aligned. There is no epidural fluid collection. The paraspinous soft tissues are normal. The lung apices are clear. IMPRESSION: Mild disc space narrowing at C5-6 and C6-7 without evidence of fracture or subluxation Electronically signed by:  Monse Christy MD  1/3/2023 7:58 PM CST Workstation: [id number]        Assessment and  Plan:    Seizures  History of stroke with functional quadriplegia  42-year-old man with history of Asperger's, stroke with left-sided hemiparesis, functional quadriplegia, hypertension, ESBL UTI who was transferred from nursing home after multiple episodes of seizure-like activity reported by nursing.  He has had similar admissions in the past.  He is on valproic acid, and level was subtherapeutic.  He also has Keppra 1125 mg b.i.d. as home medication, so it remains unclear if he has been diagnosed with seizures. Increased valproic acid dose yesterday, level is now therapeutic.  - admitted to the hospital on the floor, q.4 hours neuro checks, continuous telemetry monitoring  - seizure precautions while inpatient  - continue valproic acid to 500 mg q.8 hours, level therapeutic at 65.2  - continue Keppra 1000 mg b.i.d.  - Secondary stroke prevention with statin, not on antiplatelets likely in the setting of cerebral microhemorrhages  - avoid seizure threshold lowering medications such as Wellbutrin, tramadol, Benadryl  - EEG was performed and showed no evidence of seizure activity  - CT brain showed no acute abnormality or bleeding  - management of infectious and metabolic derangements as per primary team  - no further inpatient neurology workup is needed at this time. Please call with questions, concerns or neurological decline.    DVT prophylaxis with chemo/SCD prophylaxis  Extensively discussed lifestyle modifications as prophylactic measures for stroke prevention including smoking cessation, adequate blood pressure management, healthy diet and regular exercise.    Patient to follow up with [facility] at [phone] within 3 days from discharge.     Stroke education was provided including stroke risk factors modification and any acute neurological changes including weakness, confusion, visual changes to come straight to the ER.     All questions were answered.                              Thank you  kindly for including us in the care of this patient. Please do not hesitate to contact us with any questions.       45 minutes of care time has been spent evaluating with the patient. Time includes chart review not limited to diagnostic imaging, labs, and vitals, patient assessment, discussion with family and nursing, current order evaluations, and new order entries.      Dinah Bell MD  Neurology

## 2023-01-05 NOTE — PROCEDURES
EEG REPORT    NAME: Andrea Leon Jr.  : 1980  MRN: [id number]    DATE of EE2023    CLINICAL INDICATION: This is a 42 y.o. male with history of Asperger's, multiple strokes and functional quadriplegia being evaluated for breakthrough seizures.    MEDICATIONS:  Scheduled Meds:   [START ON 2023] amantadine HCL  100 mg Per G Tube Daily    [START ON 2023] amLODIPine  5 mg Per G Tube Daily    atorvastatin  40 mg Per G Tube QHS    [START ON 2023] enoxaparin  40 mg Subcutaneous Daily    [START ON 2023] famotidine  20 mg Oral QAM    [START ON 2023] FLUoxetine  20 mg Per G Tube Daily    levETIRAcetam  1,000 mg Oral BID    [START ON 2023] modafiniL  200 mg Per G Tube Daily    [START ON 2023] polyethylene glycol  17 g Per G Tube Daily    valproic acid (as sodium salt)  500 mg Oral Q8H     Continuous Infusions:   dextrose 5% lactated ringers 100 mL/hr at 23 0355     PRN Meds:.cloNIDine, lorazepam, LORazepam, melatonin, ondansetron      EEG DESCRIPTION:  A 16-channel EEG was performed with electrode placement in 10/20 [employer] System. Longitudinal bipolar and referential montages were utilized in analysis. Total duration of this study was 25 minutes. Video not obtained due to malfunction of equipment.    This is a technically difficult study with moderate muscle and motion artifacts.    There is a posterior dominant rhythm of 7-8 Hz which is interspersed with slower theta frequencies and occasional delta frequencies during wakefulness. Stage II sleep structures are not seen. There is increased beta activity seen throughout the study.    Photic stimulation was performed with no abnormal reactivity noted. Hyperventilation was not performed.    No epileptiform discharges or seizures are captured.    Impression:  This is an abnormal awake and drowsy routine EEG due to diffuse slowing of the background activity consistent with a mild to moderate encephalopathy. No epileptiform  discharges or seizures are captured.  Findings of this study indicate a generalized encephalopathic process that is nonspecific in type. Toxic, metabolic, or structural abnormalities may be considered.  Further clinical correlation is advised.      Dinah Bell MD  Neurology

## 2023-01-05 NOTE — CONSULTS
"Count includes the [facility]  Adult Nutrition   Consult Note (Nutrition Support Management)    SUMMARY     Recommendations  Recommendation/Intervention:   1) RD initiating TF of Jevity 1.5 @ 20 mL/hr advancing 10 mL Q4hrs or as tolerated to goal rate @ 55 mL/hr to provide 1980 kcal, 84 gm protein, and 1003 mL free water.   2) FWF's: 30 mL Q4hrs. Monitor I/O, hydration status and adjust flushes accordingly.   3) RD to monitor TF tolerance, weight, labs, and make recommendations as needed and manage enteral nutriton accordingly.    Goals:   1) Pt tolerate TF.   2) Nutrition provisions to be met via TF.  Nutrition Goal Status: new    Dietitian Rounds Brief  Consult for PEG feeds. Pt is a 41 y/o M who was transferred from nursing care facility after reportedly having "multiple episodes of seizure-like activity." Pt is w/ PEG in place. RD recommending TF of Jevity 1.5 @ 20 mL/hr advancing 10 mL Q4hrs or as tolerated to goal rate @ 55 mL/hr to provide 1980 kcal, 84 gm protein, and 1003 mL free water. RD to monitor TF initiation + tolerance, labs, and plan of care.     Diet order:   Current Diet Order: Seizure      Current Nutrition Support Formula Ordered: Jevity 1.5  Current Nutrition Support Rate Ordered: 55 (ml)  Current Nutrition Support Frequency Ordered: mL/hr    Evaluation of Received Nutrient/Fluid Intake  Enteral Calories (kcal): 1980  Enteral Protein (gm): 84  Enteral (Free Water) Fluid (mL): 1003  Free Water Flush Fluid (mL): 180  Energy Calories Required: not meeting needs  Protein Required: not meeting needs  Fluid Required: not meeting needs     % Intake of Estimated Energy Needs: 0%  % Meal Intake: NPO      Intake/Output Summary (Last 24 hours) at 1/5/2023 0861  Last data filed at 1/5/2023 0543  Gross per 24 hour   Intake --   Output 1400 ml   Net -1400 ml        Anthropometrics  Temp: 97.7 °F (36.5 °C)  Height: 5' 9" (175.3 cm)  Height (inches): 69 in  Weight Method: Bed Scale  Weight: 72.8 kg (160 lb 6.4 " oz)  Weight (lb): 160.4 lb  Ideal Body Weight (IBW), Male: 160 lb  % Ideal Body Weight, Male (lb): 94.38 %  BMI (Calculated): 23.7  BMI Grade: 18.5-24.9 - normal       Estimated/Assessed Needs  Weight Used For Calorie Calculations: 73 kg (160 lb 15 oz)  Energy Calorie Requirements (kcal): 5312-7036 (25-30 kcal/kg)  Energy Need Method: Kcal/kg  Protein Requirements:  (1.0-1.5 gm/kg)  Weight Used For Protein Calculations: 73 kg (160 lb 15 oz)  Fluid Requirements (mL): 5137-6489 (1 ml/kcal)     RDA Method (mL): 1825       Reason for Assessment  Reason For Assessment: new tube feeding, consult  Diagnosis: seizures  Relevant Medical History: Asperger's, CVA, HTN, PEG status, functional quadriplegia    Nutrition/Diet History  Food Allergies: NKFA  Factors Affecting Nutritional Intake: NPO, difficulty/impaired swallowing    Nutrition Risk Screen  Nutrition Risk Screen: tube feeding or parenteral nutrition     MST Score: 0  Have you recently lost weight without trying?: No  Weight loss score: 0  Have you been eating poorly because of a decreased appetite?: No  Appetite score: 0       Weight History:  Wt Readings from Last 5 Encounters:   01/05/23 72.8 kg (160 lb 6.4 oz)   10/25/22 68.8 kg (151 lb 10.8 oz)   10/24/22 65.8 kg (145 lb)   10/20/22 72.6 kg (160 lb)   10/16/22 73 kg (160 lb 15 oz)        Medications:Pertinent Medications Reviewed  Scheduled Meds:   amantadine HCL  100 mg Per G Tube Daily    amLODIPine  5 mg Per G Tube Daily    atorvastatin  40 mg Per G Tube QHS    enoxaparin  40 mg Subcutaneous Daily    famotidine  20 mg Oral QAM    FLUoxetine  20 mg Per G Tube Daily    levETIRAcetam  1,000 mg Oral BID    modafiniL  200 mg Per G Tube Daily    polyethylene glycol  17 g Per G Tube Daily    valproic acid (as sodium salt)  500 mg Oral Q8H     Continuous Infusions:   dextrose 5% lactated ringers 100 mL/hr at 01/05/23 0709     PRN Meds:.cloNIDine, lorazepam, LORazepam, melatonin, ondansetron    Labs: Pertinent  Labs Reviewed  Clinical Chemistry:  Recent Labs   Lab 01/03/23  1801 01/04/23  0408 01/05/23  0345      < > 141   K 4.2   < > 3.7      < > 109   CO2 27   < > 25   GLU 94   < > 83   BUN 36*   < > 23*   CREATININE 1.5*   < > 1.2   CALCIUM 9.8   < > 9.6   PROT 7.3  --   --    ALBUMIN 3.8  --   --    BILITOT 1.1*  --   --    ALKPHOS 121  --   --    AST 19  --   --    ALT 14  --   --    ANIONGAP 9   < > 7*   MG 2.0  --   --     < > = values in this interval not displayed.     CBC:   Recent Labs   Lab 01/05/23  0346   WBC 7.61   RBC 3.96*   HGB 12.2*   HCT 38.5*   *   MCV 97   MCH 30.8   MCHC 31.7*     Cardiac Profile:  Recent Labs   Lab 01/03/23  1804   CPK 80       Monitor and Evaluation  Food and Nutrient Intake: energy intake, enteral nutrition intake  Food and Nutrient Adminstration: enteral and parenteral nutrition administration  Knowledge/Beliefs/Attitudes: food and nutrition knowledge/skill  Physical Activity and Function: nutrition-related ADLs and IADLs  Anthropometric Measurements: weight, weight change, body mass index  Biochemical Data, Medical Tests and Procedures: electrolyte and renal panel, gastrointestinal profile, glucose/endocrine profile  Nutrition-Focused Physical Findings: overall appearance     Nutrition Risk  Level of Risk/Frequency of Follow-up: high     Nutrition Follow-Up  RD Follow-up?: Yes      Nilda Rueda, SEA 01/05/2023 8:46 AM

## 2023-01-05 NOTE — PLAN OF CARE
Problem: Feeding Intolerance (Enteral Nutrition)  Goal: Feeding Tolerance  Outcome: Ongoing, Progressing  Intervention: Prevent and Manage Feeding Intolerance  Flowsheets (Taken 1/5/2023 1859)  Nutrition Support Management: tube feeding initiated

## 2023-01-05 NOTE — PLAN OF CARE
Problem: Adult Inpatient Plan of Care  Goal: Plan of Care Review  Outcome: Ongoing, Progressing  Goal: Absence of Hospital-Acquired Illness or Injury  Outcome: Ongoing, Progressing  Goal: Optimal Comfort and Wellbeing  Outcome: Ongoing, Progressing     Problem: Bleeding (Sepsis/Septic Shock)  Goal: Absence of Bleeding  Outcome: Ongoing, Progressing     Problem: Glycemic Control Impaired (Sepsis/Septic Shock)  Goal: Blood Glucose Level Within Desired Range  Outcome: Ongoing, Progressing     Problem: Nutrition Impaired (Sepsis/Septic Shock)  Goal: Optimal Nutrition Intake  Outcome: Ongoing, Progressing     Problem: Fluid and Electrolyte Imbalance (Acute Kidney Injury/Impairment)  Goal: Fluid and Electrolyte Balance  Outcome: Ongoing, Progressing

## 2023-01-05 NOTE — PLAN OF CARE
01/05/23 0950   Post-Acute Status   Post-Acute Authorization Placement   Post-Acute Placement Status Pending medical clearance/testing   Discharge Plan   Discharge Plan A Return to nursing home   Discharge Plan B Return to Nursing Home     Updated clinicals sent [facility]

## 2023-01-05 NOTE — PROGRESS NOTES
"[facility] Medicine Progress Note  Patient Name: Andrea Leon Jr. MRN: [id number]   Patient Class: IP- Inpatient  Length of Stay: 0   Admission Date: 1/3/2023  3:35 PM Attending Physician: Latoya Seals MD   Primary Care Provider: Elly Mathew DO Face-to-Face encounter date: 01/05/2023   Chief Complaint: Seizures      Subjective:    Interval History   Opens eyes and talks with one word sentences  No complaints  No seizures as per nursing staff    Review of Systems   ROS unable to obtain    Hospital Course     01/05/2023     amantadine HCL  100 mg Per G Tube Daily    amLODIPine  5 mg Per G Tube Daily    atorvastatin  40 mg Per G Tube QHS    enoxaparin  40 mg Subcutaneous Daily    famotidine  20 mg Oral QAM    FLUoxetine  20 mg Per G Tube Daily    levetiracetam  1,000 mg Per G Tube BID    modafiniL  200 mg Per G Tube Daily    polyethylene glycol  17 g Per G Tube Daily    valproic acid (as sodium salt)  500 mg Oral Q8H       cloNIDine, lorazepam, LORazepam, melatonin, ondansetron      Objective:   Physical Exam  BP (!) 175/81 (BP Location: Left arm, Patient Position: Lying)   Pulse 65   Temp 97.6 °F (36.4 °C) (Oral)   Resp 17   Ht 5' 9" (1.753 m)   Wt 72.8 kg (160 lb 6.4 oz)   SpO2 97%   BMI 23.69 kg/m²   Constitutional: No distress.   HENT: Atraumatic.   Cardiovascular: Normal rate, regular rhythm and normal heart sounds.   Pulmonary/Chest: Effort normal. Clear to auscultation bilaterally. No wheezes.   Abdominal: Soft. Bowel sounds are normal. Exhibits no distension and no mass. No tenderness  Neurological: lethargic  Skin: Skin is warm and dry.     Labs and Imaging    Significant Labs: All pertinent labs within the past 24 hours have been reviewed.    Significant Imaging: I have reviewed all pertinent imaging results/findings within the past 24 hours.    I have reviewed the Vitals, labs and imaging as above.     Assessment & Plan:   Andrea Leon Jr. is a 42 " y.o. male admitted for    Active Hospital Problems    Diagnosis    *Seizure    Functional quadriplegia    Essential hypertension, benign    CVA, old, hemiparesis    PRABHJOT (acute kidney injury)       Plan  Discussed with neurology  Increased valproric acid  Levels to be checked this evening  If therapeutic, he can be discharged tomorrow back to nursing home    Active PT: No  Active OT: No  Active SLP: No    Core measures:  - Code status:   - Diet: Regular  VTE Risk Mitigation (From admission, onward)           Ordered     enoxaparin injection 40 mg  Daily         01/04/23 1615     Place MARILYN hose  Until discontinued         01/04/23 1615     IP VTE HIGH RISK PATIENT  Once         01/04/23 1615                    Discharge Planning:   Discharge Planning   AMEYA: 01/06    Code Status: Full Code   Is the patient medically ready for discharge?: no    Reason for patient still in hospital (select all that apply): Patient trending condition  Discharge Plan A: Home with assistance from family        Above encounter included review of the medical records, interviewing and examining the patient face-to-face, discussion with family and other health care providers, ordering and interpreting lab/test results and formulating a plan of care.     Medical Decision Making:  [] Low Complexity  [x] Moderate Complexity  [] High Complexity    Latoya Seals MD  [facility] Hospitalist  01/05/2023

## 2023-01-06 VITALS
SYSTOLIC BLOOD PRESSURE: 158 MMHG | TEMPERATURE: 97 F | WEIGHT: 166.5 LBS | OXYGEN SATURATION: 96 % | DIASTOLIC BLOOD PRESSURE: 99 MMHG | HEART RATE: 64 BPM | HEIGHT: 69 IN | RESPIRATION RATE: 18 BRPM | BODY MASS INDEX: 24.66 KG/M2

## 2023-01-06 LAB
ANION GAP SERPL CALC-SCNC: 7 MMOL/L (ref 8–16)
BASOPHILS # BLD AUTO: 0.04 K/UL (ref 0–0.2)
BASOPHILS NFR BLD: 0.6 % (ref 0–1.9)
BUN SERPL-MCNC: 15 MG/DL (ref 6–20)
CALCIUM SERPL-MCNC: 9.4 MG/DL (ref 8.7–10.5)
CHLORIDE SERPL-SCNC: 109 MMOL/L (ref 95–110)
CO2 SERPL-SCNC: 27 MMOL/L (ref 23–29)
CREAT SERPL-MCNC: 1.4 MG/DL (ref 0.5–1.4)
DIFFERENTIAL METHOD: ABNORMAL
EOSINOPHIL # BLD AUTO: 0.4 K/UL (ref 0–0.5)
EOSINOPHIL NFR BLD: 5.8 % (ref 0–8)
ERYTHROCYTE [DISTWIDTH] IN BLOOD BY AUTOMATED COUNT: 14.7 % (ref 11.5–14.5)
EST. GFR  (NO RACE VARIABLE): >60 ML/MIN/1.73 M^2
GLUCOSE SERPL-MCNC: 105 MG/DL (ref 70–110)
GLUCOSE SERPL-MCNC: 98 MG/DL (ref 70–110)
HCT VFR BLD AUTO: 41.4 % (ref 40–54)
HGB BLD-MCNC: 13 G/DL (ref 14–18)
IMM GRANULOCYTES # BLD AUTO: 0.02 K/UL (ref 0–0.04)
IMM GRANULOCYTES NFR BLD AUTO: 0.3 % (ref 0–0.5)
LYMPHOCYTES # BLD AUTO: 1.7 K/UL (ref 1–4.8)
LYMPHOCYTES NFR BLD: 25.1 % (ref 18–48)
MCH RBC QN AUTO: 31 PG (ref 27–31)
MCHC RBC AUTO-ENTMCNC: 31.4 G/DL (ref 32–36)
MCV RBC AUTO: 99 FL (ref 82–98)
MONOCYTES # BLD AUTO: 0.7 K/UL (ref 0.3–1)
MONOCYTES NFR BLD: 9.7 % (ref 4–15)
NEUTROPHILS # BLD AUTO: 4 K/UL (ref 1.8–7.7)
NEUTROPHILS NFR BLD: 58.5 % (ref 38–73)
NRBC BLD-RTO: 0 /100 WBC
PLATELET # BLD AUTO: 121 K/UL (ref 150–450)
PMV BLD AUTO: 12.1 FL (ref 9.2–12.9)
POTASSIUM SERPL-SCNC: 4.1 MMOL/L (ref 3.5–5.1)
RBC # BLD AUTO: 4.2 M/UL (ref 4.6–6.2)
SODIUM SERPL-SCNC: 143 MMOL/L (ref 136–145)
WBC # BLD AUTO: 6.89 K/UL (ref 3.9–12.7)

## 2023-01-06 PROCEDURE — 25000003 PHARM REV CODE 250: Performed by: STUDENT IN AN ORGANIZED HEALTH CARE EDUCATION/TRAINING PROGRAM

## 2023-01-06 PROCEDURE — 36415 COLL VENOUS BLD VENIPUNCTURE: CPT | Performed by: INTERNAL MEDICINE

## 2023-01-06 PROCEDURE — 25000003 PHARM REV CODE 250: Performed by: INTERNAL MEDICINE

## 2023-01-06 PROCEDURE — 63600175 PHARM REV CODE 636 W HCPCS: Performed by: INTERNAL MEDICINE

## 2023-01-06 PROCEDURE — 80048 BASIC METABOLIC PNL TOTAL CA: CPT | Performed by: INTERNAL MEDICINE

## 2023-01-06 PROCEDURE — 85025 COMPLETE CBC W/AUTO DIFF WBC: CPT | Performed by: INTERNAL MEDICINE

## 2023-01-06 RX ORDER — VALPROIC ACID 250 MG/5ML
500 SOLUTION ORAL EVERY 8 HOURS
Qty: 480 ML | Refills: 9 | Status: SHIPPED | OUTPATIENT
Start: 2023-01-06

## 2023-01-06 RX ORDER — LEVETIRACETAM 1000 MG/1
1000 TABLET ORAL 2 TIMES DAILY
Qty: 60 TABLET | Refills: 0 | Status: SHIPPED | OUTPATIENT
Start: 2023-01-06 | End: 2023-11-26

## 2023-01-06 RX ADMIN — LEVETIRACETAM 1000 MG: 100 SOLUTION ORAL at 08:01

## 2023-01-06 RX ADMIN — ENOXAPARIN SODIUM 40 MG: 100 INJECTION SUBCUTANEOUS at 08:01

## 2023-01-06 RX ADMIN — POLYETHYLENE GLYCOL 3350 17 G: 17 POWDER, FOR SOLUTION ORAL at 08:01

## 2023-01-06 RX ADMIN — FLUOXETINE 20 MG: 20 CAPSULE ORAL at 08:01

## 2023-01-06 RX ADMIN — MODAFINIL 200 MG: 100 TABLET ORAL at 10:01

## 2023-01-06 RX ADMIN — AMANTADINE 100 MG: 100 CAPSULE ORAL at 08:01

## 2023-01-06 RX ADMIN — FAMOTIDINE 20 MG: 20 TABLET ORAL at 06:01

## 2023-01-06 RX ADMIN — AMLODIPINE BESYLATE 5 MG: 5 TABLET ORAL at 08:01

## 2023-01-06 RX ADMIN — VALPROIC ACID 500 MG: 500 SOLUTION ORAL at 06:01

## 2023-01-06 RX ADMIN — VALPROIC ACID 500 MG: 500 SOLUTION ORAL at 02:01

## 2023-01-06 NOTE — PLAN OF CARE
01/06/23 0956   Post-Acute Status   Post-Acute Authorization Placement   Post-Acute Placement Status Pending medical clearance/testing     Updated clinicals sent to [facility] via [facility]. EMMA anticipating discharge today to [facility] nursing home. CM to follow     1010 - CM notified Zuleyka with [facility] of anticipated discharge today. She v/u     1307 - DC orders and AVS sent to Zuleyka via [facility]. CM awaiting number for report at this time.

## 2023-01-06 NOTE — PLAN OF CARE
Problem: Infection  Goal: Absence of Infection Signs and Symptoms  Outcome: Ongoing, Progressing     Problem: Adult Inpatient Plan of Care  Goal: Plan of Care Review  Outcome: Ongoing, Progressing     Problem: Adjustment to Illness (Sepsis/Septic Shock)  Goal: Optimal Coping  Outcome: Ongoing, Progressing     Problem: Bleeding (Sepsis/Septic Shock)  Goal: Absence of Bleeding  Outcome: Ongoing, Progressing

## 2023-01-06 NOTE — DISCHARGE SUMMARY
"[facility]  Discharge Summary  Patient Name: Andrea Leon Jr. MRN: [id number]   Patient Class: IP- Inpatient  Length of Stay: 1   Admission Date: 1/3/2023  3:35 PM Attending Physician: Latoya Seals MD   Primary Care Provider: Elly Mathew DO Face-to-Face encounter date: 01/06/2023   Chief Complaint: Seizures    Date of Discharge: 1/6/2023  Discharge Disposition:  nursing home  Condition: Stable       Reason for Hospitalization     Active Hospital Problems    Diagnosis    *Seizure    Functional quadriplegia    Essential hypertension, benign    CVA, old, hemiparesis    PRABHJOT (acute kidney injury)         Brief History of Present Illness    Andrea Leon Jr. is a 42 y.o.  male who  has a past medical history of Anticoagulant long-term use, Asperger's syndrome, CVA, old, hemiparesis, Encounter for blood transfusion, Essential hypertension, malignant, Functional quadriplegia (5/28/2021), and PEG (percutaneous endoscopic gastrostomy) status.. The patient presented to [facility] on 1/3/2023 with a primary complaint of Seizures  .     For the full HPI please refer to the History & Physical from this admission.    Hospital Course By Problem with Pertinent Findings     Patient admitted for seizure evaluation. Neurology consulted. Valproic acid dose adjusted and levels checked which remained therapeutic. No recurrent episodes of seizures in the hospital.  Patient discharged back to nursing home.     Patient was seen and examined on the date of discharge and determined to be suitable for discharge.    Physical Exam  BP (!) 158/99 (BP Location: Left leg, Patient Position: Lying)   Pulse 64   Temp 97.2 °F (36.2 °C) (Axillary)   Resp 18   Ht 5' 9" (1.753 m)   Wt 75.5 kg (166 lb 8 oz)   SpO2 96%   BMI 24.59 kg/m²   Vitals reviewed.    Constitutional: No distress.   HENT: Atraumatic.   Cardiovascular: Normal rate, regular rhythm and normal heart sounds.   Pulmonary/Chest: " Effort normal. Clear to auscultation bilaterally. No wheezes.   Abdominal: Soft. Bowel sounds are normal. Exhibits no distension and no mass. No tenderness  Neurological: Alert.   Skin: Skin is warm and dry.     Following labs were Reviewed   Recent Labs   Lab 01/06/23  0342   WBC 6.89   HGB 13.0*   HCT 41.4   *   CALCIUM 9.4      K 4.1   CO2 27      BUN 15   CREATININE 1.4     No results found for: POCTGLUCOSE     All labs within the past 24 hours have been reviewed    Microbiology Results (last 7 days)       Procedure Component Value Units Date/Time    Blood culture #1 **CANNOT BE ORDERED STAT** [[id number]] Collected: 01/03/23 1916    Order Status: Completed Specimen: Blood Updated: 01/05/23 2232     Blood Culture, Routine No Growth to date      No Growth to date      No Growth to date    Blood culture #2 **CANNOT BE ORDERED STAT** [[id number]] Collected: 01/03/23 1758    Order Status: Completed Specimen: Blood from Peripheral, Upper Arm, Right Updated: 01/05/23 2032     Blood Culture, Routine No Growth to date      No Growth to date      No Growth to date    Urine culture [[id number]] Collected: 01/03/23 2256    Order Status: Completed Specimen: Urine Updated: 01/05/23 0856     Urine Culture, Routine Multiple organisms isolated. None in predominance.  Repeat if      clinically necessary.    Narrative:      Specimen Source->Urine          CT Cervical Spine Without Contrast   Final Result      CT Head Without Contrast   Final Result          No results found for this or any previous visit.      Consultants and Procedures   Consultants:  Consults (From admission, onward)          Status Ordering Provider     Inpatient consult to Registered Dietitian/Nutritionist  Once        Provider:  (Not yet assigned)    JULIO Alford     Inpatient consult to Neurology  Once        Provider:  MD Bharti Cheng MUHAMMAD F.     Inpatient consult to Hospitalist  Once         Provider:  Julio Hernandez MD    Acknowledged JULIO HERNANDEZ            Procedures:   * No surgery found *     Discharge Information:   Diet:  Resume regular diet/    Physical Activity:  Activity as tolerated    Instructions:  1. Take all medications as prescribed  2. Keep all follow-up appointments  3. Return to the hospital or call your primary care physicians if any worsening symptoms such as weakness, numbness, seizures occur.    Follow-Up Appointments:  Please call your primary care physician to schedule an appointment in 1 week time.  2. Please call your neurologist to schedule appointment in 2 weeks time.          Pending laboratory work/Tests to be performed/followed by the Primary care Physician:  None    The patient was discharged in the care of her parents//wife/family/caregiver, with discharge instructions were reviewed in written and verbal form. All pertinent questions were discussed and prescriptions were provided. The importance of making follow up appointments and compliance of medications has been stressed repeatedly. The patient will follow up in 1 week or sooner as needed with the PCP, and the patient is on board with the plan. Upon discharge, patient needs to be on following medications.    Discharge Medications:     Medication List        CHANGE how you take these medications      atorvastatin 40 MG tablet  Commonly known as: LIPITOR  1 tablet (40 mg total) by Per G Tube route once daily.  What changed: when to take this     levETIRAcetam 1000 MG tablet  Commonly known as: KEPPRA  Take 1 tablet (1,000 mg total) by mouth 2 (two) times daily.  What changed:   medication strength  how much to take     valproic acid (as sodium salt) 500 mg/10 mL (10 mL) Soln  Take 10 mLs (500 mg total) by mouth every 8 (eight) hours.  What changed: when to take this            CONTINUE taking these medications      amantadine  mg capsule  Commonly known as: SYMMETREL  1 capsule (100 mg total)  by Per G Tube route once daily.     amLODIPine 5 MG tablet  Commonly known as: NORVASC  1 tablet (5 mg total) by Per G Tube route once daily.     cloNIDine 0.1 MG tablet  Commonly known as: CATAPRES  Take 1 tablet (0.1 mg total) by mouth 3 (three) times daily as needed (180).     enoxaparin 40 mg/0.4 mL Syrg  Commonly known as: LOVENOX  Inject 0.4 mLs (40 mg total) into the skin once daily.     famotidine 20 MG tablet  Commonly known as: PEPCID     FLUoxetine 20 MG capsule  1 capsule (20 mg total) by Per G Tube route once daily.     LORazepam 0.5 MG tablet  Commonly known as: ATIVAN     modafiniL 200 MG Tab  Commonly known as: PROVIGIL     ondansetron 4 MG tablet  Commonly known as: ZOFRAN     polyethylene glycol 17 gram Pwpk  Commonly known as: GLYCOLAX     PRO-STAT AWC  gram-kcal/30 mL Liqd  Generic drug: amino acids-protein hydrolys            STOP taking these medications      acetaminophen 325 MG tablet  Commonly known as: TYLENOL     ISOSOURCE 1.5 ALEC 0.07 gram-1.5 kcal/mL Liqd  Generic drug: lactose-reduced food with fibr     ketoconazole 2 % shampoo  Commonly known as: NIZORAL               Where to Get Your Medications        These medications were sent to [id number] - [place], [place] - [address] AT [facility] [address]  [address] [place] [address] [address]      Phone: [phone]   levETIRAcetam 1000 MG tablet  valproic acid (as sodium salt) 500 mg/10 mL (10 mL) Soln           I spent 40 minutes preparing the discharge including reviewing records from previous encounters, preparation of discharge summary, assessing and final examination of the patient, discharge medicine reconciliation, discussing plan of care, follow up and education and prescriptions.       Latoya Seals  [facility] Hospitalist  01/06/2023

## 2023-01-06 NOTE — PLAN OF CARE
01/06/23 1333   Final Note   Assessment Type Final Discharge Note   Anticipated Discharge Disposition Int Care Fac   What phone number can be called within the next 1-3 days to see how you are doing after discharge? [phone]   Hospital Resources/Appts/Education Provided Provided patient/caregiver with written discharge plan information;Post-Acute resouces added to AVS   Post-Acute Status   Post-Acute Authorization Placement   Post-Acute Placement Status Set-up Complete/Auth obtained   Discharge Delays (!) Ambulance Transport/Facility Transport     Discharge orders and chart reviewed. No other discharge needs noted at this time. Pt is clear for discharge from case management. Pt is discharging to [facility], where patient is a half-way resident.    NH to schedule hospital follow up.     Report information sent to patient's primary nurse via secure chat. NH to arrange transport back to facility.

## 2023-01-06 NOTE — NURSING
Patient transferred from bed to stretcher by 2 EMTs in stable condition. Patient discharged to [facility]

## 2023-01-06 NOTE — PROGRESS NOTES
"Critical access hospital  Adult Nutrition   Progress Note (Follow-Up)    SUMMARY     Recommendations  Recommendation/Intervention: 1. Continue TF at goal rate at tolerated. Jevity 1.5 with goal rate of 55 mL/hr (to provide 1980 kcal, 84 gm protein, and 1003 mL free water). 2. FWF's: 30 mL Q4hrs. Monitor I/O, hydration status and adjust flushes accordingly. 3. RD to monitor TF tolerance, weight, labs, and make recommendations as needed and manage enteral nutriton accordingly.  Goals: 1) Pt tolerate TF. 2) Nutrition provisions to be met via TF.  Nutrition Goal Status: progressing towards goal  Communication of RD Recs: reviewed with RN    Dietitian Rounds Brief  Follow up. Tolerating TF at goal.     Reason for Assessment  Reason For Assessment: RD follow-up  Diagnosis: seizures  Relevant Medical History: Asperger's, CVA, HTN, PEG status, functional quadriplegia    Nutrition Risk Screen  Nutrition Risk Screen: tube feeding or parenteral nutrition     MST Score: 0  Have you recently lost weight without trying?: No  Weight loss score: 0  Have you been eating poorly because of a decreased appetite?: No  Appetite score: 0       Nutrition/Diet History  Food Allergies: NKFA  Factors Affecting Nutritional Intake: NPO, difficulty/impaired swallowing    Anthropometrics  Temp: 97.2 °F (36.2 °C)  Height: 5' 9" (175.3 cm)  Height (inches): 69 in  Weight Method: Bed Scale  Weight: 75.5 kg (166 lb 8 oz)  Weight (lb): 166.5 lb  Ideal Body Weight (IBW), Male: 160 lb  % Ideal Body Weight, Male (lb): 94.38 %  BMI (Calculated): 24.6  BMI Grade: 18.5-24.9 - normal       Weight History:  Wt Readings from Last 10 Encounters:   01/06/23 75.5 kg (166 lb 8 oz)   10/25/22 68.8 kg (151 lb 10.8 oz)   10/24/22 65.8 kg (145 lb)   10/20/22 72.6 kg (160 lb)   10/16/22 73 kg (160 lb 15 oz)   10/12/22 86.2 kg (190 lb 0.6 oz)   10/12/22 86.2 kg (190 lb 0.6 oz)   09/09/22 86.2 kg (190 lb)   06/13/22 86.2 kg (190 lb)   11/30/21 86.2 kg (190 lb) "       Lab/Procedures/Meds: Pertinent Labs Reviewed    Clinical Chemistry:  Recent Labs   Lab 01/03/23  1801 01/04/23  0408 01/06/23  0342      < > 143   K 4.2   < > 4.1      < > 109   CO2 27   < > 27   GLU 94   < > 105   BUN 36*   < > 15   CREATININE 1.5*   < > 1.4   CALCIUM 9.8   < > 9.4   PROT 7.3  --   --    ALBUMIN 3.8  --   --    BILITOT 1.1*  --   --    ALKPHOS 121  --   --    AST 19  --   --    ALT 14  --   --    ANIONGAP 9   < > 7*   MG 2.0  --   --     < > = values in this interval not displayed.       CBC:   Recent Labs   Lab 01/06/23 0342   WBC 6.89   RBC 4.20*   HGB 13.0*   HCT 41.4   *   MCV 99*   MCH 31.0   MCHC 31.4*       Cardiac Profile:  Recent Labs   Lab 01/03/23  1804   CPK 80       Medications: Pertinent Medications reviewed    Scheduled Meds:   amantadine HCL  100 mg Per G Tube Daily    amLODIPine  5 mg Per G Tube Daily    atorvastatin  40 mg Per G Tube QHS    enoxaparin  40 mg Subcutaneous Daily    famotidine  20 mg Oral QAM    FLUoxetine  20 mg Per G Tube Daily    levetiracetam  1,000 mg Per G Tube BID    modafiniL  200 mg Per G Tube Daily    polyethylene glycol  17 g Per G Tube Daily    valproic acid (as sodium salt)  500 mg Oral Q8H       Continuous Infusions:   dextrose 5% lactated ringers Stopped (01/06/23 1430)       PRN Meds:.cloNIDine, lorazepam, LORazepam, melatonin, ondansetron    Estimated/Assessed Needs  Weight Used For Calorie Calculations: 73 kg (160 lb 15 oz)  Energy Calorie Requirements (kcal): 7005-4680 (25-30 kcal/kg)  Energy Need Method: Kcal/kg  Protein Requirements:  (1.0-1.5 gm/kg)  Weight Used For Protein Calculations: 73 kg (160 lb 15 oz)  Fluid Requirements (mL): 6126-1578 (1 ml/kcal)     RDA Method (mL): 1825       Nutrition Prescription Ordered  Current Diet Order: -  Current Nutrition Support Formula Ordered: Jevity 1.5  Current Nutrition Support Rate Ordered: 55 (ml)  Current Nutrition Support Frequency Ordered: ml/hr    Evaluation of  Received Nutrient/Fluid Intake  Energy Calories Required: meeting needs  Protein Required: meeting needs  Fluid Required: meeting needs  Tolerance: tolerating     Intake/Output Summary (Last 24 hours) at 1/6/2023 1624  Last data filed at 1/6/2023 1430  Gross per 24 hour   Intake 850 ml   Output 1425 ml   Net -575 ml      % Intake of Estimated Energy Needs: 75 - 100 %  % Meal Intake: NPO    Nutrition Risk  Level of Risk/Frequency of Follow-up: moderate - high     Monitor and Evaluation  Food and Nutrient Intake: energy intake, enteral nutrition intake  Food and Nutrient Adminstration: enteral and parenteral nutrition administration  Knowledge/Beliefs/Attitudes: food and nutrition knowledge/skill  Physical Activity and Function: nutrition-related ADLs and IADLs  Anthropometric Measurements: weight, weight change, body mass index  Biochemical Data, Medical Tests and Procedures: electrolyte and renal panel, gastrointestinal profile, glucose/endocrine profile  Nutrition-Focused Physical Findings: overall appearance     Nutrition Follow-Up  RD Follow-up?: Yes    Grere Mason RD 01/06/2023 4:24 PM

## 2023-01-06 NOTE — NURSING
Report given via phone to Inés at [facility] Transportation will be setup. Spoke to mother Alda Leon aware patient will be returning to the nursing home

## 2023-01-06 NOTE — PLAN OF CARE
Problem: Infection  Goal: Absence of Infection Signs and Symptoms  1/6/2023 1339 by Sarah Jarrell LPN  Outcome: Met  1/6/2023 1328 by Sarah Jarrell LPN  Outcome: Ongoing, Progressing     Problem: Adult Inpatient Plan of Care  Goal: Plan of Care Review  1/6/2023 1339 by Sarah Jarrell LPN  Outcome: Met  1/6/2023 1328 by Sarah Jarrell LPN  Outcome: Ongoing, Progressing  Goal: Patient-Specific Goal (Individualized)  Outcome: Met  Goal: Absence of Hospital-Acquired Illness or Injury  Outcome: Met  Goal: Optimal Comfort and Wellbeing  Outcome: Met  Goal: Readiness for Transition of Care  Outcome: Met     Problem: Adjustment to Illness (Sepsis/Septic Shock)  Goal: Optimal Coping  1/6/2023 1339 by Sarah Jarrell LPN  Outcome: Met  1/6/2023 1328 by Sarah Jarrell LPN  Outcome: Ongoing, Progressing     Problem: Bleeding (Sepsis/Septic Shock)  Goal: Absence of Bleeding  1/6/2023 1339 by Sarah Jarrell LPN  Outcome: Met  1/6/2023 1328 by Sarah Jarrell LPN  Outcome: Ongoing, Progressing     Problem: Glycemic Control Impaired (Sepsis/Septic Shock)  Goal: Blood Glucose Level Within Desired Range  Outcome: Met     Problem: Infection Progression (Sepsis/Septic Shock)  Goal: Absence of Infection Signs and Symptoms  Outcome: Met     Problem: Nutrition Impaired (Sepsis/Septic Shock)  Goal: Optimal Nutrition Intake  Outcome: Met     Problem: Fluid and Electrolyte Imbalance (Acute Kidney Injury/Impairment)  Goal: Fluid and Electrolyte Balance  Outcome: Met     Problem: Oral Intake Inadequate (Acute Kidney Injury/Impairment)  Goal: Optimal Nutrition Intake  Outcome: Met     Problem: Renal Function Impairment (Acute Kidney Injury/Impairment)  Goal: Effective Renal Function  Outcome: Met     Problem: Skin Injury Risk Increased  Goal: Skin Health and Integrity  Outcome: Met     Problem: Feeding Intolerance (Enteral Nutrition)  Goal: Feeding Tolerance  Outcome: Met

## 2023-01-06 NOTE — DISCHARGE INSTRUCTIONS
Diet:  Resume regular diet/    Physical Activity:  Activity as tolerated    Instructions:  1. Take all medications as prescribed  2. Keep all follow-up appointments  3. Return to the hospital or call your primary care physicians if any worsening symptoms such as weakness, numbness, seizures occur.    Follow-Up Appointments:  Please call your primary care physician to schedule an appointment in 1 week time.  2. Please call your neurologist to schedule appointment in 2 weeks time.

## 2023-01-08 LAB
BACTERIA BLD CULT: NORMAL
BACTERIA BLD CULT: NORMAL

## 2023-01-09 LAB — LEVETIRACETAM SERPL-MCNC: 22.5 UG/ML (ref 10–40)

## 2023-01-16 PROBLEM — N39.0 URINARY TRACT INFECTION WITH HEMATURIA: Status: RESOLVED | Noted: 2021-08-22 | Resolved: 2023-01-16

## 2023-01-16 PROBLEM — R31.9 URINARY TRACT INFECTION WITH HEMATURIA: Status: RESOLVED | Noted: 2021-08-22 | Resolved: 2023-01-16

## 2023-04-10 PROBLEM — N17.9 AKI (ACUTE KIDNEY INJURY): Status: RESOLVED | Noted: 2021-04-06 | Resolved: 2023-04-10

## 2023-10-19 ENCOUNTER — HOSPITAL ENCOUNTER (EMERGENCY)
Facility: HOSPITAL | Age: 43
Discharge: HOME | End: 2023-10-19
Attending: EMERGENCY MEDICINE
Payer: MEDICAID

## 2023-10-19 VITALS
SYSTOLIC BLOOD PRESSURE: 128 MMHG | WEIGHT: 167 LBS | OXYGEN SATURATION: 96 % | DIASTOLIC BLOOD PRESSURE: 52 MMHG | HEART RATE: 53 BPM | TEMPERATURE: 98 F | RESPIRATION RATE: 19 BRPM | BODY MASS INDEX: 24.73 KG/M2 | HEIGHT: 69 IN

## 2023-10-19 DIAGNOSIS — T85.598A FEEDING TUBE DYSFUNCTION, INITIAL ENCOUNTER: Primary | ICD-10-CM

## 2023-10-19 PROCEDURE — 99284 EMERGENCY DEPT VISIT MOD MDM: CPT

## 2023-10-19 PROCEDURE — 25500020 PHARM REV CODE 255

## 2023-10-19 PROCEDURE — 43762 RPLC GTUBE NO REVJ TRC: CPT

## 2023-10-19 RX ADMIN — DIATRIZOATE MEGLUMINE AND DIATRIZOATE SODIUM 50 ML: 660; 100 LIQUID ORAL; RECTAL at 12:10

## 2023-10-19 NOTE — ED PROVIDER NOTES
Encounter Date: 10/19/2023       History     Chief Complaint   Patient presents with    PEG Tube Problem     Sent from [facility] for PEG tube that came out this AM     HPI 43-year-old man with a history of Asperger syndrome, CVA with hemiparesis and PEG tube dependent who presents emergency department for dislodgement of his PEG tube this morning from which he gets tube feeds  Review of patient's allergies indicates:  No Known Allergies  Past Medical History:   Diagnosis Date    Anticoagulant long-term use     baby asa per chart    Asperger's syndrome     CVA, old, hemiparesis     Encounter for blood transfusion     Essential hypertension, malignant     Functional quadriplegia 5/28/2021    PEG (percutaneous endoscopic gastrostomy) status      Past Surgical History:   Procedure Laterality Date    COLONOSCOPY N/A 6/3/2021    Procedure: COLONOSCOPY;  Surgeon: Rosario Meeks MD;  Location: [facility];  Service: Endoscopy;  Laterality: N/A;    ESOPHAGOGASTRODUODENOSCOPY N/A 7/10/2021    Procedure: EGD (ESOPHAGOGASTRODUODENOSCOPY);  Surgeon: Rosario Meeks MD;  Location: [facility];  Service: Endoscopy;  Laterality: N/A;    ESOPHAGOGASTRODUODENOSCOPY W/ PEG N/A 4/1/2021    Procedure: EGD, WITH PEG TUBE INSERTION;  Surgeon: En Zheng MD;  Location: [facility];  Service: Endoscopy;  Laterality: N/A;     No family history on file.  Social History     Tobacco Use    Smoking status: Never    Smokeless tobacco: Never   Substance Use Topics    Alcohol use: Not Currently    Drug use: Not Currently     Review of Systems   Unable to perform ROS: Psychiatric disorder       Physical Exam     Initial Vitals [10/19/23 0934]   BP Pulse Resp Temp SpO2   (!) 142/99 63 19 97.9 °F (36.6 °C) 95 %      MAP       --         Physical Exam    Nursing note and vitals reviewed.  Constitutional: He appears well-developed and well-nourished.   HENT:   Head: Normocephalic and atraumatic.   Eyes: EOM are normal. Pupils are equal,  round, and reactive to light.   Neck: Neck supple.   Pulmonary/Chest: No respiratory distress.   Abdominal: Abdomen is soft. Bowel sounds are normal. He exhibits no distension.   PEG tube stoma with pink tissue, no surrounding signs of infection.   Musculoskeletal:         General: Normal range of motion.      Cervical back: Neck supple.     Neurological: He is alert.   Skin: Skin is warm and dry.         ED Course   Feeding Tube    Date/Time: 10/19/2023 9:28 AM  Location procedure was performed: [facility] EMERGENCY DEPARTMENT    Performed by: Aristeo Haile MD  Authorized by: Aristeo Haile MD  Consent: Verbal consent obtained.  Consent given by: patient  Indications: tube removed by patient    Sedation:  Patient sedated: no    Local anesthesia used: no    Anesthesia:  Local anesthesia used: no  Tube type: gastrostomy  Procedure type: replacement  Tube size: 18 Fr  Endoscope used: no  Bulb inflation volume: 20 (ml)  Bulb inflation fluid: normal saline  Placement/position confirmation: gastric contents aspirated and x-ray  Tube placement difficulty: moderate  Complications: No        Labs Reviewed - No data to display       Imaging Results              XR Gastric tube check, non-radiologist performed (Final result)  Result time 10/19/23 12:47:33      Final result by Ivone Loving MD (10/19/23 12:47:33)                   Impression:      Contrast administered through the gastrostomy tube is seen within the stomach.      Electronically signed by: Ivone Loving MD  Date:    10/19/2023  Time:    12:47               Narrative:    EXAMINATION:  XR GASTRIC TUBE CHECK, NON-RADIOLOGIST PERFORMED    CLINICAL HISTORY:  eval for PEG tube placement;    TECHNIQUE:  Images of the abdomen were acquired with and without the administration of contrast through the gastrostomy tube.    COMPARISON:  None    FINDINGS:  Contrast administered through the gastrostomy tube is seen within the stomach.                                        Medications   diatrizoate meglumineand-diatrizoate sodium (GASTROVIEW) solution 120 mL (50 mLs Oral Given 10/19/23 1231)     Medical Decision Making  43-year-old man with a history of Asperger syndrome, CVA with hemiparesis and PEG tube dependent who presents emergency department for dislodgement of his PEG tube this morning from which he gets tube feeds.  Tube replaced successfully with x-ray confirming proper placement of the gastrostomy tube.  PCP follow-up as needed.  Return precautions discussed.  Discharged in no acute distress.    Amount and/or Complexity of Data Reviewed  Radiology: ordered.    Risk  Prescription drug management.                               Clinical Impression:   Final diagnoses:  [T85.598A] Feeding tube dysfunction, initial encounter (Primary)        ED Disposition Condition    Discharge Stable          ED Prescriptions    None       Follow-up Information       Follow up With Specialties Details Why Contact Info    Elly Mathew,  Family Medicine  As needed [address] [place] [id number]  [phone]               Aristeo Haile MD  10/19/23 9193

## 2023-10-19 NOTE — ED NOTES
[facility] have peg tube replaced. Stoma is pink and surrounding edges are  w/o redness or drainage. Cleansed well and covered with gauze. Pt has red rash w/extending across the lower abdomen. Pt states it has been itching. Cleansed area well. Will notify Doctor. Supplies to change peg is at bedside.

## 2023-11-23 ENCOUNTER — HOSPITAL ENCOUNTER (EMERGENCY)
Facility: HOSPITAL | Age: 43
Discharge: HOME | End: 2023-11-23
Attending: EMERGENCY MEDICINE
Payer: MEDICAID

## 2023-11-23 VITALS
HEIGHT: 69 IN | TEMPERATURE: 98 F | HEART RATE: 66 BPM | BODY MASS INDEX: 24.73 KG/M2 | OXYGEN SATURATION: 95 % | RESPIRATION RATE: 20 BRPM | WEIGHT: 167 LBS | DIASTOLIC BLOOD PRESSURE: 104 MMHG | SYSTOLIC BLOOD PRESSURE: 155 MMHG

## 2023-11-23 VITALS
OXYGEN SATURATION: 95 % | TEMPERATURE: 97 F | HEIGHT: 69 IN | DIASTOLIC BLOOD PRESSURE: 86 MMHG | HEART RATE: 86 BPM | SYSTOLIC BLOOD PRESSURE: 140 MMHG | BODY MASS INDEX: 24.82 KG/M2 | WEIGHT: 167.56 LBS | RESPIRATION RATE: 20 BRPM

## 2023-11-23 DIAGNOSIS — K94.23 MIGRATION OF PERCUTANEOUS ENDOSCOPIC GASTROSTOMY (PEG) TUBE: Primary | ICD-10-CM

## 2023-11-23 DIAGNOSIS — R11.2 NAUSEA AND VOMITING, UNSPECIFIED VOMITING TYPE: Primary | ICD-10-CM

## 2023-11-23 DIAGNOSIS — K59.00 CONSTIPATION, UNSPECIFIED CONSTIPATION TYPE: ICD-10-CM

## 2023-11-23 DIAGNOSIS — N30.01 ACUTE CYSTITIS WITH HEMATURIA: ICD-10-CM

## 2023-11-23 DIAGNOSIS — K92.0 HEMATEMESIS: ICD-10-CM

## 2023-11-23 LAB
ABO + RH BLD: NORMAL
ALBUMIN SERPL BCP-MCNC: 3.3 G/DL (ref 3.5–5.2)
ALBUMIN SERPL BCP-MCNC: 3.8 G/DL (ref 3.5–5.2)
ALP SERPL-CCNC: 82 U/L (ref 55–135)
ALP SERPL-CCNC: 93 U/L (ref 55–135)
ALT SERPL W/O P-5'-P-CCNC: 6 U/L (ref 10–44)
ALT SERPL W/O P-5'-P-CCNC: 7 U/L (ref 10–44)
ANION GAP SERPL CALC-SCNC: 10 MMOL/L (ref 8–16)
ANION GAP SERPL CALC-SCNC: 12 MMOL/L (ref 8–16)
AST SERPL-CCNC: 11 U/L (ref 10–40)
AST SERPL-CCNC: 11 U/L (ref 10–40)
BACTERIA #/AREA URNS HPF: ABNORMAL /HPF
BASOPHILS # BLD AUTO: 0.03 K/UL (ref 0–0.2)
BASOPHILS # BLD AUTO: 0.04 K/UL (ref 0–0.2)
BASOPHILS NFR BLD: 0.2 % (ref 0–1.9)
BASOPHILS NFR BLD: 0.3 % (ref 0–1.9)
BILIRUB SERPL-MCNC: 0.7 MG/DL (ref 0.1–1)
BILIRUB SERPL-MCNC: 0.9 MG/DL (ref 0.1–1)
BILIRUB UR QL STRIP: NEGATIVE
BLD GP AB SCN CELLS X3 SERPL QL: NORMAL
BUN SERPL-MCNC: 16 MG/DL (ref 6–20)
BUN SERPL-MCNC: 18 MG/DL (ref 6–20)
CALCIUM SERPL-MCNC: 10 MG/DL (ref 8.7–10.5)
CALCIUM SERPL-MCNC: 9.1 MG/DL (ref 8.7–10.5)
CHLORIDE SERPL-SCNC: 103 MMOL/L (ref 95–110)
CHLORIDE SERPL-SCNC: 107 MMOL/L (ref 95–110)
CLARITY UR: ABNORMAL
CO2 SERPL-SCNC: 25 MMOL/L (ref 23–29)
CO2 SERPL-SCNC: 29 MMOL/L (ref 23–29)
COLOR UR: ABNORMAL
CREAT SERPL-MCNC: 1.6 MG/DL (ref 0.5–1.4)
CREAT SERPL-MCNC: 1.9 MG/DL (ref 0.5–1.4)
DIFFERENTIAL METHOD: ABNORMAL
DIFFERENTIAL METHOD: ABNORMAL
EOSINOPHIL # BLD AUTO: 0 K/UL (ref 0–0.5)
EOSINOPHIL # BLD AUTO: 0 K/UL (ref 0–0.5)
EOSINOPHIL NFR BLD: 0.2 % (ref 0–8)
EOSINOPHIL NFR BLD: 0.3 % (ref 0–8)
ERYTHROCYTE [DISTWIDTH] IN BLOOD BY AUTOMATED COUNT: 14.6 % (ref 11.5–14.5)
ERYTHROCYTE [DISTWIDTH] IN BLOOD BY AUTOMATED COUNT: 14.6 % (ref 11.5–14.5)
EST. GFR  (NO RACE VARIABLE): 45 ML/MIN/1.73 M^2
EST. GFR  (NO RACE VARIABLE): 55 ML/MIN/1.73 M^2
GLUCOSE SERPL-MCNC: 106 MG/DL (ref 70–110)
GLUCOSE SERPL-MCNC: 114 MG/DL (ref 70–110)
GLUCOSE UR QL STRIP: NEGATIVE
HCT VFR BLD AUTO: 47.9 % (ref 40–54)
HCT VFR BLD AUTO: 54.3 % (ref 40–54)
HGB BLD-MCNC: 15.6 G/DL (ref 14–18)
HGB BLD-MCNC: 17.5 G/DL (ref 14–18)
HGB UR QL STRIP: ABNORMAL
HYALINE CASTS #/AREA URNS LPF: 0 /LPF
IMM GRANULOCYTES # BLD AUTO: 0.04 K/UL (ref 0–0.04)
IMM GRANULOCYTES # BLD AUTO: 0.05 K/UL (ref 0–0.04)
IMM GRANULOCYTES NFR BLD AUTO: 0.3 % (ref 0–0.5)
IMM GRANULOCYTES NFR BLD AUTO: 0.3 % (ref 0–0.5)
INR PPP: 1 (ref 0.8–1.2)
INR PPP: 1 (ref 0.8–1.2)
KETONES UR QL STRIP: ABNORMAL
LACTATE SERPL-SCNC: 1 MMOL/L (ref 0.5–2.2)
LEUKOCYTE ESTERASE UR QL STRIP: ABNORMAL
LIPASE SERPL-CCNC: 22 U/L (ref 4–60)
LIPASE SERPL-CCNC: 23 U/L (ref 4–60)
LYMPHOCYTES # BLD AUTO: 1.2 K/UL (ref 1–4.8)
LYMPHOCYTES # BLD AUTO: 1.2 K/UL (ref 1–4.8)
LYMPHOCYTES NFR BLD: 7.7 % (ref 18–48)
LYMPHOCYTES NFR BLD: 9.2 % (ref 18–48)
MCH RBC QN AUTO: 29.5 PG (ref 27–31)
MCH RBC QN AUTO: 29.8 PG (ref 27–31)
MCHC RBC AUTO-ENTMCNC: 32.2 G/DL (ref 32–36)
MCHC RBC AUTO-ENTMCNC: 32.6 G/DL (ref 32–36)
MCV RBC AUTO: 91 FL (ref 82–98)
MCV RBC AUTO: 92 FL (ref 82–98)
MICROSCOPIC COMMENT: ABNORMAL
MONOCYTES # BLD AUTO: 0.7 K/UL (ref 0.3–1)
MONOCYTES # BLD AUTO: 0.8 K/UL (ref 0.3–1)
MONOCYTES NFR BLD: 4.9 % (ref 4–15)
MONOCYTES NFR BLD: 6.4 % (ref 4–15)
NEUTROPHILS # BLD AUTO: 10.7 K/UL (ref 1.8–7.7)
NEUTROPHILS # BLD AUTO: 13 K/UL (ref 1.8–7.7)
NEUTROPHILS NFR BLD: 83.7 % (ref 38–73)
NEUTROPHILS NFR BLD: 86.5 % (ref 38–73)
NITRITE UR QL STRIP: NEGATIVE
NON-SQ EPI CELLS #/AREA URNS HPF: 3 /HPF
NRBC BLD-RTO: 0 /100 WBC
NRBC BLD-RTO: 0 /100 WBC
PH UR STRIP: 8 [PH] (ref 5–8)
PLATELET # BLD AUTO: 123 K/UL (ref 150–450)
PLATELET # BLD AUTO: 141 K/UL (ref 150–450)
PMV BLD AUTO: 10.4 FL (ref 9.2–12.9)
PMV BLD AUTO: 10.5 FL (ref 9.2–12.9)
POTASSIUM SERPL-SCNC: 3.6 MMOL/L (ref 3.5–5.1)
POTASSIUM SERPL-SCNC: 4.4 MMOL/L (ref 3.5–5.1)
PROT SERPL-MCNC: 6.9 G/DL (ref 6–8.4)
PROT SERPL-MCNC: 8 G/DL (ref 6–8.4)
PROT UR QL STRIP: ABNORMAL
PROTHROMBIN TIME: 11 SEC (ref 9–12.5)
PROTHROMBIN TIME: 11 SEC (ref 9–12.5)
RBC # BLD AUTO: 5.24 M/UL (ref 4.6–6.2)
RBC # BLD AUTO: 5.93 M/UL (ref 4.6–6.2)
RBC #/AREA URNS HPF: 36 /HPF (ref 0–4)
SODIUM SERPL-SCNC: 142 MMOL/L (ref 136–145)
SODIUM SERPL-SCNC: 144 MMOL/L (ref 136–145)
SP GR UR STRIP: >1.03 (ref 1–1.03)
SPECIMEN OUTDATE: NORMAL
SQUAMOUS #/AREA URNS HPF: 6 /HPF
UNIDENT CRYS URNS QL MICRO: 12
URN SPEC COLLECT METH UR: ABNORMAL
UROBILINOGEN UR STRIP-ACNC: ABNORMAL EU/DL
WBC # BLD AUTO: 12.76 K/UL (ref 3.9–12.7)
WBC # BLD AUTO: 15 K/UL (ref 3.9–12.7)
WBC #/AREA URNS HPF: >100 /HPF (ref 0–5)
WBC CLUMPS URNS QL MICRO: ABNORMAL
YEAST URNS QL MICRO: ABNORMAL

## 2023-11-23 PROCEDURE — 43246 EGD PLACE GASTROSTOMY TUBE: CPT

## 2023-11-23 PROCEDURE — 80053 COMPREHEN METABOLIC PANEL: CPT | Mod: 91 | Performed by: EMERGENCY MEDICINE

## 2023-11-23 PROCEDURE — 36415 COLL VENOUS BLD VENIPUNCTURE: CPT | Performed by: EMERGENCY MEDICINE

## 2023-11-23 PROCEDURE — 83605 ASSAY OF LACTIC ACID: CPT | Performed by: EMERGENCY MEDICINE

## 2023-11-23 PROCEDURE — 80053 COMPREHEN METABOLIC PANEL: CPT | Performed by: NURSE PRACTITIONER

## 2023-11-23 PROCEDURE — 63600175 PHARM REV CODE 636 W HCPCS: Performed by: NURSE PRACTITIONER

## 2023-11-23 PROCEDURE — 86901 BLOOD TYPING SEROLOGIC RH(D): CPT | Performed by: NURSE PRACTITIONER

## 2023-11-23 PROCEDURE — 83690 ASSAY OF LIPASE: CPT | Mod: 91 | Performed by: EMERGENCY MEDICINE

## 2023-11-23 PROCEDURE — 96365 THER/PROPH/DIAG IV INF INIT: CPT

## 2023-11-23 PROCEDURE — 87086 URINE CULTURE/COLONY COUNT: CPT | Performed by: NURSE PRACTITIONER

## 2023-11-23 PROCEDURE — 87186 SC STD MICRODIL/AGAR DIL: CPT | Performed by: NURSE PRACTITIONER

## 2023-11-23 PROCEDURE — C9113 INJ PANTOPRAZOLE SODIUM, VIA: HCPCS | Performed by: NURSE PRACTITIONER

## 2023-11-23 PROCEDURE — 83690 ASSAY OF LIPASE: CPT | Performed by: NURSE PRACTITIONER

## 2023-11-23 PROCEDURE — 85025 COMPLETE CBC W/AUTO DIFF WBC: CPT | Mod: 91 | Performed by: EMERGENCY MEDICINE

## 2023-11-23 PROCEDURE — 96360 HYDRATION IV INFUSION INIT: CPT

## 2023-11-23 PROCEDURE — 81000 URINALYSIS NONAUTO W/SCOPE: CPT | Performed by: NURSE PRACTITIONER

## 2023-11-23 PROCEDURE — 87077 CULTURE AEROBIC IDENTIFY: CPT | Performed by: NURSE PRACTITIONER

## 2023-11-23 PROCEDURE — 25000003 PHARM REV CODE 250: Performed by: EMERGENCY MEDICINE

## 2023-11-23 PROCEDURE — 25500020 PHARM REV CODE 255

## 2023-11-23 PROCEDURE — 99285 EMERGENCY DEPT VISIT HI MDM: CPT | Mod: 25

## 2023-11-23 PROCEDURE — 85025 COMPLETE CBC W/AUTO DIFF WBC: CPT | Performed by: NURSE PRACTITIONER

## 2023-11-23 PROCEDURE — 99284 EMERGENCY DEPT VISIT MOD MDM: CPT | Mod: 25

## 2023-11-23 PROCEDURE — 85610 PROTHROMBIN TIME: CPT | Performed by: NURSE PRACTITIONER

## 2023-11-23 PROCEDURE — 96375 TX/PRO/DX INJ NEW DRUG ADDON: CPT | Mod: 59

## 2023-11-23 PROCEDURE — 85610 PROTHROMBIN TIME: CPT | Mod: 91 | Performed by: EMERGENCY MEDICINE

## 2023-11-23 PROCEDURE — 25000003 PHARM REV CODE 250: Performed by: NURSE PRACTITIONER

## 2023-11-23 RX ORDER — CEFIXIME 100 MG/5ML
300 POWDER, FOR SUSPENSION ORAL DAILY
Qty: 150 ML | Refills: 0 | OUTPATIENT
Start: 2023-11-23 | End: 2023-11-26

## 2023-11-23 RX ORDER — CEFIXIME 100 MG/5ML
300 POWDER, FOR SUSPENSION ORAL DAILY
Qty: 150 ML | Refills: 0 | Status: SHIPPED | OUTPATIENT
Start: 2023-11-23 | End: 2023-11-23 | Stop reason: SDUPTHER

## 2023-11-23 RX ORDER — ONDANSETRON HYDROCHLORIDE 4 MG/5ML
4 SOLUTION ORAL 2 TIMES DAILY
Qty: 100 ML | Refills: 0 | Status: SHIPPED | OUTPATIENT
Start: 2023-11-23

## 2023-11-23 RX ORDER — PSEUDOEPHEDRINE/ACETAMINOPHEN 30MG-500MG
100 TABLET ORAL
Status: COMPLETED | OUTPATIENT
Start: 2023-11-23 | End: 2023-11-23

## 2023-11-23 RX ORDER — ONDANSETRON 2 MG/ML
4 INJECTION INTRAMUSCULAR; INTRAVENOUS
Status: COMPLETED | OUTPATIENT
Start: 2023-11-23 | End: 2023-11-23

## 2023-11-23 RX ORDER — PANTOPRAZOLE SODIUM 40 MG/10ML
80 INJECTION, POWDER, LYOPHILIZED, FOR SOLUTION INTRAVENOUS
Status: COMPLETED | OUTPATIENT
Start: 2023-11-23 | End: 2023-11-23

## 2023-11-23 RX ORDER — SYRING-NEEDL,DISP,INSUL,0.3 ML 29 G X1/2"
296 SYRINGE, EMPTY DISPOSABLE MISCELLANEOUS
Status: COMPLETED | OUTPATIENT
Start: 2023-11-23 | End: 2023-11-23

## 2023-11-23 RX ADMIN — PANTOPRAZOLE SODIUM 80 MG: 40 INJECTION, POWDER, LYOPHILIZED, FOR SOLUTION INTRAVENOUS at 12:11

## 2023-11-23 RX ADMIN — BE HEALTH MAGNESIUM CITRATE ORAL SOLUTION - CHERRY 296 ML: 1.75 LIQUID ORAL at 05:11

## 2023-11-23 RX ADMIN — Medication 100 ML: at 05:11

## 2023-11-23 RX ADMIN — SODIUM CHLORIDE 500 ML: 9 INJECTION, SOLUTION INTRAVENOUS at 05:11

## 2023-11-23 RX ADMIN — CEFTRIAXONE SODIUM 1 G: 1 INJECTION, POWDER, FOR SOLUTION INTRAMUSCULAR; INTRAVENOUS at 06:11

## 2023-11-23 RX ADMIN — SODIUM CHLORIDE 1000 ML: 9 INJECTION, SOLUTION INTRAVENOUS at 06:11

## 2023-11-23 RX ADMIN — IOHEXOL 75 ML: 350 INJECTION, SOLUTION INTRAVENOUS at 02:11

## 2023-11-23 RX ADMIN — DIATRIZOATE MEGLUMINE AND DIATRIZOATE SODIUM 30 ML: 660; 100 LIQUID ORAL; RECTAL at 03:11

## 2023-11-23 RX ADMIN — ONDANSETRON 4 MG: 2 INJECTION INTRAMUSCULAR; INTRAVENOUS at 12:11

## 2023-11-23 NOTE — ED PROVIDER NOTES
Encounter Date: 11/23/2023       History     Chief Complaint   Patient presents with    coffee-ground emesis     Once yesterday morning and once this morning     This patient presents from nursing home secondary to hemoptysis.  Patient went episode yesterday 1 episode today.  This is per ear mass the nursing home staff.  Per EMS there was some question as to whether not this was coffee-ground emesis.  Patient is not able to provide any review of systems.    The history is provided by the EMS personnel and the [facility].     Review of patient's allergies indicates:  No Known Allergies  Past Medical History:   Diagnosis Date    Anticoagulant long-term use     baby asa per chart    Asperger's syndrome     CVA, old, hemiparesis     Encounter for blood transfusion     Essential hypertension, malignant     Functional quadriplegia 5/28/2021    PEG (percutaneous endoscopic gastrostomy) status      Past Surgical History:   Procedure Laterality Date    COLONOSCOPY N/A 6/3/2021    Procedure: COLONOSCOPY;  Surgeon: Rosario Meeks MD;  Location: [facility];  Service: Endoscopy;  Laterality: N/A;    ESOPHAGOGASTRODUODENOSCOPY N/A 7/10/2021    Procedure: EGD (ESOPHAGOGASTRODUODENOSCOPY);  Surgeon: Rosario Meeks MD;  Location: [facility];  Service: Endoscopy;  Laterality: N/A;    ESOPHAGOGASTRODUODENOSCOPY W/ PEG N/A 4/1/2021    Procedure: EGD, WITH PEG TUBE INSERTION;  Surgeon: En Zheng MD;  Location: [facility];  Service: Endoscopy;  Laterality: N/A;     No family history on file.  Social History     Tobacco Use    Smoking status: Never    Smokeless tobacco: Never   Substance Use Topics    Alcohol use: Not Currently    Drug use: Not Currently     Review of Systems   Constitutional: Negative.    HENT: Negative.     Eyes: Negative.    Respiratory: Negative.     Cardiovascular: Negative.    Gastrointestinal:  Positive for nausea (hematemesis).   Endocrine: Negative.    Genitourinary: Negative.    Musculoskeletal:  Negative.    Skin: Negative.    Allergic/Immunologic: Negative.    Neurological:  Positive for weakness (hemiplegic from CVA).   Hematological: Negative.    Psychiatric/Behavioral: Negative.     All other systems reviewed and are negative.      Physical Exam     Initial Vitals   BP Pulse Resp Temp SpO2   11/23/23 0604 11/23/23 0604 11/23/23 0604 11/23/23 0625 11/23/23 0604   (!) 164/107 81 16 97.3 °F (36.3 °C) 96 %      MAP       --                Physical Exam    Nursing note and vitals reviewed.  Constitutional: Vital signs are normal. He appears cachectic. He is active and cooperative. He appears ill.   HENT:   Head: Normocephalic and atraumatic.   Eyes: Conjunctivae, EOM and lids are normal. Pupils are equal, round, and reactive to light.   Neck: Trachea normal. Neck supple. No thyroid mass present.   Normal range of motion.   Full passive range of motion without pain.     Cardiovascular:  Normal rate, regular rhythm, S1 normal, S2 normal, normal heart sounds, intact distal pulses and normal pulses.           Pulmonary/Chest: Effort normal and breath sounds normal.   Abdominal: Abdomen is soft and protuberant. Bowel sounds are normal. There is no abdominal tenderness.   Musculoskeletal:         General: Normal range of motion.      Cervical back: Full passive range of motion without pain, normal range of motion and neck supple.     Lymphadenopathy:     He has no axillary adenopathy.   Neurological: He is oriented to person, place, and time. A sensory deficit is present. No cranial nerve deficit. He exhibits normal muscle tone. GCS eye subscore is 4. GCS verbal subscore is 1. GCS motor subscore is 6.   Skin: Skin is warm, dry and intact.   Psychiatric: He has a normal mood and affect. His speech is normal and behavior is normal. Judgment and thought content normal. Cognition and memory are normal.         ED Course   Procedures  Labs Reviewed   CBC W/ AUTO DIFFERENTIAL - Abnormal; Notable for the following  components:       Result Value    WBC 15.00 (*)     Hematocrit 54.3 (*)     RDW 14.6 (*)     Platelets 141 (*)     Gran # (ANC) 13.0 (*)     Immature Grans (Abs) 0.05 (*)     Gran % 86.5 (*)     Lymph % 7.7 (*)     All other components within normal limits   COMPREHENSIVE METABOLIC PANEL - Abnormal; Notable for the following components:    Glucose 114 (*)     Creatinine 1.9 (*)     ALT 7 (*)     eGFR 45 (*)     All other components within normal limits   PROTIME-INR   LIPASE   LACTIC ACID, PLASMA          Imaging Results    None          Medications   sodium chloride 0.9% bolus 1,000 mL 1,000 mL (0 mLs Intravenous Stopped 11/23/23 0740)     Medical Decision Making  The patient has no evidence of coffee-ground emesis has completely normal H&H and normal lipase.  There is currently no events of emesis here in the emergency department subsequent to this I will discharge the patient back to the nursing home with instructions to give Zofran for any nausea or vomiting.  Patient had no evidence of coffee-ground emesis when suctioning his PEG tube.  Patient's H&H is completely normal no evidence of any coagulopathy.  Patient's subsequently be discharged back to the nursing home with precautions.    Amount and/or Complexity of Data Reviewed  Labs: ordered. Decision-making details documented in ED Course.    Risk  Prescription drug management.      Results for orders placed or performed during the hospital encounter of 11/23/23   CBC auto differential   Result Value Ref Range    WBC 15.00 (H) 3.90 - 12.70 K/uL    RBC 5.93 4.60 - 6.20 M/uL    Hemoglobin 17.5 14.0 - 18.0 g/dL    Hematocrit 54.3 (H) 40.0 - 54.0 %    MCV 92 82 - 98 fL    MCH 29.5 27.0 - 31.0 pg    MCHC 32.2 32.0 - 36.0 g/dL    RDW 14.6 (H) 11.5 - 14.5 %    Platelets 141 (L) 150 - 450 K/uL    MPV 10.4 9.2 - 12.9 fL    Immature Granulocytes 0.3 0.0 - 0.5 %    Gran # (ANC) 13.0 (H) 1.8 - 7.7 K/uL    Immature Grans (Abs) 0.05 (H) 0.00 - 0.04 K/uL    Lymph # 1.2 1.0 -  4.8 K/uL    Mono # 0.7 0.3 - 1.0 K/uL    Eos # 0.0 0.0 - 0.5 K/uL    Baso # 0.04 0.00 - 0.20 K/uL    nRBC 0 0 /100 WBC    Gran % 86.5 (H) 38.0 - 73.0 %    Lymph % 7.7 (L) 18.0 - 48.0 %    Mono % 4.9 4.0 - 15.0 %    Eosinophil % 0.3 0.0 - 8.0 %    Basophil % 0.3 0.0 - 1.9 %    Differential Method Automated    Comprehensive metabolic panel   Result Value Ref Range    Sodium 144 136 - 145 mmol/L    Potassium 4.4 3.5 - 5.1 mmol/L    Chloride 103 95 - 110 mmol/L    CO2 29 23 - 29 mmol/L    Glucose 114 (H) 70 - 110 mg/dL    BUN 18 6 - 20 mg/dL    Creatinine 1.9 (H) 0.5 - 1.4 mg/dL    Calcium 10.0 8.7 - 10.5 mg/dL    Total Protein 8.0 6.0 - 8.4 g/dL    Albumin 3.8 3.5 - 5.2 g/dL    Total Bilirubin 0.9 0.1 - 1.0 mg/dL    Alkaline Phosphatase 93 55 - 135 U/L    AST 11 10 - 40 U/L    ALT 7 (L) 10 - 44 U/L    eGFR 45 (A) >60 mL/min/1.73 m^2    Anion Gap 12 8 - 16 mmol/L   Protime-INR   Result Value Ref Range    Prothrombin Time 11.0 9.0 - 12.5 sec    INR 1.0 0.8 - 1.2   Lipase   Result Value Ref Range    Lipase 22 4 - 60 U/L   Lactic acid, plasma   Result Value Ref Range    Lactate (Lactic Acid) 1.0 0.5 - 2.2 mmol/L                ED Course as of 11/25/23 1914   Thu Nov 23, 2023   0652 Nursing staff aspirated gastric contents from PEG tube there was no evidence of any coffee-ground emesis. [MI]      ED Course User Index  [MI] Brian Rowell MD                        Clinical Impression:  Final diagnoses:  [R11.2] Nausea and vomiting, unspecified vomiting type (Primary)          ED Disposition Condition    Discharge Stable          ED Prescriptions       Medication Sig Dispense Start Date End Date Auth. Provider    ondansetron (ZOFRAN) 4 mg/5 mL solution Take 5 mLs (4 mg total) by mouth 2 (two) times daily. 100 mL 11/23/2023 -- Brian Rowell MD          Follow-up Information       Follow up With Specialties Details Why Contact Info    Elly Mathew,  Family Medicine Schedule an appointment as soon as  possible for a visit in 3 days  [address]  [facility] [address]  [phone]               Brian Rowell MD  11/23/23 0658       Brian Rowell MD  11/25/23 5540

## 2023-11-23 NOTE — ED NOTES
Bed: 11  Expected date: 11/23/23  Expected time: 5:39 AM  Means of arrival: Ambulance Service  Comments:  GI bleed

## 2023-11-24 NOTE — ED PROVIDER NOTES
Encounter Date: 11/23/2023       History     Chief Complaint   Patient presents with    Hematemesis     Coffee -ground emesis /  2 nd ED visit for same      Patient is a 43 y.o. male who presents to the ED 11/23/2023 with a chief complaint of Two episodes of coffee-ground emesis at the nursing home since discharge from the emergency department a few hours ago.  Patient feels nausea.  He states he might be impacted.  He has no other complaints.  He denies any fever abdominal pain or chest pain or shortness of breath.  He denies any blood in his stool.  He has a PEG tube and lives at a nursing home.  He is bed-bound.  He has a history of CVA, hypertension, and Asperger syndrome.  He does take Eliquis.             Review of patient's allergies indicates:  No Known Allergies  Past Medical History:   Diagnosis Date    Anticoagulant long-term use     baby asa per chart    Asperger's syndrome     CVA, old, hemiparesis     Encounter for blood transfusion     Essential hypertension, malignant     Functional quadriplegia 5/28/2021    PEG (percutaneous endoscopic gastrostomy) status      Past Surgical History:   Procedure Laterality Date    COLONOSCOPY N/A 6/3/2021    Procedure: COLONOSCOPY;  Surgeon: Rosario Meeks MD;  Location: [facility];  Service: Endoscopy;  Laterality: N/A;    ESOPHAGOGASTRODUODENOSCOPY N/A 7/10/2021    Procedure: EGD (ESOPHAGOGASTRODUODENOSCOPY);  Surgeon: Rosario Meeks MD;  Location: [facility];  Service: Endoscopy;  Laterality: N/A;    ESOPHAGOGASTRODUODENOSCOPY W/ PEG N/A 4/1/2021    Procedure: EGD, WITH PEG TUBE INSERTION;  Surgeon: En Zheng MD;  Location: [facility];  Service: Endoscopy;  Laterality: N/A;     No family history on file.  Social History     Tobacco Use    Smoking status: Never    Smokeless tobacco: Never   Substance Use Topics    Alcohol use: Not Currently    Drug use: Not Currently     Review of Systems   Constitutional:  Negative for chills and fever.   HENT:   no rashes , no suspicious lesions , no areas of discoloration , no jaundice present , no masses , no tenderness on palpation Negative for sore throat.    Respiratory:  Negative for chest tightness and shortness of breath.    Cardiovascular:  Negative for chest pain.   Gastrointestinal:  Positive for nausea and vomiting. Negative for abdominal pain.   Genitourinary:  Negative for dysuria.   Musculoskeletal:  Negative for arthralgias, gait problem, joint swelling and myalgias.   Skin:  Negative for rash and wound.   Neurological:  Negative for syncope.   Hematological:  Does not bruise/bleed easily.       Physical Exam     Initial Vitals [11/23/23 1144]   BP Pulse Resp Temp SpO2   (!) 158/108 84 18 97.8 °F (36.6 °C) 96 %      MAP       --         Physical Exam    Nursing note and vitals reviewed.  HENT:   Head: Normocephalic and atraumatic.   Nose: Nose normal.   Eyes: Conjunctivae are normal. Pupils are equal, round, and reactive to light. Right eye exhibits no discharge. Left eye exhibits no discharge.   Neck: Neck supple.   Normal range of motion.  Cardiovascular:  Normal rate, regular rhythm, normal heart sounds and intact distal pulses.           Pulmonary/Chest: Breath sounds normal.   Abdominal: Abdomen is soft. Bowel sounds are normal.   PEG tube in place. No tenderness, guarding or distention.    Genitourinary:    Rectum normal.      Genitourinary Comments: No flip blood. No impaction.      Musculoskeletal:         General: Normal range of motion.      Cervical back: Normal range of motion and neck supple.     Neurological: He is alert and oriented to person, place, and time. He has normal strength. No sensory deficit.   Awake and alert. Oriented to self and place. Quadraplegia with retracted upper and lower extremities. Upper extremities with 3/5 strength with hand contractures but able to lift bi upper extremities off the bed against gravity.    Skin: Skin is warm and dry. Capillary refill takes less than 2 seconds.   Psychiatric: He has a normal mood and affect.         ED Course   Procedures  Labs Reviewed   CBC W/ AUTO  DIFFERENTIAL - Abnormal; Notable for the following components:       Result Value    WBC 12.76 (*)     RDW 14.6 (*)     Platelets 123 (*)     Gran # (ANC) 10.7 (*)     Gran % 83.7 (*)     Lymph % 9.2 (*)     All other components within normal limits   COMPREHENSIVE METABOLIC PANEL - Abnormal; Notable for the following components:    Creatinine 1.6 (*)     Albumin 3.3 (*)     ALT 6 (*)     eGFR 55 (*)     All other components within normal limits   URINALYSIS, REFLEX TO URINE CULTURE - Abnormal; Notable for the following components:    Color, UA Orange (*)     Appearance, UA Cloudy (*)     Specific Gravity, UA >1.030 (*)     Protein, UA 3+ (*)     Ketones, UA 1+ (*)     Occult Blood UA 2+ (*)     Urobilinogen, UA 2.0-3.0 (*)     Leukocytes, UA 3+ (*)     All other components within normal limits    Narrative:     Specimen Source->Urine   URINALYSIS MICROSCOPIC - Abnormal; Notable for the following components:    RBC, UA 36 (*)     WBC, UA >100 (*)     WBC Clumps, UA Many (*)     Yeast, UA Rare (*)     Non-Squam Epith 3 (*)     All other components within normal limits    Narrative:     Specimen Source->Urine   CULTURE, URINE   LIPASE   PROTIME-INR   TYPE & SCREEN          Imaging Results              XR NG/OG tube placement check, non-radiologist performed (Final result)  Result time 11/23/23 16:02:05      Final result by Ivone Loving MD (11/23/23 16:02:05)                   Impression:      Contrast administered through the gastrostomy tube is seen within the superior portion of the stomach.      Electronically signed by: Ivone Loving MD  Date:    11/23/2023  Time:    16:02               Narrative:    EXAMINATION:  XR NG/OG TUBE PLACEMENT CHECK, NON-RADIOLOGIST PERFORMED    CLINICAL HISTORY:  peg tube placement;    TECHNIQUE:  30 cc contrast was administered and spot view was acquired.    COMPARISON:  None    FINDINGS:  Contrast administered through the gastrostomy tube is seen within the superior portion  of the stomach.                                       CT Abdomen Pelvis With IV Contrast NO Oral Contrast (Final result)  Result time 11/23/23 14:35:56      Final result by Ivone Loving MD (11/23/23 14:35:56)                   Impression:      The balloon for the patient's gastrostomy tube is malpositioned and lies within the 2nd portion of the duodenum with air and fluid distending the stomach.    There are findings concerning for fecal impaction.    There is circumferential bladder wall thickening which may be seen with cystitis.      Electronically signed by: Ivone Loving MD  Date:    11/23/2023  Time:    14:35               Narrative:    EXAMINATION:  CT ABDOMEN PELVIS WITH IV CONTRAST    CLINICAL HISTORY:  GI bleed;    TECHNIQUE:  Low dose axial images, sagittal and coronal reformations were obtained from the lung bases to the pubic symphysis following the IV administration of 75 mL of Omnipaque 350 and the oral administration of 30 mL of Omnipaque 350.    COMPARISON:  10/12/2022    FINDINGS:  The lung bases are clear.    There is a gastrostomy tube whose balloon lies within the 2nd portion of the duodenum.  The stomach is distended with air and fluid.    The liver, spleen, adrenal glands, kidneys and pancreas show normal contrasted appearance.  The gallbladder is in place.    The portal vein, splenic vein and superior mesenteric veins are patent.    There is a large amount of stool seen within the rectum which measures 7.8 cm in greatest transaxial dimension.  Stool is seen throughout the colon.  There is circumferential bladder wall thickening.  There is no evidence of abdominal nor pelvic lymphadenopathy.  The osseous structures are within normal limits.                                       X-Ray Abdomen AP 1 View (KUB) (Final result)  Result time 11/23/23 12:10:45      Final result by Ivone Loving MD (11/23/23 12:10:45)                   Impression:      The stomach is distended with  air.      Electronically signed by: Ivone Loving MD  Date:    11/23/2023  Time:    12:10               Narrative:    EXAMINATION:  XR ABDOMEN AP 1 VIEW    CLINICAL HISTORY:  Hematemesis    TECHNIQUE:  AP View(s) of the abdomen was performed.    COMPARISON:  10/19/2023    FINDINGS:  The stomach is distended.  A gastrostomy tube overlies the stomach.  There is no evidence bowel obstruction.  There are degenerative changes of the spine and hips.                                       Medications   pantoprazole injection 80 mg (80 mg Intravenous Given 11/23/23 1232)   ondansetron injection 4 mg (4 mg Intravenous Given 11/23/23 1232)   iohexoL (OMNIPAQUE 350) 350 mg iodine/mL injection (75 mLs Intravenous Given 11/23/23 1422)   glycerin 99.5% topical solution 100 mL (100 mLs Rectal Given 11/23/23 1711)     And   magnesium citrate solution 296 mL (296 mLs Rectal Given 11/23/23 1712)     And   sodium chloride 0.9% bolus 500 mL 500 mL (0 mLs Rectal Stopped 11/23/23 1820)   diatrizoate meglumineand-diatrizoate sodium (GASTROVIEW) solution 30 mL (30 mLs Per G Tube Given 11/23/23 1552)   cefTRIAXone (ROCEPHIN) 1 g in dextrose 5 % in water (D5W) 100 mL IVPB (MB+) (0 g Intravenous Stopped 11/23/23 1900)     Medical Decision Making  Amount and/or Complexity of Data Reviewed  Labs: ordered.  Radiology: ordered.    Risk  OTC drugs.  Prescription drug management.         APC / Resident Notes:   Patient is a 43 y.o. male who presents to the ED 11/23/2023 who underwent emergent evaluation for vomiting. Visit to the ED in the last 24 hours for the same. Patient having no vomiting in the emergency department.  He is awake and alert.  He is not febrile or tachycardic.  He does not appear septic or toxic.  Mild leukocytosis noted.  CT of abdomen pelvis with malpositioned PEG tube and fecal impaction and cystitis.  Discussed findings with gastroenterology on-call Dr. Sparrow  Also discussed hematemesis.  Hemoglobin and hematocrit are  normal.  Again he is having no vomiting or diarrhea in the emergency department.  He recommends reposition of PEG tube and deflation of balloon and pulling back on it. The tube is easily deflated and pulled back 8 cm's and currently rests at 4cm at insertion site with repeat xray showing it is now in the stomach. Pt's nausea and symptoms resolve while in ED. Pt given enema and manual disimpaction attempted with no impaction noted on exam. Recommend continued bowel regimen at nursing home. U/A concerning for UTI and he is given IV antibiotics in the ED and discharged on Suprax via PEG with urine culture pending. He doesn't appear to be septic and I do not think admission for UTI indicated. I do not think emergent endoscopy or surgical consultation indicated at this time. Based on my clinical evaluation, I do not appreciate any immediate, emergent, or life threatening condition or etiology that warrants additional workup today and feel that the patient can be discharged with close follow up care. Case discussed with Dr. Leroy who is agreeable to plan of care. Follow up and return precautions discussed; patient verbalized understanding and is agreeable to plan of care. Patient discharged home in stable condition.          Attending Attestation:     Physician Attestation Statement for NP/PA:   I have directed and reviewed the workup performed by the PA/NP.  I performed the substantive portion of the medical decision making.     Other NP/PA Attestation Additions:    History of Present Illness: 43-year-old male presents with vomiting.    Medical Decision Making: Initial differential diagnosis included but not limited to GI bleed, constipation, and impaction.  The patient's CT scan does show advancement of his PEG tube into the duodenal per Radiology.  This was repositioned here in the emergency department with appropriate positioning noted on x-ray.  The patient's labs showed no acute abnormalities.  I am in agreement  with the nurse practitioner's assessment, treatment, and plan of care.             ED Course as of 11/24/23 0726   Thu Nov 23, 2023   2147 Patient already discharge with transport pending.  Until prescription is absent from discharge paperwork.  This is reprinted and signed at nurse staff request.  This is the extent of my involvement in the patient's care and I did not staff or disposition this patient previously. [MR]      ED Course User Index  [MR] Mike Gunderson MD               Medical Decision Making:   Differential Diagnosis:   GI bleed  Anemia  consipation          Clinical Impression:  Final diagnoses:  [K92.0] Hematemesis  [K94.23] Migration of percutaneous endoscopic gastrostomy (PEG) tube (Primary)  [N30.01] Acute cystitis with hematuria  [K59.00] Constipation, unspecified constipation type          ED Disposition Condition    Discharge Stable          ED Prescriptions       Medication Sig Dispense Start Date End Date Auth. Provider    cefixime (SUPRAX) 100 mg/5 mL suspension  (Status: Discontinued) Take 15 mLs (300 mg total) by mouth once daily. for 10 days 150 mL 11/23/2023 11/23/2023 Zina Hoyt NP    cefixime (SUPRAX) 100 mg/5 mL suspension Take 15 mLs (300 mg total) by mouth once daily. for 10 days 150 mL 11/23/2023 12/3/2023 Mike Gunderson MD          Follow-up Information       Follow up With Specialties Details Why Contact Info Additional Information    Elly Mathew,  Family Medicine In 2 days  [address]  [address]  [facility] [address]  [phone]       [facility] -  Emergency Medicine  As needed, If symptoms worsen [address] [place] [address] [place] [id number] 1st floor             Zina Hoyt NP  11/23/23 7427       Prasanna Leroy MD  11/24/23 6895

## 2023-11-24 NOTE — ED NOTES
Pt report given to LORRAINE Gregorio at [facility]. She said she is going to set up transportation w/ [facility].

## 2023-11-26 ENCOUNTER — HOSPITAL ENCOUNTER (EMERGENCY)
Facility: HOSPITAL | Age: 43
Discharge: HOME | End: 2023-11-26
Attending: EMERGENCY MEDICINE
Payer: MEDICAID

## 2023-11-26 VITALS
BODY MASS INDEX: 24.73 KG/M2 | HEART RATE: 78 BPM | DIASTOLIC BLOOD PRESSURE: 78 MMHG | HEIGHT: 69 IN | SYSTOLIC BLOOD PRESSURE: 122 MMHG | TEMPERATURE: 98 F | OXYGEN SATURATION: 98 % | WEIGHT: 167 LBS | RESPIRATION RATE: 18 BRPM

## 2023-11-26 DIAGNOSIS — Z93.1 PEG (PERCUTANEOUS ENDOSCOPIC GASTROSTOMY) STATUS: ICD-10-CM

## 2023-11-26 DIAGNOSIS — N28.89 RENAL MASS: ICD-10-CM

## 2023-11-26 DIAGNOSIS — E87.0 HYPERNATREMIA: ICD-10-CM

## 2023-11-26 DIAGNOSIS — Z71.89 GOALS OF CARE, COUNSELING/DISCUSSION: ICD-10-CM

## 2023-11-26 DIAGNOSIS — R53.2 FUNCTIONAL QUADRIPLEGIA: ICD-10-CM

## 2023-11-26 DIAGNOSIS — N12 PYELONEPHRITIS: ICD-10-CM

## 2023-11-26 DIAGNOSIS — Z16.12 ESBL (EXTENDED SPECTRUM BETA-LACTAMASE) PRODUCING BACTERIA INFECTION: ICD-10-CM

## 2023-11-26 DIAGNOSIS — R41.82 ALTERED MENTAL STATUS, UNSPECIFIED ALTERED MENTAL STATUS TYPE: ICD-10-CM

## 2023-11-26 DIAGNOSIS — R78.81 BACTEREMIA: ICD-10-CM

## 2023-11-26 DIAGNOSIS — K92.2 LOWER GI BLEED: ICD-10-CM

## 2023-11-26 DIAGNOSIS — I69.359 CVA, OLD, HEMIPARESIS: ICD-10-CM

## 2023-11-26 DIAGNOSIS — N30.00 ACUTE CYSTITIS WITHOUT HEMATURIA: ICD-10-CM

## 2023-11-26 DIAGNOSIS — N30.01 ACUTE CYSTITIS WITH HEMATURIA: Primary | ICD-10-CM

## 2023-11-26 DIAGNOSIS — I10 ESSENTIAL HYPERTENSION, BENIGN: ICD-10-CM

## 2023-11-26 DIAGNOSIS — R33.9 URINARY RETENTION: ICD-10-CM

## 2023-11-26 DIAGNOSIS — R56.9 SEIZURE: ICD-10-CM

## 2023-11-26 DIAGNOSIS — A41.9 SEPSIS, DUE TO UNSPECIFIED ORGANISM, UNSPECIFIED WHETHER ACUTE ORGAN DYSFUNCTION PRESENT: ICD-10-CM

## 2023-11-26 DIAGNOSIS — D69.6 THROMBOCYTOPENIA: ICD-10-CM

## 2023-11-26 DIAGNOSIS — A49.9 ESBL (EXTENDED SPECTRUM BETA-LACTAMASE) PRODUCING BACTERIA INFECTION: ICD-10-CM

## 2023-11-26 LAB
ALBUMIN SERPL BCP-MCNC: 3 G/DL (ref 3.5–5.2)
ALP SERPL-CCNC: 73 U/L (ref 55–135)
ALT SERPL W/O P-5'-P-CCNC: <5 U/L (ref 10–44)
ANION GAP SERPL CALC-SCNC: 8 MMOL/L (ref 8–16)
AST SERPL-CCNC: 8 U/L (ref 10–40)
BACTERIA UR CULT: ABNORMAL
BASOPHILS # BLD AUTO: 0.04 K/UL (ref 0–0.2)
BASOPHILS NFR BLD: 0.5 % (ref 0–1.9)
BILIRUB SERPL-MCNC: 0.4 MG/DL (ref 0.1–1)
BUN SERPL-MCNC: 17 MG/DL (ref 6–20)
CALCIUM SERPL-MCNC: 8.9 MG/DL (ref 8.7–10.5)
CHLORIDE SERPL-SCNC: 106 MMOL/L (ref 95–110)
CO2 SERPL-SCNC: 28 MMOL/L (ref 23–29)
CREAT SERPL-MCNC: 1.6 MG/DL (ref 0.5–1.4)
DIFFERENTIAL METHOD: ABNORMAL
EOSINOPHIL # BLD AUTO: 0.2 K/UL (ref 0–0.5)
EOSINOPHIL NFR BLD: 3 % (ref 0–8)
ERYTHROCYTE [DISTWIDTH] IN BLOOD BY AUTOMATED COUNT: 14.5 % (ref 11.5–14.5)
EST. GFR  (NO RACE VARIABLE): 55 ML/MIN/1.73 M^2
GLUCOSE SERPL-MCNC: 100 MG/DL (ref 70–110)
HCT VFR BLD AUTO: 44.7 % (ref 40–54)
HGB BLD-MCNC: 14.4 G/DL (ref 14–18)
IMM GRANULOCYTES # BLD AUTO: 0.03 K/UL (ref 0–0.04)
IMM GRANULOCYTES NFR BLD AUTO: 0.4 % (ref 0–0.5)
LYMPHOCYTES # BLD AUTO: 1.7 K/UL (ref 1–4.8)
LYMPHOCYTES NFR BLD: 22.5 % (ref 18–48)
MCH RBC QN AUTO: 29.8 PG (ref 27–31)
MCHC RBC AUTO-ENTMCNC: 32.2 G/DL (ref 32–36)
MCV RBC AUTO: 92 FL (ref 82–98)
MONOCYTES # BLD AUTO: 0.8 K/UL (ref 0.3–1)
MONOCYTES NFR BLD: 10.5 % (ref 4–15)
NEUTROPHILS # BLD AUTO: 4.9 K/UL (ref 1.8–7.7)
NEUTROPHILS NFR BLD: 63.1 % (ref 38–73)
NRBC BLD-RTO: 0 /100 WBC
PLATELET # BLD AUTO: 105 K/UL (ref 150–450)
PMV BLD AUTO: 9.8 FL (ref 9.2–12.9)
POTASSIUM SERPL-SCNC: 4 MMOL/L (ref 3.5–5.1)
PROT SERPL-MCNC: 6.2 G/DL (ref 6–8.4)
RBC # BLD AUTO: 4.84 M/UL (ref 4.6–6.2)
SODIUM SERPL-SCNC: 142 MMOL/L (ref 136–145)
WBC # BLD AUTO: 7.75 K/UL (ref 3.9–12.7)

## 2023-11-26 PROCEDURE — C1751 CATH, INF, PER/CENT/MIDLINE: HCPCS

## 2023-11-26 PROCEDURE — 25000003 PHARM REV CODE 250: Performed by: HOSPITALIST

## 2023-11-26 PROCEDURE — 36410 VNPNXR 3YR/> PHY/QHP DX/THER: CPT

## 2023-11-26 PROCEDURE — 25000003 PHARM REV CODE 250: Performed by: EMERGENCY MEDICINE

## 2023-11-26 PROCEDURE — 63600175 PHARM REV CODE 636 W HCPCS: Performed by: EMERGENCY MEDICINE

## 2023-11-26 PROCEDURE — 96367 TX/PROPH/DG ADDL SEQ IV INF: CPT

## 2023-11-26 PROCEDURE — 85025 COMPLETE CBC W/AUTO DIFF WBC: CPT | Performed by: EMERGENCY MEDICINE

## 2023-11-26 PROCEDURE — 80053 COMPREHEN METABOLIC PANEL: CPT | Performed by: EMERGENCY MEDICINE

## 2023-11-26 PROCEDURE — 36415 COLL VENOUS BLD VENIPUNCTURE: CPT | Performed by: EMERGENCY MEDICINE

## 2023-11-26 PROCEDURE — 63600175 PHARM REV CODE 636 W HCPCS: Performed by: HOSPITALIST

## 2023-11-26 PROCEDURE — 96365 THER/PROPH/DIAG IV INF INIT: CPT

## 2023-11-26 PROCEDURE — 99284 EMERGENCY DEPT VISIT MOD MDM: CPT

## 2023-11-26 RX ORDER — LEVETIRACETAM 100 MG/ML
10 SOLUTION ORAL 2 TIMES DAILY
COMMUNITY

## 2023-11-26 RX ORDER — SUCRALFATE 1 G/1
1 TABLET ORAL 3 TIMES DAILY
COMMUNITY

## 2023-11-26 RX ORDER — PANTOPRAZOLE SODIUM 40 MG/1
40 TABLET, DELAYED RELEASE ORAL DAILY
COMMUNITY

## 2023-11-26 RX ORDER — SODIUM CHLORIDE 0.9 % (FLUSH) 0.9 %
10 SYRINGE (ML) INJECTION EVERY 6 HOURS
Status: DISCONTINUED | OUTPATIENT
Start: 2023-11-26 | End: 2023-11-26 | Stop reason: HOSPADM

## 2023-11-26 RX ORDER — SODIUM CHLORIDE 0.9 % (FLUSH) 0.9 %
10 SYRINGE (ML) INJECTION
Status: DISCONTINUED | OUTPATIENT
Start: 2023-11-26 | End: 2023-11-26 | Stop reason: HOSPADM

## 2023-11-26 RX ADMIN — ERTAPENEM 1 G: 1 INJECTION, POWDER, LYOPHILIZED, FOR SOLUTION INTRAMUSCULAR; INTRAVENOUS at 03:11

## 2023-11-26 RX ADMIN — PIPERACILLIN AND TAZOBACTAM 4.5 G: 4; .5 INJECTION, POWDER, LYOPHILIZED, FOR SOLUTION INTRAVENOUS; PARENTERAL at 11:11

## 2023-11-26 NOTE — PHARMACY MED REC
"Admission Medication History     The home medication history was taken by Hugo Agarwal.    You may go to "Admission" then "Reconcile Home Medications" tabs to review and/or act upon these items.     The home medication list has been updated by the Pharmacy department.   Please read ALL comments highlighted in yellow.   Please address this information as you see fit.    Feel free to contact us if you have any questions or require assistance.        Medications listed below were obtained from: Patient/family, [employer], and Nursing home  No current facility-administered medications on file prior to encounter.     Current Outpatient Medications on File Prior to Encounter   Medication Sig Dispense Refill    amantadine HCL (SYMMETREL) 100 mg capsule 1 capsule (100 mg total) by Per G Tube route once daily. 30 capsule 11    amino acids-protein hydrolys (PRO-STAT AWC)  gram-kcal/30 mL Liqd 30 mLs by PEG Tube route every morning. 30 mls in 30 mls of water      amLODIPine (NORVASC) 5 MG tablet 1 tablet (5 mg total) by Per G Tube route once daily. 30 tablet 11    apixaban (ELIQUIS) 2.5 mg Tab 2.5 mg by Per G Tube route 2 (two) times daily.      atorvastatin (LIPITOR) 40 MG tablet 1 tablet (40 mg total) by Per G Tube route once daily. (Patient taking differently: 40 mg by Per G Tube route every evening.) 90 tablet 3    cloNIDine (CATAPRES) 0.1 MG tablet Take 1 tablet (0.1 mg total) by mouth 3 (three) times daily as needed (180). (Patient taking differently: 0.1 mg by Per G Tube route 3 (three) times daily as needed (Systolic over 180).) 90 tablet 0    FLUoxetine 20 MG capsule 1 capsule (20 mg total) by Per G Tube route once daily. 30 capsule 11    levETIRAcetam (KEPPRA) 100 mg/mL Soln Take 10 mLs by mouth 2 (two) times daily.      modafiniL (PROVIGIL) 200 MG Tab 200 mg by Per G Tube route once daily.      ondansetron (ZOFRAN) 4 mg/5 mL solution Take 5 mLs (4 mg total) by mouth 2 (two) times daily. 100 " mL 0    pantoprazole (PROTONIX) 40 MG tablet Take 40 mg by mouth once daily. Peg Tube      polyethylene glycol (GLYCOLAX) 17 gram PwPk 17 g by Per G Tube route once daily.      sucralfate (CARAFATE) 1 gram tablet 1 g by Per G Tube route 3 (three) times daily.      valproic acid, as sodium salt, 500 mg/10 mL (10 mL) Soln Take 10 mLs (500 mg total) by mouth every 8 (eight) hours. (Patient taking differently: Take 500 mg by mouth every 8 (eight) hours. Peg Tube) 480 mL 9    cefixime (SUPRAX) 100 mg/5 mL suspension Take 15 mLs (300 mg total) by mouth once daily. for 10 days 150 mL 0    famotidine (PEPCID) 20 MG tablet Take 20 mg by mouth every morning.      levETIRAcetam (KEPPRA) 1000 MG tablet Take 1 tablet (1,000 mg total) by mouth 2 (two) times daily. 60 tablet 0    LORazepam (ATIVAN) 0.5 MG tablet 0.5 mg by Per G Tube route every 6 (six) hours as needed for Anxiety.      ondansetron (ZOFRAN) 4 MG tablet 4 mg by Per G Tube route every 6 (six) hours as needed for Nausea.           Hugo Agarwal  EXT 1923                .

## 2023-11-26 NOTE — ED PROVIDER NOTES
Encounter Date: 11/26/2023       History     Chief Complaint   Patient presents with    Urinary Tract Infection     Sent from [facility]      Patient is a 43-year-old male with a past medical history of Asperger syndrome CVA few years ago with hemiparesis hypertension functional quadriplegia indwelling PEG who presents the emergency room for evaluation of positive urine culture for Proteus ESBL MDRO requiring IV antibiotics.  Patient had been in the emergency room for the past few days intermittently for PEG tube complications.  He was noted to have cystitis and urinary tract infection.  He is placed on broad-spectrum antibiotics but unfortunately urine culture showed resistance.  He no longer has any nausea vomiting.  He denies any fevers.  He denies any complaints of pain at this time.  He has no indwelling Moran or suprapubic catheter.  A year ago he had urosepsis.      Review of patient's allergies indicates:  No Known Allergies  Past Medical History:   Diagnosis Date    Anticoagulant long-term use     baby asa per chart    Asperger's syndrome     CVA, old, hemiparesis     Encounter for blood transfusion     Essential hypertension, malignant     Functional quadriplegia 5/28/2021    PEG (percutaneous endoscopic gastrostomy) status      Past Surgical History:   Procedure Laterality Date    COLONOSCOPY N/A 6/3/2021    Procedure: COLONOSCOPY;  Surgeon: Rosario Meeks MD;  Location: [facility];  Service: Endoscopy;  Laterality: N/A;    ESOPHAGOGASTRODUODENOSCOPY N/A 7/10/2021    Procedure: EGD (ESOPHAGOGASTRODUODENOSCOPY);  Surgeon: Rosario Meeks MD;  Location: [facility];  Service: Endoscopy;  Laterality: N/A;    ESOPHAGOGASTRODUODENOSCOPY W/ PEG N/A 4/1/2021    Procedure: EGD, WITH PEG TUBE INSERTION;  Surgeon: En Zheng MD;  Location: [facility];  Service: Endoscopy;  Laterality: N/A;     No family history on file.  Social History     Tobacco Use    Smoking status: Never    Smokeless tobacco:  Never   Substance Use Topics    Alcohol use: Not Currently    Drug use: Not Currently     Review of Systems   Unable to perform ROS: Other   Constitutional:  Negative for fever.   HENT:  Negative for congestion and sore throat.    Respiratory:  Positive for cough. Negative for shortness of breath.    Cardiovascular:  Negative for chest pain.   Gastrointestinal:  Negative for abdominal pain, nausea and vomiting.   Genitourinary:  Negative for difficulty urinating, dysuria and penile pain.   Musculoskeletal:         Spasticity bilateral upper and lower extremities   Neurological:  Positive for weakness. Negative for numbness.       Physical Exam     Initial Vitals [11/26/23 1022]   BP Pulse Resp Temp SpO2   136/86 86 18 97.8 °F (36.6 °C) 97 %      MAP       --         Physical Exam    Nursing note and vitals reviewed.  Constitutional: No distress.   Functional quadriplegia   HENT:   Head: Normocephalic and atraumatic.   Mouth/Throat: Oropharynx is clear and moist.   Eyes: Conjunctivae are normal. Pupils are equal, round, and reactive to light.   Slight right eye exotropia baseline   Neck: Neck supple. No JVD present.   Cardiovascular:  Normal rate, regular rhythm and normal heart sounds.           Pulmonary/Chest: Breath sounds normal. He has no wheezes. He has no rhonchi. He has no rales.   Abdominal: Abdomen is soft. There is no abdominal tenderness.   Feeding tube left upper quadrant   Musculoskeletal:      Cervical back: Neck supple.     Neurological: He is alert and oriented to person, place, and time.   Functional quadriplegia bilateral upper and lower extremities   Skin:   Small amount of erythema in the diaper region in the folds of the inguinal region.  Patient rolled and there are no sacral decubitus ulcers   Psychiatric: He has a normal mood and affect.         ED Course   Procedures  Labs Reviewed   CBC W/ AUTO DIFFERENTIAL - Abnormal; Notable for the following components:       Result Value    Platelets  105 (*)     All other components within normal limits   COMPREHENSIVE METABOLIC PANEL - Abnormal; Notable for the following components:    Creatinine 1.6 (*)     Albumin 3.0 (*)     AST 8 (*)     ALT <5 (*)     eGFR 55 (*)     All other components within normal limits          Imaging Results    None          Medications   piperacillin-tazobactam (ZOSYN) 4.5 g in dextrose 5 % in water (D5W) 100 mL IVPB (MB+) (0 g Intravenous Stopped 11/26/23 1230)     Medical Decision Making  Amount and/or Complexity of Data Reviewed  Labs: ordered.               ED Course as of 11/30/23 0211   Sun Nov 26, 2023   1058 Know when his on-call for Infectious Disease however given the patient was uroseptic in the past I think it would be best to admit him for IV antibiotics.  He does not have an indwelling Moran catheter and has a urinary tract infection according to CT scan and urinalysis.  He is not currently on antibiotics.  He is asymptomatic now but did have nausea and vomiting earlier.  In the past he presented to the hospital and was uroseptic a year ago after several visits for G-tube complaints. [JS]   1252 I spoke to the hospitalist who agrees with the patient needing IV antibiotics.  They will attempt to get a PICC line placed with close outpatient follow-up for IV antibiotics at the nursing home. [JS]   1311 Patient will be seen by case management and Hospital Medicine to attempt to get IV antibiotics ordered as an outpatient.  He does not appear to be septic or toxic. [JS]   1433 Case management and hospitalist have been able to order IV antibiotics as an outpatient.  Patient be transferred back to his facility. [JS]      ED Course User Index  [JS] Telly Benítez MD                          Clinical Impression:  Final diagnoses:  [N30.01] Acute cystitis with hematuria (Primary)          ED Disposition Condition    Discharge Stable          ED Prescriptions       Medication Sig Dispense Start Date End Date Auth. Provider     sodium chloride 0.9 % PgBk 100 mL with ertapenem 1 gram SolR 1 g Inject 1 g into the vein once daily. for 9 days 9 g 11/27/2023 12/6/2023 Zunilda Espinoza MD          Follow-up Information       Follow up With Specialties Details Why Contact Info        Follow-up with the doctor at the facility.             Telly Benítez MD  11/30/23 0213

## 2023-11-26 NOTE — PLAN OF CARE
Called [facility] [phone], spoke to Omari and he stated they are able to provide the IV ertapenem starting tomorrow and can call report to Mary, would like order and prescription   faxed to [phone].  [facility] faxed available clinicals (labs, facesheet, facility transfer orders and copy of script for ertapenem), gave the prescription to Katey, ED nurse and asked that she send it with pt.  Katey will call report and arrange transportation once pt is ready for dc.  PICC placement pending.     11/26/23 1434   Post-Acute Status   Post-Acute Authorization Placement   Post-Acute Placement Status Pending medical clearance/testing   Discharge Plan   Discharge Plan A Return to nursing home

## 2023-11-26 NOTE — DISCHARGE INSTRUCTIONS
[facility]  Facility Transfer Orders        Admit to: [facility]    Diagnoses: ESBL UTI    Allergies: Review of patient's allergies indicates:  No Known Allergies    Code Status: Full code    Vitals: Routine        Activity: Activity as tolerated    Nursing Precautions: Aspiration , Fall, Seizure, and Pressure ulcer prevention    Bed/Surface: Low Air Loss    Labs: CBC and CMP in one week    Pending Diagnostic Studies:       None          Imaging: none    Miscellaneous Care:   Remove PICC after completion of IV ertapenem    IV Access: PICC     Medications: Discontinue all previous medication orders, if any. See new list below.  Current Discharge Medication List        START taking these medications    Details   sodium chloride 0.9 % PgBk 100 mL with ertapenem 1 gram SolR 1 g Inject 1 g into the vein once daily. for 9 days  Qty: 9 g, Refills: 0           CONTINUE these medications which have NOT CHANGED    Details   amantadine HCL (SYMMETREL) 100 mg capsule 1 capsule (100 mg total) by Per G Tube route once daily.  Qty: 30 capsule, Refills: 11      amino acids-protein hydrolys (PRO-STAT AWC)  gram-kcal/30 mL Liqd 30 mLs by PEG Tube route every morning. 30 mls in 30 mls of water      amLODIPine (NORVASC) 5 MG tablet 1 tablet (5 mg total) by Per G Tube route once daily.  Qty: 30 tablet, Refills: 11    Comments: .      apixaban (ELIQUIS) 2.5 mg Tab 2.5 mg by Per G Tube route 2 (two) times daily.      atorvastatin (LIPITOR) 40 MG tablet 1 tablet (40 mg total) by Per G Tube route once daily.  Qty: 90 tablet, Refills: 3      cloNIDine (CATAPRES) 0.1 MG tablet Take 1 tablet (0.1 mg total) by mouth 3 (three) times daily as needed (180).  Qty: 90 tablet, Refills: 0    Comments: .      FLUoxetine 20 MG capsule 1 capsule (20 mg total) by Per G Tube route once daily.  Qty: 30 capsule, Refills: 11      levETIRAcetam (KEPPRA) 100 mg/mL Soln Take 10 mLs by mouth 2 (two) times daily.      modafiniL  (PROVIGIL) 200 MG Tab 200 mg by Per G Tube route once daily.      ondansetron (ZOFRAN) 4 mg/5 mL solution Take 5 mLs (4 mg total) by mouth 2 (two) times daily.  Qty: 100 mL, Refills: 0      pantoprazole (PROTONIX) 40 MG tablet Take 40 mg by mouth once daily. Peg Tube      polyethylene glycol (GLYCOLAX) 17 gram PwPk 17 g by Per G Tube route once daily.      sucralfate (CARAFATE) 1 gram tablet 1 g by Per G Tube route 3 (three) times daily.      valproic acid, as sodium salt, 500 mg/10 mL (10 mL) Soln Take 10 mLs (500 mg total) by mouth every 8 (eight) hours.  Qty: 480 mL, Refills: 9      famotidine (PEPCID) 20 MG tablet Take 20 mg by mouth every morning.      levETIRAcetam (KEPPRA) 1000 MG tablet Take 1 tablet (1,000 mg total) by mouth 2 (two) times daily.  Qty: 60 tablet, Refills: 0      LORazepam (ATIVAN) 0.5 MG tablet 0.5 mg by Per G Tube route every 6 (six) hours as needed for Anxiety.      ondansetron (ZOFRAN) 4 MG tablet 4 mg by Per G Tube route every 6 (six) hours as needed for Nausea.           STOP taking these medications       cefixime (SUPRAX) 100 mg/5 mL suspension Comments:   Reason for Stopping:             Follow up:       Immunizations Administered as of 11/26/2023       No immunizations on file.                Zunilda Espinoza MD

## 2023-12-01 ENCOUNTER — HOSPITAL ENCOUNTER (EMERGENCY)
Facility: HOSPITAL | Age: 43
Discharge: NURSING, REHAB OR LONG-TERM CARE | End: 2023-12-01
Attending: EMERGENCY MEDICINE
Payer: MEDICAID

## 2023-12-01 VITALS
BODY MASS INDEX: 24.73 KG/M2 | HEIGHT: 69 IN | HEART RATE: 82 BPM | OXYGEN SATURATION: 97 % | RESPIRATION RATE: 18 BRPM | WEIGHT: 167 LBS | DIASTOLIC BLOOD PRESSURE: 107 MMHG | SYSTOLIC BLOOD PRESSURE: 154 MMHG | TEMPERATURE: 99 F

## 2023-12-01 DIAGNOSIS — Z78.9 PROBLEM WITH VASCULAR ACCESS: ICD-10-CM

## 2023-12-01 DIAGNOSIS — T82.524A DISPLACEMENT OF PERIPHERALLY INSERTED CENTRAL CATHETER (PICC): Primary | ICD-10-CM

## 2023-12-01 DIAGNOSIS — Z95.828 S/P PICC CENTRAL LINE PLACEMENT: ICD-10-CM

## 2023-12-01 PROCEDURE — 99283 EMERGENCY DEPT VISIT LOW MDM: CPT | Mod: 25

## 2023-12-01 PROCEDURE — 36569 INSJ PICC 5 YR+ W/O IMAGING: CPT

## 2023-12-01 PROCEDURE — C1751 CATH, INF, PER/CENT/MIDLINE: HCPCS

## 2023-12-01 NOTE — ED NOTES
Report given to Jose RAVI, awaiting xray confirmation on placement before transport services notified.

## 2023-12-01 NOTE — PROCEDURES
[facility]  Date/Time: 12/1/2023 3:01 PM  Location procedure was performed: [facility] EMERGENCY DEPARTMENT  Performed by: Robert Gómez RN  Supervising provider: Jerrod Huitron RN  Assisting provider: Billy Albert DO  Pre-operative Diagnosis: Vascular access device insertion  Consent Done: Yes  Time out: Immediately prior to procedure a time out was called to verify the correct patient, procedure, equipment, support staff and site/side marked as required  Indications: med administration  Anesthesia: local infiltration  Local anesthetic: lidocaine 1% without epinephrine  Anesthetic Total (mL): 5  Preparation: skin prepped with Betadine and skin prepped with ChloraPrep  Skin prep agent dried: skin prep agent completely dried prior to procedure  Sterile barriers: all five maximum sterile barriers used - cap, mask, sterile gown, sterile gloves, and large sterile sheet  Hand hygiene: hand hygiene performed prior to central venous catheter insertion  Location details: left basilic  Catheter type: double lumen  Catheter size: 5 Fr  Catheter Length: 41cm    Ultrasound guidance: yes  Vessel Caliber: large and patent, compressibility normal  Vascular Doppler: not done  Needle advanced into vessel with real time Ultrasound guidance.  Guidewire confirmed in vessel.  Image recorded and saved.  Sterile sheath used.  no esophageal manometryNumber of attempts: 1  Post-procedure: blood return through all ports, chlorhexidine patch and sterile dressing applied  Technical procedures used: seldinger technique  Estimated blood loss (mL): 0  Specimens: No  Implants: No  Assessment: placement verified by x-ray, no pneumothorax on x-ray and successful placement  Complications: none

## 2023-12-01 NOTE — ED PROVIDER NOTES
Encounter Date: 12/1/2023       History     Chief Complaint   Patient presents with    Vascular Access Problem     Pt resident at [facility], apparently pt pulled out midline cath by accident, staff found blood around arm an noticed it had been removed.      43-year-old male past medical history of Asperger's, CVA, hypertension, quadriplegia, peg tube, presents emergency department with needing a PICC line.  It is reported that the patient was found at the bedside with blood around his right arm and a PICC line hanging FPC out.  Staff on a taking the PICC line out.  Patient is sent here for PICC line.  It is reported the patient has no other complaints.  No reported other issues.      Review of patient's allergies indicates:  No Known Allergies  Past Medical History:   Diagnosis Date    Anticoagulant long-term use     baby asa per chart    Asperger's syndrome     CVA, old, hemiparesis     Encounter for blood transfusion     Essential hypertension, malignant     Functional quadriplegia 5/28/2021    PEG (percutaneous endoscopic gastrostomy) status      Past Surgical History:   Procedure Laterality Date    COLONOSCOPY N/A 6/3/2021    Procedure: COLONOSCOPY;  Surgeon: Rosario Meeks MD;  Location: [facility];  Service: Endoscopy;  Laterality: N/A;    ESOPHAGOGASTRODUODENOSCOPY N/A 7/10/2021    Procedure: EGD (ESOPHAGOGASTRODUODENOSCOPY);  Surgeon: Rosario Meeks MD;  Location: [facility];  Service: Endoscopy;  Laterality: N/A;    ESOPHAGOGASTRODUODENOSCOPY W/ PEG N/A 4/1/2021    Procedure: EGD, WITH PEG TUBE INSERTION;  Surgeon: En Zheng MD;  Location: [facility];  Service: Endoscopy;  Laterality: N/A;     No family history on file.  Social History     Tobacco Use    Smoking status: Never    Smokeless tobacco: Never   Substance Use Topics    Alcohol use: Not Currently    Drug use: Not Currently     Review of Systems   Constitutional:  Negative for chills and fever.   HENT:  Negative for sore throat.     Respiratory:  Negative for shortness of breath.    Cardiovascular:  Negative for chest pain.   Gastrointestinal:  Negative for nausea.   Genitourinary:  Negative for dysuria.   Musculoskeletal:  Negative for back pain.   Skin:  Negative for rash.   Neurological:  Negative for weakness.   Hematological:  Does not bruise/bleed easily.   All other systems reviewed and are negative.      Physical Exam     Initial Vitals [12/01/23 1317]   BP Pulse Resp Temp SpO2   (!) 162/112 77 18 98.6 °F (37 °C) 95 %      MAP       --         Physical Exam    Nursing note and vitals reviewed.  Constitutional: He appears well-developed and well-nourished. He is not diaphoretic. No distress.   HENT:   Head: Normocephalic and atraumatic.   Mouth/Throat: Oropharynx is clear and moist. No oropharyngeal exudate.   Eyes: Conjunctivae and EOM are normal. Pupils are equal, round, and reactive to light.   Neck: Neck supple. No tracheal deviation present.   Cardiovascular:  Normal rate, regular rhythm, normal heart sounds and intact distal pulses.           No murmur heard.  Pulmonary/Chest: Breath sounds normal. No respiratory distress. He has no wheezes. He has no rhonchi. He has no rales.   Abdominal: Abdomen is soft. Bowel sounds are normal. He exhibits no distension. There is no abdominal tenderness.   PEG tube present in the left upper quadrant region. There is no rebound and no guarding.   Musculoskeletal:         General: No tenderness or edema. Normal range of motion.      Cervical back: Neck supple.      Comments: Right upper arm there is a bandage present on the medial aspect of the right upper arm.  No bleeding noted.     Neurological: He is alert and oriented to person, place, and time. No sensory deficit. GCS score is 15. GCS eye subscore is 4. GCS verbal subscore is 5. GCS motor subscore is 6.   Generalized weakness noted in upper and lower extremities bilaterally.  Atrophy present in the hands bilaterally.  No focal deficits.      Skin: Skin is warm and dry. Capillary refill takes less than 2 seconds. No rash noted. No erythema. No pallor.   Psychiatric: He has a normal mood and affect.         ED Course   Procedures  Labs Reviewed - No data to display       Imaging Results              X-Ray Chest AP Portable (Preliminary result)  Result time 12/01/23 15:23:18      Wet Read by Billy Albert DO (12/01/23 15:23:18, [facility] - Emergency Dept, Emergency Medicine)    Pt. With PICC line in place, terminates in the SVC                                      Medications - No data to display  Medical Decision Making  Differential includes but not limited to dislodged PICC line, bleeding PICC line.     Emergent evaluation of a 43-year-old male presents emergency department with dislodged PICC line.  Patient had PICC line replaced in the emergency department.  X-ray confirms placement.  Patient tolerated well without any complications.  Patient will be discharged home with follow-up with primary care provider.    A dictation software program was used for this note.  Please expect some simple typographical  errors in this note.      I had a detailed discussion with the patient and/or guardian regarding: The historical points, exam findings, and diagnostic results supporting the discharge diagnosis, lab results, pertinent radiology results, and the need for outpatient follow-up, for definitive care with a family practitioner and to return to the emergency department if symptoms worsen or persist or if there are any questions or concerns that arise at home. All questions have been answered in detail. Strict return to Emergency Department precautions have been provided.      Amount and/or Complexity of Data Reviewed  Radiology: ordered.                                      Clinical Impression:  Final diagnoses:  [Z78.9] Problem with vascular access  [Z95.828] S/P PICC central line placement  [T82.524A] Displacement of peripherally  inserted central catheter (PICC) (Primary)          ED Disposition Condition    Discharge Stable          ED Prescriptions    None       Follow-up Information       Follow up With Specialties Details Why Contact Info Additional Information    Elly Mathew DO Family Medicine In 3 days  [address]  [facility] [address]  [phone]       formerly [facility] - Emergency Dept Emergency Medicine  If symptoms worsen [address] [address]  [phone] 1st floor             Billy Albetr DO  12/01/23 1525

## 2024-02-29 ENCOUNTER — HOSPITAL ENCOUNTER (OUTPATIENT)
Facility: HOSPITAL | Age: 44
Discharge: OTHER INSTITUTION | End: 2024-03-01
Attending: STUDENT IN AN ORGANIZED HEALTH CARE EDUCATION/TRAINING PROGRAM | Admitting: STUDENT IN AN ORGANIZED HEALTH CARE EDUCATION/TRAINING PROGRAM
Payer: MEDICAID

## 2024-02-29 DIAGNOSIS — R07.9 CHEST PAIN: ICD-10-CM

## 2024-02-29 DIAGNOSIS — D69.6 THROMBOCYTOPENIA: ICD-10-CM

## 2024-02-29 DIAGNOSIS — N17.9 AKI (ACUTE KIDNEY INJURY): Primary | ICD-10-CM

## 2024-02-29 DIAGNOSIS — R79.89 ELEVATED SERUM CREATININE: ICD-10-CM

## 2024-02-29 LAB
ALBUMIN SERPL BCP-MCNC: 3.3 G/DL (ref 3.5–5.2)
ALP SERPL-CCNC: 74 U/L (ref 55–135)
ALT SERPL W/O P-5'-P-CCNC: 17 U/L (ref 10–44)
ANION GAP SERPL CALC-SCNC: 11 MMOL/L (ref 8–16)
AST SERPL-CCNC: 30 U/L (ref 10–40)
BASOPHILS # BLD AUTO: 0.04 K/UL (ref 0–0.2)
BASOPHILS NFR BLD: 0.5 % (ref 0–1.9)
BILIRUB SERPL-MCNC: 0.7 MG/DL (ref 0.1–1)
BUN SERPL-MCNC: 38 MG/DL (ref 6–20)
CALCIUM SERPL-MCNC: 9.1 MG/DL (ref 8.7–10.5)
CHLORIDE SERPL-SCNC: 109 MMOL/L (ref 95–110)
CO2 SERPL-SCNC: 18 MMOL/L (ref 23–29)
CREAT SERPL-MCNC: 2.1 MG/DL (ref 0.5–1.4)
DIFFERENTIAL METHOD BLD: ABNORMAL
EOSINOPHIL # BLD AUTO: 0.3 K/UL (ref 0–0.5)
EOSINOPHIL NFR BLD: 3.1 % (ref 0–8)
ERYTHROCYTE [DISTWIDTH] IN BLOOD BY AUTOMATED COUNT: 16 % (ref 11.5–14.5)
EST. GFR  (NO RACE VARIABLE): 40 ML/MIN/1.73 M^2
GLUCOSE SERPL-MCNC: 81 MG/DL (ref 70–110)
HCT VFR BLD AUTO: 41.1 % (ref 40–54)
HGB BLD-MCNC: 13.2 G/DL (ref 14–18)
IMM GRANULOCYTES # BLD AUTO: 0.03 K/UL (ref 0–0.04)
IMM GRANULOCYTES NFR BLD AUTO: 0.4 % (ref 0–0.5)
LDH SERPL L TO P-CCNC: 432 U/L (ref 110–260)
LYMPHOCYTES # BLD AUTO: 2.3 K/UL (ref 1–4.8)
LYMPHOCYTES NFR BLD: 27.4 % (ref 18–48)
MAGNESIUM SERPL-MCNC: 2.1 MG/DL (ref 1.6–2.6)
MCH RBC QN AUTO: 30.6 PG (ref 27–31)
MCHC RBC AUTO-ENTMCNC: 32.1 G/DL (ref 32–36)
MCV RBC AUTO: 95 FL (ref 82–98)
MONOCYTES # BLD AUTO: 1 K/UL (ref 0.3–1)
MONOCYTES NFR BLD: 11.3 % (ref 4–15)
NEUTROPHILS # BLD AUTO: 4.8 K/UL (ref 1.8–7.7)
NEUTROPHILS NFR BLD: 57.3 % (ref 38–73)
NRBC BLD-RTO: 0 /100 WBC
PLATELET # BLD AUTO: 37 K/UL (ref 150–450)
PMV BLD AUTO: 12.6 FL (ref 9.2–12.9)
POTASSIUM SERPL-SCNC: 5 MMOL/L (ref 3.5–5.1)
PROT SERPL-MCNC: 7.2 G/DL (ref 6–8.4)
RBC # BLD AUTO: 4.32 M/UL (ref 4.6–6.2)
SODIUM SERPL-SCNC: 138 MMOL/L (ref 136–145)
WBC # BLD AUTO: 8.42 K/UL (ref 3.9–12.7)

## 2024-02-29 PROCEDURE — 83010 ASSAY OF HAPTOGLOBIN QUANT: CPT | Performed by: STUDENT IN AN ORGANIZED HEALTH CARE EDUCATION/TRAINING PROGRAM

## 2024-02-29 PROCEDURE — 25000003 PHARM REV CODE 250: Performed by: STUDENT IN AN ORGANIZED HEALTH CARE EDUCATION/TRAINING PROGRAM

## 2024-02-29 PROCEDURE — G0378 HOSPITAL OBSERVATION PER HR: HCPCS

## 2024-02-29 PROCEDURE — 99285 EMERGENCY DEPT VISIT HI MDM: CPT | Mod: 25

## 2024-02-29 PROCEDURE — 83735 ASSAY OF MAGNESIUM: CPT | Performed by: STUDENT IN AN ORGANIZED HEALTH CARE EDUCATION/TRAINING PROGRAM

## 2024-02-29 PROCEDURE — 85025 COMPLETE CBC W/AUTO DIFF WBC: CPT | Performed by: STUDENT IN AN ORGANIZED HEALTH CARE EDUCATION/TRAINING PROGRAM

## 2024-02-29 PROCEDURE — 36415 COLL VENOUS BLD VENIPUNCTURE: CPT | Performed by: STUDENT IN AN ORGANIZED HEALTH CARE EDUCATION/TRAINING PROGRAM

## 2024-02-29 PROCEDURE — 80053 COMPREHEN METABOLIC PANEL: CPT | Performed by: STUDENT IN AN ORGANIZED HEALTH CARE EDUCATION/TRAINING PROGRAM

## 2024-02-29 PROCEDURE — 83615 LACTATE (LD) (LDH) ENZYME: CPT | Performed by: STUDENT IN AN ORGANIZED HEALTH CARE EDUCATION/TRAINING PROGRAM

## 2024-02-29 PROCEDURE — 96361 HYDRATE IV INFUSION ADD-ON: CPT

## 2024-02-29 PROCEDURE — 51798 US URINE CAPACITY MEASURE: CPT

## 2024-02-29 PROCEDURE — 25000003 PHARM REV CODE 250

## 2024-02-29 RX ORDER — AMOXICILLIN 250 MG
1 CAPSULE ORAL 2 TIMES DAILY PRN
Status: DISCONTINUED | OUTPATIENT
Start: 2024-02-29 | End: 2024-03-01 | Stop reason: HOSPADM

## 2024-02-29 RX ORDER — ONDANSETRON HYDROCHLORIDE 2 MG/ML
4 INJECTION, SOLUTION INTRAVENOUS EVERY 8 HOURS PRN
Status: DISCONTINUED | OUTPATIENT
Start: 2024-02-29 | End: 2024-03-01 | Stop reason: HOSPADM

## 2024-02-29 RX ORDER — HYDROCODONE BITARTRATE AND ACETAMINOPHEN 5; 325 MG/1; MG/1
1 TABLET ORAL EVERY 6 HOURS PRN
Status: DISCONTINUED | OUTPATIENT
Start: 2024-02-29 | End: 2024-03-01 | Stop reason: HOSPADM

## 2024-02-29 RX ORDER — IBUPROFEN 200 MG
16 TABLET ORAL
Status: DISCONTINUED | OUTPATIENT
Start: 2024-02-29 | End: 2024-03-01

## 2024-02-29 RX ORDER — SODIUM CHLORIDE 0.9 % (FLUSH) 0.9 %
10 SYRINGE (ML) INJECTION EVERY 12 HOURS PRN
Status: DISCONTINUED | OUTPATIENT
Start: 2024-02-29 | End: 2024-03-01 | Stop reason: HOSPADM

## 2024-02-29 RX ORDER — ONDANSETRON HYDROCHLORIDE 4 MG/5ML
8 SOLUTION ORAL 2 TIMES DAILY PRN
COMMUNITY

## 2024-02-29 RX ORDER — IBUPROFEN 200 MG
24 TABLET ORAL
Status: DISCONTINUED | OUTPATIENT
Start: 2024-02-29 | End: 2024-03-01

## 2024-02-29 RX ORDER — LISINOPRIL 20 MG/1
20 TABLET ORAL NIGHTLY
COMMUNITY

## 2024-02-29 RX ORDER — HYDRALAZINE HYDROCHLORIDE 50 MG/1
50 TABLET, FILM COATED ORAL 3 TIMES DAILY
COMMUNITY

## 2024-02-29 RX ORDER — ACETAMINOPHEN 325 MG/1
650 TABLET ORAL EVERY 4 HOURS PRN
Status: DISCONTINUED | OUTPATIENT
Start: 2024-02-29 | End: 2024-03-01 | Stop reason: HOSPADM

## 2024-02-29 RX ORDER — PROCHLORPERAZINE EDISYLATE 5 MG/ML
5 INJECTION INTRAMUSCULAR; INTRAVENOUS EVERY 6 HOURS PRN
Status: DISCONTINUED | OUTPATIENT
Start: 2024-02-29 | End: 2024-03-01 | Stop reason: HOSPADM

## 2024-02-29 RX ORDER — HYDROCODONE BITARTRATE AND ACETAMINOPHEN 10; 325 MG/1; MG/1
1 TABLET ORAL EVERY 6 HOURS PRN
Status: DISCONTINUED | OUTPATIENT
Start: 2024-02-29 | End: 2024-03-01 | Stop reason: HOSPADM

## 2024-02-29 RX ORDER — TALC
6 POWDER (GRAM) TOPICAL NIGHTLY PRN
Status: DISCONTINUED | OUTPATIENT
Start: 2024-02-29 | End: 2024-03-01 | Stop reason: HOSPADM

## 2024-02-29 RX ORDER — GLUCAGON 1 MG
1 KIT INJECTION
Status: DISCONTINUED | OUTPATIENT
Start: 2024-02-29 | End: 2024-03-01

## 2024-02-29 RX ORDER — NALOXONE HCL 0.4 MG/ML
0.02 VIAL (ML) INJECTION
Status: DISCONTINUED | OUTPATIENT
Start: 2024-02-29 | End: 2024-03-01 | Stop reason: HOSPADM

## 2024-02-29 RX ORDER — SODIUM CHLORIDE 9 MG/ML
INJECTION, SOLUTION INTRAVENOUS CONTINUOUS
Status: DISCONTINUED | OUTPATIENT
Start: 2024-02-29 | End: 2024-03-01 | Stop reason: HOSPADM

## 2024-02-29 RX ADMIN — SODIUM CHLORIDE: 0.9 INJECTION, SOLUTION INTRAVENOUS at 11:02

## 2024-02-29 RX ADMIN — SODIUM CHLORIDE 1000 ML: 9 INJECTION, SOLUTION INTRAVENOUS at 08:02

## 2024-03-01 VITALS
BODY MASS INDEX: 25.1 KG/M2 | HEART RATE: 60 BPM | DIASTOLIC BLOOD PRESSURE: 86 MMHG | TEMPERATURE: 97 F | SYSTOLIC BLOOD PRESSURE: 132 MMHG | RESPIRATION RATE: 16 BRPM | OXYGEN SATURATION: 96 % | WEIGHT: 170 LBS

## 2024-03-01 PROBLEM — N12 PYELONEPHRITIS: Status: RESOLVED | Noted: 2021-07-29 | Resolved: 2024-03-01

## 2024-03-01 PROBLEM — A41.9 SEPSIS: Status: RESOLVED | Noted: 2021-08-08 | Resolved: 2024-03-01

## 2024-03-01 PROBLEM — Z16.12 ESBL (EXTENDED SPECTRUM BETA-LACTAMASE) PRODUCING BACTERIA INFECTION: Status: RESOLVED | Noted: 2021-07-31 | Resolved: 2024-03-01

## 2024-03-01 PROBLEM — R53.1 ASTHENIA: Status: ACTIVE | Noted: 2021-03-23

## 2024-03-01 PROBLEM — N18.9 ACUTE-ON-CHRONIC KIDNEY INJURY: Status: ACTIVE | Noted: 2021-04-06

## 2024-03-01 PROBLEM — G24.9 DYSTONIA: Status: ACTIVE | Noted: 2024-03-01

## 2024-03-01 PROBLEM — K92.2 LOWER GI BLEED: Status: RESOLVED | Noted: 2021-07-10 | Resolved: 2024-03-01

## 2024-03-01 PROBLEM — R41.82 ALTERED MENTAL STATUS: Status: RESOLVED | Noted: 2021-08-08 | Resolved: 2024-03-01

## 2024-03-01 PROBLEM — R62.50 DEVELOPMENTAL DELAY: Status: ACTIVE | Noted: 2021-03-23

## 2024-03-01 PROBLEM — K21.9 GERD (GASTROESOPHAGEAL REFLUX DISEASE): Status: ACTIVE | Noted: 2024-03-01

## 2024-03-01 PROBLEM — F84.5 ASPERGER'S DISORDER: Status: ACTIVE | Noted: 2021-04-29

## 2024-03-01 PROBLEM — F32.A ANXIETY AND DEPRESSION: Status: ACTIVE | Noted: 2024-03-01

## 2024-03-01 PROBLEM — D64.9 ANEMIA: Status: ACTIVE | Noted: 2024-03-01

## 2024-03-01 PROBLEM — R33.9 URINARY RETENTION: Status: RESOLVED | Noted: 2021-07-29 | Resolved: 2024-03-01

## 2024-03-01 PROBLEM — E87.0 HYPERNATREMIA: Status: RESOLVED | Noted: 2021-06-04 | Resolved: 2024-03-01

## 2024-03-01 PROBLEM — F41.9 ANXIETY AND DEPRESSION: Status: ACTIVE | Noted: 2024-03-01

## 2024-03-01 PROBLEM — N28.89 RENAL MASS: Status: RESOLVED | Noted: 2021-08-24 | Resolved: 2024-03-01

## 2024-03-01 PROBLEM — A49.9 ESBL (EXTENDED SPECTRUM BETA-LACTAMASE) PRODUCING BACTERIA INFECTION: Status: RESOLVED | Noted: 2021-07-31 | Resolved: 2024-03-01

## 2024-03-01 PROBLEM — N30.00 ACUTE CYSTITIS WITHOUT HEMATURIA: Status: RESOLVED | Noted: 2021-08-22 | Resolved: 2024-03-01

## 2024-03-01 LAB
ANION GAP SERPL CALC-SCNC: 9 MMOL/L (ref 8–16)
APTT PPP: 27.8 SEC (ref 21–32)
BUN SERPL-MCNC: 35 MG/DL (ref 6–20)
CALCIUM SERPL-MCNC: 9 MG/DL (ref 8.7–10.5)
CHLORIDE SERPL-SCNC: 111 MMOL/L (ref 95–110)
CO2 SERPL-SCNC: 24 MMOL/L (ref 23–29)
CREAT SERPL-MCNC: 1.9 MG/DL (ref 0.5–1.4)
ERYTHROCYTE [DISTWIDTH] IN BLOOD BY AUTOMATED COUNT: 16 % (ref 11.5–14.5)
EST. GFR  (NO RACE VARIABLE): 45 ML/MIN/1.73 M^2
GLUCOSE SERPL-MCNC: 80 MG/DL (ref 70–110)
HCT VFR BLD AUTO: 40.5 % (ref 40–54)
HGB BLD-MCNC: 12.8 G/DL (ref 14–18)
INR PPP: 1 (ref 0.8–1.2)
MCH RBC QN AUTO: 30.5 PG (ref 27–31)
MCHC RBC AUTO-ENTMCNC: 31.6 G/DL (ref 32–36)
MCV RBC AUTO: 96 FL (ref 82–98)
PLATELET # BLD AUTO: 130 K/UL (ref 150–450)
PLATELET BLD QL SMEAR: ABNORMAL
PMV BLD AUTO: 10.6 FL (ref 9.2–12.9)
POCT GLUCOSE: 87 MG/DL (ref 70–110)
POTASSIUM SERPL-SCNC: 4.3 MMOL/L (ref 3.5–5.1)
PROTHROMBIN TIME: 11.1 SEC (ref 9–12.5)
RBC # BLD AUTO: 4.2 M/UL (ref 4.6–6.2)
SODIUM SERPL-SCNC: 144 MMOL/L (ref 136–145)
WBC # BLD AUTO: 7.91 K/UL (ref 3.9–12.7)

## 2024-03-01 PROCEDURE — 25000003 PHARM REV CODE 250: Performed by: STUDENT IN AN ORGANIZED HEALTH CARE EDUCATION/TRAINING PROGRAM

## 2024-03-01 PROCEDURE — 85610 PROTHROMBIN TIME: CPT

## 2024-03-01 PROCEDURE — 85730 THROMBOPLASTIN TIME PARTIAL: CPT

## 2024-03-01 PROCEDURE — 25000003 PHARM REV CODE 250

## 2024-03-01 PROCEDURE — 82962 GLUCOSE BLOOD TEST: CPT

## 2024-03-01 PROCEDURE — G0378 HOSPITAL OBSERVATION PER HR: HCPCS

## 2024-03-01 PROCEDURE — 85027 COMPLETE CBC AUTOMATED: CPT

## 2024-03-01 PROCEDURE — 80048 BASIC METABOLIC PNL TOTAL CA: CPT

## 2024-03-01 PROCEDURE — 36415 COLL VENOUS BLD VENIPUNCTURE: CPT

## 2024-03-01 PROCEDURE — 96361 HYDRATE IV INFUSION ADD-ON: CPT

## 2024-03-01 PROCEDURE — 63600175 PHARM REV CODE 636 W HCPCS

## 2024-03-01 PROCEDURE — C9113 INJ PANTOPRAZOLE SODIUM, VIA: HCPCS

## 2024-03-01 PROCEDURE — 96374 THER/PROPH/DIAG INJ IV PUSH: CPT

## 2024-03-01 RX ORDER — FLUOXETINE HYDROCHLORIDE 20 MG/1
20 CAPSULE ORAL DAILY
Status: DISCONTINUED | OUTPATIENT
Start: 2024-03-01 | End: 2024-03-01 | Stop reason: HOSPADM

## 2024-03-01 RX ORDER — ATORVASTATIN CALCIUM 40 MG/1
40 TABLET, FILM COATED ORAL DAILY
Status: DISCONTINUED | OUTPATIENT
Start: 2024-03-01 | End: 2024-03-01 | Stop reason: HOSPADM

## 2024-03-01 RX ORDER — VALPROIC ACID 250 MG/5ML
500 SOLUTION ORAL EVERY 8 HOURS
Status: DISCONTINUED | OUTPATIENT
Start: 2024-03-01 | End: 2024-03-01

## 2024-03-01 RX ORDER — LEVETIRACETAM 100 MG/ML
1000 SOLUTION ORAL 2 TIMES DAILY
Status: DISCONTINUED | OUTPATIENT
Start: 2024-03-01 | End: 2024-03-01

## 2024-03-01 RX ORDER — PANTOPRAZOLE SODIUM 40 MG/10ML
40 INJECTION, POWDER, LYOPHILIZED, FOR SOLUTION INTRAVENOUS DAILY
Status: DISCONTINUED | OUTPATIENT
Start: 2024-03-01 | End: 2024-03-01 | Stop reason: HOSPADM

## 2024-03-01 RX ORDER — VALPROIC ACID 250 MG/5ML
500 SOLUTION ORAL EVERY 8 HOURS
Status: DISCONTINUED | OUTPATIENT
Start: 2024-03-01 | End: 2024-03-01 | Stop reason: HOSPADM

## 2024-03-01 RX ORDER — LEVETIRACETAM 100 MG/ML
1000 SOLUTION ORAL 2 TIMES DAILY
Status: DISCONTINUED | OUTPATIENT
Start: 2024-03-01 | End: 2024-03-01 | Stop reason: HOSPADM

## 2024-03-01 RX ORDER — AMLODIPINE BESYLATE 5 MG/1
10 TABLET ORAL DAILY
Status: DISCONTINUED | OUTPATIENT
Start: 2024-03-01 | End: 2024-03-01 | Stop reason: HOSPADM

## 2024-03-01 RX ADMIN — AMANTADINE HYDROCHLORIDE 100 MG: 100 CAPSULE, LIQUID FILLED ORAL at 08:03

## 2024-03-01 RX ADMIN — PANTOPRAZOLE SODIUM 40 MG: 40 INJECTION, POWDER, LYOPHILIZED, FOR SOLUTION INTRAVENOUS at 08:03

## 2024-03-01 RX ADMIN — ATORVASTATIN CALCIUM 40 MG: 40 TABLET, FILM COATED ORAL at 08:03

## 2024-03-01 RX ADMIN — APIXABAN 5 MG: 2.5 TABLET, FILM COATED ORAL at 09:03

## 2024-03-01 RX ADMIN — LEVETIRACETAM 1000 MG: 100 SOLUTION ORAL at 08:03

## 2024-03-01 RX ADMIN — VALPROIC ACID 500 MG: 250 SOLUTION ORAL at 08:03

## 2024-03-01 RX ADMIN — FLUOXETINE 20 MG: 20 CAPSULE ORAL at 09:03

## 2024-03-01 RX ADMIN — AMLODIPINE BESYLATE 10 MG: 5 TABLET ORAL at 08:03

## 2024-03-01 NOTE — HOSPITAL COURSE
Andrea Leon is a 43 year old male with a past medical history of Asperger's, quadriplegia, CVA, CKD, PEG tube, seizure disorder, thrombocytopenia, anemia, HLD, MDD/XENIA, and GERD who presented with PRABHJOT which improved with IV fluids. He was also noted to be thrombocytopenic on admission (worse than baseline) which normalized during his course. The patient was discharged back to [facility] 3/1/2024.

## 2024-03-01 NOTE — DISCHARGE SUMMARY
[facility]  Discharge Summary      Patient Name: Andrea Leon Jr.  MRN: [id number]  GROVER: [id number]  Patient Class: OP- Observation  Admission Date: 2/29/2024  Hospital Length of Stay: 0 days  Discharge Date and Time: No discharge date for patient encounter.  Attending Physician: Kwadwo Bates MD   Discharging Provider: Kwadwo Bates MD  Primary Care Provider: Elly Mathew DO    Primary Care Team: Networked reference to record PCT     HPI:   Andrea Leon 43 year old male with past medical history of Asperger's syndrome, CVA, functional quadriplegic, PEG, hypertension, recurrent UTIs with ESBL, and seizures who was brought in by [facility] for elevated creatinine.  He denies any chest pain, shortness on breath, fever, chills, dizziness, lightheadedness, abdominal pain, nausea or vomiting.  ED workup reveals creatinine 2.1 baseline 1.4, and platelets 37 prior 105. He is able to answer simple questions appropriately with limited motion to all extremities. Admitted to hospital medicine for further management and treatment. Consult placed with Nephrology for PRABHJOT.                   * No surgery found *      Hospital Course:   Andrea Leon is a 43 year old male with a past medical history of Asperger's, quadriplegia, CVA, CKD, PEG tube, seizure disorder, thrombocytopenia, anemia, HLD, MDD/XENIA, and GERD who presented with PRABHJOT which improved with IV fluids. He was also noted to be thrombocytopenic on admission (worse than baseline) which normalized during his course. The patient was discharged back to [facility] 3/1/2024.     Goals of Care Treatment Preferences:  Code Status: Full Code    Living Will: Yes              Consults:   Consults (From admission, onward)          Status Ordering Provider     Inpatient consult to Registered Dietitian/Nutritionist  Once        Provider:  (Not yet assigned)    Acknowledged JAIDEN READ             Neuro  Seizure  -Continue home AEDs    Functional quadriplegia  Chronic.      CVA, old, hemiparesis  -Continue home statin and Eliquis    Cardiac/Vascular  Essential hypertension, benign  -Continue home medications    Renal/  * Acute-on-chronic kidney injury  Improved with IV fluids.    Oncology  Anemia  Chronic. Stable.    GI  GERD (gastroesophageal reflux disease)  -PPI      PEG (percutaneous endoscopic gastrostomy) status  -Care per Nursing    Other  Anxiety and depression  -Continue home Prozac    Thrombocytopenia  Stable.  -Trended platelets with CBC      Final Active Diagnoses:    Diagnosis Date Noted POA    PRINCIPAL PROBLEM:  Acute-on-chronic kidney injury [N17.9, N18.9] 04/06/2021 Yes    Anxiety and depression [F41.9, F32.A] 03/01/2024 Yes    GERD (gastroesophageal reflux disease) [K21.9] 03/01/2024 Yes    Anemia [D64.9] 03/01/2024 Yes    Seizure [R56.9] 10/25/2022 Yes    Thrombocytopenia [D69.6] 06/02/2021 Yes    Functional quadriplegia [R53.2] 05/28/2021 Yes    Essential hypertension, benign [I10] 04/10/2021 Yes    PEG (percutaneous endoscopic gastrostomy) status [Z93.1] 04/10/2021 Not Applicable    CVA, old, hemiparesis [I69.359]  Not Applicable      Problems Resolved During this Admission:       Discharged Condition: stable    Disposition: Home or Self Care    Follow Up:   Follow-up Information       Records, [facility] - Follow up.    Contact information:  [place] [address] [phone]                           Patient Instructions:      Notify your health care provider if you experience any of the following:  increased confusion or weakness     Notify your health care provider if you experience any of the following:  persistent dizziness, light-headedness, or visual disturbances     Notify your health care provider if you experience any of the following:  severe persistent headache     Notify your health care provider if you experience any of the  following:  difficulty breathing or increased cough     Notify your health care provider if you experience any of the following:  severe uncontrolled pain     Notify your health care provider if you experience any of the following:  persistent nausea and vomiting or diarrhea     Notify your health care provider if you experience any of the following:  temperature >100.4     Activity as tolerated       Significant Diagnostic Studies: Labs: All labs within the past 24 hours have been reviewed    Pending Diagnostic Studies:       Procedure Component Value Units Date/Time    Haptoglobin [[id number]] Collected: 02/29/24 2307    Order Status: Sent Lab Status: In process Updated: 02/29/24 2309    Specimen: Blood            Medications:  Reconciled Home Medications:      Medication List        CHANGE how you take these medications      amLODIPine 5 MG tablet  Commonly known as: NORVASC  1 tablet (5 mg total) by Per G Tube route once daily.  What changed:   how much to take  additional instructions     atorvastatin 40 MG tablet  Commonly known as: LIPITOR  1 tablet (40 mg total) by Per G Tube route once daily.  What changed:   when to take this  additional instructions     cloNIDine 0.1 MG tablet  Commonly known as: CATAPRES  Take 1 tablet (0.1 mg total) by mouth 3 (three) times daily as needed (180).  What changed:   how to take this  when to take this  reasons to take this            CONTINUE taking these medications      amantadine  mg capsule  Commonly known as: SYMMETREL  1 capsule (100 mg total) by Per G Tube route once daily.     ELIQUIS 2.5 mg Tab  Generic drug: apixaban  2.5 mg by Per G Tube route 2 (two) times daily. Tajes 9a abd 9p     FLUoxetine 20 MG capsule  1 capsule (20 mg total) by Per G Tube route once daily.     food supplemt, lactose-reduced Liqd  Take by mouth Daily. 9am     hydrALAZINE 50 MG tablet  Commonly known as: APRESOLINE  Take 50 mg by mouth 3 (three) times daily. Takes at 6a, 2p and  10p     JEVITY 1.5 ALEC ORAL  70 mLs by Per G Tube route every hour. Daily starting at 10p and stopping at 6a. 70 ml an hour     levETIRAcetam 100 mg/mL Soln  Commonly known as: KEPPRA  Take 10 mLs by mouth 2 (two) times daily. Takes 9a and 9p     lisinopriL 20 MG tablet  Commonly known as: PRINIVIL,ZESTRIL  Take 20 mg by mouth every evening. Takes at 8p. Hold til 3/1 at midnight     modafiniL 200 MG Tab  Commonly known as: PROVIGIL  200 mg by Per G Tube route once daily. Takes at 0600     * ondansetron 4 mg/5 mL solution  Commonly known as: ZOFRAN  8 mg by Per G Tube route 2 (two) times daily as needed for Nausea.     * ondansetron 4 mg/5 mL solution  Commonly known as: ZOFRAN  Take 5 mLs (4 mg total) by mouth 2 (two) times daily.     pantoprazole 40 MG tablet  Commonly known as: PROTONIX  Take 40 mg by mouth once daily. Peg Tube daily at 8a     polyethylene glycol 17 gram Pwpk  Commonly known as: GLYCOLAX  17 g by Per G Tube route once daily. Takes at 9a     sucralfate 1 gram tablet  Commonly known as: CARAFATE  1 g by Per G Tube route 3 (three) times daily. Takes at 6a, 2p and 10p     valproic acid (as sodium salt) 500 mg/10 mL (10 mL) Soln  Take 10 mLs (500 mg total) by mouth every 8 (eight) hours.           * This list has 2 medication(s) that are the same as other medications prescribed for you. Read the directions carefully, and ask your doctor or other care provider to review them with you.                  Indwelling Lines/Drains at time of discharge:   Lines/Drains/Airways       Peripherally Inserted Central Catheter Line  Duration             PICC Double Lumen 12/01/23 1416 left basilic 90 days                    Time spent on the discharge of patient: 31 minutes         Kwadwo Bates MD  Department of Hospital Medicine  [facility]

## 2024-03-01 NOTE — ED PROVIDER NOTES
Encounter Date: 2/29/2024       History     Chief Complaint   Patient presents with    abnormal labs     Sent by [facility] for elevated Kidney functions. PRABHJOT     43-year-old male multiple comorbidities sent to ED for elevated creatinine.  Per chart review history of creatinine 1.6.  Creatinine around 2.12 on most recent labs with elevated BUN in the low 40s on paperwork sent with facility.  Patient is alert denies any symptoms at this time.  Patient brought in by EMS    The history is provided by the patient and the EMS personnel.     Review of patient's allergies indicates:  No Known Allergies  Past Medical History:   Diagnosis Date    Anticoagulant long-term use     baby asa per chart    Asperger's syndrome     CVA, old, hemiparesis     Encounter for blood transfusion     Essential hypertension, malignant     Functional quadriplegia 5/28/2021    PEG (percutaneous endoscopic gastrostomy) status      Past Surgical History:   Procedure Laterality Date    COLONOSCOPY N/A 6/3/2021    Procedure: COLONOSCOPY;  Surgeon: Rosario Meeks MD;  Location: [facility];  Service: Endoscopy;  Laterality: N/A;    ESOPHAGOGASTRODUODENOSCOPY N/A 7/10/2021    Procedure: EGD (ESOPHAGOGASTRODUODENOSCOPY);  Surgeon: Rosario Meeks MD;  Location: [facility];  Service: Endoscopy;  Laterality: N/A;    ESOPHAGOGASTRODUODENOSCOPY W/ PEG N/A 4/1/2021    Procedure: EGD, WITH PEG TUBE INSERTION;  Surgeon: En Zheng MD;  Location: [facility];  Service: Endoscopy;  Laterality: N/A;     No family history on file.  Social History     Tobacco Use    Smoking status: Never    Smokeless tobacco: Never   Substance Use Topics    Alcohol use: Not Currently    Drug use: Not Currently     Review of Systems   All other systems reviewed and are negative.      Physical Exam     Initial Vitals   BP Pulse Resp Temp SpO2   02/29/24 2010 02/29/24 2010 02/29/24 2010 02/29/24 2010 02/29/24 2015   124/61 70 16 97.5 °F (36.4 °C) 98 %      MAP       --                 Physical Exam    Nursing note and vitals reviewed.  Constitutional: Vital signs are normal.  Non-toxic appearance. No distress.   HENT:   Head: Normocephalic.   Eyes: No scleral icterus.   Cardiovascular:  Regular rhythm.           Pulmonary/Chest: No stridor. No respiratory distress.   Bilateral chest rise   Abdominal: There is no guarding.   Musculoskeletal:      Cervical back: No rigidity.      Comments: Extremities or spastic     Neurological: He is alert.   Moves all extremities, some spasticity lower extremities and left upper extremity worse   Skin: Skin is warm and dry. No rash noted.   Psychiatric: His speech is normal. He is not actively hallucinating.   Not anxious  or agitated         ED Course   Critical Care    Date/Time: 2/29/2024 10:38 PM    Performed by: Michael Elkins Jr., DO  Authorized by: Michael Elkins Jr., DO  Direct patient critical care time: 25 minutes  Ordering / reviewing critical care time: 5 minutes  Documentation critical care time: 5 minutes  Total critical care time (exclusive of procedural time) : 35 minutes  Critical care was necessary to treat or prevent imminent or life-threatening deterioration of the following conditions: Worsening creatinine.        Labs Reviewed   CBC W/ AUTO DIFFERENTIAL - Abnormal; Notable for the following components:       Result Value    RBC 4.32 (*)     Hemoglobin 13.2 (*)     RDW 16.0 (*)     Platelets 37 (*)     All other components within normal limits    Narrative:     Platelets    critical result(s) called and verbal readback obtained   from Orion Higgins RN.  by TH1 02/29/2024 21:04   COMPREHENSIVE METABOLIC PANEL - Abnormal; Notable for the following components:    CO2 18 (*)     BUN 38 (*)     Creatinine 2.1 (*)     Albumin 3.3 (*)     eGFR 40 (*)     All other components within normal limits   LACTATE DEHYDROGENASE - Abnormal; Notable for the following components:     (*)     All other components within normal  limits   BASIC METABOLIC PANEL - Abnormal; Notable for the following components:    Chloride 111 (*)     BUN 35 (*)     Creatinine 1.9 (*)     eGFR 45 (*)     All other components within normal limits   CBC WITHOUT DIFFERENTIAL - Abnormal; Notable for the following components:    RBC 4.20 (*)     Hemoglobin 12.8 (*)     MCHC 31.6 (*)     RDW 16.0 (*)     Platelets 130 (*)     All other components within normal limits   PLATELET REVIEW - Abnormal; Notable for the following components:    Platelet Estimate Decreased (*)     All other components within normal limits   MAGNESIUM   HAPTOGLOBIN   PROTIME-INR   APTT   POCT GLUCOSE          Imaging Results    None          Medications   sodium chloride 0.9% bolus 1,000 mL 1,000 mL (0 mLs Intravenous Stopped 2/29/24 9124)     Medical Decision Making  43-year-old male sent to ED for acute kidney injury.  Bladder scan 130 mL no urinary retention.  Workup demonstrated creatinine 2.1.  Increased BUN to creatinine ratio patient administered IV fluids.  Patient also has thrombocytopenia trend downward  to 37 1000, unknown etiology.  We will admit patient to hospitalist team for further management and evaluation.  No indication for platelet transfusion  At this time.  Patient updated and agrees with plan.  No active bleeding.  If creatinine continues to worsening or if thrombocytopenia continues to worsen within differential diagnosis includes TTP.  No elevated bilirubin at this time, no significant drop in H&H.    Amount and/or Complexity of Data Reviewed  Labs: ordered. Decision-making details documented in ED Course.  Discussion of management or test interpretation with external provider(s): Spoke with Ashlie Gaming NP with hospitalist team will admit for further management evaluation                 ED Course as of 03/09/24 1513   u Feb 29, 2024 2112 Magnesium : 2.1 [KB]   2114 Thrombocytopenia, 37,000, down trending, no active bleeding [KB]   2114 We will admit to  hospitalist team management [KB]   2114 Patient updated and agrees with plan [KB]   Fri Mar 01, 2024   0016 Lactate Dehydrogenase(!): 432 [KB]   0016 Blood smear with no schistocytes, less likely TTP [KB]      ED Course User Index  [KB] Michael Elkins Jr., DO                           Clinical Impression:  Final diagnoses:  [N17.9] PRABHJOT (acute kidney injury) (Primary)  [R79.89] Elevated serum creatinine  [D69.6] Thrombocytopenia          ED Disposition Condition    Observation Stable                Michael Elkins Jr., DO  02/29/24 2238       Michael Elkins Jr., DO  03/01/24 0016       Michael Elkins Jr., DO  03/09/24 1513

## 2024-03-01 NOTE — DISCHARGE INSTRUCTIONS
[facility]  Facility Transfer Orders        [facility]    Diagnoses:   Active Hospital Problems    Diagnosis  POA    *Acute-on-chronic kidney injury [N17.9, N18.9]  Yes    Anxiety and depression [F41.9, F32.A]  Yes    GERD (gastroesophageal reflux disease) [K21.9]  Yes    Anemia [D64.9]  Yes    Seizure [R56.9]  Yes    Thrombocytopenia [D69.6]  Yes    Functional quadriplegia [R53.2]  Yes    Essential hypertension, benign [I10]  Yes    PEG (percutaneous endoscopic gastrostomy) status [Z93.1]  Not Applicable    CVA, old, hemiparesis [I69.359]  Not Applicable      Resolved Hospital Problems   No resolved problems to display.     Allergies: Review of patient's allergies indicates:  No Known Allergies    Code Status: FULL    Vitals: Routine       Diet:  NO changes to diet    Activity: Activity as tolerated    Nursing Precautions: Aspiration , Fall, Seizure, and Pressure ulcer prevention    Bed/Surface: Low Air Loss    Consults: Wound Care and Nutrition to evaluate and recommend diet    Oxygen: room air    Dialysis: Patient is not on dialysis.       Miscellaneous Care:   PEG Care:  Clean site every 24 hours  Routine Skin for Bedridden Patients:  Apply moisture barrier cream to all       Medications: Discontinue all previous medication orders, if any. See new list below.  Current Discharge Medication List        CONTINUE these medications which have NOT CHANGED    Details   amantadine HCL (SYMMETREL) 100 mg capsule 1 capsule (100 mg total) by Per G Tube route once daily.  Qty: 30 capsule, Refills: 11      amLODIPine (NORVASC) 5 MG tablet 1 tablet (5 mg total) by Per G Tube route once daily.  Qty: 30 tablet, Refills: 11    Comments: .      apixaban (ELIQUIS) 2.5 mg Tab 2.5 mg by Per G Tube route 2 (two) times daily. Tajes 9a abd 9p      atorvastatin (LIPITOR) 40 MG tablet 1 tablet (40 mg total) by Per G Tube route once daily.  Qty: 90 tablet, Refills: 3      cloNIDine (CATAPRES) 0.1 MG tablet Take 1  tablet (0.1 mg total) by mouth 3 (three) times daily as needed (180).  Qty: 90 tablet, Refills: 0    Comments: .      food supplemt, lactose-reduced Liqd Take by mouth Daily. 9am      hydrALAZINE (APRESOLINE) 50 MG tablet Take 50 mg by mouth 3 (three) times daily. Takes at 6a, 2p and 10p      lactose-reduced food/fiber (JEVITY 1.5 ALEC ORAL) 70 mLs by Per G Tube route every hour. Daily starting at 10p and stopping at 6a. 70 ml an hour      levETIRAcetam (KEPPRA) 100 mg/mL Soln Take 10 mLs by mouth 2 (two) times daily. Takes 9a and 9p      lisinopriL (PRINIVIL,ZESTRIL) 20 MG tablet Take 20 mg by mouth every evening. Takes at 8p. Hold til 3/1 at midnight      modafiniL (PROVIGIL) 200 MG Tab 200 mg by Per G Tube route once daily. Takes at 0600      !! ondansetron (ZOFRAN) 4 mg/5 mL solution Take 5 mLs (4 mg total) by mouth 2 (two) times daily.  Qty: 100 mL, Refills: 0      !! ondansetron (ZOFRAN) 4 mg/5 mL solution 8 mg by Per G Tube route 2 (two) times daily as needed for Nausea.      pantoprazole (PROTONIX) 40 MG tablet Take 40 mg by mouth once daily. Peg Tube daily at 8a      polyethylene glycol (GLYCOLAX) 17 gram PwPk 17 g by Per G Tube route once daily. Takes at 9a      sucralfate (CARAFATE) 1 gram tablet 1 g by Per G Tube route 3 (three) times daily. Takes at 6a, 2p and 10p      valproic acid, as sodium salt, 500 mg/10 mL (10 mL) Soln Take 10 mLs (500 mg total) by mouth every 8 (eight) hours.  Qty: 480 mL, Refills: 9      FLUoxetine 20 MG capsule 1 capsule (20 mg total) by Per G Tube route once daily.  Qty: 30 capsule, Refills: 11       !! - Potential duplicate medications found. Please discuss with provider.        Follow up:    Follow-up Information       Records, [facility] - Follow up.    Contact information:  [place] [address] [phone]                               Immunizations Administered as of 3/1/2024       No immunizations on file.                Some patients  may experience side effects after vaccination.  These may include fever, headache, muscle or joint aches.  Most symptoms resolve with 24-48 hours and do not require urgent medical evaluation unless they persist for more than 72 hours or symptoms are concerning for an unrelated medical condition.          Kwadwo Bates MD

## 2024-03-01 NOTE — ASSESSMENT & PLAN NOTE
Chronic, controlled. Latest blood pressure and vitals reviewed-     Temp:  [97.5 °F (36.4 °C)]   Pulse:  [57-70]   Resp:  [16-18]   BP: (108-131)/(61-82)   SpO2:  [95 %-100 %] .   Home meds for hypertension were reviewed and noted below.   Hypertension Medications               amLODIPine (NORVASC) 5 MG tablet 1 tablet (5 mg total) by Per G Tube route once daily.    cloNIDine (CATAPRES) 0.1 MG tablet Take 1 tablet (0.1 mg total) by mouth 3 (three) times daily as needed (180).    hydrALAZINE (APRESOLINE) 50 MG tablet Take 50 mg by mouth 3 (three) times daily. Takes at 6a, 2p and 10p    lisinopriL (PRINIVIL,ZESTRIL) 20 MG tablet Take 20 mg by mouth every evening. Takes at 8p. Hold til 3/1 at midnight            While in the hospital, will manage blood pressure as follows; Continue home antihypertensive regimen    Will utilize p.r.n. blood pressure medication only if patient's blood pressure greater than 180/110 and he develops symptoms such as worsening chest pain or shortness of breath.

## 2024-03-01 NOTE — ED NOTES
Cleansed the peg tube site and applied drain sponge. Some bright red blood noted at stoma. There was old crusted drainage around stoma before cleaning. Cleansed and changed diaper and bedding. Partial bath given. Pt had stool and urine. Changed the mepliex dressing to buttock and cleansed buttocks well. Pt tolerated all care well.

## 2024-03-01 NOTE — ASSESSMENT & PLAN NOTE
Patient was found to have thrombocytopenia will monitor the platelets Daily.   Hold DVT prophylaxis if platelets are <50k. The patient's platelet results have been reviewed and are listed below.  Recent Labs   Lab 02/29/24 2023   PLT 37*     Repeat and monitor CBC  Monitor Vitals and signs of bleeding    Restart Eliquis once Plts improves and remains free s/s of bleeding

## 2024-03-01 NOTE — SUBJECTIVE & OBJECTIVE
Past Medical History:   Diagnosis Date    Anticoagulant long-term use     baby asa per chart    Asperger's syndrome     CVA, old, hemiparesis     Encounter for blood transfusion     Essential hypertension, malignant     Functional quadriplegia 5/28/2021    PEG (percutaneous endoscopic gastrostomy) status        Past Surgical History:   Procedure Laterality Date    COLONOSCOPY N/A 6/3/2021    Procedure: COLONOSCOPY;  Surgeon: Rosario Meeks MD;  Location: [facility] ENDO;  Service: Endoscopy;  Laterality: N/A;    ESOPHAGOGASTRODUODENOSCOPY N/A 7/10/2021    Procedure: EGD (ESOPHAGOGASTRODUODENOSCOPY);  Surgeon: Rosario Meeks MD;  Location: [facility] ENDO;  Service: Endoscopy;  Laterality: N/A;    ESOPHAGOGASTRODUODENOSCOPY W/ PEG N/A 4/1/2021    Procedure: EGD, WITH PEG TUBE INSERTION;  Surgeon: En Zheng MD;  Location: [facility] ENDO;  Service: Endoscopy;  Laterality: N/A;       Review of patient's allergies indicates:  No Known Allergies    No current facility-administered medications on file prior to encounter.     Current Outpatient Medications on File Prior to Encounter   Medication Sig    amantadine HCL (SYMMETREL) 100 mg capsule 1 capsule (100 mg total) by Per G Tube route once daily.    amLODIPine (NORVASC) 5 MG tablet 1 tablet (5 mg total) by Per G Tube route once daily. (Patient taking differently: 10 mg by Per G Tube route once daily. Takes at 15:00 daily)    apixaban (ELIQUIS) 2.5 mg Tab 2.5 mg by Per G Tube route 2 (two) times daily. Tajes 9a abd 9p    atorvastatin (LIPITOR) 40 MG tablet 1 tablet (40 mg total) by Per G Tube route once daily. (Patient taking differently: 40 mg by Per G Tube route every evening. Takes at 9p)    cloNIDine (CATAPRES) 0.1 MG tablet Take 1 tablet (0.1 mg total) by mouth 3 (three) times daily as needed (180). (Patient taking differently: 0.1 mg by Per G Tube route every 8 (eight) hours as needed (Systolic over 180).)    food supplemt, lactose-reduced Liqd Take by mouth Daily. 9am     hydrALAZINE (APRESOLINE) 50 MG tablet Take 50 mg by mouth 3 (three) times daily. Takes at 6a, 2p and 10p    lactose-reduced food/fiber (JEVITY 1.5 ALEC ORAL) 70 mLs by Per G Tube route every hour. Daily starting at 10p and stopping at 6a. 70 ml an hour    levETIRAcetam (KEPPRA) 100 mg/mL Soln Take 10 mLs by mouth 2 (two) times daily. Takes 9a and 9p    lisinopriL (PRINIVIL,ZESTRIL) 20 MG tablet Take 20 mg by mouth every evening. Takes at 8p. Hold til 3/1 at midnight    modafiniL (PROVIGIL) 200 MG Tab 200 mg by Per G Tube route once daily. Takes at 0600    ondansetron (ZOFRAN) 4 mg/5 mL solution Take 5 mLs (4 mg total) by mouth 2 (two) times daily.    ondansetron (ZOFRAN) 4 mg/5 mL solution 8 mg by Per G Tube route 2 (two) times daily as needed for Nausea.    pantoprazole (PROTONIX) 40 MG tablet Take 40 mg by mouth once daily. Peg Tube daily at 8a    polyethylene glycol (GLYCOLAX) 17 gram PwPk 17 g by Per G Tube route once daily. Takes at 9a    sucralfate (CARAFATE) 1 gram tablet 1 g by Per G Tube route 3 (three) times daily. Takes at 6a, 2p and 10p    valproic acid, as sodium salt, 500 mg/10 mL (10 mL) Soln Take 10 mLs (500 mg total) by mouth every 8 (eight) hours. (Patient taking differently: Take 500 mg by mouth every 8 (eight) hours. Peg Tube. Takes at 6a, 2p and 10p)    FLUoxetine 20 MG capsule 1 capsule (20 mg total) by Per G Tube route once daily.     Family History    None       Tobacco Use    Smoking status: Never    Smokeless tobacco: Never   Substance and Sexual Activity    Alcohol use: Not Currently    Drug use: Not Currently    Sexual activity: Not Currently     Review of Systems   Constitutional:  Negative for chills, diaphoresis and fever.   Respiratory:  Negative for cough and shortness of breath.    Cardiovascular:  Negative for chest pain and leg swelling.   Gastrointestinal:  Negative for abdominal distention, abdominal pain, nausea and vomiting.   Neurological:  Negative for seizures,  light-headedness and headaches.     Objective:     Vital Signs (Most Recent):  Temp: 97.5 °F (36.4 °C) (02/29/24 2010)  Pulse: (!) 57 (03/01/24 0200)  Resp: 18 (03/01/24 0200)  BP: 111/71 (03/01/24 0200)  SpO2: 95 % (03/01/24 0200) Vital Signs (24h Range):  Temp:  [97.5 °F (36.4 °C)] 97.5 °F (36.4 °C)  Pulse:  [57-70] 57  Resp:  [16-18] 18  SpO2:  [95 %-100 %] 95 %  BP: (108-131)/(61-82) 111/71     Weight: 77.1 kg (170 lb)  Body mass index is 25.1 kg/m².     Physical Exam  Vitals and nursing note reviewed.   Constitutional:       General: He is not in acute distress.     Appearance: He is not ill-appearing or toxic-appearing.   Cardiovascular:      Rate and Rhythm: Regular rhythm. Bradycardia present.   Pulmonary:      Effort: Pulmonary effort is normal. No respiratory distress.      Breath sounds: Normal breath sounds. No wheezing.   Abdominal:      General: Bowel sounds are normal. There is no distension.      Palpations: Abdomen is soft.      Tenderness: There is no abdominal tenderness.   Musculoskeletal:         General: No swelling.      Right lower leg: No edema.      Left lower leg: No edema.      Comments: Functional Quadriplegic   Skin:     General: Skin is warm and dry.      Capillary Refill: Capillary refill takes less than 2 seconds.   Neurological:      Mental Status: He is alert. Mental status is at baseline.                Significant Labs: All pertinent labs within the past 24 hours have been reviewed.  BMP:   Recent Labs   Lab 02/29/24 2023   GLU 81      K 5.0      CO2 18*   BUN 38*   CREATININE 2.1*   CALCIUM 9.1   MG 2.1     CBC:   Recent Labs   Lab 02/29/24 2023   WBC 8.42   HGB 13.2*   HCT 41.1   PLT 37*     CMP:   Recent Labs   Lab 02/29/24 2023      K 5.0      CO2 18*   GLU 81   BUN 38*   CREATININE 2.1*   CALCIUM 9.1   PROT 7.2   ALBUMIN 3.3*   BILITOT 0.7   ALKPHOS 74   AST 30   ALT 17   ANIONGAP 11         Significant Imaging: I have reviewed all pertinent  imaging results/findings within the past 24 hours.

## 2024-03-01 NOTE — PLAN OF CARE
MAGUE sent patient information to [facility] via [employer] system.       03/01/24 0944   Post-Acute Status   Post-Acute Authorization Placement   Post-Acute Placement Status Pending post-acute provider review/more information requested

## 2024-03-01 NOTE — PLAN OF CARE
[facility] - ED  Initial Discharge Assessment       Primary Care Provider: Elly Mathew DO    Admission Diagnosis: PRABHJOT (acute kidney injury) [N17.9]    Admission Date: 2/29/2024  Expected Discharge Date: 3/1/2024    Transition of Care Barriers: None    Payor: MEDICAID / Plan: MEDICAID [place] / Product Type: Government /     Extended Emergency Contact Information  Primary Emergency Contact: Alda Leon  Home Phone: [phone]  Mobile Phone: [phone]  Relation: Mother  Preferred language: English   needed? No  Secondary Emergency Contact: luma walker  Mobile Phone: [phone]  Relation: Step parent  Preferred language: English   needed? No    Discharge Plan A: Return to nursing home  Discharge Plan B: Return to Nursing Home      [id number] - [place] - [address] AT [facility] [address]  [address]  [place] [address]  Phone: [phone] Fax: [phone]    SW completed discharge planning assessment from chart review and speaking with facility.  Per Aidan with [facility], patient is resident and will return upon discharge.    Initial Assessment (most recent)       Adult Discharge Assessment - 03/01/24 0942          Discharge Assessment    Assessment Type Discharge Planning Assessment     Confirmed/corrected address, phone number and insurance Yes     Confirmed Demographics Correct on Facesheet     Source of Information health record;facility verbal report     Communicated AMEYA with patient/caregiver Yes     People in Home facility resident     Facility Arrived From: [facility]     Do you expect to return to your current living situation? Yes     Do you have help at home or someone to help you manage your care at home? Yes     Who are your caregiver(s) and their phone number(s)? staff     Prior to hospitilization cognitive status: Unable to Assess     Current cognitive status: Unable to Assess     Who is going to  help you get home at discharge? staff     How do you get to doctors appointments? agency     Discharge Plan A Return to nursing home     Discharge Plan B Return to Nursing Home     DME Needed Upon Discharge  none     Transition of Care Barriers None

## 2024-03-01 NOTE — ED NOTES
Contacted  in regards to discharge. Per  plan [facility] after being seen by .

## 2024-03-01 NOTE — ASSESSMENT & PLAN NOTE
Patient with acute kidney injury/acute renal failure likely due to pre-renal azotemia due to dehydration PRABHJOT is currently worsening. Baseline creatinine  1.4  - Labs reviewed- Renal function/electrolytes with Estimated Creatinine Clearance: 45.8 mL/min (A) (based on SCr of 2.1 mg/dL (H)). according to latest data. Monitor urine output and serial BMP and adjust therapy as needed. Avoid nephrotoxins and renally dose meds for GFR listed above.    IVFs  Nephrology consult  Avoid nephrotoxin agents

## 2024-03-01 NOTE — H&P
[facility] Medicine  History & Physical    Patient Name: Andrea Leon Jr.  MRN: [id number]  Patient Class: OP- Observation  Admission Date: 2/29/2024  Attending Physician: Kwadwo Bates MD   Primary Care Provider: Elly Mathew DO         Patient information was obtained from patient, past medical records, and ER records.     Subjective:     Principal Problem:PRABHJOT (acute kidney injury)    Chief Complaint:   Chief Complaint   Patient presents with    abnormal labs     Sent by [facility] for elevated Kidney functions. PRABHJOT        HPI: Andrea Leon 43 year old male with past medical history of Asperger's syndrome, CVA, functional quadriplegic, PEG, hypertension, recurrent UTIs with ESBL, and seizures who was brought in by [facility] for elevated creatinine.  He denies any chest pain, shortness on breath, fever, chills, dizziness, lightheadedness, abdominal pain, nausea or vomiting.  ED workup reveals creatinine 2.1 baseline 1.4, and platelets 37 prior 105. He is able to answer simple questions appropriately with limited motion to all extremities. Admitted to hospital medicine for further management and treatment. Consult placed with Nephrology for PRABHJOT.                   Past Medical History:   Diagnosis Date    Anticoagulant long-term use     baby asa per chart    Asperger's syndrome     CVA, old, hemiparesis     Encounter for blood transfusion     Essential hypertension, malignant     Functional quadriplegia 5/28/2021    PEG (percutaneous endoscopic gastrostomy) status        Past Surgical History:   Procedure Laterality Date    COLONOSCOPY N/A 6/3/2021    Procedure: COLONOSCOPY;  Surgeon: Rosario Meeks MD;  Location: [facility];  Service: Endoscopy;  Laterality: N/A;    ESOPHAGOGASTRODUODENOSCOPY N/A 7/10/2021    Procedure: EGD (ESOPHAGOGASTRODUODENOSCOPY);  Surgeon: Rosario Meeks MD;  Location: [facility];  Service: Endoscopy;  Laterality: N/A;     ESOPHAGOGASTRODUODENOSCOPY W/ PEG N/A 4/1/2021    Procedure: EGD, WITH PEG TUBE INSERTION;  Surgeon: En Zheng MD;  Location: [facility];  Service: Endoscopy;  Laterality: N/A;       Review of patient's allergies indicates:  No Known Allergies    No current facility-administered medications on file prior to encounter.     Current Outpatient Medications on File Prior to Encounter   Medication Sig    amantadine HCL (SYMMETREL) 100 mg capsule 1 capsule (100 mg total) by Per G Tube route once daily.    amLODIPine (NORVASC) 5 MG tablet 1 tablet (5 mg total) by Per G Tube route once daily. (Patient taking differently: 10 mg by Per G Tube route once daily. Takes at 15:00 daily)    apixaban (ELIQUIS) 2.5 mg Tab 2.5 mg by Per G Tube route 2 (two) times daily. Tajes 9a abd 9p    atorvastatin (LIPITOR) 40 MG tablet 1 tablet (40 mg total) by Per G Tube route once daily. (Patient taking differently: 40 mg by Per G Tube route every evening. Takes at 9p)    cloNIDine (CATAPRES) 0.1 MG tablet Take 1 tablet (0.1 mg total) by mouth 3 (three) times daily as needed (180). (Patient taking differently: 0.1 mg by Per G Tube route every 8 (eight) hours as needed (Systolic over 180).)    food supplemt, lactose-reduced Liqd Take by mouth Daily. 9am    hydrALAZINE (APRESOLINE) 50 MG tablet Take 50 mg by mouth 3 (three) times daily. Takes at 6a, 2p and 10p    lactose-reduced food/fiber (JEVITY 1.5 ALEC ORAL) 70 mLs by Per G Tube route every hour. Daily starting at 10p and stopping at 6a. 70 ml an hour    levETIRAcetam (KEPPRA) 100 mg/mL Soln Take 10 mLs by mouth 2 (two) times daily. Takes 9a and 9p    lisinopriL (PRINIVIL,ZESTRIL) 20 MG tablet Take 20 mg by mouth every evening. Takes at 8p. Hold til 3/1 at midnight    modafiniL (PROVIGIL) 200 MG Tab 200 mg by Per G Tube route once daily. Takes at 0600    ondansetron (ZOFRAN) 4 mg/5 mL solution Take 5 mLs (4 mg total) by mouth 2 (two) times daily.    ondansetron (ZOFRAN) 4 mg/5 mL solution  8 mg by Per G Tube route 2 (two) times daily as needed for Nausea.    pantoprazole (PROTONIX) 40 MG tablet Take 40 mg by mouth once daily. Peg Tube daily at 8a    polyethylene glycol (GLYCOLAX) 17 gram PwPk 17 g by Per G Tube route once daily. Takes at 9a    sucralfate (CARAFATE) 1 gram tablet 1 g by Per G Tube route 3 (three) times daily. Takes at 6a, 2p and 10p    valproic acid, as sodium salt, 500 mg/10 mL (10 mL) Soln Take 10 mLs (500 mg total) by mouth every 8 (eight) hours. (Patient taking differently: Take 500 mg by mouth every 8 (eight) hours. Peg Tube. Takes at 6a, 2p and 10p)    FLUoxetine 20 MG capsule 1 capsule (20 mg total) by Per G Tube route once daily.     Family History    None       Tobacco Use    Smoking status: Never    Smokeless tobacco: Never   Substance and Sexual Activity    Alcohol use: Not Currently    Drug use: Not Currently    Sexual activity: Not Currently     Review of Systems   Constitutional:  Negative for chills, diaphoresis and fever.   Respiratory:  Negative for cough and shortness of breath.    Cardiovascular:  Negative for chest pain and leg swelling.   Gastrointestinal:  Negative for abdominal distention, abdominal pain, nausea and vomiting.   Neurological:  Negative for seizures, light-headedness and headaches.     Objective:     Vital Signs (Most Recent):  Temp: 97.5 °F (36.4 °C) (02/29/24 2010)  Pulse: (!) 57 (03/01/24 0200)  Resp: 18 (03/01/24 0200)  BP: 111/71 (03/01/24 0200)  SpO2: 95 % (03/01/24 0200) Vital Signs (24h Range):  Temp:  [97.5 °F (36.4 °C)] 97.5 °F (36.4 °C)  Pulse:  [57-70] 57  Resp:  [16-18] 18  SpO2:  [95 %-100 %] 95 %  BP: (108-131)/(61-82) 111/71     Weight: 77.1 kg (170 lb)  Body mass index is 25.1 kg/m².     Physical Exam  Vitals and nursing note reviewed.   Constitutional:       General: He is not in acute distress.     Appearance: He is not ill-appearing or toxic-appearing.   Cardiovascular:      Rate and Rhythm: Regular rhythm. Bradycardia present.    Pulmonary:      Effort: Pulmonary effort is normal. No respiratory distress.      Breath sounds: Normal breath sounds. No wheezing.   Abdominal:      General: Bowel sounds are normal. There is no distension.      Palpations: Abdomen is soft.      Tenderness: There is no abdominal tenderness.   Musculoskeletal:         General: No swelling.      Right lower leg: No edema.      Left lower leg: No edema.      Comments: Functional Quadriplegic   Skin:     General: Skin is warm and dry.      Capillary Refill: Capillary refill takes less than 2 seconds.   Neurological:      Mental Status: He is alert. Mental status is at baseline.                Significant Labs: All pertinent labs within the past 24 hours have been reviewed.  BMP:   Recent Labs   Lab 02/29/24 2023   GLU 81      K 5.0      CO2 18*   BUN 38*   CREATININE 2.1*   CALCIUM 9.1   MG 2.1     CBC:   Recent Labs   Lab 02/29/24 2023   WBC 8.42   HGB 13.2*   HCT 41.1   PLT 37*     CMP:   Recent Labs   Lab 02/29/24 2023      K 5.0      CO2 18*   GLU 81   BUN 38*   CREATININE 2.1*   CALCIUM 9.1   PROT 7.2   ALBUMIN 3.3*   BILITOT 0.7   ALKPHOS 74   AST 30   ALT 17   ANIONGAP 11         Significant Imaging: I have reviewed all pertinent imaging results/findings within the past 24 hours.      Assessment/Plan:     * PRABHJOT (acute kidney injury)  Patient with acute kidney injury/acute renal failure likely due to pre-renal azotemia due to dehydration PRABHJOT is currently worsening. Baseline creatinine  1.4  - Labs reviewed- Renal function/electrolytes with Estimated Creatinine Clearance: 45.8 mL/min (A) (based on SCr of 2.1 mg/dL (H)). according to latest data. Monitor urine output and serial BMP and adjust therapy as needed. Avoid nephrotoxins and renally dose meds for GFR listed above.    IVFs  Nephrology consult  Avoid nephrotoxin agents    Seizure  Chronic condition noted  Seizure precautions   Continue AEDs    Thrombocytopenia  Patient was found  to have thrombocytopenia will monitor the platelets Daily.   Hold DVT prophylaxis if platelets are <50k. The patient's platelet results have been reviewed and are listed below.  Recent Labs   Lab 02/29/24 2023   PLT 37*     Repeat and monitor CBC  Monitor Vitals and signs of bleeding    Restart Eliquis once Plts improves and remains free s/s of bleeding    CVA, old, hemiparesis  Chronic condition noted  Verbal and follows simple commands    Essential hypertension, benign  Chronic, controlled. Latest blood pressure and vitals reviewed-     Temp:  [97.5 °F (36.4 °C)]   Pulse:  [57-70]   Resp:  [16-18]   BP: (108-131)/(61-82)   SpO2:  [95 %-100 %] .   Home meds for hypertension were reviewed and noted below.   Hypertension Medications               amLODIPine (NORVASC) 5 MG tablet 1 tablet (5 mg total) by Per G Tube route once daily.    cloNIDine (CATAPRES) 0.1 MG tablet Take 1 tablet (0.1 mg total) by mouth 3 (three) times daily as needed (180).    hydrALAZINE (APRESOLINE) 50 MG tablet Take 50 mg by mouth 3 (three) times daily. Takes at 6a, 2p and 10p    lisinopriL (PRINIVIL,ZESTRIL) 20 MG tablet Take 20 mg by mouth every evening. Takes at 8p. Hold til 3/1 at midnight            While in the hospital, will manage blood pressure as follows; Continue home antihypertensive regimen    Will utilize p.r.n. blood pressure medication only if patient's blood pressure greater than 180/110 and he develops symptoms such as worsening chest pain or shortness of breath.    PEG (percutaneous endoscopic gastrostomy) status  -Patient noted to have a percutaneous endoscopic gastrostomy tube in place. I have personally inspected the tube.Tube was placed prior to this admission There are no signs of drainage or infection around the site Med Routine care to be done by wound care and nursing staff.     -Nutrition consult place for Tube Feeding        VTE Risk Mitigation (From admission, onward)           Ordered     IP VTE LOW RISK PATIENT   Once         02/29/24 2235     Place sequential compression device  Until discontinued         02/29/24 2235                           Ashlie Gaming DNP  Department of Hospital Medicine  [facility]

## 2024-03-01 NOTE — ASSESSMENT & PLAN NOTE
-Patient noted to have a percutaneous endoscopic gastrostomy tube in place. I have personally inspected the tube.Tube was placed prior to this admission There are no signs of drainage or infection around the site Med Routine care to be done by wound care and nursing staff.     -Nutrition consult place for Tube Feeding

## 2024-03-01 NOTE — PLAN OF CARE
Patient cleared for discharge from case management standpoint.    Per Aidan with [facility], patient's nurse can call report to [facility] 0 [phone].   scheduled ambulance with  time NOW.       03/01/24 1137   Final Note   Assessment Type Final Discharge Note   Anticipated Discharge Disposition [facility] Resources/Appts/Education Provided Provided patient/caregiver with written discharge plan information;Provided education on problems/symptoms using teachback;Post-Acute resouces added to AVS   Post-Acute Status   Post-Acute Authorization Placement   Post-Acute Placement Status Set-up Complete/Auth obtained   Discharge Delays (!) Ambulance Transport/Facility Transport

## 2024-03-01 NOTE — HPI
Andrea Leon 43 year old male with past medical history of Asperger's syndrome, CVA, functional quadriplegic, PEG, hypertension, recurrent UTIs with ESBL, and seizures who was brought in by [facility] for elevated creatinine.  He denies any chest pain, shortness on breath, fever, chills, dizziness, lightheadedness, abdominal pain, nausea or vomiting.  ED workup reveals creatinine 2.1 baseline 1.4, and platelets 37 prior 105. He is able to answer simple questions appropriately with limited motion to all extremities. Admitted to hospital medicine for further management and treatment. Consult placed with Nephrology for PRABHJOT.

## 2024-03-04 LAB — HAPTOGLOB SERPL NEPH-MCNC: 105 MG/DL (ref 30–200)

## 2024-06-03 PROBLEM — N17.9 ACUTE-ON-CHRONIC KIDNEY INJURY: Status: RESOLVED | Noted: 2021-04-06 | Resolved: 2024-06-03

## 2024-06-03 PROBLEM — N18.9 ACUTE-ON-CHRONIC KIDNEY INJURY: Status: RESOLVED | Noted: 2021-04-06 | Resolved: 2024-06-03

## 2024-09-05 ENCOUNTER — HOSPITAL ENCOUNTER (OUTPATIENT)
Facility: HOSPITAL | Age: 44
Discharge: NURSING, REHAB OR LONG-TERM CARE | End: 2024-09-09
Attending: EMERGENCY MEDICINE | Admitting: HOSPITALIST
Payer: MEDICAID

## 2024-09-05 DIAGNOSIS — R47.1 DYSARTHRIA: ICD-10-CM

## 2024-09-05 DIAGNOSIS — R41.82 ALTERED MENTAL STATUS: ICD-10-CM

## 2024-09-05 DIAGNOSIS — R41.82 ALTERED MENTAL STATUS, UNSPECIFIED ALTERED MENTAL STATUS TYPE: Primary | ICD-10-CM

## 2024-09-05 DIAGNOSIS — G93.41 ENCEPHALOPATHY, METABOLIC: ICD-10-CM

## 2024-09-05 DIAGNOSIS — R07.9 CHEST PAIN: ICD-10-CM

## 2024-09-05 PROBLEM — Z79.01 LONG TERM (CURRENT) USE OF ANTICOAGULANTS: Status: ACTIVE | Noted: 2024-09-05

## 2024-09-05 PROBLEM — N39.0 UTI (URINARY TRACT INFECTION): Status: ACTIVE | Noted: 2024-09-05

## 2024-09-05 LAB
ALBUMIN SERPL BCP-MCNC: 3.8 G/DL (ref 3.5–5.2)
ALLENS TEST: ABNORMAL
ALP SERPL-CCNC: 92 U/L (ref 55–135)
ALT SERPL W/O P-5'-P-CCNC: 17 U/L (ref 10–44)
ANION GAP SERPL CALC-SCNC: 13 MMOL/L (ref 8–16)
AST SERPL-CCNC: 22 U/L (ref 10–40)
BACTERIA #/AREA URNS HPF: ABNORMAL /HPF
BASOPHILS # BLD AUTO: 0.02 K/UL (ref 0–0.2)
BASOPHILS NFR BLD: 0.2 % (ref 0–1.9)
BILIRUB SERPL-MCNC: 1 MG/DL (ref 0.1–1)
BILIRUB UR QL STRIP: NEGATIVE
BNP SERPL-MCNC: 19 PG/ML (ref 0–99)
BUN SERPL-MCNC: 19 MG/DL (ref 6–20)
CALCIUM SERPL-MCNC: 9.8 MG/DL (ref 8.7–10.5)
CHLORIDE SERPL-SCNC: 102 MMOL/L (ref 95–110)
CLARITY UR: ABNORMAL
CO2 SERPL-SCNC: 22 MMOL/L (ref 23–29)
COLOR UR: ABNORMAL
CREAT SERPL-MCNC: 1.7 MG/DL (ref 0.5–1.4)
DELSYS: ABNORMAL
DIFFERENTIAL METHOD BLD: ABNORMAL
EOSINOPHIL # BLD AUTO: 0.1 K/UL (ref 0–0.5)
EOSINOPHIL NFR BLD: 1.1 % (ref 0–8)
ERYTHROCYTE [DISTWIDTH] IN BLOOD BY AUTOMATED COUNT: 14.6 % (ref 11.5–14.5)
EST. GFR  (NO RACE VARIABLE): 51 ML/MIN/1.73 M^2
GLUCOSE SERPL-MCNC: 72 MG/DL (ref 70–110)
GLUCOSE UR QL STRIP: NEGATIVE
HCT VFR BLD AUTO: 42.4 % (ref 40–54)
HGB BLD-MCNC: 14.1 G/DL (ref 14–18)
HGB UR QL STRIP: ABNORMAL
HYALINE CASTS #/AREA URNS LPF: 0 /LPF
IMM GRANULOCYTES # BLD AUTO: 0.03 K/UL (ref 0–0.04)
IMM GRANULOCYTES NFR BLD AUTO: 0.3 % (ref 0–0.5)
INFLUENZA A, MOLECULAR: NEGATIVE
INFLUENZA B, MOLECULAR: NEGATIVE
KETONES UR QL STRIP: ABNORMAL
LACTATE SERPL-SCNC: 0.6 MMOL/L (ref 0.5–2.2)
LDH SERPL L TO P-CCNC: 0.45 MMOL/L (ref 0.5–2.2)
LEUKOCYTE ESTERASE UR QL STRIP: ABNORMAL
LYMPHOCYTES # BLD AUTO: 2.3 K/UL (ref 1–4.8)
LYMPHOCYTES NFR BLD: 22.3 % (ref 18–48)
MAGNESIUM SERPL-MCNC: 1.7 MG/DL (ref 1.6–2.6)
MCH RBC QN AUTO: 31.5 PG (ref 27–31)
MCHC RBC AUTO-ENTMCNC: 33.3 G/DL (ref 32–36)
MCV RBC AUTO: 95 FL (ref 82–98)
MICROSCOPIC COMMENT: ABNORMAL
MODE: ABNORMAL
MONOCYTES # BLD AUTO: 1.2 K/UL (ref 0.3–1)
MONOCYTES NFR BLD: 11.6 % (ref 4–15)
NEUTROPHILS # BLD AUTO: 6.5 K/UL (ref 1.8–7.7)
NEUTROPHILS NFR BLD: 64.5 % (ref 38–73)
NITRITE UR QL STRIP: NEGATIVE
NRBC BLD-RTO: 0 /100 WBC
PH UR STRIP: 7 [PH] (ref 5–8)
PLATELET # BLD AUTO: 159 K/UL (ref 150–450)
PMV BLD AUTO: 10.2 FL (ref 9.2–12.9)
POTASSIUM SERPL-SCNC: 3.9 MMOL/L (ref 3.5–5.1)
PROCALCITONIN SERPL IA-MCNC: 0.06 NG/ML (ref 0–0.5)
PROT SERPL-MCNC: 7.3 G/DL (ref 6–8.4)
PROT UR QL STRIP: ABNORMAL
RBC # BLD AUTO: 4.48 M/UL (ref 4.6–6.2)
RBC #/AREA URNS HPF: >100 /HPF (ref 0–4)
SAMPLE: ABNORMAL
SARS-COV-2 RDRP RESP QL NAA+PROBE: NEGATIVE
SITE: ABNORMAL
SODIUM SERPL-SCNC: 137 MMOL/L (ref 136–145)
SP GR UR STRIP: 1.01 (ref 1–1.03)
SPECIMEN SOURCE: NORMAL
TROPONIN I SERPL DL<=0.01 NG/ML-MCNC: 0.01 NG/ML (ref 0–0.03)
TROPONIN I SERPL DL<=0.01 NG/ML-MCNC: 0.01 NG/ML (ref 0–0.03)
TSH SERPL DL<=0.005 MIU/L-ACNC: 1.71 UIU/ML (ref 0.4–4)
URN SPEC COLLECT METH UR: ABNORMAL
UROBILINOGEN UR STRIP-ACNC: NEGATIVE EU/DL
WBC # BLD AUTO: 10.09 K/UL (ref 3.9–12.7)
WBC #/AREA URNS HPF: >100 /HPF (ref 0–5)
WBC CLUMPS URNS QL MICRO: ABNORMAL

## 2024-09-05 PROCEDURE — 99285 EMERGENCY DEPT VISIT HI MDM: CPT | Mod: 25

## 2024-09-05 PROCEDURE — 99900031 HC PATIENT EDUCATION (STAT)

## 2024-09-05 PROCEDURE — 83605 ASSAY OF LACTIC ACID: CPT | Performed by: EMERGENCY MEDICINE

## 2024-09-05 PROCEDURE — 87086 URINE CULTURE/COLONY COUNT: CPT | Performed by: EMERGENCY MEDICINE

## 2024-09-05 PROCEDURE — 87502 INFLUENZA DNA AMP PROBE: CPT | Performed by: EMERGENCY MEDICINE

## 2024-09-05 PROCEDURE — 93005 ELECTROCARDIOGRAM TRACING: CPT

## 2024-09-05 PROCEDURE — 83880 ASSAY OF NATRIURETIC PEPTIDE: CPT | Performed by: EMERGENCY MEDICINE

## 2024-09-05 PROCEDURE — 84443 ASSAY THYROID STIM HORMONE: CPT

## 2024-09-05 PROCEDURE — 99900035 HC TECH TIME PER 15 MIN (STAT)

## 2024-09-05 PROCEDURE — 96365 THER/PROPH/DIAG IV INF INIT: CPT

## 2024-09-05 PROCEDURE — 63600175 PHARM REV CODE 636 W HCPCS

## 2024-09-05 PROCEDURE — 84484 ASSAY OF TROPONIN QUANT: CPT | Mod: 91

## 2024-09-05 PROCEDURE — 80053 COMPREHEN METABOLIC PANEL: CPT | Performed by: EMERGENCY MEDICINE

## 2024-09-05 PROCEDURE — 93010 ELECTROCARDIOGRAM REPORT: CPT | Mod: ,,, | Performed by: INTERNAL MEDICINE

## 2024-09-05 PROCEDURE — G0378 HOSPITAL OBSERVATION PER HR: HCPCS

## 2024-09-05 PROCEDURE — 36415 COLL VENOUS BLD VENIPUNCTURE: CPT | Performed by: EMERGENCY MEDICINE

## 2024-09-05 PROCEDURE — U0002 COVID-19 LAB TEST NON-CDC: HCPCS | Performed by: EMERGENCY MEDICINE

## 2024-09-05 PROCEDURE — 85025 COMPLETE CBC W/AUTO DIFF WBC: CPT | Performed by: EMERGENCY MEDICINE

## 2024-09-05 PROCEDURE — 63600175 PHARM REV CODE 636 W HCPCS: Performed by: EMERGENCY MEDICINE

## 2024-09-05 PROCEDURE — 83735 ASSAY OF MAGNESIUM: CPT | Performed by: EMERGENCY MEDICINE

## 2024-09-05 PROCEDURE — 87186 SC STD MICRODIL/AGAR DIL: CPT | Performed by: EMERGENCY MEDICINE

## 2024-09-05 PROCEDURE — 36415 COLL VENOUS BLD VENIPUNCTURE: CPT

## 2024-09-05 PROCEDURE — 94761 N-INVAS EAR/PLS OXIMETRY MLT: CPT

## 2024-09-05 PROCEDURE — 25000003 PHARM REV CODE 250: Performed by: EMERGENCY MEDICINE

## 2024-09-05 PROCEDURE — 84484 ASSAY OF TROPONIN QUANT: CPT | Performed by: EMERGENCY MEDICINE

## 2024-09-05 PROCEDURE — 81000 URINALYSIS NONAUTO W/SCOPE: CPT | Performed by: EMERGENCY MEDICINE

## 2024-09-05 PROCEDURE — 25000003 PHARM REV CODE 250

## 2024-09-05 PROCEDURE — 87040 BLOOD CULTURE FOR BACTERIA: CPT | Performed by: EMERGENCY MEDICINE

## 2024-09-05 PROCEDURE — 84145 PROCALCITONIN (PCT): CPT | Performed by: EMERGENCY MEDICINE

## 2024-09-05 PROCEDURE — 83605 ASSAY OF LACTIC ACID: CPT

## 2024-09-05 PROCEDURE — 96376 TX/PRO/DX INJ SAME DRUG ADON: CPT

## 2024-09-05 RX ORDER — HYDRALAZINE HYDROCHLORIDE 25 MG/1
50 TABLET, FILM COATED ORAL 3 TIMES DAILY
Status: DISCONTINUED | OUTPATIENT
Start: 2024-09-06 | End: 2024-09-09 | Stop reason: HOSPADM

## 2024-09-05 RX ORDER — MODAFINIL 200 MG/1
200 TABLET ORAL DAILY
Status: DISCONTINUED | OUTPATIENT
Start: 2024-09-06 | End: 2024-09-09 | Stop reason: HOSPADM

## 2024-09-05 RX ORDER — LEVETIRACETAM 100 MG/ML
1000 SOLUTION ORAL 2 TIMES DAILY
Status: DISCONTINUED | OUTPATIENT
Start: 2024-09-05 | End: 2024-09-09 | Stop reason: HOSPADM

## 2024-09-05 RX ORDER — ONDANSETRON HYDROCHLORIDE 2 MG/ML
4 INJECTION, SOLUTION INTRAVENOUS EVERY 8 HOURS PRN
Status: DISCONTINUED | OUTPATIENT
Start: 2024-09-05 | End: 2024-09-09 | Stop reason: HOSPADM

## 2024-09-05 RX ORDER — ASPIRIN 325 MG
325 TABLET ORAL DAILY
Status: DISCONTINUED | OUTPATIENT
Start: 2024-09-06 | End: 2024-09-09 | Stop reason: HOSPADM

## 2024-09-05 RX ORDER — NALOXONE HCL 0.4 MG/ML
0.02 VIAL (ML) INJECTION
Status: DISCONTINUED | OUTPATIENT
Start: 2024-09-05 | End: 2024-09-09 | Stop reason: HOSPADM

## 2024-09-05 RX ORDER — ASPIRIN 325 MG
325 TABLET ORAL
Status: COMPLETED | OUTPATIENT
Start: 2024-09-05 | End: 2024-09-05

## 2024-09-05 RX ORDER — GLUCAGON 1 MG
1 KIT INJECTION
Status: DISCONTINUED | OUTPATIENT
Start: 2024-09-05 | End: 2024-09-09 | Stop reason: HOSPADM

## 2024-09-05 RX ORDER — IBUPROFEN 200 MG
16 TABLET ORAL
Status: DISCONTINUED | OUTPATIENT
Start: 2024-09-05 | End: 2024-09-09 | Stop reason: HOSPADM

## 2024-09-05 RX ORDER — PANTOPRAZOLE SODIUM 40 MG/10ML
40 INJECTION, POWDER, LYOPHILIZED, FOR SOLUTION INTRAVENOUS DAILY
Status: DISCONTINUED | OUTPATIENT
Start: 2024-09-05 | End: 2024-09-09 | Stop reason: HOSPADM

## 2024-09-05 RX ORDER — SUCRALFATE 1 G/1
1 TABLET ORAL 3 TIMES DAILY
Status: DISCONTINUED | OUTPATIENT
Start: 2024-09-06 | End: 2024-09-09 | Stop reason: HOSPADM

## 2024-09-05 RX ORDER — IBUPROFEN 200 MG
24 TABLET ORAL
Status: DISCONTINUED | OUTPATIENT
Start: 2024-09-05 | End: 2024-09-09 | Stop reason: HOSPADM

## 2024-09-05 RX ORDER — ACETAMINOPHEN 325 MG/1
650 TABLET ORAL EVERY 4 HOURS PRN
Status: DISCONTINUED | OUTPATIENT
Start: 2024-09-05 | End: 2024-09-09 | Stop reason: HOSPADM

## 2024-09-05 RX ORDER — AMLODIPINE BESYLATE 5 MG/1
10 TABLET ORAL DAILY
Status: DISCONTINUED | OUTPATIENT
Start: 2024-09-06 | End: 2024-09-09 | Stop reason: HOSPADM

## 2024-09-05 RX ORDER — LANOLIN ALCOHOL/MO/W.PET/CERES
800 CREAM (GRAM) TOPICAL
Status: DISCONTINUED | OUTPATIENT
Start: 2024-09-05 | End: 2024-09-08

## 2024-09-05 RX ORDER — POLYETHYLENE GLYCOL 3350 17 G/17G
17 POWDER, FOR SOLUTION ORAL DAILY
Status: DISCONTINUED | OUTPATIENT
Start: 2024-09-06 | End: 2024-09-09 | Stop reason: HOSPADM

## 2024-09-05 RX ORDER — VALPROIC ACID 250 MG/5ML
500 SOLUTION ORAL EVERY 8 HOURS
Status: DISCONTINUED | OUTPATIENT
Start: 2024-09-05 | End: 2024-09-09 | Stop reason: HOSPADM

## 2024-09-05 RX ORDER — SODIUM CHLORIDE 0.9 % (FLUSH) 0.9 %
10 SYRINGE (ML) INJECTION EVERY 12 HOURS PRN
Status: DISCONTINUED | OUTPATIENT
Start: 2024-09-05 | End: 2024-09-09 | Stop reason: HOSPADM

## 2024-09-05 RX ORDER — LORAZEPAM 2 MG/ML
1 INJECTION INTRAMUSCULAR
Status: DISCONTINUED | OUTPATIENT
Start: 2024-09-05 | End: 2024-09-05

## 2024-09-05 RX ORDER — ATORVASTATIN CALCIUM 40 MG/1
40 TABLET, FILM COATED ORAL NIGHTLY
Status: DISCONTINUED | OUTPATIENT
Start: 2024-09-05 | End: 2024-09-09 | Stop reason: HOSPADM

## 2024-09-05 RX ADMIN — ATORVASTATIN CALCIUM 40 MG: 40 TABLET, FILM COATED ORAL at 10:09

## 2024-09-05 RX ADMIN — LEVETIRACETAM 1000 MG: 100 SOLUTION ORAL at 10:09

## 2024-09-05 RX ADMIN — MEROPENEM 2 G: 2 INJECTION, POWDER, FOR SOLUTION INTRAVENOUS at 07:09

## 2024-09-05 RX ADMIN — APIXABAN 2.5 MG: 2.5 TABLET, FILM COATED ORAL at 10:09

## 2024-09-05 RX ADMIN — ASPIRIN 325 MG: 325 TABLET ORAL at 07:09

## 2024-09-05 RX ADMIN — VALPROIC ACID 500 MG: 250 SOLUTION ORAL at 10:09

## 2024-09-05 RX ADMIN — PANTOPRAZOLE SODIUM 40 MG: 40 INJECTION, POWDER, LYOPHILIZED, FOR SOLUTION INTRAVENOUS at 10:09

## 2024-09-05 NOTE — ED PROVIDER NOTES
Encounter Date: 9/5/2024       History     Chief Complaint   Patient presents with    Altered Mental Status     Altered at baseline... facility concerned for increased confusion.      44-year-old male with past medical history of CVA, hemiparesis, functional quadriplegia, peg tube, Asperger's syndrome, anticoagulation on Eliquis, presents emergency department with altered mental status.  It is reported that the patient started to act agitated and combative at around 1:00 a.m..  Unsure when the last time he was normal.  They checked on him at 1:00 p.m. trying to give him medication he swiped in out of the nurses hand.  Says that he is normally not aggressive or agitated.  Seemed like he had a questionable left facial droop.  This report was from nursing staff who called nursing home where the patient lives.  History limited by the patient's confusion.      Review of patient's allergies indicates:  No Known Allergies  Past Medical History:   Diagnosis Date    Anticoagulant long-term use     baby asa per chart    Asperger's syndrome     CVA, old, hemiparesis     Encounter for blood transfusion     Essential hypertension, malignant     Functional quadriplegia 5/28/2021    PEG (percutaneous endoscopic gastrostomy) status      Past Surgical History:   Procedure Laterality Date    COLONOSCOPY N/A 6/3/2021    Procedure: COLONOSCOPY;  Surgeon: Rosario Meeks MD;  Location: [facility];  Service: Endoscopy;  Laterality: N/A;    ESOPHAGOGASTRODUODENOSCOPY N/A 7/10/2021    Procedure: EGD (ESOPHAGOGASTRODUODENOSCOPY);  Surgeon: Rosario Meeks MD;  Location: [facility];  Service: Endoscopy;  Laterality: N/A;    ESOPHAGOGASTRODUODENOSCOPY W/ PEG N/A 4/1/2021    Procedure: EGD, WITH PEG TUBE INSERTION;  Surgeon: En Zheng MD;  Location: [facility];  Service: Endoscopy;  Laterality: N/A;     No family history on file.  Social History     Tobacco Use    Smoking status: Never    Smokeless tobacco: Never   Substance Use  Topics    Alcohol use: Not Currently    Drug use: Not Currently     Review of Systems   Unable to perform ROS: Mental status change       Physical Exam     Initial Vitals   BP Pulse Resp Temp SpO2   09/05/24 1459 09/05/24 1507 09/05/24 1504 09/05/24 1459 09/05/24 1507   120/89 79 18 100.3 °F (37.9 °C) 96 %      MAP       --                Physical Exam    Nursing note and vitals reviewed.  Constitutional: He appears well-developed and well-nourished. He is not diaphoretic. No distress.   HENT:   Head: Normocephalic and atraumatic.   Mouth/Throat: Oropharynx is clear and moist. No oropharyngeal exudate.   Dry mucous membranes   Eyes: Conjunctivae and EOM are normal. Pupils are equal, round, and reactive to light.   Neck: Neck supple. No tracheal deviation present.   Cardiovascular:  Normal rate, regular rhythm, normal heart sounds and intact distal pulses.           No murmur heard.  Pulmonary/Chest: Breath sounds normal. No stridor. No respiratory distress. He has no wheezes. He has no rhonchi. He has no rales.   Abdominal: Abdomen is soft. Bowel sounds are normal. He exhibits no distension. There is no abdominal tenderness.   G-tube present in the left upper quadrant. There is no rebound and no guarding.   Genitourinary:    Genitourinary Comments: Patient was rolled.  Patient has no skin breakdown overlying sacrum or buttocks.  There is no breakdown on his back either.  No abnormality.   exam within normal limits.     Musculoskeletal:         General: No tenderness or edema. Normal range of motion.      Cervical back: Neck supple.     Neurological: He is alert. He has normal strength. No sensory deficit.   Able to tell me his name, speech is thick and dysarthric.  Patient is functional quadriplegia.  Patient is contracted at the wrist bilaterally.  Spasticity in the lower extremities and upper extremities.        Skin: Skin is warm and dry. Capillary refill takes less than 2 seconds. No rash noted. No erythema.  No pallor.         ED Course   Procedures  Labs Reviewed   CBC W/ AUTO DIFFERENTIAL - Abnormal       Result Value    WBC 10.09      RBC 4.48 (*)     Hemoglobin 14.1      Hematocrit 42.4      MCV 95      MCH 31.5 (*)     MCHC 33.3      RDW 14.6 (*)     Platelets 159      MPV 10.2      Immature Granulocytes 0.3      Gran # (ANC) 6.5      Immature Grans (Abs) 0.03      Lymph # 2.3      Mono # 1.2 (*)     Eos # 0.1      Baso # 0.02      nRBC 0      Gran % 64.5      Lymph % 22.3      Mono % 11.6      Eosinophil % 1.1      Basophil % 0.2      Differential Method Automated     COMPREHENSIVE METABOLIC PANEL - Abnormal    Sodium 137      Potassium 3.9      Chloride 102      CO2 22 (*)     Glucose 72      BUN 19      Creatinine 1.7 (*)     Calcium 9.8      Total Protein 7.3      Albumin 3.8      Total Bilirubin 1.0      Alkaline Phosphatase 92      AST 22      ALT 17      eGFR 51 (*)     Anion Gap 13     URINALYSIS, REFLEX TO URINE CULTURE - Abnormal    Specimen UA Urine, Clean Catch      Color, UA Orange (*)     Appearance, UA Cloudy (*)     pH, UA 7.0      Specific Gravity, UA 1.015      Protein, UA 2+ (*)     Glucose, UA Negative      Ketones, UA Trace (*)     Bilirubin (UA) Negative      Occult Blood UA 3+ (*)     Nitrite, UA Negative      Urobilinogen, UA Negative      Leukocytes, UA 3+ (*)     Narrative:     Specimen Source->Urine   URINALYSIS MICROSCOPIC - Abnormal    RBC, UA >100 (*)     WBC, UA >100 (*)     WBC Clumps, UA Many (*)     Bacteria Moderate (*)     Hyaline Casts, UA 0      Microscopic Comment SEE COMMENT      Narrative:     Specimen Source->Urine   ISTAT LACTATE - Abnormal    POC Lactate 0.45 (*)     Sample VENOUS      Site Other      Allens Test N/A      DelSys Room Air      Mode SPONT     INFLUENZA A & B BY MOLECULAR    Influenza A, Molecular Negative      Influenza B, Molecular Negative      Flu A & B Source Nasal swab     CULTURE, BLOOD   CULTURE, BLOOD   CULTURE, URINE   SARS-COV-2 RNA AMPLIFICATION,  QUAL    SARS-CoV-2 RNA, Amplification, Qual Negative     MAGNESIUM    Magnesium 1.7     B-TYPE NATRIURETIC PEPTIDE    BNP 19     TROPONIN I    Troponin I 0.007     PROCALCITONIN    Procalcitonin 0.06     LACTIC ACID, PLASMA   TSH   TROPONIN I          Imaging Results              X-Ray Chest AP Portable (Final result)  Result time 09/05/24 17:16:39      Final result by Harshal Casey MD (09/05/24 17:16:39)                   Impression:      Negative chest.  No significant change.      Electronically signed by: Harshal Casey MD  Date:    09/05/2024  Time:    17:16               Narrative:    EXAMINATION:  XR CHEST AP PORTABLE    CLINICAL HISTORY:  Sepsis;    TECHNIQUE:  Single frontal view of the chest was performed.    COMPARISON:  12/01/2023    FINDINGS:  The cardiomediastinal silhouette is within normal limits.  The lungs are well expanded without consolidation or pleural effusion.                        Wet Read by Billy Albert DO (09/05/24 17:15:27, [facility], Emergency Medicine)    No acute process                                     CT Head Without Contrast (Final result)  Result time 09/05/24 16:53:35      Final result by Yanna Mansfield MD (09/05/24 16:53:35)                   Impression:      No acute intracranial findings      Electronically signed by: Yanna Mansfield MD  Date:    09/05/2024  Time:    16:53               Narrative:    EXAMINATION:  CT HEAD WITHOUT CONTRAST    CLINICAL HISTORY:  Mental status change, unknown cause;    TECHNIQUE:  Low dose axial images were obtained through the head.  Coronal and sagittal reformations were also performed. Contrast was not administered.    COMPARISON:  01/03/2023    FINDINGS:  There is mild dilatation of ventricles, sulci, fissures. There is decreased density in white matter suggesting microvascular ischemic change. There is no acute intracranial hemorrhage.  There is no intracranial mass effect.  There is no acute major  vascular territory infarct. Note is made that MRI is typically more sensitive than CT particularly for detection of early or small nonhemorrhagic infarcts. The calvarium appears intact.  Visualized paranasal sinuses appear clear of air-fluid levels.  Small polypoid structure right maxillary sinus suggesting retention cyst.  Mastoids  well pneumatized                                       Medications   aspirin tablet 325 mg (has no administration in time range)   meropenem 2 g in 0.9% NaCl 100 mL IVPB (MB+) (has no administration in time range)   sodium chloride 0.9% flush 10 mL (has no administration in time range)   naloxone 0.4 mg/mL injection 0.02 mg (has no administration in time range)   glucose chewable tablet 16 g (has no administration in time range)   glucose chewable tablet 24 g (has no administration in time range)   glucagon (human recombinant) injection 1 mg (has no administration in time range)   potassium bicarbonate disintegrating tablet 50 mEq (has no administration in time range)   potassium bicarbonate disintegrating tablet 35 mEq (has no administration in time range)   potassium bicarbonate disintegrating tablet 60 mEq (has no administration in time range)   magnesium oxide tablet 800 mg (has no administration in time range)   magnesium oxide tablet 800 mg (has no administration in time range)   acetaminophen tablet 650 mg (has no administration in time range)   ondansetron injection 4 mg (has no administration in time range)   dextrose 10% bolus 125 mL 125 mL (has no administration in time range)   dextrose 10% bolus 250 mL 250 mL (has no administration in time range)   meropenem (MERREM) 2 g in 0.9% NaCl 100 mL IVPB (has no administration in time range)   aspirin tablet 325 mg (has no administration in time range)     Medical Decision Making  Differential includes but not limited to UTI, pneumonia, dehydration, anemia, stroke, TIA, electrolyte abnormality, hypothyroidism, sepsis,    Emergent  evaluation of a 44-year-old male presents emergency department with altered mental status.  Patient is functional quadriplegia Asperger's.  Patient difficult to get a history from given his altered mental status.  Patient worked up in the ER had had a negative workup except found urinary tract infection.  Patient has had ESBL UTIs in the past I suspect that this is more than likely the etiology of his altered mental status.  He has low-grade rectal temp.  Will treat him with Mirapex him as he has been sensitive this in the past.  I also considered stroke has a possibility given his dysarthria on my evaluation of him.  Patient has been given aspirin here in the emergency department.  CT scan of the brain is negative.  Patient is not a TNK candidate given the fact that he is already on Eliquis.  Last known well time was greater than 24 hours ago so this is unlikely any large vessel type of occlusion and I believe more than likely his altered mental status is related to urinary tract infection.  He will be admitted for further evaluation of his symptoms.    A dictation software program was used for this note.  Please expect some simple typographical  errors in this note.         Amount and/or Complexity of Data Reviewed  External Data Reviewed: labs and notes.  Labs: ordered. Decision-making details documented in ED Course.  Radiology: ordered and independent interpretation performed.  ECG/medicine tests: ordered and independent interpretation performed. Decision-making details documented in ED Course.    Risk  OTC drugs.  Prescription drug management.  Decision regarding hospitalization.               ED Course as of 09/05/24 1912   Thu Sep 05, 2024   8413 Discussing with nursing staff at [facility] regarding patient's condition.  One nurse states that his speech does sound off kind of sick.  They said he has been hypersexual refusing his medications, there is question of a left facial droop.  Last known  well was sometime yesterday evening [JR]   1658 EKG 4:00 p.m. no ST elevation or depression.  No STEMI.  Normal sinus rhythm rate of 73.  EKG interpreted independently by me.  Artifact present which limits EKG interpretation. [JR]   1714 Creatinine(!): 1.7  Cr at baseline [JR]   1810 Patient has had Proteus ESBL UTI in the past.  It has been sensitive to meropenem.  I will order meropenem for the patient.  He will be admitted. [JR]   1814 I discussed the case with Bozena from [facility] who will admit the patient. [JR]      ED Course User Index  [JR] Billy Albert DO                           Clinical Impression:  Final diagnoses:  [R41.82] Altered mental status  [R41.82] Altered mental status, unspecified altered mental status type (Primary)  [R47.1] Dysarthria          ED Disposition Condition    Observation                 Billy Albert DO  09/05/24 1912

## 2024-09-05 NOTE — ED NOTES
Attempted to call [facility] [place] to get report on pt, no answer. Will attempt to return call.

## 2024-09-06 LAB
ALBUMIN SERPL BCP-MCNC: 3.8 G/DL (ref 3.5–5.2)
ALP SERPL-CCNC: 91 U/L (ref 55–135)
ALT SERPL W/O P-5'-P-CCNC: 25 U/L (ref 10–44)
ANION GAP SERPL CALC-SCNC: 15 MMOL/L (ref 8–16)
AST SERPL-CCNC: 38 U/L (ref 10–40)
BILIRUB SERPL-MCNC: 1.1 MG/DL (ref 0.1–1)
BUN SERPL-MCNC: 19 MG/DL (ref 6–20)
CALCIUM SERPL-MCNC: 9.9 MG/DL (ref 8.7–10.5)
CHLORIDE SERPL-SCNC: 105 MMOL/L (ref 95–110)
CO2 SERPL-SCNC: 20 MMOL/L (ref 23–29)
CREAT SERPL-MCNC: 1.6 MG/DL (ref 0.5–1.4)
ERYTHROCYTE [DISTWIDTH] IN BLOOD BY AUTOMATED COUNT: 14.5 % (ref 11.5–14.5)
EST. GFR  (NO RACE VARIABLE): 54 ML/MIN/1.73 M^2
GLUCOSE SERPL-MCNC: 71 MG/DL (ref 70–110)
HCT VFR BLD AUTO: 45.4 % (ref 40–54)
HGB BLD-MCNC: 15.1 G/DL (ref 14–18)
MAGNESIUM SERPL-MCNC: 1.7 MG/DL (ref 1.6–2.6)
MCH RBC QN AUTO: 31.3 PG (ref 27–31)
MCHC RBC AUTO-ENTMCNC: 33.3 G/DL (ref 32–36)
MCV RBC AUTO: 94 FL (ref 82–98)
PHOSPHATE SERPL-MCNC: 4 MG/DL (ref 2.7–4.5)
PLATELET # BLD AUTO: 135 K/UL (ref 150–450)
PMV BLD AUTO: 10.1 FL (ref 9.2–12.9)
POTASSIUM SERPL-SCNC: 4 MMOL/L (ref 3.5–5.1)
PROT SERPL-MCNC: 7.7 G/DL (ref 6–8.4)
RBC # BLD AUTO: 4.82 M/UL (ref 4.6–6.2)
SODIUM SERPL-SCNC: 140 MMOL/L (ref 136–145)
TROPONIN I SERPL DL<=0.01 NG/ML-MCNC: 0.01 NG/ML (ref 0–0.03)
VALPROATE SERPL-MCNC: 13.8 UG/ML (ref 50–100)
WBC # BLD AUTO: 10.38 K/UL (ref 3.9–12.7)

## 2024-09-06 PROCEDURE — 63600175 PHARM REV CODE 636 W HCPCS

## 2024-09-06 PROCEDURE — 80177 DRUG SCRN QUAN LEVETIRACETAM: CPT

## 2024-09-06 PROCEDURE — 80164 ASSAY DIPROPYLACETIC ACD TOT: CPT

## 2024-09-06 PROCEDURE — 36415 COLL VENOUS BLD VENIPUNCTURE: CPT

## 2024-09-06 PROCEDURE — 80053 COMPREHEN METABOLIC PANEL: CPT

## 2024-09-06 PROCEDURE — C1751 CATH, INF, PER/CENT/MIDLINE: HCPCS

## 2024-09-06 PROCEDURE — 83735 ASSAY OF MAGNESIUM: CPT

## 2024-09-06 PROCEDURE — 84100 ASSAY OF PHOSPHORUS: CPT

## 2024-09-06 PROCEDURE — 25000003 PHARM REV CODE 250

## 2024-09-06 PROCEDURE — G0378 HOSPITAL OBSERVATION PER HR: HCPCS

## 2024-09-06 PROCEDURE — 94799 UNLISTED PULMONARY SVC/PX: CPT

## 2024-09-06 PROCEDURE — 76937 US GUIDE VASCULAR ACCESS: CPT

## 2024-09-06 PROCEDURE — 94761 N-INVAS EAR/PLS OXIMETRY MLT: CPT

## 2024-09-06 PROCEDURE — 85027 COMPLETE CBC AUTOMATED: CPT

## 2024-09-06 PROCEDURE — 96366 THER/PROPH/DIAG IV INF ADDON: CPT

## 2024-09-06 PROCEDURE — 84484 ASSAY OF TROPONIN QUANT: CPT

## 2024-09-06 PROCEDURE — 96376 TX/PRO/DX INJ SAME DRUG ADON: CPT

## 2024-09-06 PROCEDURE — 99900035 HC TECH TIME PER 15 MIN (STAT)

## 2024-09-06 PROCEDURE — 36410 VNPNXR 3YR/> PHY/QHP DX/THER: CPT

## 2024-09-06 RX ADMIN — SUCRALFATE 1 G: 1 TABLET ORAL at 08:09

## 2024-09-06 RX ADMIN — AMLODIPINE BESYLATE 10 MG: 5 TABLET ORAL at 09:09

## 2024-09-06 RX ADMIN — SUCRALFATE 1 G: 1 TABLET ORAL at 09:09

## 2024-09-06 RX ADMIN — LEVETIRACETAM 1000 MG: 100 SOLUTION ORAL at 09:09

## 2024-09-06 RX ADMIN — ASPIRIN 325 MG: 325 TABLET ORAL at 09:09

## 2024-09-06 RX ADMIN — Medication 800 MG: at 01:09

## 2024-09-06 RX ADMIN — PANTOPRAZOLE SODIUM 40 MG: 40 INJECTION, POWDER, LYOPHILIZED, FOR SOLUTION INTRAVENOUS at 09:09

## 2024-09-06 RX ADMIN — VALPROIC ACID 500 MG: 250 SOLUTION ORAL at 06:09

## 2024-09-06 RX ADMIN — HYDRALAZINE HYDROCHLORIDE 50 MG: 25 TABLET ORAL at 01:09

## 2024-09-06 RX ADMIN — MODAFINIL 200 MG: 200 TABLET ORAL at 10:09

## 2024-09-06 RX ADMIN — VALPROIC ACID 500 MG: 250 SOLUTION ORAL at 01:09

## 2024-09-06 RX ADMIN — Medication 800 MG: at 04:09

## 2024-09-06 RX ADMIN — VALPROIC ACID 500 MG: 250 SOLUTION ORAL at 08:09

## 2024-09-06 RX ADMIN — POLYETHYLENE GLYCOL (3350) 17 G: 17 POWDER, FOR SOLUTION ORAL at 09:09

## 2024-09-06 RX ADMIN — LEVETIRACETAM 1000 MG: 100 SOLUTION ORAL at 08:09

## 2024-09-06 RX ADMIN — HYDRALAZINE HYDROCHLORIDE 50 MG: 25 TABLET ORAL at 09:09

## 2024-09-06 RX ADMIN — APIXABAN 2.5 MG: 2.5 TABLET, FILM COATED ORAL at 08:09

## 2024-09-06 RX ADMIN — MEROPENEM 2 G: 2 INJECTION, POWDER, FOR SOLUTION INTRAVENOUS at 09:09

## 2024-09-06 RX ADMIN — MEROPENEM 2 G: 2 INJECTION, POWDER, FOR SOLUTION INTRAVENOUS at 07:09

## 2024-09-06 RX ADMIN — APIXABAN 2.5 MG: 2.5 TABLET, FILM COATED ORAL at 09:09

## 2024-09-06 RX ADMIN — SUCRALFATE 1 G: 1 TABLET ORAL at 01:09

## 2024-09-06 RX ADMIN — HYDRALAZINE HYDROCHLORIDE 50 MG: 25 TABLET ORAL at 08:09

## 2024-09-06 RX ADMIN — MEROPENEM 2 G: 2 INJECTION, POWDER, FOR SOLUTION INTRAVENOUS at 03:09

## 2024-09-06 RX ADMIN — ATORVASTATIN CALCIUM 40 MG: 40 TABLET, FILM COATED ORAL at 08:09

## 2024-09-06 RX ADMIN — AMANTADINE HYDROCHLORIDE 100 MG: 100 CAPSULE, LIQUID FILLED ORAL at 09:09

## 2024-09-06 NOTE — CONSULTS
18 gauge x 10cm Midline placed to patient's left cephalic vein with the use of ultrasound guidance.     Ultrasound guidance: yes  Vessel Caliber: large and patent, compressibility normal  Needle advanced into vessel with real time Ultrasound guidance.  Guidewire confirmed in vessel.  Image recorded and saved.  Sterile sheath used.  Sterile dressing applied  Arm circumference- 35cm  Dressing dated   Limb alert applied.

## 2024-09-06 NOTE — ASSESSMENT & PLAN NOTE
CT of head negative for any acute process. Altered mental status likely secondary to UTI. Eliquis 2.5mg continued.

## 2024-09-06 NOTE — CONSULTS
Recommendations  TF ordered see below  Will monitor tolerance  Collaborate with health care providers     Goal: pt will tolerate TF Rx  Comments: no reports of N/V ;last BM on 9/1 !  Nutrition Goal Status: new  Communication of RD Recs: reviewed with physician     Nutrition Related Social Determinants of Health: SDOH: Unable to assess at this time.      Assessment and Plan  Original order for TF was nocturnal 10 p - 6a  Jevity 1.5 @ 70 ml/hr which only provides 840 calories.  Isosource 1.5 will be substituted for Jevity 1.5:  @ 70 ml/hr from  4p - 6a with 200 ml FWF QID which will provide 1500 calories; 1500 ml free water and 70 gm protein daily; added 1 packet liquid PROSTAT via PEG QD to meet 100% EPN and EEN.     Malnutrition Assessment   Pt does not currently meet ASPEN criteria for Malnutrition but is at risk d/t dysphagia and enteral feeding dependency and chronic diseases.     Contacted [facility] to determine PEG feeding regimen but was unsuccessful; nursing was unavailable to speak with me.

## 2024-09-06 NOTE — ASSESSMENT & PLAN NOTE
Atrophy and contractures noted in all limbs but patient does have minimal movement in all limbs and able to follow commands    -Pressure injury prevention bundle in place  -Turn patient Q 2 hours  -Fall precautions

## 2024-09-06 NOTE — H&P
"[facility]  History & Physical    Patient Name: Andrea Leon Jr.  MRN: [id number]  Patient Class: OP- Observation  Admission Date: 9/5/2024  Attending Physician: Yosi Sanchez MD   Primary Care Provider: Elly Mathew DO         Patient information was obtained from patient, past medical records, and ER records.     Subjective:     Principal Problem:Encephalopathy, metabolic    Chief Complaint:   Chief Complaint   Patient presents with    Altered Mental Status     Altered at baseline... facility concerned for increased confusion.         HPI: Andrea Leon Jr. Is a 44 year old male with a previous medical history of CVA, long-term anticoagulant use on Eliquis, HTN, hemiparesis (left sided), functional quadriplegia, Peg tube, Asperger's syndrome, reoccurring UTI's, ESBL UTI, and seizures who was sent to the ED for altered mental status. Patient is a resident of [facility] and per ED report he became agitated, combative and hypersexual at 1300. At this time they were attempted to administer his midday medications and he swiped it out of the nurses hand. Per ED note he is normally not agitated or aggressive. His last known normal is unknown but on his med list he was administered his morning medications. Initial ED evaluation showed a head CT and chest xray with no acute process, flu and covid negative. CBC unremarkable. Creatinine 1.7 but near baseline of 1.6. Urine studies positive for infection and patient initiated on IV Merrem due to history of ESBL urosepsis. Attempted to obtain MRI but per MRI tech the patient is unable to follow commands and had overall body rigidity and thus was not a candidate for an MRI. Upon admit evaluation the patient is awake and alert with flat affect. He was calm and cooperative. He has his personal earplugs in and baby yoda stuffed animal under right arm. He was oriented to name and reported the year as "20 " "something". He followed all simple commands. There was a left sided facial droop but per chart review this was documented as a CVA residual in 2021. Patient admitted by hospital medicine for further evaluation and management.     Past Medical History:   Diagnosis Date    Anticoagulant long-term use     baby asa per chart    Asperger's syndrome     CVA, old, hemiparesis     Encounter for blood transfusion     Essential hypertension, malignant     Functional quadriplegia 5/28/2021    PEG (percutaneous endoscopic gastrostomy) status        Past Surgical History:   Procedure Laterality Date    COLONOSCOPY N/A 6/3/2021    Procedure: COLONOSCOPY;  Surgeon: Rosario Meeks MD;  Location: [facility] ENDO;  Service: Endoscopy;  Laterality: N/A;    ESOPHAGOGASTRODUODENOSCOPY N/A 7/10/2021    Procedure: EGD (ESOPHAGOGASTRODUODENOSCOPY);  Surgeon: Rosario Meeks MD;  Location: [facility] ENDO;  Service: Endoscopy;  Laterality: N/A;    ESOPHAGOGASTRODUODENOSCOPY W/ PEG N/A 4/1/2021    Procedure: EGD, WITH PEG TUBE INSERTION;  Surgeon: En Zheng MD;  Location: [facility];  Service: Endoscopy;  Laterality: N/A;       Review of patient's allergies indicates:  No Known Allergies    No current facility-administered medications on file prior to encounter.     Current Outpatient Medications on File Prior to Encounter   Medication Sig    amantadine HCL (SYMMETREL) 100 mg capsule 1 capsule (100 mg total) by Per G Tube route once daily.    amLODIPine (NORVASC) 5 MG tablet 1 tablet (5 mg total) by Per G Tube route once daily. (Patient taking differently: 10 mg by Per G Tube route once daily. Takes at 15:00 daily)    apixaban (ELIQUIS) 2.5 mg Tab 2.5 mg by Per G Tube route 2 (two) times daily. Tajes 9a abd 9p    atorvastatin (LIPITOR) 40 MG tablet 1 tablet (40 mg total) by Per G Tube route once daily. (Patient taking differently: 40 mg by Per G Tube route every evening. Takes at 9p)    cloNIDine (CATAPRES) 0.1 MG tablet Take 1 tablet (0.1 " mg total) by mouth 3 (three) times daily as needed (180). (Patient taking differently: 0.1 mg by Per G Tube route every 8 (eight) hours as needed (Systolic over 180).)    food supplemt, lactose-reduced Liqd Take by mouth Daily. 9am    lactose-reduced food/fiber (JEVITY 1.5 ALEC ORAL) 70 mLs by Per G Tube route every hour. Daily starting at 10p and stopping at 6a. 70 ml an hour    levETIRAcetam (KEPPRA) 100 mg/mL Soln Take 10 mLs by mouth 2 (two) times daily. Takes 9a and 9p    modafiniL (PROVIGIL) 200 MG Tab 200 mg by Per G Tube route once daily. Takes at 0600    pantoprazole (PROTONIX) 40 MG tablet Take 40 mg by mouth once daily. Peg Tube daily at 8a    sucralfate (CARAFATE) 1 gram tablet 1 g by Per G Tube route 3 (three) times daily. Takes at 6a, 2p and 10p    valproic acid, as sodium salt, 500 mg/10 mL (10 mL) Soln Take 10 mLs (500 mg total) by mouth every 8 (eight) hours. (Patient taking differently: Take 500 mg by mouth every 8 (eight) hours. Peg Tube. Takes at 6a, 2p and 10p)    FLUoxetine 20 MG capsule 1 capsule (20 mg total) by Per G Tube route once daily.    hydrALAZINE (APRESOLINE) 50 MG tablet Take 50 mg by mouth 3 (three) times daily. Takes at 6a, 2p and 10p    lisinopriL (PRINIVIL,ZESTRIL) 20 MG tablet Take 20 mg by mouth every evening. Takes at 8p. Hold til 3/1 at midnight    ondansetron (ZOFRAN) 4 mg/5 mL solution Take 5 mLs (4 mg total) by mouth 2 (two) times daily.    ondansetron (ZOFRAN) 4 mg/5 mL solution 8 mg by Per G Tube route 2 (two) times daily as needed for Nausea.    polyethylene glycol (GLYCOLAX) 17 gram PwPk 17 g by Per G Tube route once daily. Takes at 9a     Family History    None       Tobacco Use    Smoking status: Never    Smokeless tobacco: Never   Substance and Sexual Activity    Alcohol use: Not Currently    Drug use: Not Currently    Sexual activity: Not Currently     Review of Systems   Unable to perform ROS: Psychiatric disorder     Objective:     Vital Signs (Most  Recent):  Temp: 97.6 °F (36.4 °C) (09/05/24 2108)  Pulse: 64 (09/05/24 2121)  Resp: 18 (09/05/24 2108)  BP: 136/87 (09/05/24 2108)  SpO2: 95 % (09/05/24 2121) Vital Signs (24h Range):  Temp:  [97.6 °F (36.4 °C)-100.3 °F (37.9 °C)] 97.6 °F (36.4 °C)  Pulse:  [64-81] 64  Resp:  [18] 18  SpO2:  [95 %-99 %] 95 %  BP: (120-145)/() 136/87     Weight: 77.1 kg (170 lb)  Body mass index is 25.1 kg/m².     Physical Exam  Vitals reviewed.   Constitutional:       Appearance: He is ill-appearing.      Comments: Chronically ill appearing   HENT:      Head: Normocephalic and atraumatic.      Ears:      Comments: Earplugs noted in bilateral ears     Nose: Nose normal.      Mouth/Throat:      Mouth: Mucous membranes are dry.      Pharynx: Oropharynx is clear.   Eyes:      Extraocular Movements:      Right eye: Abnormal extraocular motion present.      Left eye: Abnormal extraocular motion present.      Pupils: Pupils are equal, round, and reactive to light.   Cardiovascular:      Rate and Rhythm: Normal rate and regular rhythm.      Pulses: Normal pulses.      Heart sounds: Normal heart sounds.   Pulmonary:      Effort: Pulmonary effort is normal.      Breath sounds: Normal breath sounds.   Abdominal:      General: Bowel sounds are normal. There is no distension.      Palpations: Abdomen is soft.      Tenderness: There is no abdominal tenderness.   Musculoskeletal:      Cervical back: Rigidity present.      Comments: Atrophy and contracture noted in all four limbs. With limited ROM  Compared to right arm and hand the Left arm noted to have less movement and left hand noted to have fingers contracted into tight    Skin:     General: Skin is warm and dry.      Capillary Refill: Capillary refill takes less than 2 seconds.   Neurological:      Mental Status: He is alert. He is confused.      GCS: GCS eye subscore is 4. GCS verbal subscore is 4. GCS motor subscore is 6.      Cranial Nerves: Dysarthria present.      Comments:  Unknown baseline. Patient unable to fully participate in neuro exam   Noted left sided facial droop with smiling and per chart review this was documented in 2021  Tongue midline   Patient moves all limbs minimally                CRANIAL NERVES     CN III, IV, VI   Pupils are equal, round, and reactive to light.       Significant Labs: All pertinent labs within the past 24 hours have been reviewed.    Bilirubin:   Recent Labs   Lab 09/05/24  1611   BILITOT 1.0     BMP:   Recent Labs   Lab 09/05/24  1611   GLU 72      K 3.9      CO2 22*   BUN 19   CREATININE 1.7*   CALCIUM 9.8   MG 1.7     CBC:   Recent Labs   Lab 09/05/24  1611   WBC 10.09   HGB 14.1   HCT 42.4        CMP:   Recent Labs   Lab 09/05/24  1611      K 3.9      CO2 22*   GLU 72   BUN 19   CREATININE 1.7*   CALCIUM 9.8   PROT 7.3   ALBUMIN 3.8   BILITOT 1.0   ALKPHOS 92   AST 22   ALT 17   ANIONGAP 13     Cardiac Markers:   Recent Labs   Lab 09/05/24  1611   BNP 19     Lactic Acid:   Recent Labs   Lab 09/05/24  2042   LACTATE 0.6       Magnesium:   Recent Labs   Lab 09/05/24  1611   MG 1.7       Troponin:   Recent Labs   Lab 09/05/24  1611 09/05/24  2042   TROPONINI 0.007 0.007     TSH:   Recent Labs   Lab 09/05/24  2042   TSH 1.712       Urine Studies:   Recent Labs   Lab 09/05/24  1738   COLORU Orange*   APPEARANCEUA Cloudy*   PHUR 7.0   SPECGRAV 1.015   PROTEINUA 2+*   GLUCUA Negative   KETONESU Trace*   BILIRUBINUA Negative   OCCULTUA 3+*   NITRITE Negative   UROBILINOGEN Negative   LEUKOCYTESUR 3+*   RBCUA >100*   WBCUA >100*   BACTERIA Moderate*   HYALINECASTS 0       Significant Imaging: I have reviewed all pertinent imaging results/findings within the past 24 hours.  EXAMINATION:  CT HEAD WITHOUT CONTRAST     CLINICAL HISTORY:  Mental status change, unknown cause;     TECHNIQUE:  Low dose axial images were obtained through the head.  Coronal and sagittal reformations were also performed. Contrast was not  administered.     COMPARISON:  01/03/2023     FINDINGS:  There is mild dilatation of ventricles, sulci, fissures. There is decreased density in white matter suggesting microvascular ischemic change. There is no acute intracranial hemorrhage.  There is no intracranial mass effect.  There is no acute major vascular territory infarct. Note is made that MRI is typically more sensitive than CT particularly for detection of early or small nonhemorrhagic infarcts. The calvarium appears intact.  Visualized paranasal sinuses appear clear of air-fluid levels.  Small polypoid structure right maxillary sinus suggesting retention cyst.  Mastoids  well pneumatized     Impression:     No acute intracranial findings        Electronically signed by:Yanna Mansfield MD  Date:                                            09/05/2024  Time:                                           16:53    EXAMINATION:  XR CHEST AP PORTABLE     CLINICAL HISTORY:  Sepsis;     TECHNIQUE:  Single frontal view of the chest was performed.     COMPARISON:  12/01/2023     FINDINGS:  The cardiomediastinal silhouette is within normal limits.  The lungs are well expanded without consolidation or pleural effusion.     Impression:     Negative chest.  No significant change.        Electronically signed by:Harshal Casey MD  Date:                                            09/05/2024  Time:                                           17:16  Assessment/Plan:     * Encephalopathy, metabolic  Patient has acute metabolic encephalopathy that is secondary to Infective. Patient's current mental status is Awake, Alert, Confused. Patient's baseline mental status is unknown. There is documented left sided facial droop in 2021. Per report patient is usually cooperative. Evaluation and for underlying cause(s) is underway and inclusive of Blood Chemistries, TSH, Keppra and Valproic Acid level and Neuro-Imaging (MRI/CT) CT negative for any acute process and patient not a candiate  "for MRI due to inability to follow commands per MRI tech. Will monitor neuro checks carefully, avoid narcotics and benzos that will exacerbate agitation, and use PRN medications for controls of behavior for self harm.       Long term (current) use of anticoagulants  CT of head negative for any acute process. Altered mental status likely secondary to UTI. Eliquis 2.5mg continued.     UTI (urinary tract infection)  History of ESBL UTI.    -Contact precautions  -Urine culture pending  -IV merrem Q 8 hours      Asperger's disorder  Chronic condition    -Decrease environmental stimuli  -Allow patient to wear personal earplugs  -Monitor for symptoms of self harm or aggressive behavior and mitigate as needed with redirection or as last resort medications      Seizure  -seizure precautions  -Continue AEDs  -Valproic acid level pending  -Keppra level pending      Functional quadriplegia  Atrophy and contractures noted in all limbs but patient does have minimal movement in all limbs and able to follow commands    -Pressure injury prevention bundle in place  -Turn patient Q 2 hours  -Fall precautions        CVA, old, hemiparesis  Patient minimally verbal knowing name and that the year is "20 something"  Follows simple commands  Noted left sided facial droop but unsure of previous CVA residuals  Patient unable to participate in full neurological exam      Essential hypertension, benign  Chronic, controlled. Latest blood pressure and vitals reviewed-     Temp:  [97.6 °F (36.4 °C)-100.3 °F (37.9 °C)]   Pulse:  [64-81]   Resp:  [18]   BP: (120-145)/()   SpO2:  [95 %-99 %] .   Home meds for hypertension were reviewed and noted below.   Hypertension Medications               amLODIPine (NORVASC) 5 MG tablet 1 tablet (5 mg total) by Per G Tube route once daily.    cloNIDine (CATAPRES) 0.1 MG tablet Take 1 tablet (0.1 mg total) by mouth 3 (three) times daily as needed (180).    hydrALAZINE (APRESOLINE) 50 MG tablet Take 50 mg by " mouth 3 (three) times daily. Takes at 6a, 2p and 10p                 While in the hospital, will manage blood pressure as follows; Continue home antihypertensive regimen except PRN clonidine    Will utilize p.r.n. blood pressure medication only if patient's blood pressure greater than 160/100 and he develops symptoms such as worsening chest pain or shortness of breath.    PEG (percutaneous endoscopic gastrostomy) status  Patient noted to have a percutaneous endoscopic gastrostomy tube in place. I have personally inspected the tube.Tube was placed prior to this admission There are no signs of drainage or infection around the site. The tube is patent. Medications have converted to liquid form if available.  Routine care to be done by wound care and nursing staff.   PEG TUBE CARE THREE TIMES DAILY          VTE Risk Mitigation (From admission, onward)           Ordered     apixaban tablet 2.5 mg  2 times daily         09/05/24 2129     IP VTE LOW RISK PATIENT  Once         09/05/24 1833     Place sequential compression device  Until discontinued         09/05/24 1833                         On 09/05/2024, patient should be placed in hospital observation services under my care in collaboration with Dr. Yosi Sanchez MD.           Bozena Kaur NP  Department of Hospital Medicine  [facility]/Surg

## 2024-09-06 NOTE — NURSING
Awake and alert. Answers some simple questions with yes/no answers. Not sure if this patient has full cognitive understanding of questions asked. 20g to lt hand with site free of s/s of infiltration. 71 HR, 97% RA.

## 2024-09-06 NOTE — ASSESSMENT & PLAN NOTE
Chronic, controlled. Latest blood pressure and vitals reviewed-     Temp:  [97.6 °F (36.4 °C)-100.3 °F (37.9 °C)]   Pulse:  [55-81]   Resp:  [17-18]   BP: (120-154)/()   SpO2:  [94 %-99 %] .   Home meds for hypertension were reviewed and noted below.   Hypertension Medications               amLODIPine (NORVASC) 5 MG tablet 1 tablet (5 mg total) by Per G Tube route once daily.    cloNIDine (CATAPRES) 0.1 MG tablet Take 1 tablet (0.1 mg total) by mouth 3 (three) times daily as needed (180).    hydrALAZINE (APRESOLINE) 50 MG tablet Take 50 mg by mouth 3 (three) times daily. Takes at 6a, 2p and 10p                 While in the hospital, will manage blood pressure as follows; Continue home antihypertensive regimen except PRN clonidine    Will utilize p.r.n. blood pressure medication only if patient's blood pressure greater than 160/100 and he develops symptoms such as worsening chest pain or shortness of breath.

## 2024-09-06 NOTE — HOSPITAL COURSE
44-year-old male with history of CVA, long-term anticoagulant use on Eliquis, hypertension, left-sided hemiparesis, functional quadriplegia, Asperger's syndrome, recurrent UTIs, seizures admitted for altered mental status.  Found to have UTI.  Currently on IV meropenem awaiting urine culture.  Mental status improving with antibiotics.  Eventually urine culture shows ESBL Proteus sensitive to carbapenems.  Patient does not have urinary retention with serial bladder scan.  Patient has completed 5 days of IV meropenem.  Patient stable to transfer back to nursing home.  Mental status back to his baseline.

## 2024-09-06 NOTE — ASSESSMENT & PLAN NOTE
Chronic, controlled. Latest blood pressure and vitals reviewed-     Temp:  [97.6 °F (36.4 °C)-100.3 °F (37.9 °C)]   Pulse:  [64-81]   Resp:  [18]   BP: (120-145)/()   SpO2:  [95 %-99 %] .   Home meds for hypertension were reviewed and noted below.   Hypertension Medications               amLODIPine (NORVASC) 5 MG tablet 1 tablet (5 mg total) by Per G Tube route once daily.    cloNIDine (CATAPRES) 0.1 MG tablet Take 1 tablet (0.1 mg total) by mouth 3 (three) times daily as needed (180).    hydrALAZINE (APRESOLINE) 50 MG tablet Take 50 mg by mouth 3 (three) times daily. Takes at 6a, 2p and 10p                 While in the hospital, will manage blood pressure as follows; Continue home antihypertensive regimen except PRN clonidine    Will utilize p.r.n. blood pressure medication only if patient's blood pressure greater than 160/100 and he develops symptoms such as worsening chest pain or shortness of breath.   Physical Therapy Evaluation    Visit Type: Initial Evaluation  Visit: 1  Referring Provider: Anuradha Kim MD  Medical Diagnosis (from order): Diagnosis Information    Diagnosis  V49.89 (ICD-9-CM) - Z74.09, Z78.9 (ICD-10-CM) - Impaired mobility and ADLs  433.11 (ICD-9-CM) - I63.231 (ICD-10-CM) - Cerebral infarction due to occlusion of right internal carotid artery (CMD)       Treatment Diagnosis: abnormal posture, gait abnormality, impaired balance, impaired activity tolerance, increased risk for falls, impaired mobility, impaired muscle strength  Chart reviewed at time of initial evaluation (relevant co-morbidities, allergies, tests and medications listed):     Essential (primary) hypertension                              Congestive cardiac failure (CMD)                              Stroke (CMD)                                                  A-fib (CMD)                                                   Diabetes mellitus (CMD)                                                        SUBJECTIVE                                                                                                               5/5/23- left sided weakness with fall.  5/6/23- to [facility] ED.  Work up: CT of the brain was negative for any acute intracranial hemorrhage.  It did show prior left PCA, left frontal and deep stroke on the right.  CTA head/neck with right ICA and EDMOND occlusions and right MCA severe stenosis.  Angiogram with mechanical thrombectomy on 5/7, Right occipital petechial hemorrhagic transformation.  Mild intracranial small ischemic disease.      cerebrovascular accident in May 2023.  Left hemiparesis.  Recovery has been unexpected.  I wasn't expecting to recover this far.  Things were severe in the beginning.  Went to [place] [facility] for rehab.  Then I had rehab on [address].  I left there on 8/1/23.  No therapy since then.  Been working on things at home.  Still have to  the brace (ankle foot orthosis) for the left foot.  (nAna,  Kacie).  I had a fall 2 weeks ago in the kitchen.  I was walking, bringing the cup.  Suddenly, I fell back on my buttocks.  I didn't hit my head.  My son helped me up.  Always someone at home.  I don't drive.  I don't want to.  I drove before.  I walk around the house a few steps without the cane.   Son and daughter moved in after the stroke.  I go for a walk with my son every day down the street and back.  Takes 10 minutes, tired afterward.  Things feel heavy fast- arm and leg.      Patient Goals: . Be able to be alone, put my clothes on by myself to go to [employer].  I like to feel pretty.  Do my make-up, and hair.    Home Environment   - Patient lives with: adult children (son and daughter)  - Type of home: apartment (1 step entry, did laundry at the [facility])  - Bathroom setup: tub with shower  - Assistance available: consistent  - Denies 2 or more falls or an unexplained fall with injury in the last year.  - Feel safe at home / work / school: yes    Function (patient/family/caregiver reported):   Prior Level: Worked in an office setting, likes to play piano, cook, listen to live music (rhythm and blues, pop, gospel)  Prior level: independent with ambulation and mobility and drove     OBJECTIVE                                                                                                                    Cognitive Status   Orientation    - Oriented to: person, place, time and situation    Vitals:  Blood Pressure (mmhg): 142/82  right UE    Hand Dominance: right-handed    Posture:  - Shoulders: rounded  Spine: increased thoracic kyphosis  Cervical: forward head      Range of Motion (ROM)   (degrees unless noted; active unless noted; norms in ( ); negative=lacking to 0, positive=beyond 0)  WFL: LLE, RLE    Strength  (out of 5 unless noted, standard test position unless noted)   Hip:    - Flexion:        • Left: 3-        • Right: 4+    - Abduction:        • Left: 2+    - Internal Rotation:        • Left: 3+    -  External Rotation:        • Left: 2+  Knee:    - Extension:        • Left: 3- (incomplete extension, tone)  Ankle:    - Dorsiflexion:        • Left: 3-         Sensation/Dermatome Testing:    Intact: LLE light touch, RLE light touch    Balance Tests   5 Times Sit to Stand 23.99 sec    Interpretation:        > 12 seconds indicates need for further assessment for fall risk for community dwelling elderly      > 16 seconds indicative of falls risk for Parkinson's disease  5x sit to stand without UE support    Bed Mobility  - Rolling left: modified independent  - Rolling right: modified independent  - Supine to sit: modified independent  - Sit to supine: modified independent  Transfers  - Sit to stand: modified independent  - Stand to sit: modified independent     Ambulation / Gait  100 feet with straight cane Standby Assist, 1 loss of balance posteriorly, recovered with Contact Guard Assist; slow penny, decreased left LE weightbearing with decreased left stance time, decreased right step length with right foot flat at initial contact        Outcome/Assessments  Neuro QOL- Lower Extremity Function (Mobility), Short Form, Score:  29/40 (cMDC=6.6), performed by Patient.      Treatment     Gait belt applied and used throughout session.  Skilled input: verbal instruction/cues    Writer verbally educated and received verbal consent for hand placement, positioning of patient, and techniques to be performed today from patient for hand placement and palpation for techniques as described above and how they are pertinent to the patient's plan of care.  Home Exercise Program  Sit to stand 5x, 3x/day      ASSESSMENT                                                                                                          60 year old patient based on evaluation above that has reported functional limitations listed above is below functional baseline.  Treatment Diagnosis: abnormal posture, gait abnormality, impaired balance, impaired  activity tolerance, increased risk for falls, impaired mobility, impaired muscle strength    Patient tolerated physical therapy evaluation well.  Patient presents with above impairments.  Patient was previously independent and active.  Patient has good social support and is motivated to improve.  Patient would benefit from physical therapy to address strength, gait, balance, to maximize safety with independent mobility.  Continue physical therapy.      Prognosis: Patient will benefit from skilled therapy.  Rehabilitative potential is: good.  Predicted patient presentation: Moderate (evolving) - Patient comorbidities and complexities, as defined above, may have varying impact on steady progress for prescribed plan of care.  Education:   - Present and ready to learn: patient  - Results of above outlined education: Verbalizes understanding    PLAN                                                                                                                         The following skilled interventions to be implemented to achieve goals listed below:  Neuromuscular Re-Education (09833)  Therapeutic Activity (08040)  Therapeutic Exercise (18144)  Manual Therapy (84476)  Gait Training (52111)    Frequency / Duration  2 times per week tapering as patient progresses for 10 weeks for an estimated total of 16 visits    Patient involved in and agreed to plan of care and goals.    Suggestions for next session as indicated: Progress per plan of care      Goals  Long Term Goals: to be met by end of plan of care  1. Patient to ambulate with straight cane Modified Independent 500 feet even/uneven surfaces to be able to go out of home and walk in parking lots to appointments and stores  2. Patient to demonstrate 5 times sit to stand <18 seconds to demonstrate improve efficiency to stand up to improve ability to stand from a chair at a restaurant or friend's home with less difficulty  3. Patient to report score of 35/40 on Neuro QOL  Lower Extremity Function (Mobility )- Short Form to demonstrate improved patient perception of quality of life.     4. Patient to ambulate 30 feet/ household distances Modified Independent to improve ability to walk in home and carry a cup of water      Therapy procedure time and total treatment time can be found documented on the Time Entry flowsheet

## 2024-09-06 NOTE — SUBJECTIVE & OBJECTIVE
Interval History:  Patient seen and examined.  Tells me he just woke up.  Denies any pain other complaints.    Review of Systems  Objective:     Vital Signs (Most Recent):  Temp: 97.9 °F (36.6 °C) (09/06/24 0321)  Pulse: 69 (09/06/24 0739)  Resp: 17 (09/06/24 0739)  BP: 132/76 (09/06/24 0739)  SpO2: 95 % (09/06/24 0321) Vital Signs (24h Range):  Temp:  [97.6 °F (36.4 °C)-100.3 °F (37.9 °C)] 97.9 °F (36.6 °C)  Pulse:  [55-81] 69  Resp:  [17-18] 17  SpO2:  [94 %-99 %] 95 %  BP: (120-154)/() 132/76     Weight: 79.9 kg (176 lb 2.4 oz)  Body mass index is 26.01 kg/m².  No intake or output data in the 24 hours ending 09/06/24 1059      Physical Exam  Constitutional:       General: He is not in acute distress.     Appearance: He is well-developed. He is ill-appearing (Chronically).   HENT:      Head: Normocephalic and atraumatic.   Eyes:      Pupils: Pupils are equal, round, and reactive to light.   Cardiovascular:      Rate and Rhythm: Normal rate and regular rhythm.      Heart sounds: No murmur heard.  Pulmonary:      Effort: Pulmonary effort is normal. No respiratory distress.      Breath sounds: Normal breath sounds. No wheezing or rales.   Abdominal:      General: Bowel sounds are normal. There is no distension.      Palpations: Abdomen is soft.      Tenderness: There is no abdominal tenderness.   Musculoskeletal:      Comments: Contracted   Skin:     General: Skin is warm and dry.      Findings: No rash.   Neurological:      Mental Status: He is alert.      Cranial Nerves: No cranial nerve deficit.   Psychiatric:         Behavior: Behavior normal.             Significant Labs: All pertinent labs within the past 24 hours have been reviewed.    Significant Imaging: I have reviewed all pertinent imaging results/findings within the past 24 hours.

## 2024-09-06 NOTE — ASSESSMENT & PLAN NOTE
Chronic condition    -Decrease environmental stimuli  -Allow patient to wear personal earplugs  -Monitor for symptoms of self harm or aggressive behavior and mitigate as needed with redirection or as last resort medications

## 2024-09-06 NOTE — ASSESSMENT & PLAN NOTE
Patient noted to have a percutaneous endoscopic gastrostomy tube in place. I have personally inspected the tube.Tube was placed prior to this admission There are no signs of drainage or infection around the site. The tube is patent. Medications have converted to liquid form if available.  Routine care to be done by wound care and nursing staff.   PEG TUBE CARE THREE TIMES DAILY

## 2024-09-06 NOTE — SUBJECTIVE & OBJECTIVE
Past Medical History:   Diagnosis Date    Anticoagulant long-term use     baby asa per chart    Asperger's syndrome     CVA, old, hemiparesis     Encounter for blood transfusion     Essential hypertension, malignant     Functional quadriplegia 5/28/2021    PEG (percutaneous endoscopic gastrostomy) status        Past Surgical History:   Procedure Laterality Date    COLONOSCOPY N/A 6/3/2021    Procedure: COLONOSCOPY;  Surgeon: Rosario Meeks MD;  Location: [facility] ENDO;  Service: Endoscopy;  Laterality: N/A;    ESOPHAGOGASTRODUODENOSCOPY N/A 7/10/2021    Procedure: EGD (ESOPHAGOGASTRODUODENOSCOPY);  Surgeon: Rosario Meeks MD;  Location: [facility] ENDO;  Service: Endoscopy;  Laterality: N/A;    ESOPHAGOGASTRODUODENOSCOPY W/ PEG N/A 4/1/2021    Procedure: EGD, WITH PEG TUBE INSERTION;  Surgeon: En Zheng MD;  Location: [facility] ENDO;  Service: Endoscopy;  Laterality: N/A;       Review of patient's allergies indicates:  No Known Allergies    No current facility-administered medications on file prior to encounter.     Current Outpatient Medications on File Prior to Encounter   Medication Sig    amantadine HCL (SYMMETREL) 100 mg capsule 1 capsule (100 mg total) by Per G Tube route once daily.    amLODIPine (NORVASC) 5 MG tablet 1 tablet (5 mg total) by Per G Tube route once daily. (Patient taking differently: 10 mg by Per G Tube route once daily. Takes at 15:00 daily)    apixaban (ELIQUIS) 2.5 mg Tab 2.5 mg by Per G Tube route 2 (two) times daily. Tajes 9a abd 9p    atorvastatin (LIPITOR) 40 MG tablet 1 tablet (40 mg total) by Per G Tube route once daily. (Patient taking differently: 40 mg by Per G Tube route every evening. Takes at 9p)    cloNIDine (CATAPRES) 0.1 MG tablet Take 1 tablet (0.1 mg total) by mouth 3 (three) times daily as needed (180). (Patient taking differently: 0.1 mg by Per G Tube route every 8 (eight) hours as needed (Systolic over 180).)    food supplemt, lactose-reduced Liqd Take by mouth Daily. 9am     lactose-reduced food/fiber (JEVITY 1.5 ALEC ORAL) 70 mLs by Per G Tube route every hour. Daily starting at 10p and stopping at 6a. 70 ml an hour    levETIRAcetam (KEPPRA) 100 mg/mL Soln Take 10 mLs by mouth 2 (two) times daily. Takes 9a and 9p    modafiniL (PROVIGIL) 200 MG Tab 200 mg by Per G Tube route once daily. Takes at 0600    pantoprazole (PROTONIX) 40 MG tablet Take 40 mg by mouth once daily. Peg Tube daily at 8a    sucralfate (CARAFATE) 1 gram tablet 1 g by Per G Tube route 3 (three) times daily. Takes at 6a, 2p and 10p    valproic acid, as sodium salt, 500 mg/10 mL (10 mL) Soln Take 10 mLs (500 mg total) by mouth every 8 (eight) hours. (Patient taking differently: Take 500 mg by mouth every 8 (eight) hours. Peg Tube. Takes at 6a, 2p and 10p)    FLUoxetine 20 MG capsule 1 capsule (20 mg total) by Per G Tube route once daily.    hydrALAZINE (APRESOLINE) 50 MG tablet Take 50 mg by mouth 3 (three) times daily. Takes at 6a, 2p and 10p    lisinopriL (PRINIVIL,ZESTRIL) 20 MG tablet Take 20 mg by mouth every evening. Takes at 8p. Hold til 3/1 at midnight    ondansetron (ZOFRAN) 4 mg/5 mL solution Take 5 mLs (4 mg total) by mouth 2 (two) times daily.    ondansetron (ZOFRAN) 4 mg/5 mL solution 8 mg by Per G Tube route 2 (two) times daily as needed for Nausea.    polyethylene glycol (GLYCOLAX) 17 gram PwPk 17 g by Per G Tube route once daily. Takes at 9a     Family History    None       Tobacco Use    Smoking status: Never    Smokeless tobacco: Never   Substance and Sexual Activity    Alcohol use: Not Currently    Drug use: Not Currently    Sexual activity: Not Currently     Review of Systems   Unable to perform ROS: Psychiatric disorder     Objective:     Vital Signs (Most Recent):  Temp: 97.6 °F (36.4 °C) (09/05/24 2108)  Pulse: 64 (09/05/24 2121)  Resp: 18 (09/05/24 2108)  BP: 136/87 (09/05/24 2108)  SpO2: 95 % (09/05/24 2121) Vital Signs (24h Range):  Temp:  [97.6 °F (36.4 °C)-100.3 °F (37.9 °C)] 97.6 °F (36.4  °C)  Pulse:  [64-81] 64  Resp:  [18] 18  SpO2:  [95 %-99 %] 95 %  BP: (120-145)/() 136/87     Weight: 77.1 kg (170 lb)  Body mass index is 25.1 kg/m².     Physical Exam  Vitals reviewed.   Constitutional:       Appearance: He is ill-appearing.      Comments: Chronically ill appearing   HENT:      Head: Normocephalic and atraumatic.      Ears:      Comments: Earplugs noted in bilateral ears     Nose: Nose normal.      Mouth/Throat:      Mouth: Mucous membranes are dry.      Pharynx: Oropharynx is clear.   Eyes:      Extraocular Movements:      Right eye: Abnormal extraocular motion present.      Left eye: Abnormal extraocular motion present.      Pupils: Pupils are equal, round, and reactive to light.   Cardiovascular:      Rate and Rhythm: Normal rate and regular rhythm.      Pulses: Normal pulses.      Heart sounds: Normal heart sounds.   Pulmonary:      Effort: Pulmonary effort is normal.      Breath sounds: Normal breath sounds.   Abdominal:      General: Bowel sounds are normal. There is no distension.      Palpations: Abdomen is soft.      Tenderness: There is no abdominal tenderness.   Musculoskeletal:      Cervical back: Rigidity present.      Comments: Atrophy and contracture noted in all four limbs. With limited ROM  Compared to right arm and hand the Left arm noted to have less movement and left hand noted to have fingers contracted into tight    Skin:     General: Skin is warm and dry.      Capillary Refill: Capillary refill takes less than 2 seconds.   Neurological:      Mental Status: He is alert. He is confused.      GCS: GCS eye subscore is 4. GCS verbal subscore is 4. GCS motor subscore is 6.      Cranial Nerves: Dysarthria present.      Comments: Unknown baseline. Patient unable to fully participate in neuro exam   Noted left sided facial droop with smiling and per chart review this was documented in 2021  Tongue midline   Patient moves all limbs minimally                CRANIAL NERVES      CN III, IV, VI   Pupils are equal, round, and reactive to light.       Significant Labs: All pertinent labs within the past 24 hours have been reviewed.    Bilirubin:   Recent Labs   Lab 09/05/24  1611   BILITOT 1.0     BMP:   Recent Labs   Lab 09/05/24  1611   GLU 72      K 3.9      CO2 22*   BUN 19   CREATININE 1.7*   CALCIUM 9.8   MG 1.7     CBC:   Recent Labs   Lab 09/05/24  1611   WBC 10.09   HGB 14.1   HCT 42.4        CMP:   Recent Labs   Lab 09/05/24  1611      K 3.9      CO2 22*   GLU 72   BUN 19   CREATININE 1.7*   CALCIUM 9.8   PROT 7.3   ALBUMIN 3.8   BILITOT 1.0   ALKPHOS 92   AST 22   ALT 17   ANIONGAP 13     Cardiac Markers:   Recent Labs   Lab 09/05/24  1611   BNP 19     Lactic Acid:   Recent Labs   Lab 09/05/24  2042   LACTATE 0.6       Magnesium:   Recent Labs   Lab 09/05/24  1611   MG 1.7       Troponin:   Recent Labs   Lab 09/05/24  1611 09/05/24  2042   TROPONINI 0.007 0.007     TSH:   Recent Labs   Lab 09/05/24  2042   TSH 1.712       Urine Studies:   Recent Labs   Lab 09/05/24  1738   COLORU Orange*   APPEARANCEUA Cloudy*   PHUR 7.0   SPECGRAV 1.015   PROTEINUA 2+*   GLUCUA Negative   KETONESU Trace*   BILIRUBINUA Negative   OCCULTUA 3+*   NITRITE Negative   UROBILINOGEN Negative   LEUKOCYTESUR 3+*   RBCUA >100*   WBCUA >100*   BACTERIA Moderate*   HYALINECASTS 0       Significant Imaging: I have reviewed all pertinent imaging results/findings within the past 24 hours.  EXAMINATION:  CT HEAD WITHOUT CONTRAST     CLINICAL HISTORY:  Mental status change, unknown cause;     TECHNIQUE:  Low dose axial images were obtained through the head.  Coronal and sagittal reformations were also performed. Contrast was not administered.     COMPARISON:  01/03/2023     FINDINGS:  There is mild dilatation of ventricles, sulci, fissures. There is decreased density in white matter suggesting microvascular ischemic change. There is no acute intracranial hemorrhage.  There is no  intracranial mass effect.  There is no acute major vascular territory infarct. Note is made that MRI is typically more sensitive than CT particularly for detection of early or small nonhemorrhagic infarcts. The calvarium appears intact.  Visualized paranasal sinuses appear clear of air-fluid levels.  Small polypoid structure right maxillary sinus suggesting retention cyst.  Mastoids  well pneumatized     Impression:     No acute intracranial findings        Electronically signed by:Yanna Mansfield MD  Date:                                            09/05/2024  Time:                                           16:53    EXAMINATION:  XR CHEST AP PORTABLE     CLINICAL HISTORY:  Sepsis;     TECHNIQUE:  Single frontal view of the chest was performed.     COMPARISON:  12/01/2023     FINDINGS:  The cardiomediastinal silhouette is within normal limits.  The lungs are well expanded without consolidation or pleural effusion.     Impression:     Negative chest.  No significant change.        Electronically signed by:Harshal Casey MD  Date:                                            09/05/2024  Time:                                           17:16

## 2024-09-06 NOTE — ASSESSMENT & PLAN NOTE
Follows simple commands  Noted left sided facial droop but unsure of previous CVA residuals  Patient unable to participate in full neurological exam

## 2024-09-06 NOTE — HPI
"Andrea Leon Jr. Is a 44 year old male with a previous medical history of CVA, long-term anticoagulant use on Eliquis, HTN, hemiparesis (left sided), functional quadriplegia, Peg tube, Asperger's syndrome, reoccurring UTI's, ESBL UTI, and seizures who was sent to the ED for altered mental status. Patient is a resident of [facility] and per ED report he became agitated, combative and hypersexual at 1300. At this time they were attempted to administer his midday medications and he swiped it out of the nurses hand. Per ED note he is normally not agitated or aggressive. His last known normal is unknown but on his med list he was administered his morning medications. Initial ED evaluation showed a head CT and chest xray with no acute process, flu and covid negative. CBC unremarkable. Creatinine 1.7 but near baseline of 1.6. Urine studies positive for infection and patient initiated on IV Merrem due to history of ESBL urosepsis. Attempted to obtain MRI but per MRI tech the patient is unable to follow commands and had overall body rigidity and thus was not a candidate for an MRI. Upon admit evaluation the patient is awake and alert with flat affect. He was calm and cooperative. He has his personal earplugs in and baby yoda stuffed animal under right arm. He was oriented to name and reported the year as "20 something". He followed all simple commands. There was a left sided facial droop but per chart review this was documented as a CVA residual in 2021. Patient admitted by hospital medicine for further evaluation and management.   "

## 2024-09-06 NOTE — ASSESSMENT & PLAN NOTE
Patient has acute metabolic encephalopathy that is secondary to Infective. Patient's current mental status is Awake, Alert, Confused. Patient's baseline mental status is unknown. There is documented left sided facial droop in 2021. Per report patient is usually cooperative. Evaluation and for underlying cause(s) is underway and inclusive of Blood Chemistries, TSH, Keppra and Valproic Acid level and Neuro-Imaging (MRI/CT) CT negative for any acute process and patient not a candiate for MRI due to inability to follow commands per MRI tech. Will monitor neuro checks carefully, avoid narcotics and benzos that will exacerbate agitation, and use PRN medications for controls of behavior for self harm.

## 2024-09-06 NOTE — CONSULTS
[facility]/Surg  Adult Nutrition  Consult Note    SUMMARY     Recommendations  TF ordered see below  Will monitor tolerance  Collaborate with health care providers    Goal: pt will tolerate TF Rx  Comments: no reports of N/V ;last BM on 9/1 !  Nutrition Goal Status: new  Communication of SEA Recs: reviewed with physician    Nutrition Related Social Determinants of Health: SDOH: Unable to assess at this time.     Assessment and Plan  Original order for TF was nocturnal 10 p - 6a  Jevity 1.5 @ 70 ml/hr which only provides 840 calories.  Isosource 1.5 will be substituted for Jevity 1.5:  @ 70 ml/hr from  4p - 6a with 200 ml FWF QID which will provide 1500 calories; 1500 ml free water and 70 gm protein daily; added 1 packet liquid PROSTAT via PEG QD to meet 100% EPN and EEN.     Malnutrition Assessment   Pt does not currently meet ASPEN criteria for Malnutrition but is at risk d/t dysphagia and enteral feeding dependency and chronic diseases.    Contacted [facility] to determine PEG feeding regimen but was unsuccessful; nursing was unavailable to speak with me.     Reason for Assessment: TF recommendations  Dx: metabolic encephalopathy; UTI; Asperger's; Seizures  PMH: CVA; functional Quad; GERD; Developmentally delayed    Reason For Assessment: consult  Diagnosis: other (see comments)  Interdisciplinary Rounds: did not attend  Nutrition Discharge Planning: PEG TF orders    Nutrition Risk Screen    Nutrition Risk Screen: tube feeding or parenteral nutrition    Nutrition/Diet History    Patient Reported Diet/Restrictions/Preferences: no oral intake  Spiritual, Cultural Beliefs, Church Practices, Values that Affect Care: no  Food Allergies: NKFA  Factors Affecting Nutritional Intake: NPO  Nutrition Support Formula Prior to Admit: Jevity 1.5    Anthropometrics  Wt Readings from Last 9 Encounters:   09/06/24 79.9 kg (176 lb 2.4 oz)   02/29/24 77.1 kg (170 lb)   12/01/23 75.8 kg (167 lb)   11/26/23  "75.8 kg (167 lb)   11/23/23 75.8 kg (167 lb)   11/23/23 76 kg (167 lb 8.8 oz)   10/19/23 75.8 kg (167 lb)   01/06/23 75.5 kg (166 lb 8 oz)   10/25/22 68.8 kg (151 lb 10.8 oz)     Temp: 97.9 °F (36.6 °C)  Height: 5' 9" (175.3 cm)  Height (inches): 69 in  Weight Method: Bed Scale  Weight: 79.9 kg (176 lb 2.4 oz)  Weight (lb): 176.15 lb  Ideal Body Weight (IBW), Male: 160 lb  % Ideal Body Weight, Male (lb): 110.09 %  BMI (Calculated): 26  BMI Grade: 25 - 29.9 - overweight     Lab/Procedures/Meds   Latest Reference Range & Units Most Recent   Hemoglobin 14.0 - 18.0 g/dL 15.1  9/6/24 04:53   Hematocrit 40.0 - 54.0 % 45.4  9/6/24 04:53      Latest Reference Range & Units Most Recent   Albumin 3.5 - 5.2 g/dL 3.8  9/6/24 04:54     Pertinent Labs Reviewed: reviewed  Pertinent Medications Reviewed: reviewed; carafate; valproic acid; polyethylene glycol; protonix; amantadine; IV meropenem    Physical Findings/Assessment  No skin integrity issues have been documented  Job score = 8; pt is a functional Quad     Estimated/Assessed Needs    Weight Used For Calorie Calculations: 79.9 kg (176 lb 2.4 oz)  Energy Calorie Requirements (kcal): 1600 caloreis daily;  20/kg  Energy Need Method: Kcal/kg  Protein Requirements: 80 gm protein daily;  1/kg  Weight Used For Protein Calculations: 79.9 kg (176 lb 2.4 oz)     Estimated Fluid Requirement Method: RDA Method  RDA Method (mL): 1600     Nutrition Prescription Ordered    Current Diet Order: NPO    Evaluation of Received Nutrient/Fluid Intake  No intake or output data in the 24 hours ending 09/06/24 1428    Energy Calories Required: not meeting needs  Protein Required: not meeting needs  Fluid Required: not meeting needs  Tolerance: other (see comments) (PEG)  % Intake of Estimated Energy Needs: Other: PEG  % Meal Intake: NPO    Nutrition Risk    Level of Risk/Frequency of Follow-up: high 2 x week    Monitor and Evaluation    Food and Nutrient Intake: enteral nutrition intake  Food and " Nutrient Adminstration: enteral and parenteral nutrition administration  Knowledge/Beliefs/Attitudes: beliefs and attitudes  Physical Activity and Function: nutrition-related ADLs and IADLs  Anthropometric Measurements: weight change  Biochemical Data, Medical Tests and Procedures: gastrointestinal profile, glucose/endocrine profile, other (specify), electrolyte and renal panel  Nutrition-Focused Physical Findings: overall appearance, skin     Nutrition Follow-Up  yes

## 2024-09-06 NOTE — PROGRESS NOTES
"[facility] Medicine  Progress Note    Patient Name: Andrea Leon Jr.  MRN: [id number]  Patient Class: OP- Observation   Admission Date: 9/5/2024  Length of Stay: 0 days  Attending Physician: Zunilda Espinoza MD  Primary Care Provider: Elly Mathew DO        Subjective:     Principal Problem:Encephalopathy, metabolic        HPI:  Andrea Leon Jr. Is a 44 year old male with a previous medical history of CVA, long-term anticoagulant use on Eliquis, HTN, hemiparesis (left sided), functional quadriplegia, Peg tube, Asperger's syndrome, reoccurring UTI's, ESBL UTI, and seizures who was sent to the ED for altered mental status. Patient is a resident of [facility] and per ED report he became agitated, combative and hypersexual at 1300. At this time they were attempted to administer his midday medications and he swiped it out of the nurses hand. Per ED note he is normally not agitated or aggressive. His last known normal is unknown but on his med list he was administered his morning medications. Initial ED evaluation showed a head CT and chest xray with no acute process, flu and covid negative. CBC unremarkable. Creatinine 1.7 but near baseline of 1.6. Urine studies positive for infection and patient initiated on IV Merrem due to history of ESBL urosepsis. Attempted to obtain MRI but per MRI tech the patient is unable to follow commands and had overall body rigidity and thus was not a candidate for an MRI. Upon admit evaluation the patient is awake and alert with flat affect. He was calm and cooperative. He has his personal earplugs in and baby yoda stuffed animal under right arm. He was oriented to name and reported the year as "20 something". He followed all simple commands. There was a left sided facial droop but per chart review this was documented as a CVA residual in 2021. Patient admitted by hospital medicine for further evaluation and management. "     Overview/Hospital Course:  No notes on file    Interval History:  Patient seen and examined.  Tells me he just woke up.  Denies any pain other complaints.    Review of Systems  Objective:     Vital Signs (Most Recent):  Temp: 97.9 °F (36.6 °C) (09/06/24 0321)  Pulse: 69 (09/06/24 0739)  Resp: 17 (09/06/24 0739)  BP: 132/76 (09/06/24 0739)  SpO2: 95 % (09/06/24 0321) Vital Signs (24h Range):  Temp:  [97.6 °F (36.4 °C)-100.3 °F (37.9 °C)] 97.9 °F (36.6 °C)  Pulse:  [55-81] 69  Resp:  [17-18] 17  SpO2:  [94 %-99 %] 95 %  BP: (120-154)/() 132/76     Weight: 79.9 kg (176 lb 2.4 oz)  Body mass index is 26.01 kg/m².  No intake or output data in the 24 hours ending 09/06/24 1059      Physical Exam  Constitutional:       General: He is not in acute distress.     Appearance: He is well-developed. He is ill-appearing (Chronically).   HENT:      Head: Normocephalic and atraumatic.   Eyes:      Pupils: Pupils are equal, round, and reactive to light.   Cardiovascular:      Rate and Rhythm: Normal rate and regular rhythm.      Heart sounds: No murmur heard.  Pulmonary:      Effort: Pulmonary effort is normal. No respiratory distress.      Breath sounds: Normal breath sounds. No wheezing or rales.   Abdominal:      General: Bowel sounds are normal. There is no distension.      Palpations: Abdomen is soft.      Tenderness: There is no abdominal tenderness.   Musculoskeletal:      Comments: Contracted   Skin:     General: Skin is warm and dry.      Findings: No rash.   Neurological:      Mental Status: He is alert.      Cranial Nerves: No cranial nerve deficit.   Psychiatric:         Behavior: Behavior normal.             Significant Labs: All pertinent labs within the past 24 hours have been reviewed.    Significant Imaging: I have reviewed all pertinent imaging results/findings within the past 24 hours.    Assessment/Plan:      * Encephalopathy, metabolic  Patient has acute metabolic encephalopathy that is secondary to  Infective. Patient's current mental status is Awake, Alert, Confused. Patient's baseline mental status is unknown. There is documented left sided facial droop in 2021. Per report patient is usually cooperative. Evaluation and for underlying cause(s) is underway and inclusive of Blood Chemistries, TSH, Keppra and Valproic Acid level and Neuro-Imaging (MRI/CT) CT negative for any acute process and patient not a candiate for MRI due to inability to follow commands per MRI tech. Will monitor neuro checks carefully, avoid narcotics and benzos that will exacerbate agitation, and use PRN medications for controls of behavior for self harm.       Long term (current) use of anticoagulants  CT of head negative for any acute process. Altered mental status likely secondary to UTI. Eliquis 2.5mg continued.     UTI (urinary tract infection)  History of ESBL UTI.    -Contact precautions  -Urine culture pending  -IV merrem Q 8 hours      Asperger's disorder  Chronic condition    -Decrease environmental stimuli  -Allow patient to wear personal earplugs  -Monitor for symptoms of self harm or aggressive behavior and mitigate as needed with redirection or as last resort medications      Seizure  -seizure precautions  -Continue AEDs  -Valproic acid level pending  -Keppra level pending      Functional quadriplegia  Atrophy and contractures noted in all limbs but patient does have minimal movement in all limbs and able to follow commands    -Pressure injury prevention bundle in place  -Turn patient Q 2 hours  -Fall precautions        CVA, old, hemiparesis  Follows simple commands  Noted left sided facial droop but unsure of previous CVA residuals  Patient unable to participate in full neurological exam      Essential hypertension, benign  Chronic, controlled. Latest blood pressure and vitals reviewed-     Temp:  [97.6 °F (36.4 °C)-100.3 °F (37.9 °C)]   Pulse:  [55-81]   Resp:  [17-18]   BP: (120-154)/()   SpO2:  [94 %-99 %] .   Home  meds for hypertension were reviewed and noted below.   Hypertension Medications               amLODIPine (NORVASC) 5 MG tablet 1 tablet (5 mg total) by Per G Tube route once daily.    cloNIDine (CATAPRES) 0.1 MG tablet Take 1 tablet (0.1 mg total) by mouth 3 (three) times daily as needed (180).    hydrALAZINE (APRESOLINE) 50 MG tablet Take 50 mg by mouth 3 (three) times daily. Takes at 6a, 2p and 10p                 While in the hospital, will manage blood pressure as follows; Continue home antihypertensive regimen except PRN clonidine    Will utilize p.r.n. blood pressure medication only if patient's blood pressure greater than 160/100 and he develops symptoms such as worsening chest pain or shortness of breath.    PEG (percutaneous endoscopic gastrostomy) status  Patient noted to have a percutaneous endoscopic gastrostomy tube in place. I have personally inspected the tube.Tube was placed prior to this admission There are no signs of drainage or infection around the site. The tube is patent. Medications have converted to liquid form if available.  Routine care to be done by wound care and nursing staff.   PEG TUBE CARE THREE TIMES DAILY          VTE Risk Mitigation (From admission, onward)           Ordered     apixaban tablet 2.5 mg  2 times daily         09/05/24 2129     IP VTE LOW RISK PATIENT  Once         09/05/24 1833     Place sequential compression device  Until discontinued         09/05/24 1833                    Discharge Planning   AMEYA: 9/7/2024     Code Status: Full Code   Is the patient medically ready for discharge?:     Reason for patient still in hospital (select all that apply): Patient trending condition and Treatment                     Zunilda Espinoza MD  Department of Hospital Medicine   [facility]/Surg

## 2024-09-06 NOTE — NURSING
Nurses Note -- 4 Eyes      9/6/2024   2:06 AM      Skin assessed during: Admit      [x] No Altered Skin Integrity Present    [x]Prevention Measures Documented      [] Yes- Altered Skin Integrity Present or Discovered   [] LDA Added if Not in Epic (Describe Wound)   [] New Altered Skin Integrity was Present on Admit and Documented in LDA   [] Wound Image Taken    Wound Care Consulted? No    Attending Nurse:  Lexi Funes RN/Staff Member:   Rosa Elena

## 2024-09-06 NOTE — PLAN OF CARE
[facility] - Med/Surg  Initial Discharge Assessment       Primary Care Provider: Elly Mathew DO    Admission Diagnosis: Altered mental status, unspecified altered mental status type [R41.82]    Admission Date: 9/5/2024  Expected Discharge Date: 9/7/2024    DC assessment completed by chart review. Pt is a resident at [facility] and will return once medically clear.     Transition of Care Barriers: None    Payor: MEDICAID / Plan: MEDICAID [place] / Product Type: Government /     Extended Emergency Contact Information  Primary Emergency Contact: Alda Leon  Home Phone: [phone]  Mobile Phone: [phone]  Relation: Mother  Preferred language: English   needed? No  Secondary Emergency Contact: luma walker  Mobile Phone: [phone]  Relation: Step parent  Preferred language: English   needed? No    Discharge Plan A: Return to nursing home  Discharge Plan B: Return to Nursing Home      [id number] - [place] - [address] AT [facility] [address]  [address]  [place] [address]  Phone: [phone] Fax: [phone]    [facility] - [place], [place] - [address]  [address]  [address]  [facility] [address]  Phone: [phone] Fax: [phone]      Initial Assessment (most recent)       Adult Discharge Assessment - 09/06/24 1206          Discharge Assessment    Assessment Type Discharge Planning Assessment     Confirmed/corrected address, phone number and insurance Yes     Source of Information health record     People in Home facility resident     Do you expect to return to your current living situation? Yes     Do you have help at home or someone to help you manage your care at home? Yes     Prior to hospitilization cognitive status: Unable to Assess     Walking or Climbing Stairs Difficulty yes     Walking or Climbing Stairs transferring difficulty, dependent     Dressing/Bathing Difficulty yes      Dressing/Bathing dressing difficulty, dependent     Readmission within 30 days? No     Patient currently being followed by outpatient case management? No     Do you take prescription medications? Yes     Do you have prescription coverage? Yes     How do you get to doctors appointments? health plan transportation     Are you on dialysis? No     Do you take coumadin? No     Discharge Plan A Return to nursing home     Discharge Plan B Return to Nursing Home     DME Needed Upon Discharge  none     Transition of Care Barriers None

## 2024-09-07 LAB
ALBUMIN SERPL BCP-MCNC: 3.4 G/DL (ref 3.5–5.2)
ALP SERPL-CCNC: 107 U/L (ref 55–135)
ALT SERPL W/O P-5'-P-CCNC: 27 U/L (ref 10–44)
ANION GAP SERPL CALC-SCNC: 13 MMOL/L (ref 8–16)
AST SERPL-CCNC: 31 U/L (ref 10–40)
BILIRUB SERPL-MCNC: 0.7 MG/DL (ref 0.1–1)
BUN SERPL-MCNC: 23 MG/DL (ref 6–20)
CALCIUM SERPL-MCNC: 9.1 MG/DL (ref 8.7–10.5)
CHLORIDE SERPL-SCNC: 102 MMOL/L (ref 95–110)
CO2 SERPL-SCNC: 24 MMOL/L (ref 23–29)
CREAT SERPL-MCNC: 1.6 MG/DL (ref 0.5–1.4)
ERYTHROCYTE [DISTWIDTH] IN BLOOD BY AUTOMATED COUNT: 14.6 % (ref 11.5–14.5)
EST. GFR  (NO RACE VARIABLE): 54 ML/MIN/1.73 M^2
GLUCOSE SERPL-MCNC: 85 MG/DL (ref 70–110)
HCT VFR BLD AUTO: 41.6 % (ref 40–54)
HGB BLD-MCNC: 13.8 G/DL (ref 14–18)
MAGNESIUM SERPL-MCNC: 1.9 MG/DL (ref 1.6–2.6)
MCH RBC QN AUTO: 31.9 PG (ref 27–31)
MCHC RBC AUTO-ENTMCNC: 33.2 G/DL (ref 32–36)
MCV RBC AUTO: 96 FL (ref 82–98)
PHOSPHATE SERPL-MCNC: 3.4 MG/DL (ref 2.7–4.5)
PLATELET # BLD AUTO: 142 K/UL (ref 150–450)
PMV BLD AUTO: 10.4 FL (ref 9.2–12.9)
POTASSIUM SERPL-SCNC: 4.2 MMOL/L (ref 3.5–5.1)
PROT SERPL-MCNC: 6.7 G/DL (ref 6–8.4)
RBC # BLD AUTO: 4.32 M/UL (ref 4.6–6.2)
SODIUM SERPL-SCNC: 139 MMOL/L (ref 136–145)
WBC # BLD AUTO: 9.46 K/UL (ref 3.9–12.7)

## 2024-09-07 PROCEDURE — 96376 TX/PRO/DX INJ SAME DRUG ADON: CPT

## 2024-09-07 PROCEDURE — 84100 ASSAY OF PHOSPHORUS: CPT

## 2024-09-07 PROCEDURE — 25000003 PHARM REV CODE 250

## 2024-09-07 PROCEDURE — 94761 N-INVAS EAR/PLS OXIMETRY MLT: CPT

## 2024-09-07 PROCEDURE — 80053 COMPREHEN METABOLIC PANEL: CPT

## 2024-09-07 PROCEDURE — 63600175 PHARM REV CODE 636 W HCPCS

## 2024-09-07 PROCEDURE — 85027 COMPLETE CBC AUTOMATED: CPT

## 2024-09-07 PROCEDURE — G0378 HOSPITAL OBSERVATION PER HR: HCPCS

## 2024-09-07 PROCEDURE — 92610 EVALUATE SWALLOWING FUNCTION: CPT

## 2024-09-07 PROCEDURE — 83735 ASSAY OF MAGNESIUM: CPT

## 2024-09-07 PROCEDURE — 36415 COLL VENOUS BLD VENIPUNCTURE: CPT

## 2024-09-07 PROCEDURE — 96366 THER/PROPH/DIAG IV INF ADDON: CPT

## 2024-09-07 RX ADMIN — MODAFINIL 200 MG: 200 TABLET ORAL at 10:09

## 2024-09-07 RX ADMIN — SUCRALFATE 1 G: 1 TABLET ORAL at 03:09

## 2024-09-07 RX ADMIN — HYDRALAZINE HYDROCHLORIDE 50 MG: 25 TABLET ORAL at 08:09

## 2024-09-07 RX ADMIN — PANTOPRAZOLE SODIUM 40 MG: 40 INJECTION, POWDER, LYOPHILIZED, FOR SOLUTION INTRAVENOUS at 10:09

## 2024-09-07 RX ADMIN — ASPIRIN 325 MG: 325 TABLET ORAL at 10:09

## 2024-09-07 RX ADMIN — VALPROIC ACID 500 MG: 250 SOLUTION ORAL at 03:09

## 2024-09-07 RX ADMIN — HYDRALAZINE HYDROCHLORIDE 50 MG: 25 TABLET ORAL at 10:09

## 2024-09-07 RX ADMIN — LEVETIRACETAM 1000 MG: 100 SOLUTION ORAL at 08:09

## 2024-09-07 RX ADMIN — VALPROIC ACID 500 MG: 250 SOLUTION ORAL at 06:09

## 2024-09-07 RX ADMIN — SUCRALFATE 1 G: 1 TABLET ORAL at 10:09

## 2024-09-07 RX ADMIN — AMLODIPINE BESYLATE 10 MG: 5 TABLET ORAL at 10:09

## 2024-09-07 RX ADMIN — MEROPENEM 2 G: 2 INJECTION, POWDER, FOR SOLUTION INTRAVENOUS at 09:09

## 2024-09-07 RX ADMIN — AMANTADINE HYDROCHLORIDE 100 MG: 100 CAPSULE, LIQUID FILLED ORAL at 10:09

## 2024-09-07 RX ADMIN — APIXABAN 2.5 MG: 2.5 TABLET, FILM COATED ORAL at 08:09

## 2024-09-07 RX ADMIN — MEROPENEM 2 G: 2 INJECTION, POWDER, FOR SOLUTION INTRAVENOUS at 02:09

## 2024-09-07 RX ADMIN — LEVETIRACETAM 1000 MG: 100 SOLUTION ORAL at 10:09

## 2024-09-07 RX ADMIN — ATORVASTATIN CALCIUM 40 MG: 40 TABLET, FILM COATED ORAL at 08:09

## 2024-09-07 RX ADMIN — POLYETHYLENE GLYCOL (3350) 17 G: 17 POWDER, FOR SOLUTION ORAL at 10:09

## 2024-09-07 RX ADMIN — VALPROIC ACID 500 MG: 250 SOLUTION ORAL at 09:09

## 2024-09-07 RX ADMIN — APIXABAN 2.5 MG: 2.5 TABLET, FILM COATED ORAL at 10:09

## 2024-09-07 RX ADMIN — SUCRALFATE 1 G: 1 TABLET ORAL at 08:09

## 2024-09-07 RX ADMIN — MEROPENEM 2 G: 2 INJECTION, POWDER, FOR SOLUTION INTRAVENOUS at 11:09

## 2024-09-07 RX ADMIN — HYDRALAZINE HYDROCHLORIDE 50 MG: 25 TABLET ORAL at 03:09

## 2024-09-07 NOTE — ASSESSMENT & PLAN NOTE
Chronic, controlled. Latest blood pressure and vitals reviewed-     Temp:  [97.9 °F (36.6 °C)-98.8 °F (37.1 °C)]   Pulse:  [63-73]   Resp:  [16-18]   BP: (111-121)/(70-85)   SpO2:  [95 %-99 %] .   Home meds for hypertension were reviewed and noted below.   Hypertension Medications               amLODIPine (NORVASC) 5 MG tablet 1 tablet (5 mg total) by Per G Tube route once daily.    cloNIDine (CATAPRES) 0.1 MG tablet Take 1 tablet (0.1 mg total) by mouth 3 (three) times daily as needed (180).    hydrALAZINE (APRESOLINE) 50 MG tablet Take 50 mg by mouth 3 (three) times daily. Takes at 6a, 2p and 10p                 While in the hospital, will manage blood pressure as follows; Continue home antihypertensive regimen except PRN clonidine    Will utilize p.r.n. blood pressure medication only if patient's blood pressure greater than 160/100 and he develops symptoms such as worsening chest pain or shortness of breath.

## 2024-09-07 NOTE — PLAN OF CARE
Problem: SLP  Goal: SLP Goal  Description: 1. Patient will consume pleasure feeds with Minced and Moist Diet-IDDSI Level 5, Thin Liquids-IDDSI Level 0 with no s/s of air way compromise or pulmonary complication.  Outcome: Progressing   Swallow assessment completed at bedside. Oral dysphagia with slow mastication but appeared functional for pleasure feeds with Minced and Moist Diet-IDDSI Level 5, Thin Liquids-IDDSI Level 0 diet. No s/s of air way compromise observed. Speech Therapy will follow for diet tolerance.

## 2024-09-07 NOTE — ASSESSMENT & PLAN NOTE
Patient has acute metabolic encephalopathy that is secondary to Infective. Patient's current mental status is Awake, Alert, Confused. Patient's baseline mental status is unknown. There is documented left sided facial droop in 2021. Per report patient is usually cooperative. Evaluation and for underlying cause(s) is underway and inclusive of Blood Chemistries, TSH, Keppra and Valproic Acid level and Neuro-Imaging (MRI/CT) CT negative for any acute process and patient not a candiate for MRI due to inability to follow commands per MRI tech. Will monitor neuro checks carefully, avoid narcotics and benzos that will exacerbate agitation, and use PRN medications for controls of behavior for self harm.       Back to his baseline

## 2024-09-07 NOTE — PLAN OF CARE
Problem: Adult Inpatient Plan of Care  Goal: Patient-Specific Goal (Individualized)  Outcome: Progressing  Flowsheets (Taken 9/7/2024 1820)  Individualized Care Needs: Safety, Turn q 2, Speech Evaluation  Anxieties, Fears or Concerns: Okay  Patient/Family-Specific Goals (Include Timeframe): Pt will remain free of injury or falls through 9/8/24  Goal: Absence of Hospital-Acquired Illness or Injury  Outcome: Progressing  Goal: Optimal Comfort and Wellbeing  Outcome: Progressing  Goal: Readiness for Transition of Care  Outcome: Progressing     Problem: Infection  Goal: Absence of Infection Signs and Symptoms  Outcome: Progressing     Problem: Skin Injury Risk Increased  Goal: Skin Health and Integrity  Outcome: Progressing

## 2024-09-07 NOTE — SUBJECTIVE & OBJECTIVE
"Interval History:    Patient is awake alert, stated the "  I am in the heaven", no fever.  No chills.  No diarrhea.  Patient's mental status apparently back to his baseline I believe.     Review of Systems   Psychiatric/Behavioral:  Positive for behavioral problems and confusion.      Objective:     Vital Signs (Most Recent):  Temp: 98.8 °F (37.1 °C) (09/07/24 1130)  Pulse: 63 (09/07/24 1130)  Resp: 18 (09/07/24 1130)  BP: 118/83 (09/07/24 1130)  SpO2: 98 % (09/07/24 1130) Vital Signs (24h Range):  Temp:  [97.9 °F (36.6 °C)-98.8 °F (37.1 °C)] 98.8 °F (37.1 °C)  Pulse:  [63-73] 63  Resp:  [16-18] 18  SpO2:  [95 %-99 %] 98 %  BP: (111-121)/(70-85) 118/83     Weight: 79.9 kg (176 lb 2.4 oz)  Body mass index is 26.01 kg/m².    Intake/Output Summary (Last 24 hours) at 9/7/2024 1324  Last data filed at 9/7/2024 1100  Gross per 24 hour   Intake 820 ml   Output --   Net 820 ml         Physical Exam  Constitutional:       General: He is not in acute distress.     Appearance: He is well-developed. He is ill-appearing (Chronically).   HENT:      Head: Normocephalic and atraumatic.   Eyes:      Pupils: Pupils are equal, round, and reactive to light.   Cardiovascular:      Rate and Rhythm: Normal rate and regular rhythm.      Heart sounds: No murmur heard.  Pulmonary:      Effort: Pulmonary effort is normal. No respiratory distress.      Breath sounds: Normal breath sounds. No wheezing or rales.   Abdominal:      General: Bowel sounds are normal. There is no distension.      Palpations: Abdomen is soft.      Tenderness: There is no abdominal tenderness.   Musculoskeletal:      Comments: Contracted   Skin:     General: Skin is warm and dry.      Findings: No rash.   Neurological:      Mental Status: He is alert.      Cranial Nerves: No cranial nerve deficit.   Psychiatric:         Behavior: Behavior normal.             Significant Labs: All pertinent labs within the past 24 hours have been reviewed.    Significant Imaging: I have " reviewed all pertinent imaging results/findings within the past 24 hours.    Scheduled Meds:   amantadine HCL  100 mg Per G Tube Daily    amLODIPine  10 mg Per G Tube Daily    apixaban  2.5 mg Per G Tube BID    aspirin  325 mg Per G Tube Daily    atorvastatin  40 mg Per G Tube QHS    hydrALAZINE  50 mg Oral TID    levetiracetam  1,000 mg Oral BID    meropenem 2 g in 0.9% NaCl 100 mL IVPB (MB+)  2 g Intravenous Q8H    modafiniL  200 mg Per G Tube Daily    pantoprazole  40 mg Intravenous Daily    polyethylene glycol  17 g Per G Tube Daily    sucralfate  1 g Per G Tube TID    valproic acid (as sodium salt)  500 mg Oral Q8H     Continuous Infusions:  PRN Meds:.  Current Facility-Administered Medications:     acetaminophen, 650 mg, Per G Tube, Q4H PRN    dextrose 10%, 12.5 g, Intravenous, PRN    dextrose 10%, 25 g, Intravenous, PRN    glucagon (human recombinant), 1 mg, Intramuscular, PRN    glucose, 16 g, Oral, PRN    glucose, 24 g, Oral, PRN    magnesium oxide, 800 mg, Per G Tube, PRN    magnesium oxide, 800 mg, Per G Tube, PRN    naloxone, 0.02 mg, Intravenous, PRN    ondansetron, 4 mg, Intravenous, Q8H PRN    potassium bicarbonate, 35 mEq, Per G Tube, PRN    potassium bicarbonate, 50 mEq, Per G Tube, PRN    potassium bicarbonate, 60 mEq, Per G Tube, PRN    sodium chloride 0.9%, 10 mL, Intravenous, Q12H PRN      X-Ray Chest AP Portable    Result Date: 9/5/2024  EXAMINATION: XR CHEST AP PORTABLE CLINICAL HISTORY: Sepsis; TECHNIQUE: Single frontal view of the chest was performed. COMPARISON: 12/01/2023 FINDINGS: The cardiomediastinal silhouette is within normal limits.  The lungs are well expanded without consolidation or pleural effusion.     Negative chest.  No significant change. Electronically signed by: Harshal Casey MD Date:    09/05/2024 Time:    17:16    CT Head Without Contrast    Result Date: 9/5/2024  EXAMINATION: CT HEAD WITHOUT CONTRAST CLINICAL HISTORY: Mental status change, unknown cause; TECHNIQUE:  Low dose axial images were obtained through the head.  Coronal and sagittal reformations were also performed. Contrast was not administered. COMPARISON: 01/03/2023 FINDINGS: There is mild dilatation of ventricles, sulci, fissures. There is decreased density in white matter suggesting microvascular ischemic change. There is no acute intracranial hemorrhage.  There is no intracranial mass effect.  There is no acute major vascular territory infarct. Note is made that MRI is typically more sensitive than CT particularly for detection of early or small nonhemorrhagic infarcts. The calvarium appears intact.  Visualized paranasal sinuses appear clear of air-fluid levels.  Small polypoid structure right maxillary sinus suggesting retention cyst.  Mastoids  well pneumatized     No acute intracranial findings Electronically signed by: Yanna Mansfield MD Date:    09/05/2024 Time:    16:53  - pulls last radiology orders

## 2024-09-07 NOTE — PT/OT/SLP EVAL
Speech Language Pathology Evaluation  Bedside Swallow    Patient Name:  Andrea Leon Jr.   MRN:  [id number]  Admitting Diagnosis: Encephalopathy, metabolic    Recommendations:                 General Recommendations:   Follow for diet tolerance and ongoing assessment for possibility of full PO intake  Diet recommendations:  Pleasure Feeding (Puree, minced and moist, soft solids), Thin liquids - IDDSI Level 0   Aspiration Precautions:  check for oral residue and Standard aspiration precautions . Medications crushed in puree  General Precautions: Standard,   Communication strategies:  provide increased time to answer    Assessment:   Andrea Leon Jr. is a 44 y.o. male admitted on 9/5/2024 with a chief complaint of Altered Mental Status (Altered at baseline... facility concerned for increased confusion. ) and primary diagnosis of  Encephalopathy, metabolic. ST consult received on 9/7 2° swallow with PEG placement.    The patient  presents with an SLP diagnosis of  oral dysphagia  characterized by slow mastication, impaired rotary chewing, impaired tongue lateralization but WFL for pleasure feeds of puree, minced and moist and soft solids with thin liquids. Medications probably best crushed in medications although not assessed. No s/s of air way compromise or aspiration observed. Speech Therapy to follow for diet tolerance and ongoing swallow assessment.    PLAN OF CARE:  Final diagnoses:  [R41.82] Altered mental status  [R41.82] Altered mental status, unspecified altered mental status type (Primary)  [R47.1] Dysarthria  [G93.41] Encephalopathy, metabolic   PERH&P  Andrea Leon Jr. Is a 44 year old male with a previous medical history of CVA, long-term anticoagulant use on Eliquis, HTN, hemiparesis (left sided), functional quadriplegia, Peg tube, Asperger's syndrome, reoccurring UTI's, ESBL UTI, and seizures who was sent to the ED for altered mental status. Patient is a resident of [facility] and  "per ED report he became agitated, combative and hypersexual at 1300. At this time they were attempted to administer his midday medications and he swiped it out of the nurses hand. Per ED note he is normally not agitated or aggressive. His last known normal is unknown but on his med list he was administered his morning medications. Initial ED evaluation showed a head CT and chest xray with no acute process, flu and covid negative. CBC unremarkable. Creatinine 1.7 but near baseline of 1.6. Urine studies positive for infection and patient initiated on IV Merrem due to history of ESBL urosepsis. Attempted to obtain MRI but per MRI tech the patient is unable to follow commands and had overall body rigidity and thus was not a candidate for an MRI. Upon admit evaluation the patient is awake and alert with flat affect. He was calm and cooperative. He has his personal earplugs in and baby yoda stuffed animal under right arm. He was oriented to name and reported the year as "20 something". He followed all simple commands. There was a left sided facial droop but per chart review this was documented as a CVA residual in 2021. Patient admitted by hospital medicine for further evaluation and management.      Overview/Hospital Course:  44-year-old male with history of CVA, long-term anticoagulant use on Eliquis, hypertension, left-sided hemiparesis, functional quadriplegia, Asperger's syndrome, recurrent UTIs, seizures admitted for altered mental status.  Found to have UTI.  Currently on IV meropenem awaiting urine culture.  Mental status improving with antibiotics     Interval History:    Patient is awake alert, stated the "  I am in the heaven", no fever.  No chills.  No diarrhea.  Patient's mental status apparently back to his baseline I believe.      Review of Systems   Psychiatric/Behavioral:  Positive for behavioral problems and confusion.         Past Medical History:   Diagnosis Date    Anticoagulant long-term use     baby " asa per chart    Asperger's syndrome     CVA, old, hemiparesis     Encounter for blood transfusion     Essential hypertension, malignant     Functional quadriplegia 5/28/2021    PEG (percutaneous endoscopic gastrostomy) status        Past Surgical History:   Procedure Laterality Date    COLONOSCOPY N/A 6/3/2021    Procedure: COLONOSCOPY;  Surgeon: Rosario Meeks MD;  Location: [facility] ENDO;  Service: Endoscopy;  Laterality: N/A;    ESOPHAGOGASTRODUODENOSCOPY N/A 7/10/2021    Procedure: EGD (ESOPHAGOGASTRODUODENOSCOPY);  Surgeon: Rosario Meeks MD;  Location: [facility] ENDO;  Service: Endoscopy;  Laterality: N/A;    ESOPHAGOGASTRODUODENOSCOPY W/ PEG N/A 4/1/2021    Procedure: EGD, WITH PEG TUBE INSERTION;  Surgeon: En Zheng MD;  Location: [facility] ENDO;  Service: Endoscopy;  Laterality: N/A;           INFORMATION PERTINENT FOR ST/SWALLOW EVAL:  LABS:   -WBC - WNL  -Albumin 3.4  -Hemoglobin 13.8 just below normal   Vital Signs      Temperature  98.1 ... 98.3 ... 98.8 ... 98.9 ...      Heart Rate  65 69 63 72      Blood pressure  116/71 116/70 118/83 122/82      Mean BP  88 89 96 95      Resp Rate  16 17 18 18      O2 Sat  96 97 98 98           INTUBATION HISTORY: None this admit.   CHEST X-RAY:  9/5 The lungs are well expanded without consolidation or pleural effusion   BRAIN IMAGING: nothing acute  MODIFIED BARIUM SWALLOW STUDY:  None on file.  PRIOR SPEECH THERAPY:  yes, recommend pleasure feeds after stroke in 2021 with kahti, he had PEG placed 4/1/24. Long history of dysphagia. Initially got PEG in 2021 due to weakness and inability to take in adequate PO because of endurance and acceptance. Did not find a Modified Barium Swallow Study. Not sure if PEG is still needed or if its used. Will follow up.  PRIOR DIET:  unknown, nurse reported mother said he does eat.      .    Subjective     Patient lying in bed. Receptive to PO trials after repositioning in bed for near sitting up at 90 degrees. He was generally  oriented to person, situation and place.  Patient goals: none stated     Pain/Comfort:       Respiratory Status: Room air    Objective:     Oral Musculature Evaluation  Oral Musculature: WFL, general weakness (slow movements and ROM but Full)  Dentition: teeth in poor condition  Secretion Management: adequate  Mucosal Quality: good  Mandibular Strength and Mobility: WFL  Oral Labial Strength and Mobility: WFL  Lingual Strength and Mobility: WFL, impaired left lateral movement, impaired right lateral movement  Volitional Swallow: palpated rise and tilt  Voice Prior to PO Intake: Fair  Oral Musculature Comments: Generalized weakness but WFL for speech and swallow    Bedside Swallow Eval:   Consistencies Assessed:  Thin liquids .  Puree .  Solids cracker      Oral Phase:   Impaired efficiency but WFL for pleasure  Prolonged mastication  Lingual residue  Slow oral transit time    Pharyngeal Phase:   no overt clinical signs/symptoms of aspiration  no overt clinical signs/symptoms of pharyngeal dysphagia    Compensatory Strategies  Checked for oral residue and gave liquid wash    Treatment: Pt educated re: results/recs of evaluation, SLP role and POC. Receptive to information provided.      Goals:   Multidisciplinary Problems       SLP Goals          Problem: SLP    Goal Priority Disciplines Outcome   SLP Goal     SLP Progressing   Description: 1. Patient will consume pleasure feeds with Minced and Moist Diet-IDDSI Level 5, Thin Liquids-IDDSI Level 0 with no s/s of air way compromise or pulmonary complication.                       Plan:     Patient to be seen:  3 x/week   Plan of Care expires:     Plan of Care reviewed with:  patient   SLP Follow-Up:  Yes       Discharge recommendations:   (TBD)   Barriers to Discharge:  Level of Skilled Assistance Needed .    Time Tracking:     SLP Treatment Date:   09/07/24  Speech Start Time:  1525  Speech Stop Time:  1544     Speech Total Time (min):  19 min    Billable Minutes: Eval  Swallow and Oral Function 19 09/07/2024

## 2024-09-07 NOTE — PROGRESS NOTES
"[facility] Medicine  Progress Note    Patient Name: Andrea Leon Jr.  MRN: [id number]  Patient Class: OP- Observation   Admission Date: 9/5/2024  Length of Stay: 0 days  Attending Physician: Leydi Marcum MD  Primary Care Provider: Elly Mathew DO        Subjective:     Principal Problem:Encephalopathy, metabolic        HPI:  Andrea Leon Jr. Is a 44 year old male with a previous medical history of CVA, long-term anticoagulant use on Eliquis, HTN, hemiparesis (left sided), functional quadriplegia, Peg tube, Asperger's syndrome, reoccurring UTI's, ESBL UTI, and seizures who was sent to the ED for altered mental status. Patient is a resident of [facility] and per ED report he became agitated, combative and hypersexual at 1300. At this time they were attempted to administer his midday medications and he swiped it out of the nurses hand. Per ED note he is normally not agitated or aggressive. His last known normal is unknown but on his med list he was administered his morning medications. Initial ED evaluation showed a head CT and chest xray with no acute process, flu and covid negative. CBC unremarkable. Creatinine 1.7 but near baseline of 1.6. Urine studies positive for infection and patient initiated on IV Merrem due to history of ESBL urosepsis. Attempted to obtain MRI but per MRI tech the patient is unable to follow commands and had overall body rigidity and thus was not a candidate for an MRI. Upon admit evaluation the patient is awake and alert with flat affect. He was calm and cooperative. He has his personal earplugs in and baby yoda stuffed animal under right arm. He was oriented to name and reported the year as "20 something". He followed all simple commands. There was a left sided facial droop but per chart review this was documented as a CVA residual in 2021. Patient admitted by hospital medicine for further evaluation and management. " "    Overview/Hospital Course:  44-year-old male with history of CVA, long-term anticoagulant use on Eliquis, hypertension, left-sided hemiparesis, functional quadriplegia, Asperger's syndrome, recurrent UTIs, seizures admitted for altered mental status.  Found to have UTI.  Currently on IV meropenem awaiting urine culture.  Mental status improving with antibiotics    Interval History:    Patient is awake alert, stated the "  I am in the heaven", no fever.  No chills.  No diarrhea.  Patient's mental status apparently back to his baseline I believe.     Review of Systems   Psychiatric/Behavioral:  Positive for behavioral problems and confusion.      Objective:     Vital Signs (Most Recent):  Temp: 98.8 °F (37.1 °C) (09/07/24 1130)  Pulse: 63 (09/07/24 1130)  Resp: 18 (09/07/24 1130)  BP: 118/83 (09/07/24 1130)  SpO2: 98 % (09/07/24 1130) Vital Signs (24h Range):  Temp:  [97.9 °F (36.6 °C)-98.8 °F (37.1 °C)] 98.8 °F (37.1 °C)  Pulse:  [63-73] 63  Resp:  [16-18] 18  SpO2:  [95 %-99 %] 98 %  BP: (111-121)/(70-85) 118/83     Weight: 79.9 kg (176 lb 2.4 oz)  Body mass index is 26.01 kg/m².    Intake/Output Summary (Last 24 hours) at 9/7/2024 1324  Last data filed at 9/7/2024 1100  Gross per 24 hour   Intake 820 ml   Output --   Net 820 ml         Physical Exam  Constitutional:       General: He is not in acute distress.     Appearance: He is well-developed. He is ill-appearing (Chronically).   HENT:      Head: Normocephalic and atraumatic.   Eyes:      Pupils: Pupils are equal, round, and reactive to light.   Cardiovascular:      Rate and Rhythm: Normal rate and regular rhythm.      Heart sounds: No murmur heard.  Pulmonary:      Effort: Pulmonary effort is normal. No respiratory distress.      Breath sounds: Normal breath sounds. No wheezing or rales.   Abdominal:      General: Bowel sounds are normal. There is no distension.      Palpations: Abdomen is soft.      Tenderness: There is no abdominal tenderness. "   Musculoskeletal:      Comments: Contracted   Skin:     General: Skin is warm and dry.      Findings: No rash.   Neurological:      Mental Status: He is alert.      Cranial Nerves: No cranial nerve deficit.   Psychiatric:         Behavior: Behavior normal.             Significant Labs: All pertinent labs within the past 24 hours have been reviewed.    Significant Imaging: I have reviewed all pertinent imaging results/findings within the past 24 hours.    Scheduled Meds:   amantadine HCL  100 mg Per G Tube Daily    amLODIPine  10 mg Per G Tube Daily    apixaban  2.5 mg Per G Tube BID    aspirin  325 mg Per G Tube Daily    atorvastatin  40 mg Per G Tube QHS    hydrALAZINE  50 mg Oral TID    levetiracetam  1,000 mg Oral BID    meropenem 2 g in 0.9% NaCl 100 mL IVPB (MB+)  2 g Intravenous Q8H    modafiniL  200 mg Per G Tube Daily    pantoprazole  40 mg Intravenous Daily    polyethylene glycol  17 g Per G Tube Daily    sucralfate  1 g Per G Tube TID    valproic acid (as sodium salt)  500 mg Oral Q8H     Continuous Infusions:  PRN Meds:.  Current Facility-Administered Medications:     acetaminophen, 650 mg, Per G Tube, Q4H PRN    dextrose 10%, 12.5 g, Intravenous, PRN    dextrose 10%, 25 g, Intravenous, PRN    glucagon (human recombinant), 1 mg, Intramuscular, PRN    glucose, 16 g, Oral, PRN    glucose, 24 g, Oral, PRN    magnesium oxide, 800 mg, Per G Tube, PRN    magnesium oxide, 800 mg, Per G Tube, PRN    naloxone, 0.02 mg, Intravenous, PRN    ondansetron, 4 mg, Intravenous, Q8H PRN    potassium bicarbonate, 35 mEq, Per G Tube, PRN    potassium bicarbonate, 50 mEq, Per G Tube, PRN    potassium bicarbonate, 60 mEq, Per G Tube, PRN    sodium chloride 0.9%, 10 mL, Intravenous, Q12H PRN      X-Ray Chest AP Portable    Result Date: 9/5/2024  EXAMINATION: XR CHEST AP PORTABLE CLINICAL HISTORY: Sepsis; TECHNIQUE: Single frontal view of the chest was performed. COMPARISON: 12/01/2023 FINDINGS: The cardiomediastinal silhouette  is within normal limits.  The lungs are well expanded without consolidation or pleural effusion.     Negative chest.  No significant change. Electronically signed by: Harshal Casey MD Date:    09/05/2024 Time:    17:16    CT Head Without Contrast    Result Date: 9/5/2024  EXAMINATION: CT HEAD WITHOUT CONTRAST CLINICAL HISTORY: Mental status change, unknown cause; TECHNIQUE: Low dose axial images were obtained through the head.  Coronal and sagittal reformations were also performed. Contrast was not administered. COMPARISON: 01/03/2023 FINDINGS: There is mild dilatation of ventricles, sulci, fissures. There is decreased density in white matter suggesting microvascular ischemic change. There is no acute intracranial hemorrhage.  There is no intracranial mass effect.  There is no acute major vascular territory infarct. Note is made that MRI is typically more sensitive than CT particularly for detection of early or small nonhemorrhagic infarcts. The calvarium appears intact.  Visualized paranasal sinuses appear clear of air-fluid levels.  Small polypoid structure right maxillary sinus suggesting retention cyst.  Mastoids  well pneumatized     No acute intracranial findings Electronically signed by: Yanna Mansfield MD Date:    09/05/2024 Time:    16:53  - pulls last radiology orders      Assessment/Plan:      * Encephalopathy, metabolic  Patient has acute metabolic encephalopathy that is secondary to Infective. Patient's current mental status is Awake, Alert, Confused. Patient's baseline mental status is unknown. There is documented left sided facial droop in 2021. Per report patient is usually cooperative. Evaluation and for underlying cause(s) is underway and inclusive of Blood Chemistries, TSH, Keppra and Valproic Acid level and Neuro-Imaging (MRI/CT) CT negative for any acute process and patient not a candiate for MRI due to inability to follow commands per MRI tech. Will monitor neuro checks carefully, avoid  narcotics and benzos that will exacerbate agitation, and use PRN medications for controls of behavior for self harm.       Back to his baseline      UTI (urinary tract infection)  History of ESBL UTI.    -Contact precautions  -Urine culture pending  -IV merrem Q 8 hours    Follow up urine culture and blood culture    Check bladder scan      Long term (current) use of anticoagulants  CT of head negative for any acute process. Altered mental status likely secondary to UTI. Eliquis 2.5mg continued.     Asperger's disorder  Chronic condition    -Decrease environmental stimuli  -Allow patient to wear personal earplugs  -Monitor for symptoms of self harm or aggressive behavior and mitigate as needed with redirection or as last resort medications      Seizure  -seizure precautions  -Continue AEDs  -Valproic acid level pending  -Keppra level pending      Functional quadriplegia  Atrophy and contractures noted in all limbs but patient does have minimal movement in all limbs and able to follow commands    -Pressure injury prevention bundle in place  -Turn patient Q 2 hours  -Fall precautions        CVA, old, hemiparesis  Follows simple commands  Noted left sided facial droop but unsure of previous CVA residuals  Patient unable to participate in full neurological exam      Essential hypertension, benign  Chronic, controlled. Latest blood pressure and vitals reviewed-     Temp:  [97.9 °F (36.6 °C)-98.8 °F (37.1 °C)]   Pulse:  [63-73]   Resp:  [16-18]   BP: (111-121)/(70-85)   SpO2:  [95 %-99 %] .   Home meds for hypertension were reviewed and noted below.   Hypertension Medications               amLODIPine (NORVASC) 5 MG tablet 1 tablet (5 mg total) by Per G Tube route once daily.    cloNIDine (CATAPRES) 0.1 MG tablet Take 1 tablet (0.1 mg total) by mouth 3 (three) times daily as needed (180).    hydrALAZINE (APRESOLINE) 50 MG tablet Take 50 mg by mouth 3 (three) times daily. Takes at 6a, 2p and 10p                 While in the  hospital, will manage blood pressure as follows; Continue home antihypertensive regimen except PRN clonidine    Will utilize p.r.n. blood pressure medication only if patient's blood pressure greater than 160/100 and he develops symptoms such as worsening chest pain or shortness of breath.    PEG (percutaneous endoscopic gastrostomy) status  Patient noted to have a percutaneous endoscopic gastrostomy tube in place. I have personally inspected the tube.Tube was placed prior to this admission There are no signs of drainage or infection around the site. The tube is patent. Medications have converted to liquid form if available.  Routine care to be done by wound care and nursing staff.   PEG TUBE CARE THREE TIMES DAILY          VTE Risk Mitigation (From admission, onward)           Ordered     apixaban tablet 2.5 mg  2 times daily         09/05/24 2129     IP VTE LOW RISK PATIENT  Once         09/05/24 1833     Place sequential compression device  Until discontinued         09/05/24 1833                    Discharge Planning   AMEYA: 9/7/2024     Code Status: Full Code   Is the patient medically ready for discharge?:     Reason for patient still in hospital (select all that apply): Patient trending condition and Treatment  Discharge Plan A: Return to nursing home          We will discharge patient back to nursing home likely in the a.m. once all culture negative.         Leydi Marcum MD  Department of Hospital Medicine   [facility]

## 2024-09-07 NOTE — ASSESSMENT & PLAN NOTE
History of ESBL UTI.    -Contact precautions  -Urine culture pending  -IV merrem Q 8 hours    Follow up urine culture and blood culture    Check bladder scan

## 2024-09-08 LAB — BACTERIA UR CULT: ABNORMAL

## 2024-09-08 PROCEDURE — 96366 THER/PROPH/DIAG IV INF ADDON: CPT

## 2024-09-08 PROCEDURE — 25000003 PHARM REV CODE 250

## 2024-09-08 PROCEDURE — 94761 N-INVAS EAR/PLS OXIMETRY MLT: CPT

## 2024-09-08 PROCEDURE — 96376 TX/PRO/DX INJ SAME DRUG ADON: CPT

## 2024-09-08 PROCEDURE — 63600175 PHARM REV CODE 636 W HCPCS

## 2024-09-08 PROCEDURE — G0378 HOSPITAL OBSERVATION PER HR: HCPCS

## 2024-09-08 RX ADMIN — VALPROIC ACID 500 MG: 250 SOLUTION ORAL at 10:09

## 2024-09-08 RX ADMIN — VALPROIC ACID 500 MG: 250 SOLUTION ORAL at 06:09

## 2024-09-08 RX ADMIN — APIXABAN 2.5 MG: 2.5 TABLET, FILM COATED ORAL at 08:09

## 2024-09-08 RX ADMIN — AMANTADINE HYDROCHLORIDE 100 MG: 100 CAPSULE, LIQUID FILLED ORAL at 10:09

## 2024-09-08 RX ADMIN — SUCRALFATE 1 G: 1 TABLET ORAL at 08:09

## 2024-09-08 RX ADMIN — MEROPENEM 2 G: 2 INJECTION, POWDER, FOR SOLUTION INTRAVENOUS at 10:09

## 2024-09-08 RX ADMIN — PANTOPRAZOLE SODIUM 40 MG: 40 INJECTION, POWDER, LYOPHILIZED, FOR SOLUTION INTRAVENOUS at 10:09

## 2024-09-08 RX ADMIN — AMLODIPINE BESYLATE 10 MG: 5 TABLET ORAL at 10:09

## 2024-09-08 RX ADMIN — SUCRALFATE 1 G: 1 TABLET ORAL at 10:09

## 2024-09-08 RX ADMIN — LEVETIRACETAM 1000 MG: 100 SOLUTION ORAL at 10:09

## 2024-09-08 RX ADMIN — MODAFINIL 200 MG: 200 TABLET ORAL at 10:09

## 2024-09-08 RX ADMIN — HYDRALAZINE HYDROCHLORIDE 50 MG: 25 TABLET ORAL at 10:09

## 2024-09-08 RX ADMIN — HYDRALAZINE HYDROCHLORIDE 50 MG: 25 TABLET ORAL at 03:09

## 2024-09-08 RX ADMIN — ASPIRIN 325 MG: 325 TABLET ORAL at 10:09

## 2024-09-08 RX ADMIN — LEVETIRACETAM 1000 MG: 100 SOLUTION ORAL at 08:09

## 2024-09-08 RX ADMIN — VALPROIC ACID 500 MG: 250 SOLUTION ORAL at 03:09

## 2024-09-08 RX ADMIN — MEROPENEM 2 G: 2 INJECTION, POWDER, FOR SOLUTION INTRAVENOUS at 03:09

## 2024-09-08 RX ADMIN — APIXABAN 2.5 MG: 2.5 TABLET, FILM COATED ORAL at 10:09

## 2024-09-08 RX ADMIN — ATORVASTATIN CALCIUM 40 MG: 40 TABLET, FILM COATED ORAL at 08:09

## 2024-09-08 RX ADMIN — SUCRALFATE 1 G: 1 TABLET ORAL at 03:09

## 2024-09-08 RX ADMIN — MEROPENEM 2 G: 2 INJECTION, POWDER, FOR SOLUTION INTRAVENOUS at 08:09

## 2024-09-08 NOTE — PROGRESS NOTES
"[facility] Medicine  Progress Note    Patient Name: Andrea Leon Jr.  MRN: [id number]  Patient Class: OP- Observation   Admission Date: 9/5/2024  Length of Stay: 0 days  Attending Physician: Leydi Marcum MD  Primary Care Provider: Elly Mathew DO        Subjective:     Principal Problem:Encephalopathy, metabolic        HPI:  Andrea Leon Jr. Is a 44 year old male with a previous medical history of CVA, long-term anticoagulant use on Eliquis, HTN, hemiparesis (left sided), functional quadriplegia, Peg tube, Asperger's syndrome, reoccurring UTI's, ESBL UTI, and seizures who was sent to the ED for altered mental status. Patient is a resident of [facility] and per ED report he became agitated, combative and hypersexual at 1300. At this time they were attempted to administer his midday medications and he swiped it out of the nurses hand. Per ED note he is normally not agitated or aggressive. His last known normal is unknown but on his med list he was administered his morning medications. Initial ED evaluation showed a head CT and chest xray with no acute process, flu and covid negative. CBC unremarkable. Creatinine 1.7 but near baseline of 1.6. Urine studies positive for infection and patient initiated on IV Merrem due to history of ESBL urosepsis. Attempted to obtain MRI but per MRI tech the patient is unable to follow commands and had overall body rigidity and thus was not a candidate for an MRI. Upon admit evaluation the patient is awake and alert with flat affect. He was calm and cooperative. He has his personal earplugs in and baby yoda stuffed animal under right arm. He was oriented to name and reported the year as "20 something". He followed all simple commands. There was a left sided facial droop but per chart review this was documented as a CVA residual in 2021. Patient admitted by hospital medicine for further evaluation and management. "     Overview/Hospital Course:  44-year-old male with history of CVA, long-term anticoagulant use on Eliquis, hypertension, left-sided hemiparesis, functional quadriplegia, Asperger's syndrome, recurrent UTIs, seizures admitted for altered mental status.  Found to have UTI.  Currently on IV meropenem awaiting urine culture.  Mental status improving with antibiotics    Interval History:    Seen and examined at multidisciplinary rounds, no fever or chills, no nausea vomiting diarrhea, urine culture pending, postvoid volume is around 70 cc.  Mental status apparently back to his baseline.     Review of Systems   Psychiatric/Behavioral:  Positive for behavioral problems and confusion.      Objective:     Vital Signs (Most Recent):  Temp: 98.8 °F (37.1 °C) (09/08/24 0804)  Pulse: 72 (09/08/24 0804)  Resp: 19 (09/08/24 0804)  BP: 129/87 (09/08/24 0804)  SpO2: 98 % (09/08/24 0804) Vital Signs (24h Range):  Temp:  [96.9 °F (36.1 °C)-98.9 °F (37.2 °C)] 98.8 °F (37.1 °C)  Pulse:  [63-72] 72  Resp:  [16-19] 19  SpO2:  [92 %-98 %] 98 %  BP: (109-129)/(65-87) 129/87     Weight: 79.9 kg (176 lb 2.4 oz)  Body mass index is 26.01 kg/m².    Intake/Output Summary (Last 24 hours) at 9/8/2024 1048  Last data filed at 9/8/2024 0607  Gross per 24 hour   Intake 720 ml   Output 900 ml   Net -180 ml         Physical Exam  Constitutional:       General: He is not in acute distress.     Appearance: He is well-developed. He is ill-appearing (Chronically).   HENT:      Head: Normocephalic and atraumatic.   Eyes:      Pupils: Pupils are equal, round, and reactive to light.   Cardiovascular:      Rate and Rhythm: Normal rate and regular rhythm.      Heart sounds: No murmur heard.  Pulmonary:      Effort: Pulmonary effort is normal. No respiratory distress.      Breath sounds: Normal breath sounds. No wheezing or rales.   Abdominal:      General: Bowel sounds are normal. There is no distension.      Palpations: Abdomen is soft.      Tenderness:  There is no abdominal tenderness.   Musculoskeletal:      Comments: Contracted   Skin:     General: Skin is warm and dry.      Findings: No rash.   Neurological:      Mental Status: He is alert.      Cranial Nerves: No cranial nerve deficit.   Psychiatric:         Behavior: Behavior normal.             Significant Labs: All pertinent labs within the past 24 hours have been reviewed.    Significant Imaging: I have reviewed all pertinent imaging results/findings within the past 24 hours.    Scheduled Meds:   amantadine HCL  100 mg Per G Tube Daily    amLODIPine  10 mg Per G Tube Daily    apixaban  2.5 mg Per G Tube BID    aspirin  325 mg Per G Tube Daily    atorvastatin  40 mg Per G Tube QHS    hydrALAZINE  50 mg Oral TID    levetiracetam  1,000 mg Oral BID    meropenem 2 g in 0.9% NaCl 100 mL IVPB (MB+)  2 g Intravenous Q8H    modafiniL  200 mg Per G Tube Daily    pantoprazole  40 mg Intravenous Daily    polyethylene glycol  17 g Per G Tube Daily    sucralfate  1 g Per G Tube TID    valproic acid (as sodium salt)  500 mg Oral Q8H     Continuous Infusions:  PRN Meds:.  Current Facility-Administered Medications:     acetaminophen, 650 mg, Per G Tube, Q4H PRN    dextrose 10%, 12.5 g, Intravenous, PRN    dextrose 10%, 25 g, Intravenous, PRN    glucagon (human recombinant), 1 mg, Intramuscular, PRN    glucose, 16 g, Oral, PRN    glucose, 24 g, Oral, PRN    magnesium oxide, 800 mg, Per G Tube, PRN    magnesium oxide, 800 mg, Per G Tube, PRN    naloxone, 0.02 mg, Intravenous, PRN    ondansetron, 4 mg, Intravenous, Q8H PRN    potassium bicarbonate, 35 mEq, Per G Tube, PRN    potassium bicarbonate, 50 mEq, Per G Tube, PRN    potassium bicarbonate, 60 mEq, Per G Tube, PRN    sodium chloride 0.9%, 10 mL, Intravenous, Q12H PRN      X-Ray Chest AP Portable    Result Date: 9/5/2024  EXAMINATION: XR CHEST AP PORTABLE CLINICAL HISTORY: Sepsis; TECHNIQUE: Single frontal view of the chest was performed. COMPARISON: 12/01/2023  FINDINGS: The cardiomediastinal silhouette is within normal limits.  The lungs are well expanded without consolidation or pleural effusion.     Negative chest.  No significant change. Electronically signed by: Harshal Casey MD Date:    09/05/2024 Time:    17:16    CT Head Without Contrast    Result Date: 9/5/2024  EXAMINATION: CT HEAD WITHOUT CONTRAST CLINICAL HISTORY: Mental status change, unknown cause; TECHNIQUE: Low dose axial images were obtained through the head.  Coronal and sagittal reformations were also performed. Contrast was not administered. COMPARISON: 01/03/2023 FINDINGS: There is mild dilatation of ventricles, sulci, fissures. There is decreased density in white matter suggesting microvascular ischemic change. There is no acute intracranial hemorrhage.  There is no intracranial mass effect.  There is no acute major vascular territory infarct. Note is made that MRI is typically more sensitive than CT particularly for detection of early or small nonhemorrhagic infarcts. The calvarium appears intact.  Visualized paranasal sinuses appear clear of air-fluid levels.  Small polypoid structure right maxillary sinus suggesting retention cyst.  Mastoids  well pneumatized     No acute intracranial findings Electronically signed by: Yanna Mansfield MD Date:    09/05/2024 Time:    16:53  - pulls last radiology orders      Assessment/Plan:      * Encephalopathy, metabolic  Patient has acute metabolic encephalopathy that is secondary to Infective. Patient's current mental status is Awake, Alert, Confused. Patient's baseline mental status is unknown. There is documented left sided facial droop in 2021. Per report patient is usually cooperative. Evaluation and for underlying cause(s) is underway and inclusive of Blood Chemistries, TSH, Keppra and Valproic Acid level and Neuro-Imaging (MRI/CT) CT negative for any acute process and patient not a candiate for MRI due to inability to follow commands per MRI tech. Will  monitor neuro checks carefully, avoid narcotics and benzos that will exacerbate agitation, and use PRN medications for controls of behavior for self harm.       Back to his baseline      UTI (urinary tract infection)  History of ESBL UTI.    -Contact precautions  -Urine culture pending  -IV merrem Q 8 hours    Follow up urine culture and blood culture    Postvoid bladder scan shows 70 cc.   Still pending urine culture      Long term (current) use of anticoagulants  CT of head negative for any acute process. Altered mental status likely secondary to UTI. Eliquis 2.5mg continued.     Asperger's disorder  Chronic condition    -Decrease environmental stimuli  -Allow patient to wear personal earplugs  -Monitor for symptoms of self harm or aggressive behavior and mitigate as needed with redirection or as last resort medications      Seizure  -seizure precautions  -Continue AEDs  -Valproic acid level pending  -Keppra level pending      Functional quadriplegia  Atrophy and contractures noted in all limbs but patient does have minimal movement in all limbs and able to follow commands    -Pressure injury prevention bundle in place  -Turn patient Q 2 hours  -Fall precautions        CVA, old, hemiparesis  Follows simple commands  Noted left sided facial droop but unsure of previous CVA residuals  Patient unable to participate in full neurological exam      Essential hypertension, benign  Chronic, controlled. Latest blood pressure and vitals reviewed-     Temp:  [97.9 °F (36.6 °C)-98.8 °F (37.1 °C)]   Pulse:  [63-73]   Resp:  [16-18]   BP: (111-121)/(70-85)   SpO2:  [95 %-99 %] .   Home meds for hypertension were reviewed and noted below.   Hypertension Medications               amLODIPine (NORVASC) 5 MG tablet 1 tablet (5 mg total) by Per G Tube route once daily.    cloNIDine (CATAPRES) 0.1 MG tablet Take 1 tablet (0.1 mg total) by mouth 3 (three) times daily as needed (180).    hydrALAZINE (APRESOLINE) 50 MG tablet Take 50 mg  by mouth 3 (three) times daily. Takes at 6a, 2p and 10p                 While in the hospital, will manage blood pressure as follows; Continue home antihypertensive regimen except PRN clonidine    Will utilize p.r.n. blood pressure medication only if patient's blood pressure greater than 160/100 and he develops symptoms such as worsening chest pain or shortness of breath.    PEG (percutaneous endoscopic gastrostomy) status  Patient noted to have a percutaneous endoscopic gastrostomy tube in place. I have personally inspected the tube.Tube was placed prior to this admission There are no signs of drainage or infection around the site. The tube is patent. Medications have converted to liquid form if available.  Routine care to be done by wound care and nursing staff.   PEG TUBE CARE THREE TIMES DAILY          VTE Risk Mitigation (From admission, onward)           Ordered     apixaban tablet 2.5 mg  2 times daily         09/05/24 2129     IP VTE LOW RISK PATIENT  Once         09/05/24 1833     Place sequential compression device  Until discontinued         09/05/24 1833                    Discharge Planning   AMEYA: 9/9/2024     Code Status: Full Code   Is the patient medically ready for discharge?:     Reason for patient still in hospital (select all that apply): Patient trending condition and Treatment  Discharge Plan A: Return to nursing home          Likely discharge back to nursing home in a.m. pending urine culture        Leydi Marcum MD  Department of Hospital Medicine   [facility]/Surg

## 2024-09-08 NOTE — ASSESSMENT & PLAN NOTE
History of ESBL UTI.    -Contact precautions  -Urine culture pending  -IV merrem Q 8 hours    Follow up urine culture and blood culture    Postvoid bladder scan shows 70 cc.   Still pending urine culture

## 2024-09-08 NOTE — SUBJECTIVE & OBJECTIVE
Interval History:    Seen and examined at multidisciplinary rounds, no fever or chills, no nausea vomiting diarrhea, urine culture pending, postvoid volume is around 70 cc.  Mental status apparently back to his baseline.     Review of Systems   Psychiatric/Behavioral:  Positive for behavioral problems and confusion.      Objective:     Vital Signs (Most Recent):  Temp: 98.8 °F (37.1 °C) (09/08/24 0804)  Pulse: 72 (09/08/24 0804)  Resp: 19 (09/08/24 0804)  BP: 129/87 (09/08/24 0804)  SpO2: 98 % (09/08/24 0804) Vital Signs (24h Range):  Temp:  [96.9 °F (36.1 °C)-98.9 °F (37.2 °C)] 98.8 °F (37.1 °C)  Pulse:  [63-72] 72  Resp:  [16-19] 19  SpO2:  [92 %-98 %] 98 %  BP: (109-129)/(65-87) 129/87     Weight: 79.9 kg (176 lb 2.4 oz)  Body mass index is 26.01 kg/m².    Intake/Output Summary (Last 24 hours) at 9/8/2024 1048  Last data filed at 9/8/2024 0607  Gross per 24 hour   Intake 720 ml   Output 900 ml   Net -180 ml         Physical Exam  Constitutional:       General: He is not in acute distress.     Appearance: He is well-developed. He is ill-appearing (Chronically).   HENT:      Head: Normocephalic and atraumatic.   Eyes:      Pupils: Pupils are equal, round, and reactive to light.   Cardiovascular:      Rate and Rhythm: Normal rate and regular rhythm.      Heart sounds: No murmur heard.  Pulmonary:      Effort: Pulmonary effort is normal. No respiratory distress.      Breath sounds: Normal breath sounds. No wheezing or rales.   Abdominal:      General: Bowel sounds are normal. There is no distension.      Palpations: Abdomen is soft.      Tenderness: There is no abdominal tenderness.   Musculoskeletal:      Comments: Contracted   Skin:     General: Skin is warm and dry.      Findings: No rash.   Neurological:      Mental Status: He is alert.      Cranial Nerves: No cranial nerve deficit.   Psychiatric:         Behavior: Behavior normal.             Significant Labs: All pertinent labs within the past 24 hours have  been reviewed.    Significant Imaging: I have reviewed all pertinent imaging results/findings within the past 24 hours.    Scheduled Meds:   amantadine HCL  100 mg Per G Tube Daily    amLODIPine  10 mg Per G Tube Daily    apixaban  2.5 mg Per G Tube BID    aspirin  325 mg Per G Tube Daily    atorvastatin  40 mg Per G Tube QHS    hydrALAZINE  50 mg Oral TID    levetiracetam  1,000 mg Oral BID    meropenem 2 g in 0.9% NaCl 100 mL IVPB (MB+)  2 g Intravenous Q8H    modafiniL  200 mg Per G Tube Daily    pantoprazole  40 mg Intravenous Daily    polyethylene glycol  17 g Per G Tube Daily    sucralfate  1 g Per G Tube TID    valproic acid (as sodium salt)  500 mg Oral Q8H     Continuous Infusions:  PRN Meds:.  Current Facility-Administered Medications:     acetaminophen, 650 mg, Per G Tube, Q4H PRN    dextrose 10%, 12.5 g, Intravenous, PRN    dextrose 10%, 25 g, Intravenous, PRN    glucagon (human recombinant), 1 mg, Intramuscular, PRN    glucose, 16 g, Oral, PRN    glucose, 24 g, Oral, PRN    magnesium oxide, 800 mg, Per G Tube, PRN    magnesium oxide, 800 mg, Per G Tube, PRN    naloxone, 0.02 mg, Intravenous, PRN    ondansetron, 4 mg, Intravenous, Q8H PRN    potassium bicarbonate, 35 mEq, Per G Tube, PRN    potassium bicarbonate, 50 mEq, Per G Tube, PRN    potassium bicarbonate, 60 mEq, Per G Tube, PRN    sodium chloride 0.9%, 10 mL, Intravenous, Q12H PRN      X-Ray Chest AP Portable    Result Date: 9/5/2024  EXAMINATION: XR CHEST AP PORTABLE CLINICAL HISTORY: Sepsis; TECHNIQUE: Single frontal view of the chest was performed. COMPARISON: 12/01/2023 FINDINGS: The cardiomediastinal silhouette is within normal limits.  The lungs are well expanded without consolidation or pleural effusion.     Negative chest.  No significant change. Electronically signed by: Harshal Casey MD Date:    09/05/2024 Time:    17:16    CT Head Without Contrast    Result Date: 9/5/2024  EXAMINATION: CT HEAD WITHOUT CONTRAST CLINICAL HISTORY:  Mental status change, unknown cause; TECHNIQUE: Low dose axial images were obtained through the head.  Coronal and sagittal reformations were also performed. Contrast was not administered. COMPARISON: 01/03/2023 FINDINGS: There is mild dilatation of ventricles, sulci, fissures. There is decreased density in white matter suggesting microvascular ischemic change. There is no acute intracranial hemorrhage.  There is no intracranial mass effect.  There is no acute major vascular territory infarct. Note is made that MRI is typically more sensitive than CT particularly for detection of early or small nonhemorrhagic infarcts. The calvarium appears intact.  Visualized paranasal sinuses appear clear of air-fluid levels.  Small polypoid structure right maxillary sinus suggesting retention cyst.  Mastoids  well pneumatized     No acute intracranial findings Electronically signed by: Yanna Mansfield MD Date:    09/05/2024 Time:    16:53  - pulls last radiology orders

## 2024-09-09 VITALS
HEART RATE: 72 BPM | WEIGHT: 176.13 LBS | HEIGHT: 69 IN | OXYGEN SATURATION: 96 % | TEMPERATURE: 99 F | RESPIRATION RATE: 19 BRPM | SYSTOLIC BLOOD PRESSURE: 121 MMHG | BODY MASS INDEX: 26.09 KG/M2 | DIASTOLIC BLOOD PRESSURE: 85 MMHG

## 2024-09-09 LAB — LEVETIRACETAM SERPL-MCNC: 39.9 UG/ML (ref 3–60)

## 2024-09-09 PROCEDURE — 94760 N-INVAS EAR/PLS OXIMETRY 1: CPT

## 2024-09-09 PROCEDURE — 63600175 PHARM REV CODE 636 W HCPCS

## 2024-09-09 PROCEDURE — 25000003 PHARM REV CODE 250

## 2024-09-09 PROCEDURE — G0378 HOSPITAL OBSERVATION PER HR: HCPCS

## 2024-09-09 PROCEDURE — 96376 TX/PRO/DX INJ SAME DRUG ADON: CPT

## 2024-09-09 PROCEDURE — 94761 N-INVAS EAR/PLS OXIMETRY MLT: CPT

## 2024-09-09 PROCEDURE — 96366 THER/PROPH/DIAG IV INF ADDON: CPT

## 2024-09-09 RX ADMIN — ASPIRIN 325 MG: 325 TABLET ORAL at 08:09

## 2024-09-09 RX ADMIN — LEVETIRACETAM 1000 MG: 100 SOLUTION ORAL at 08:09

## 2024-09-09 RX ADMIN — SUCRALFATE 1 G: 1 TABLET ORAL at 02:09

## 2024-09-09 RX ADMIN — MODAFINIL 200 MG: 200 TABLET ORAL at 08:09

## 2024-09-09 RX ADMIN — SUCRALFATE 1 G: 1 TABLET ORAL at 08:09

## 2024-09-09 RX ADMIN — AMLODIPINE BESYLATE 10 MG: 5 TABLET ORAL at 08:09

## 2024-09-09 RX ADMIN — VALPROIC ACID 500 MG: 250 SOLUTION ORAL at 05:09

## 2024-09-09 RX ADMIN — APIXABAN 2.5 MG: 2.5 TABLET, FILM COATED ORAL at 08:09

## 2024-09-09 RX ADMIN — MEROPENEM 2 G: 2 INJECTION, POWDER, FOR SOLUTION INTRAVENOUS at 02:09

## 2024-09-09 RX ADMIN — MEROPENEM 2 G: 2 INJECTION, POWDER, FOR SOLUTION INTRAVENOUS at 11:09

## 2024-09-09 RX ADMIN — POLYETHYLENE GLYCOL (3350) 17 G: 17 POWDER, FOR SOLUTION ORAL at 08:09

## 2024-09-09 RX ADMIN — VALPROIC ACID 500 MG: 250 SOLUTION ORAL at 02:09

## 2024-09-09 RX ADMIN — PANTOPRAZOLE SODIUM 40 MG: 40 INJECTION, POWDER, LYOPHILIZED, FOR SOLUTION INTRAVENOUS at 08:09

## 2024-09-09 RX ADMIN — AMANTADINE HYDROCHLORIDE 100 MG: 100 CAPSULE, LIQUID FILLED ORAL at 08:09

## 2024-09-09 NOTE — PLAN OF CARE
Report can be called to [phone]  Pt is going to room 221B    Pt is clear for dc from . Discharging to [facility] as halfway.  [facility] to provide ambulance transport       09/09/24 1249   Final Note   Assessment Type Final Discharge Note   Anticipated Discharge Disposition [facility]   Post-Acute Status   Post-Acute Authorization Placement   Post-Acute Placement Status Set-up Complete/Auth obtained

## 2024-09-09 NOTE — PLAN OF CARE
AVS sent to [facility] for review       09/09/24 1002   Post-Acute Status   Post-Acute Authorization Placement   Post-Acute Placement Status Pending post-acute provider review/more information requested

## 2024-09-09 NOTE — PROGRESS NOTES
RD interval note 9/9:    Pt tolerating tube feeding well. SLP evaluated pt over the weekend and ordered IDDSI Level 5 diet minced and moist foods.  Nursing is feeding patient and reported he is averaging 25%-50% intake.    Intake/Output Summary (Last 24 hours) at 9/9/2024 1217  Last data filed at 9/9/2024 0650  Gross per 24 hour   Intake 1420 ml   Output 475 ml   Net 945 ml     F/U 2 x week.  Pt will DC back to nursing home facility. Recommend continue current TF and diet Rx.      Recommendations  TF ordered see below  Will monitor tolerance  Collaborate with health care providers     Goal: pt will tolerate TF Rx  Comments: no reports of N/V ;last BM on 9/1 !  Nutrition Goal Status: new  Communication of RD Recs: reviewed with physician     Nutrition Related Social Determinants of Health: SDOH: Unable to assess at this time.      Assessment and Plan  Original order for TF was nocturnal 10 p - 6a  Jevity 1.5 @ 70 ml/hr which only provides 840 calories.  Isosource 1.5 will be substituted for Jevity 1.5:  @ 70 ml/hr from  4p - 6a with 200 ml FWF QID which will provide 1500 calories; 1500 ml free water and 70 gm protein daily; added 1 packet liquid PROSTAT via PEG QD to meet 100% EPN and EEN.     Malnutrition Assessment   Pt does not currently meet ASPEN criteria for Malnutrition but is at risk d/t dysphagia and enteral feeding dependency and chronic diseases.     Contacted [facility] to determine PEG feeding regimen but was unsuccessful; nursing was unavailable to speak with me.  Reason for Assessment: TF recommendations  Dx: metabolic encephalopathy; UTI; Asperger's; Seizures  PMH: CVA; functional Quad; GERD; Developmentally delayed     Reason For Assessment: consult  Diagnosis: other (see comments)  Interdisciplinary Rounds: did not attend  Nutrition Discharge Planning: PEG TF orders     Nutrition Risk Screen     Nutrition Risk Screen: tube feeding or parenteral nutrition     Nutrition/Diet History     Patient  "Reported Diet/Restrictions/Preferences: no oral intake  Spiritual, Cultural Beliefs, Muslim Practices, Values that Affect Care: no  Food Allergies: NKFA  Factors Affecting Nutritional Intake: NPO  Nutrition Support Formula Prior to Admit: Jevity 1.5     Anthropometrics      Wt Readings from Last 9 Encounters:   09/06/24 79.9 kg (176 lb 2.4 oz)   02/29/24 77.1 kg (170 lb)   12/01/23 75.8 kg (167 lb)   11/26/23 75.8 kg (167 lb)   11/23/23 75.8 kg (167 lb)   11/23/23 76 kg (167 lb 8.8 oz)   10/19/23 75.8 kg (167 lb)   01/06/23 75.5 kg (166 lb 8 oz)   10/25/22 68.8 kg (151 lb 10.8 oz)      Temp: 97.9 °F (36.6 °C)  Height: 5' 9" (175.3 cm)  Height (inches): 69 in  Weight Method: Bed Scale  Weight: 79.9 kg (176 lb 2.4 oz)  Weight (lb): 176.15 lb  Ideal Body Weight (IBW), Male: 160 lb  % Ideal Body Weight, Male (lb): 110.09 %  BMI (Calculated): 26  BMI Grade: 25 - 29.9 - overweight  Lab/Procedures/Meds    Latest Reference Range & Units Most Recent   Hemoglobin 14.0 - 18.0 g/dL 15.1  9/6/24 04:53   Hematocrit 40.0 - 54.0 % 45.4  9/6/24 04:53        Latest Reference Range & Units Most Recent   Albumin 3.5 - 5.2 g/dL 3.8  9/6/24 04:54      Pertinent Labs Reviewed: reviewed  Pertinent Medications Reviewed: reviewed; carafate; valproic acid; polyethylene glycol; protonix; amantadine; IV meropenem     Physical Findings/Assessment  No skin integrity issues have been documented  Job score = 8; pt is a functional Quad  Estimated/Assessed Needs     Weight Used For Calorie Calculations: 79.9 kg (176 lb 2.4 oz)  Energy Calorie Requirements (kcal): 1600 caloreis daily;  20/kg  Energy Need Method: Kcal/kg  Protein Requirements: 80 gm protein daily;  1/kg  Weight Used For Protein Calculations: 79.9 kg (176 lb 2.4 oz)  Estimated Fluid Requirement Method: RDA Method  RDA Method (mL): 1600  Nutrition Prescription Ordered     Current Diet Order: NPO     Evaluation of Received Nutrient/Fluid Intake  No intake or output data in the 24 " hours ending 09/06/24 1428     Energy Calories Required: not meeting needs  Protein Required: not meeting needs  Fluid Required: not meeting needs  Tolerance: other (see comments) (PEG)  % Intake of Estimated Energy Needs: Other: PEG  % Meal Intake: NPO     Nutrition Risk     Level of Risk/Frequency of Follow-up: high 2 x week     Monitor and Evaluation     Food and Nutrient Intake: enteral nutrition intake  Food and Nutrient Adminstration: enteral and parenteral nutrition administration  Knowledge/Beliefs/Attitudes: beliefs and attitudes  Physical Activity and Function: nutrition-related ADLs and IADLs  Anthropometric Measurements: weight change  Biochemical Data, Medical Tests and Procedures: gastrointestinal profile, glucose/endocrine profile, other (specify), electrolyte and renal panel  Nutrition-Focused Physical Findings: overall appearance, skin      Nutrition Follow-Up  yes

## 2024-09-09 NOTE — NURSING
Spoke with Valarie Wyile [facility], she stated that admissions did not set up transportation and the floor nurse has to set up transport and she is currently busy but will call to set up transport within the next 30mins, charge nurse made aware, care ongoing

## 2024-09-09 NOTE — NURSING
Spoke with [facility] regarding [facility], she stated DON would be calling admissions personnel to check on transport

## 2024-09-09 NOTE — DISCHARGE INSTRUCTIONS
[facility]/Surg  Facility Transfer Orders        Admit to: SNF     Diagnoses:   Active Hospital Problems    Diagnosis  POA    *Encephalopathy, metabolic [G93.41]  Yes     Priority: 1 - High    UTI (urinary tract infection) [N39.0]  Yes     Priority: 1 - High    Long term (current) use of anticoagulants [Z79.01]  Not Applicable    Seizure [R56.9]  Yes    Functional quadriplegia [R53.2]  Yes    Asperger's disorder [F84.5]  Yes    PEG (percutaneous endoscopic gastrostomy) status [Z93.1]  Not Applicable    Essential hypertension, benign [I10]  Yes    CVA, old, hemiparesis [I69.359]  Not Applicable      Resolved Hospital Problems   No resolved problems to display.     Allergies: Review of patient's allergies indicates:  No Known Allergies    Code Status: full     Vitals: Routine       Diet:  PEG tube feeding   IDDSI Level 5 diet minced and moist foods.     Activity: Activity as tolerated    Nursing Precautions: Aspiration , Fall, and Pressure ulcer prevention    Bed/Surface: Low Air Loss    Consults: PT to evaluate and treat- 5 times a week, OT to evaluate and treat- 5 times a week, Wound Care, and Nutrition to evaluate and recommend diet    Oxygen: room air    Dialysis: Patient is not on dialysis.     Labs:   Pending Diagnostic Studies:       None          Imaging:     Miscellaneous Care:       IV Access:      Medications: Discontinue all previous medication orders, if any. See new list below.  Current Discharge Medication List        CONTINUE these medications which have NOT CHANGED    Details   amantadine HCL (SYMMETREL) 100 mg capsule 1 capsule (100 mg total) by Per G Tube route once daily.  Qty: 30 capsule, Refills: 11      amLODIPine (NORVASC) 5 MG tablet 1 tablet (5 mg total) by Per G Tube route once daily.  Qty: 30 tablet, Refills: 11    Comments: .      apixaban (ELIQUIS) 2.5 mg Tab 2.5 mg by Per G Tube route 2 (two) times daily. Tajes 9a abd 9p      atorvastatin (LIPITOR) 40 MG tablet 1 tablet  (40 mg total) by Per G Tube route once daily.  Qty: 90 tablet, Refills: 3      cloNIDine (CATAPRES) 0.1 MG tablet Take 1 tablet (0.1 mg total) by mouth 3 (three) times daily as needed (180).  Qty: 90 tablet, Refills: 0    Comments: .      food supplemt, lactose-reduced Liqd Take by mouth Daily. 9am      lactose-reduced food/fiber (JEVITY 1.5 ALEC ORAL) 70 mLs by Per G Tube route every hour. Daily starting at 10p and stopping at 6a. 70 ml an hour      levETIRAcetam (KEPPRA) 100 mg/mL Soln Take 10 mLs by mouth 2 (two) times daily. Takes 9a and 9p      modafiniL (PROVIGIL) 200 MG Tab 200 mg by Per G Tube route once daily. Takes at 0600      pantoprazole (PROTONIX) 40 MG tablet Take 40 mg by mouth once daily. Peg Tube daily at 8a      sucralfate (CARAFATE) 1 gram tablet 1 g by Per G Tube route 3 (three) times daily. Takes at 6a, 2p and 10p      valproic acid, as sodium salt, 500 mg/10 mL (10 mL) Soln Take 10 mLs (500 mg total) by mouth every 8 (eight) hours.  Qty: 480 mL, Refills: 9      FLUoxetine 20 MG capsule 1 capsule (20 mg total) by Per G Tube route once daily.  Qty: 30 capsule, Refills: 11      hydrALAZINE (APRESOLINE) 50 MG tablet Take 50 mg by mouth 3 (three) times daily. Takes at 6a, 2p and 10p      lisinopriL (PRINIVIL,ZESTRIL) 20 MG tablet Take 20 mg by mouth every evening. Takes at 8p. Hold til 3/1 at midnight      !! ondansetron (ZOFRAN) 4 mg/5 mL solution Take 5 mLs (4 mg total) by mouth 2 (two) times daily.  Qty: 100 mL, Refills: 0      !! ondansetron (ZOFRAN) 4 mg/5 mL solution 8 mg by Per G Tube route 2 (two) times daily as needed for Nausea.      polyethylene glycol (GLYCOLAX) 17 gram PwPk 17 g by Per G Tube route once daily. Takes at 9a       !! - Potential duplicate medications found. Please discuss with provider.        Follow up:    Follow-up Information       Elly Mathew DO. Schedule an appointment as soon as possible for a visit in 1 week(s).    Specialty: Family Medicine  Contact  information:  [address]  [facility] [address]  [phone]                               Immunizations Administered as of 9/9/2024       No immunizations on file.                Some patients may experience side effects after vaccination.  These may include fever, headache, muscle or joint aches.  Most symptoms resolve with 24-48 hours and do not require urgent medical evaluation unless they persist for more than 72 hours or symptoms are concerning for an unrelated medical condition.          Leydi Marcum MD

## 2024-09-09 NOTE — PLAN OF CARE
Problem: Adult Inpatient Plan of Care  Goal: Plan of Care Review  Outcome: Progressing     Problem: Adult Inpatient Plan of Care  Goal: Optimal Comfort and Wellbeing  Outcome: Progressing     Problem: Infection  Goal: Absence of Infection Signs and Symptoms  Outcome: Progressing     Problem: Skin Injury Risk Increased  Goal: Skin Health and Integrity  Outcome: Progressing     Problem: Adult Inpatient Plan of Care  Goal: Readiness for Transition of Care  Outcome: Progressing     Problem: Fall Injury Risk  Goal: Absence of Fall and Fall-Related Injury  Outcome: Progressing

## 2024-09-10 LAB — BACTERIA BLD CULT: NORMAL

## 2024-09-10 NOTE — ASSESSMENT & PLAN NOTE
Chronic, controlled. Latest blood pressure and vitals reviewed-     Temp:  [98.8 °F (37.1 °C)]   Pulse:  [72]   Resp:  [19]   BP: (121)/(85)   SpO2:  [96 %] .   Home meds for hypertension were reviewed and noted below.   Hypertension Medications               amLODIPine (NORVASC) 5 MG tablet 1 tablet (5 mg total) by Per G Tube route once daily.    cloNIDine (CATAPRES) 0.1 MG tablet Take 1 tablet (0.1 mg total) by mouth 3 (three) times daily as needed (180).    hydrALAZINE (APRESOLINE) 50 MG tablet Take 50 mg by mouth 3 (three) times daily. Takes at 6a, 2p and 10p                 While in the hospital, will manage blood pressure as follows; Continue home antihypertensive regimen except PRN clonidine    Will utilize p.r.n. blood pressure medication only if patient's blood pressure greater than 160/100 and he develops symptoms such as worsening chest pain or shortness of breath.

## 2024-09-10 NOTE — DISCHARGE SUMMARY
"[facility]  Discharge Summary      Patient Name: Andrea Leon Jr.  MRN: [id number]  GROVER: [id number]  Patient Class: OP- Observation  Admission Date: 9/5/2024  Hospital Length of Stay: 0 days  Discharge Date and Time: 9/9/2024  8:30 PM  Attending Physician: No att. providers found   Discharging Provider: Leydi Marcum MD  Primary Care Provider: Elly Mathew DO    Primary Care Team: Networked reference to record PCT     HPI:   Andrea Leon Jr. Is a 44 year old male with a previous medical history of CVA, long-term anticoagulant use on Eliquis, HTN, hemiparesis (left sided), functional quadriplegia, Peg tube, Asperger's syndrome, reoccurring UTI's, ESBL UTI, and seizures who was sent to the ED for altered mental status. Patient is a resident of [facility] and per ED report he became agitated, combative and hypersexual at 1300. At this time they were attempted to administer his midday medications and he swiped it out of the nurses hand. Per ED note he is normally not agitated or aggressive. His last known normal is unknown but on his med list he was administered his morning medications. Initial ED evaluation showed a head CT and chest xray with no acute process, flu and covid negative. CBC unremarkable. Creatinine 1.7 but near baseline of 1.6. Urine studies positive for infection and patient initiated on IV Merrem due to history of ESBL urosepsis. Attempted to obtain MRI but per MRI tech the patient is unable to follow commands and had overall body rigidity and thus was not a candidate for an MRI. Upon admit evaluation the patient is awake and alert with flat affect. He was calm and cooperative. He has his personal earplugs in and baby yoda stuffed animal under right arm. He was oriented to name and reported the year as "20 something". He followed all simple commands. There was a left sided facial droop but per chart review this was documented as a CVA " residual in 2021. Patient admitted by hospital medicine for further evaluation and management.     * No surgery found *      Hospital Course:   44-year-old male with history of CVA, long-term anticoagulant use on Eliquis, hypertension, left-sided hemiparesis, functional quadriplegia, Asperger's syndrome, recurrent UTIs, seizures admitted for altered mental status.  Found to have UTI.  Currently on IV meropenem awaiting urine culture.  Mental status improving with antibiotics.  Eventually urine culture shows ESBL Proteus sensitive to carbapenems.  Patient does not have urinary retention with serial bladder scan.  Patient has completed 5 days of IV meropenem.  Patient stable to transfer back to nursing home.  Mental status back to his baseline.     Goals of Care Treatment Preferences:  Code Status: Full Code    Living Will: Yes                 Consults:   Consults (From admission, onward)          Status Ordering Provider     Inpatient consult to Midline team  Once        Provider:  (Not yet assigned)    Completed LEN FIGUEROA     Inpatient consult to Registered Dietitian/Nutritionist  Once        Provider:  (Not yet assigned)    Completed AN LINTON            Neuro  * Encephalopathy, metabolic  Patient has acute metabolic encephalopathy that is secondary to Infective. Patient's current mental status is Awake, Alert, Confused. Patient's baseline mental status is unknown. There is documented left sided facial droop in 2021. Per report patient is usually cooperative. Evaluation and for underlying cause(s) is underway and inclusive of Blood Chemistries, TSH, Keppra and Valproic Acid level and Neuro-Imaging (MRI/CT) CT negative for any acute process and patient not a candiate for MRI due to inability to follow commands per MRI tech. Will monitor neuro checks carefully, avoid narcotics and benzos that will exacerbate agitation, and use PRN medications for controls of behavior for self harm.       Back to his  baseline      Asperger's disorder  Chronic condition    -Decrease environmental stimuli  -Allow patient to wear personal earplugs  -Monitor for symptoms of self harm or aggressive behavior and mitigate as needed with redirection or as last resort medications      Seizure  -seizure precautions  -Continue AEDs  -Valproic acid level pending  -Keppra level pending      Functional quadriplegia  Atrophy and contractures noted in all limbs but patient does have minimal movement in all limbs and able to follow commands    -Pressure injury prevention bundle in place  -Turn patient Q 2 hours  -Fall precautions        CVA, old, hemiparesis  Follows simple commands  Noted left sided facial droop but unsure of previous CVA residuals  Patient unable to participate in full neurological exam      Cardiac/Vascular  Essential hypertension, benign  Chronic, controlled. Latest blood pressure and vitals reviewed-     Temp:  [98.8 °F (37.1 °C)]   Pulse:  [72]   Resp:  [19]   BP: (121)/(85)   SpO2:  [96 %] .   Home meds for hypertension were reviewed and noted below.   Hypertension Medications               amLODIPine (NORVASC) 5 MG tablet 1 tablet (5 mg total) by Per G Tube route once daily.    cloNIDine (CATAPRES) 0.1 MG tablet Take 1 tablet (0.1 mg total) by mouth 3 (three) times daily as needed (180).    hydrALAZINE (APRESOLINE) 50 MG tablet Take 50 mg by mouth 3 (three) times daily. Takes at 6a, 2p and 10p                 While in the hospital, will manage blood pressure as follows; Continue home antihypertensive regimen except PRN clonidine    Will utilize p.r.n. blood pressure medication only if patient's blood pressure greater than 160/100 and he develops symptoms such as worsening chest pain or shortness of breath.    Renal/  UTI (urinary tract infection)  History of ESBL UTI.    -Contact precautions  -Urine culture pending  -IV merrem Q 8 hours    Follow up urine culture and blood culture    Postvoid bladder scan shows 70 cc.  "  Still pending urine culture      GI  PEG (percutaneous endoscopic gastrostomy) status  Patient noted to have a percutaneous endoscopic gastrostomy tube in place. I have personally inspected the tube.Tube was placed prior to this admission There are no signs of drainage or infection around the site. The tube is patent. Medications have converted to liquid form if available.  Routine care to be done by wound care and nursing staff.   PEG TUBE CARE THREE TIMES DAILY        Other  Long term (current) use of anticoagulants  CT of head negative for any acute process. Altered mental status likely secondary to UTI. Eliquis 2.5mg continued.       Final Active Diagnoses:    Diagnosis Date Noted POA    PRINCIPAL PROBLEM:  Encephalopathy, metabolic [G93.41] 09/05/2024 Yes    UTI (urinary tract infection) [N39.0] 09/05/2024 Yes    Long term (current) use of anticoagulants [Z79.01] 09/05/2024 Not Applicable    Seizure [R56.9] 10/25/2022 Yes    Functional quadriplegia [R53.2] 05/28/2021 Yes    Asperger's disorder [F84.5] 04/29/2021 Yes    PEG (percutaneous endoscopic gastrostomy) status [Z93.1] 04/10/2021 Not Applicable    Essential hypertension, benign [I10] 04/10/2021 Yes    CVA, old, hemiparesis [I69.359]  Not Applicable      Problems Resolved During this Admission:       Discharged Condition: stable    Disposition: Skilled Nursing Facility    Follow Up:   Follow-up Information       Elly Mathew DO. Schedule an appointment as soon as possible for a visit in 1 week(s).    Specialty: Family Medicine  Contact information:  [address] [place] [address] [id number] [phone]                           Patient Instructions:   No discharge procedures on file.    Significant Diagnostic Studies: [facility] for input(s): "NA", "K", "CL", "CO2", "GLU", "BUN", "CREATININE", "CALCIUM", "PROT", "ALBUMIN", "BILITOT", "ALKPHOS", "AST", "ALT", "ANIONGAP", "ESTGFRAFRICA", "EGFRNONAA" in the last 48 hours. and CBC " "No results for input(s): "WBC", "HGB", "HCT", "PLT" in the last 48 hours.    Pending Diagnostic Studies:       None           Medications:  Reconciled Home Medications:      Medication List        CHANGE how you take these medications      amLODIPine 5 MG tablet  Commonly known as: NORVASC  1 tablet (5 mg total) by Per G Tube route once daily.  What changed:   how much to take  additional instructions     atorvastatin 40 MG tablet  Commonly known as: LIPITOR  1 tablet (40 mg total) by Per G Tube route once daily.  What changed:   when to take this  additional instructions     cloNIDine 0.1 MG tablet  Commonly known as: CATAPRES  Take 1 tablet (0.1 mg total) by mouth 3 (three) times daily as needed (180).  What changed:   how to take this  when to take this  reasons to take this     valproic acid (as sodium salt) 500 mg/10 mL (10 mL) Soln  Take 10 mLs (500 mg total) by mouth every 8 (eight) hours.  What changed: additional instructions            CONTINUE taking these medications      amantadine  mg capsule  Commonly known as: SYMMETREL  1 capsule (100 mg total) by Per G Tube route once daily.     ELIQUIS 2.5 mg Tab  Generic drug: apixaban  2.5 mg by Per G Tube route 2 (two) times daily. Tajes 9a abd 9p     FLUoxetine 20 MG capsule  1 capsule (20 mg total) by Per G Tube route once daily.     food supplemt, lactose-reduced Liqd  Take by mouth Daily. 9am     hydrALAZINE 50 MG tablet  Commonly known as: APRESOLINE  Take 50 mg by mouth 3 (three) times daily. Takes at 6a, 2p and 10p     JEVITY 1.5 ALEC ORAL  70 mLs by Per G Tube route every hour. Daily starting at 10p and stopping at 6a. 70 ml an hour     levETIRAcetam 100 mg/mL Soln  Commonly known as: KEPPRA  Take 10 mLs by mouth 2 (two) times daily. Takes 9a and 9p     lisinopriL 20 MG tablet  Commonly known as: PRINIVIL,ZESTRIL  Take 20 mg by mouth every evening. Takes at 8p. Hold til 3/1 at midnight     modafiniL 200 MG Tab  Commonly known as: PROVIGIL  200 mg " by Per G Tube route once daily. Takes at 0600     * ondansetron 4 mg/5 mL solution  Commonly known as: ZOFRAN  8 mg by Per G Tube route 2 (two) times daily as needed for Nausea.     * ondansetron 4 mg/5 mL solution  Commonly known as: ZOFRAN  Take 5 mLs (4 mg total) by mouth 2 (two) times daily.     pantoprazole 40 MG tablet  Commonly known as: PROTONIX  Take 40 mg by mouth once daily. Peg Tube daily at 8a     polyethylene glycol 17 gram Pwpk  Commonly known as: GLYCOLAX  17 g by Per G Tube route once daily. Takes at 9a     sucralfate 1 gram tablet  Commonly known as: CARAFATE  1 g by Per G Tube route 3 (three) times daily. Takes at 6a, 2p and 10p           * This list has 2 medication(s) that are the same as other medications prescribed for you. Read the directions carefully, and ask your doctor or other care provider to review them with you.                Microbiology Results (last 7 days)       Procedure Component Value Units Date/Time    Urine culture [[id number]]  (Abnormal)  (Susceptibility) Collected: 09/05/24 1738    Order Status: Completed Specimen: Urine Updated: 09/08/24 0746     Urine Culture, Routine PROTEUS MIRABILIS ESBL  > 100,000 cfu/ml  Known ESBL patient      Narrative:      Specimen Source->Urine    Blood culture x two cultures. Draw prior to antibiotics. [[id number]] Collected: 09/05/24 1625    Order Status: Canceled Specimen: Blood from Peripheral, Wrist, Right     Influenza A & B by Molecular [[id number]] Collected: 09/05/24 1528    Order Status: Completed Specimen: Nasopharyngeal Swab Updated: 09/05/24 1605     Influenza A, Molecular Negative     Influenza B, Molecular Negative     Flu A & B Source Nasal swab            X-Ray Chest AP Portable    Result Date: 9/5/2024  EXAMINATION: XR CHEST AP PORTABLE CLINICAL HISTORY: Sepsis; TECHNIQUE: Single frontal view of the chest was performed. COMPARISON: 12/01/2023 FINDINGS: The cardiomediastinal silhouette is within normal limits.  The lungs are  well expanded without consolidation or pleural effusion.     Negative chest.  No significant change. Electronically signed by: Harshal Casey MD Date:    09/05/2024 Time:    17:16    CT Head Without Contrast    Result Date: 9/5/2024  EXAMINATION: CT HEAD WITHOUT CONTRAST CLINICAL HISTORY: Mental status change, unknown cause; TECHNIQUE: Low dose axial images were obtained through the head.  Coronal and sagittal reformations were also performed. Contrast was not administered. COMPARISON: 01/03/2023 FINDINGS: There is mild dilatation of ventricles, sulci, fissures. There is decreased density in white matter suggesting microvascular ischemic change. There is no acute intracranial hemorrhage.  There is no intracranial mass effect.  There is no acute major vascular territory infarct. Note is made that MRI is typically more sensitive than CT particularly for detection of early or small nonhemorrhagic infarcts. The calvarium appears intact.  Visualized paranasal sinuses appear clear of air-fluid levels.  Small polypoid structure right maxillary sinus suggesting retention cyst.  Mastoids  well pneumatized     No acute intracranial findings Electronically signed by: Yanna Mansfield MD Date:    09/05/2024 Time:    16:53  - pulls last radiology orders    Indwelling Lines/Drains at time of discharge:   Lines/Drains/Airways       Drain  Duration                  Gastrostomy/Enterostomy Gastrostomy tube w/ balloon LUQ -- days                    Time spent on the discharge of patient: 35 minutes         Leydi Marcum MD  Department of Hospital Medicine  [place]/Surg

## 2024-09-11 LAB
OHS QRS DURATION: 84 MS
OHS QTC CALCULATION: 458 MS

## 2024-11-26 ENCOUNTER — HOSPITAL ENCOUNTER (EMERGENCY)
Facility: HOSPITAL | Age: 44
Discharge: NURSING, REHAB OR LONG-TERM CARE | End: 2024-11-26
Attending: STUDENT IN AN ORGANIZED HEALTH CARE EDUCATION/TRAINING PROGRAM
Payer: MEDICAID

## 2024-11-26 VITALS
TEMPERATURE: 98 F | WEIGHT: 177 LBS | SYSTOLIC BLOOD PRESSURE: 103 MMHG | RESPIRATION RATE: 18 BRPM | HEART RATE: 51 BPM | DIASTOLIC BLOOD PRESSURE: 72 MMHG | OXYGEN SATURATION: 99 % | HEIGHT: 69 IN | BODY MASS INDEX: 26.22 KG/M2

## 2024-11-26 DIAGNOSIS — K94.23 PEG TUBE MALFUNCTION: ICD-10-CM

## 2024-11-26 LAB
HCV AB SERPL QL IA: NEGATIVE
HIV 1+2 AB+HIV1 P24 AG SERPL QL IA: NEGATIVE

## 2024-11-26 PROCEDURE — 36415 COLL VENOUS BLD VENIPUNCTURE: CPT | Performed by: STUDENT IN AN ORGANIZED HEALTH CARE EDUCATION/TRAINING PROGRAM

## 2024-11-26 PROCEDURE — 43762 RPLC GTUBE NO REVJ TRC: CPT

## 2024-11-26 PROCEDURE — 99285 EMERGENCY DEPT VISIT HI MDM: CPT | Mod: 25

## 2024-11-26 PROCEDURE — 87389 HIV-1 AG W/HIV-1&-2 AB AG IA: CPT | Performed by: STUDENT IN AN ORGANIZED HEALTH CARE EDUCATION/TRAINING PROGRAM

## 2024-11-26 PROCEDURE — 86803 HEPATITIS C AB TEST: CPT | Performed by: STUDENT IN AN ORGANIZED HEALTH CARE EDUCATION/TRAINING PROGRAM

## 2024-11-26 RX ORDER — DIATRIZOATE MEGLUMINE AND DIATRIZOATE SODIUM 660; 100 MG/ML; MG/ML
SOLUTION ORAL; RECTAL
Status: DISCONTINUED
Start: 2024-11-26 | End: 2024-11-26 | Stop reason: HOSPADM

## 2024-11-27 NOTE — ED PROVIDER NOTES
Encounter Date: 11/26/2024       History     Chief Complaint   Patient presents with    PEG tube out     18 FR, mckeon in place now     44-year-old male multiple comorbidities presents for evaluation of G-tube dislodgement.  Replaced with Mckeon prior to arrival by facility.    The history is provided by the patient.     Review of patient's allergies indicates:  No Known Allergies  Past Medical History:   Diagnosis Date    Anticoagulant long-term use     baby asa per chart    Asperger's syndrome     CVA, old, hemiparesis     Encounter for blood transfusion     Essential hypertension, malignant     Functional quadriplegia 5/28/2021    PEG (percutaneous endoscopic gastrostomy) status      Past Surgical History:   Procedure Laterality Date    COLONOSCOPY N/A 6/3/2021    Procedure: COLONOSCOPY;  Surgeon: Rosario Meeks MD;  Location: [facility];  Service: Endoscopy;  Laterality: N/A;    ESOPHAGOGASTRODUODENOSCOPY N/A 7/10/2021    Procedure: EGD (ESOPHAGOGASTRODUODENOSCOPY);  Surgeon: Rosario Meeks MD;  Location: [facility];  Service: Endoscopy;  Laterality: N/A;    ESOPHAGOGASTRODUODENOSCOPY W/ PEG N/A 4/1/2021    Procedure: EGD, WITH PEG TUBE INSERTION;  Surgeon: En Zheng MD;  Location: [facility];  Service: Endoscopy;  Laterality: N/A;     No family history on file.  Social History     Tobacco Use    Smoking status: Never    Smokeless tobacco: Never   Substance Use Topics    Alcohol use: Not Currently    Drug use: Not Currently     Review of Systems   All other systems reviewed and are negative.      Physical Exam     Initial Vitals [11/26/24 1648]   BP Pulse Resp Temp SpO2   124/77 (!) 50 18 97.7 °F (36.5 °C) 96 %      MAP       --         Physical Exam    Nursing note and vitals reviewed.  Constitutional: Vital signs are normal.  Non-toxic appearance. No distress.   HENT:   Head: Normocephalic.   Eyes: No scleral icterus.   Cardiovascular:            Heart rate 51   Pulmonary/Chest: No stridor. No  respiratory distress.   Bilateral chest rise   Abdominal: There is no guarding.   Musculoskeletal:         General: No tenderness.      Cervical back: No rigidity.     Neurological: He is alert.   Skin: Skin is warm and dry. No rash noted.   Moran in place upper abdominal wall minimal blood around Moran, no pulsatile bleeding, no erythema or purulence   Psychiatric: His speech is normal. He is not actively hallucinating.   Not anxious  or agitated         ED Course   Feeding Tube    Date/Time: 11/26/2024 4:42 PM  Location procedure was performed: [facility] EMERGENCY DEPARTMENT    Performed by: Michael Elkins Jr., DO  Authorized by: Michael Elkins Jr., DO  Consent: Verbal consent obtained.  Risks and benefits: risks, benefits and alternatives were discussed  Consent given by: patient  Indications: tube dislodged  Tube type: gastrostomy  Patient position: supine  Procedure type: replacement  Tube size: 18 Fr  Bulb inflation fluid: sterile water  Placement/position confirmation: x-ray  Tube placement difficulty: none  Patient tolerance: patient tolerated the procedure well with no immediate complications  Comments: 20 mL sterile water placed        Labs Reviewed   HEPATITIS C ANTIBODY       Result Value    Hepatitis C Ab Negative      Narrative:     Release to patient->Immediate   HIV 1 / 2 ANTIBODY    HIV 1/2 Ag/Ab Negative      Narrative:     Release to patient->Immediate          Imaging Results              XR NG/OG tube placement check, non-radiologist performed (Final result)  Result time 11/26/24 18:49:03   Procedure changed from X-Ray Abdomen Portable     Final result by Alex La DO (11/26/24 18:49:03)                   Impression:      Gastrostomy tube as above      Electronically signed by: Alex La  Date:    11/26/2024  Time:    18:49               Narrative:    EXAMINATION:  XR NG/OG TUBE PLACEMENT CHECK, NON-RADIOLOGIST PERFORMED    CLINICAL HISTORY:  PEG tube insertion;PEG TUBE CHECK;   Gastrostomy malfunction    TECHNIQUE:  AP radiograph of the abdomen was performed after the installation of oral contrast through the gastrostomy tube.    COMPARISON:  11/23/2023.    FINDINGS:  There is a gastrostomy tube in place.  There is contrast in the stomach and the proximal duodenum.  There is no evidence of extraluminal contrast to suggest a leak.  Partially imaged bowel gas pattern is nonspecific.  Lung bases are clear.  Osseous structures are intact.                                       Medications   diatrizoate meglumineand-diatrizoate sodium (GASTROVIEW) 66-10 % solution (has no administration in time range)     Medical Decision Making  44-year-old male presents for evaluation of PEG tube displacement.  Moran in place on arrival, 18 [place]ian PEG tube replaced after verbal consent.  Radiographs obtained confirms appropriate position, patient discharged back to facility no evidence of superimposed infection.  Patient voiced understanding and agrees with plan    Amount and/or Complexity of Data Reviewed  Labs: ordered.  Radiology: ordered.                                      Clinical Impression:  Final diagnoses:  [K94.23] PEG tube malfunction          ED Disposition Condition    Discharge Stable          ED Prescriptions    None       Follow-up Information    None          Michael Elkins Jr., DO  11/26/24 5525

## 2025-05-13 ENCOUNTER — HOSPITAL ENCOUNTER (EMERGENCY)
Facility: HOSPITAL | Age: 45
Discharge: HOME | End: 2025-05-13
Attending: EMERGENCY MEDICINE
Payer: MEDICAID

## 2025-05-13 VITALS
BODY MASS INDEX: 26.14 KG/M2 | SYSTOLIC BLOOD PRESSURE: 122 MMHG | TEMPERATURE: 99 F | DIASTOLIC BLOOD PRESSURE: 80 MMHG | OXYGEN SATURATION: 98 % | HEART RATE: 62 BPM | RESPIRATION RATE: 18 BRPM | WEIGHT: 177 LBS

## 2025-05-13 DIAGNOSIS — Z93.1 S/P PERCUTANEOUS ENDOSCOPIC GASTROSTOMY (PEG) TUBE PLACEMENT: ICD-10-CM

## 2025-05-13 PROCEDURE — 99283 EMERGENCY DEPT VISIT LOW MDM: CPT | Mod: 25

## 2025-05-13 PROCEDURE — 43762 RPLC GTUBE NO REVJ TRC: CPT

## 2025-05-13 RX ORDER — DIATRIZOATE MEGLUMINE AND DIATRIZOATE SODIUM 660; 100 MG/ML; MG/ML
30 SOLUTION ORAL; RECTAL
Status: DISCONTINUED | OUTPATIENT
Start: 2025-05-13 | End: 2025-05-13 | Stop reason: HOSPADM

## 2025-05-14 NOTE — ED PROVIDER NOTES
Encounter Date: 5/13/2025       History   No chief complaint on file.    44-year-old male multiple comorbidities presents for evaluation of G-tube dislodgement.  Replaced with Moran prior to arrival by facility. No other complaints.         The history is provided by the patient. No  was used.     Review of patient's allergies indicates:  No Known Allergies  Past Medical History:   Diagnosis Date    Anticoagulant long-term use     baby asa per chart    Asperger's syndrome     CVA, old, hemiparesis     Encounter for blood transfusion     Essential hypertension, malignant     Functional quadriplegia 5/28/2021    PEG (percutaneous endoscopic gastrostomy) status      Past Surgical History:   Procedure Laterality Date    COLONOSCOPY N/A 6/3/2021    Procedure: COLONOSCOPY;  Surgeon: Rosario Meeks MD;  Location: [facility];  Service: Endoscopy;  Laterality: N/A;    ESOPHAGOGASTRODUODENOSCOPY N/A 7/10/2021    Procedure: EGD (ESOPHAGOGASTRODUODENOSCOPY);  Surgeon: Rosario Meeks MD;  Location: [facility];  Service: Endoscopy;  Laterality: N/A;    ESOPHAGOGASTRODUODENOSCOPY W/ PEG N/A 4/1/2021    Procedure: EGD, WITH PEG TUBE INSERTION;  Surgeon: En Zheng MD;  Location: [facility];  Service: Endoscopy;  Laterality: N/A;     No family history on file.  Social History[1]  Review of Systems    Physical Exam     Initial Vitals [05/13/25 1658]   BP Pulse Resp Temp SpO2   126/82 (!) 59 16 98.3 °F (36.8 °C) 98 %      MAP       --         Physical Exam    Nursing note and vitals reviewed.  Constitutional: He is not diaphoretic. No distress.   Chronically ill-appearing   HENT:   Head: Normocephalic and atraumatic. Mouth/Throat: Oropharynx is clear and moist. No oropharyngeal exudate.   Dry mucous membranes   Eyes: Conjunctivae and EOM are normal. Pupils are equal, round, and reactive to light.   Neck: Neck supple. No tracheal deviation present.   Cardiovascular:  Normal rate, regular rhythm, normal  heart sounds and intact distal pulses.           No murmur heard.  Pulmonary/Chest: Breath sounds normal. No stridor. No respiratory distress. He has no wheezes. He has no rhonchi. He has no rales.   Abdominal: Abdomen is soft. Bowel sounds are normal. He exhibits no distension. There is no abdominal tenderness.   Moran present in the left upper quadrant. There is no rebound and no guarding.   Musculoskeletal:         General: No tenderness or edema. Normal range of motion.      Cervical back: Neck supple.     Neurological: He is alert. He has normal strength. No sensory deficit.   Able to tell me his name, speech is thick and dysarthric.  Patient is functional quadriplegia.  Patient is contracted at the wrist bilaterally.  Spasticity in the lower extremities and upper extremities.        Skin: Skin is warm and dry. Capillary refill takes less than 2 seconds. No rash noted. No erythema. No pallor.         ED Course   Feeding Tube    Date/Time: 5/13/2025 8:08 PM  Location procedure was performed: [facility] EMERGENCY DEPARTMENT    Performed by: Urbano Damon MD  Authorized by: Urbano Damon MD  Consent: Verbal consent obtained  Risks and benefits: risks, benefits and alternatives were discussed  Consent given by: patient  Patient identity confirmed: verbally with patient  Indications: tube dislodged and tube malfunction    Sedation:  Patient sedated: no    Local anesthesia used: no    Anesthesia:  Local anesthesia used: no  Tube type: gastrostomy  Patient position: supine        Labs Reviewed - No data to display       Imaging Results              X-Ray KUB (In process)    Procedure changed from XR NG/OG tube placement check, non-radiologist performed                    Medications   diatrizoate meglumineand-diatrizoate sodium (GASTROVIEW) solution 30 mL (has no administration in time range)     Medical Decision Making  Presentation consistent with G Tube dislodgement, without evidence of infection at ostomy  site.  ED Interventions: Feeding tube reinsertion  A post-placement x-ray was obtained by injecting contrast into the tube with subsequent film exposure. This x-ray demonstrated appropriate tube placement.  Disposition: DC home with recommendations for expedited PCP and GI follow up.  SRP discussed.      Amount and/or Complexity of Data Reviewed  Radiology: ordered.    Risk  Prescription drug management.               ED Course as of 05/13/25 2113   Tue May 13, 2025   1954 X-ray appears feeding tube is in appropriate position. [BD]      ED Course User Index  [BD] Urbano Damon MD                           Clinical Impression:  Final diagnoses:  [Z93.1] S/P percutaneous endoscopic gastrostomy (PEG) tube placement          ED Disposition Condition    Discharge Stable          ED Prescriptions    None       Follow-up Information    None            [1]   Social History  Tobacco Use    Smoking status: Never    Smokeless tobacco: Never   Substance Use Topics    Alcohol use: Not Currently    Drug use: Not Currently        Urbano Damon MD  05/13/25 2113

## 2025-05-27 ENCOUNTER — HOSPITAL ENCOUNTER (EMERGENCY)
Facility: HOSPITAL | Age: 45
Discharge: HOME | End: 2025-05-27
Attending: EMERGENCY MEDICINE
Payer: MEDICAID

## 2025-05-27 VITALS
BODY MASS INDEX: 26.22 KG/M2 | HEIGHT: 69 IN | TEMPERATURE: 98 F | WEIGHT: 177 LBS | HEART RATE: 65 BPM | RESPIRATION RATE: 18 BRPM | OXYGEN SATURATION: 98 % | DIASTOLIC BLOOD PRESSURE: 87 MMHG | SYSTOLIC BLOOD PRESSURE: 160 MMHG
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
## 2025-05-27 DIAGNOSIS — S20.229A CONTUSION OF BACK, UNSPECIFIED LATERALITY, INITIAL ENCOUNTER: ICD-10-CM

## 2025-05-27 PROCEDURE — 99285 EMERGENCY DEPT VISIT HI MDM: CPT | Mod: 25
